# Patient Record
Sex: MALE | Race: WHITE | NOT HISPANIC OR LATINO | Employment: OTHER | URBAN - METROPOLITAN AREA
[De-identification: names, ages, dates, MRNs, and addresses within clinical notes are randomized per-mention and may not be internally consistent; named-entity substitution may affect disease eponyms.]

---

## 2017-01-25 ENCOUNTER — ALLSCRIPTS OFFICE VISIT (OUTPATIENT)
Dept: OTHER | Facility: OTHER | Age: 62
End: 2017-01-25

## 2017-01-25 DIAGNOSIS — G47.30 SLEEP APNEA: ICD-10-CM

## 2017-01-25 DIAGNOSIS — R79.9 ABNORMAL FINDING OF BLOOD CHEMISTRY: ICD-10-CM

## 2017-01-25 DIAGNOSIS — I10 ESSENTIAL (PRIMARY) HYPERTENSION: ICD-10-CM

## 2017-01-25 DIAGNOSIS — K22.70 BARRETT'S ESOPHAGUS WITHOUT DYSPLASIA: ICD-10-CM

## 2017-01-25 DIAGNOSIS — Z12.11 ENCOUNTER FOR SCREENING FOR MALIGNANT NEOPLASM OF COLON: ICD-10-CM

## 2017-02-03 ENCOUNTER — TRANSCRIBE ORDERS (OUTPATIENT)
Dept: ADMINISTRATIVE | Facility: HOSPITAL | Age: 62
End: 2017-02-03

## 2017-02-03 ENCOUNTER — APPOINTMENT (OUTPATIENT)
Dept: LAB | Facility: HOSPITAL | Age: 62
End: 2017-02-03
Attending: INTERNAL MEDICINE
Payer: MEDICARE

## 2017-02-03 DIAGNOSIS — Z12.11 SPECIAL SCREENING FOR MALIGNANT NEOPLASMS, COLON: Primary | ICD-10-CM

## 2017-02-03 DIAGNOSIS — Z12.11 ENCOUNTER FOR SCREENING FOR MALIGNANT NEOPLASM OF COLON: ICD-10-CM

## 2017-02-03 DIAGNOSIS — G47.30 SLEEP APNEA: ICD-10-CM

## 2017-02-03 DIAGNOSIS — K22.70 BARRETT'S ESOPHAGUS WITHOUT DYSPLASIA: ICD-10-CM

## 2017-02-03 DIAGNOSIS — I10 ESSENTIAL (PRIMARY) HYPERTENSION: ICD-10-CM

## 2017-02-03 DIAGNOSIS — R79.9 ABNORMAL FINDING OF BLOOD CHEMISTRY: ICD-10-CM

## 2017-02-03 DIAGNOSIS — R80.9 PROTEINURIA: ICD-10-CM

## 2017-02-03 LAB
ALBUMIN SERPL BCP-MCNC: 3.5 G/DL (ref 3.5–5)
ALP SERPL-CCNC: 129 U/L (ref 46–116)
ALT SERPL W P-5'-P-CCNC: 23 U/L (ref 12–78)
ANION GAP SERPL CALCULATED.3IONS-SCNC: 5 MMOL/L (ref 4–13)
AST SERPL W P-5'-P-CCNC: 15 U/L (ref 5–45)
BACTERIA UR QL AUTO: ABNORMAL /HPF
BASOPHILS # BLD AUTO: 0.03 THOUSANDS/ΜL (ref 0–0.1)
BASOPHILS NFR BLD AUTO: 1 % (ref 0–1)
BILIRUB SERPL-MCNC: 0.6 MG/DL (ref 0.2–1)
BILIRUB UR QL STRIP: NEGATIVE
BUN SERPL-MCNC: 13 MG/DL (ref 5–25)
CALCIUM SERPL-MCNC: 8.5 MG/DL (ref 8.3–10.1)
CHLORIDE SERPL-SCNC: 107 MMOL/L (ref 100–108)
CHOLEST SERPL-MCNC: 164 MG/DL (ref 50–200)
CLARITY UR: CLEAR
CO2 SERPL-SCNC: 31 MMOL/L (ref 21–32)
COLOR UR: YELLOW
CREAT SERPL-MCNC: 0.81 MG/DL (ref 0.6–1.3)
CREAT UR-MCNC: 263 MG/DL
CREAT UR-MCNC: 271 MG/DL
EOSINOPHIL # BLD AUTO: 0.21 THOUSAND/ΜL (ref 0–0.61)
EOSINOPHIL NFR BLD AUTO: 4 % (ref 0–6)
ERYTHROCYTE [DISTWIDTH] IN BLOOD BY AUTOMATED COUNT: 12.2 % (ref 11.6–15.1)
EST. AVERAGE GLUCOSE BLD GHB EST-MCNC: 105 MG/DL
GFR SERPL CREATININE-BSD FRML MDRD: >60 ML/MIN/1.73SQ M
GLUCOSE SERPL-MCNC: 110 MG/DL (ref 65–140)
GLUCOSE UR STRIP-MCNC: NEGATIVE MG/DL
HBA1C MFR BLD: 5.3 % (ref 4.2–6.3)
HCT VFR BLD AUTO: 42.2 % (ref 36.5–49.3)
HDLC SERPL-MCNC: 34 MG/DL (ref 40–60)
HGB BLD-MCNC: 14.2 G/DL (ref 12–17)
HGB UR QL STRIP.AUTO: ABNORMAL
KETONES UR STRIP-MCNC: NEGATIVE MG/DL
LDLC SERPL CALC-MCNC: 96 MG/DL (ref 0–100)
LEUKOCYTE ESTERASE UR QL STRIP: NEGATIVE
LYMPHOCYTES # BLD AUTO: 1.5 THOUSANDS/ΜL (ref 0.6–4.47)
LYMPHOCYTES NFR BLD AUTO: 27 % (ref 14–44)
MCH RBC QN AUTO: 28.1 PG (ref 26.8–34.3)
MCHC RBC AUTO-ENTMCNC: 33.6 G/DL (ref 31.4–37.4)
MCV RBC AUTO: 83 FL (ref 82–98)
MICROALBUMIN UR-MCNC: 789 MG/L (ref 0–20)
MICROALBUMIN/CREAT 24H UR: 291 MG/G CREATININE (ref 0–30)
MONOCYTES # BLD AUTO: 0.42 THOUSAND/ΜL (ref 0.17–1.22)
MONOCYTES NFR BLD AUTO: 8 % (ref 4–12)
NEUTROPHILS # BLD AUTO: 3.42 THOUSANDS/ΜL (ref 1.85–7.62)
NEUTS SEG NFR BLD AUTO: 61 % (ref 43–75)
NITRITE UR QL STRIP: NEGATIVE
NON-SQ EPI CELLS URNS QL MICRO: ABNORMAL /HPF
NRBC BLD AUTO-RTO: 0 /100 WBCS
PH UR STRIP.AUTO: 5.5 [PH] (ref 4.5–8)
PLATELET # BLD AUTO: 217 THOUSANDS/UL (ref 149–390)
PMV BLD AUTO: 10.8 FL (ref 8.9–12.7)
POTASSIUM SERPL-SCNC: 3.6 MMOL/L (ref 3.5–5.3)
PROT SERPL-MCNC: 6.6 G/DL (ref 6.4–8.2)
PROT UR STRIP-MCNC: ABNORMAL MG/DL
PROT UR-MCNC: 115 MG/DL
PROT/CREAT UR: 0.44 MG/G{CREAT} (ref 0–0.1)
RBC # BLD AUTO: 5.06 MILLION/UL (ref 3.88–5.62)
RBC #/AREA URNS AUTO: ABNORMAL /HPF
SODIUM SERPL-SCNC: 143 MMOL/L (ref 136–145)
SP GR UR STRIP.AUTO: >=1.03 (ref 1–1.03)
T4 FREE SERPL-MCNC: 0.78 NG/DL (ref 0.76–1.46)
TRIGL SERPL-MCNC: 170 MG/DL
TSH SERPL DL<=0.05 MIU/L-ACNC: 0.95 UIU/ML (ref 0.36–3.74)
UROBILINOGEN UR QL STRIP.AUTO: 0.2 E.U./DL
WBC # BLD AUTO: 5.59 THOUSAND/UL (ref 4.31–10.16)
WBC #/AREA URNS AUTO: ABNORMAL /HPF

## 2017-02-03 PROCEDURE — 80061 LIPID PANEL: CPT

## 2017-02-03 PROCEDURE — 84156 ASSAY OF PROTEIN URINE: CPT

## 2017-02-03 PROCEDURE — 85025 COMPLETE CBC W/AUTO DIFF WBC: CPT

## 2017-02-03 PROCEDURE — 82570 ASSAY OF URINE CREATININE: CPT | Performed by: FAMILY MEDICINE

## 2017-02-03 PROCEDURE — 84439 ASSAY OF FREE THYROXINE: CPT

## 2017-02-03 PROCEDURE — 82043 UR ALBUMIN QUANTITATIVE: CPT | Performed by: FAMILY MEDICINE

## 2017-02-03 PROCEDURE — 36415 COLL VENOUS BLD VENIPUNCTURE: CPT

## 2017-02-03 PROCEDURE — 83036 HEMOGLOBIN GLYCOSYLATED A1C: CPT

## 2017-02-03 PROCEDURE — 87086 URINE CULTURE/COLONY COUNT: CPT

## 2017-02-03 PROCEDURE — 81001 URINALYSIS AUTO W/SCOPE: CPT

## 2017-02-03 PROCEDURE — 84443 ASSAY THYROID STIM HORMONE: CPT

## 2017-02-03 PROCEDURE — 80053 COMPREHEN METABOLIC PANEL: CPT

## 2017-02-04 LAB — BACTERIA UR CULT: NORMAL

## 2017-02-05 ENCOUNTER — GENERIC CONVERSION - ENCOUNTER (OUTPATIENT)
Dept: OTHER | Facility: OTHER | Age: 62
End: 2017-02-05

## 2017-02-06 ENCOUNTER — TRANSCRIBE ORDERS (OUTPATIENT)
Dept: ADMINISTRATIVE | Facility: HOSPITAL | Age: 62
End: 2017-02-06

## 2017-02-06 ENCOUNTER — APPOINTMENT (OUTPATIENT)
Dept: LAB | Facility: HOSPITAL | Age: 62
End: 2017-02-06
Payer: MEDICARE

## 2017-02-06 DIAGNOSIS — Z12.11 ENCOUNTER FOR SCREENING FOR MALIGNANT NEOPLASM OF COLON: ICD-10-CM

## 2017-02-06 LAB — HEMOCCULT STL QL IA: POSITIVE

## 2017-02-06 PROCEDURE — G0328 FECAL BLOOD SCRN IMMUNOASSAY: HCPCS

## 2017-02-08 ENCOUNTER — GENERIC CONVERSION - ENCOUNTER (OUTPATIENT)
Dept: OTHER | Facility: OTHER | Age: 62
End: 2017-02-08

## 2017-02-16 ENCOUNTER — ALLSCRIPTS OFFICE VISIT (OUTPATIENT)
Dept: OTHER | Facility: OTHER | Age: 62
End: 2017-02-16

## 2017-02-27 ENCOUNTER — APPOINTMENT (OUTPATIENT)
Dept: LAB | Facility: HOSPITAL | Age: 62
End: 2017-02-27
Attending: INTERNAL MEDICINE
Payer: MEDICARE

## 2017-02-27 ENCOUNTER — TRANSCRIBE ORDERS (OUTPATIENT)
Dept: ADMINISTRATIVE | Facility: HOSPITAL | Age: 62
End: 2017-02-27

## 2017-02-27 DIAGNOSIS — R80.9 PROTEINURIA: Primary | ICD-10-CM

## 2017-02-27 DIAGNOSIS — R80.9 PROTEINURIA: ICD-10-CM

## 2017-02-27 PROCEDURE — 84166 PROTEIN E-PHORESIS/URINE/CSF: CPT | Performed by: INTERNAL MEDICINE

## 2017-02-27 PROCEDURE — 36415 COLL VENOUS BLD VENIPUNCTURE: CPT

## 2017-02-27 PROCEDURE — 84165 PROTEIN E-PHORESIS SERUM: CPT

## 2017-02-28 ENCOUNTER — ALLSCRIPTS OFFICE VISIT (OUTPATIENT)
Dept: OTHER | Facility: OTHER | Age: 62
End: 2017-02-28

## 2017-03-01 LAB
ALBUMIN SERPL ELPH-MCNC: 4.21 G/DL (ref 3.5–5)
ALBUMIN SERPL ELPH-MCNC: 64.8 % (ref 52–65)
ALBUMIN UR ELPH-MCNC: 80.2 %
ALPHA1 GLOB MFR UR ELPH: 2 %
ALPHA1 GLOB SERPL ELPH-MCNC: 0.33 G/DL (ref 0.1–0.4)
ALPHA1 GLOB SERPL ELPH-MCNC: 5.1 % (ref 2.5–5)
ALPHA2 GLOB MFR UR ELPH: 4.8 %
ALPHA2 GLOB SERPL ELPH-MCNC: 0.64 G/DL (ref 0.4–1.2)
ALPHA2 GLOB SERPL ELPH-MCNC: 9.9 % (ref 7–13)
B-GLOBULIN MFR UR ELPH: 6.7 %
BETA GLOB ABNORMAL SERPL ELPH-MCNC: 0.46 G/DL (ref 0.4–0.8)
BETA1 GLOB SERPL ELPH-MCNC: 7.1 % (ref 5–13)
BETA2 GLOB SERPL ELPH-MCNC: 4.1 % (ref 2–8)
BETA2+GAMMA GLOB SERPL ELPH-MCNC: 0.27 G/DL (ref 0.2–0.5)
GAMMA GLOB ABNORMAL SERPL ELPH-MCNC: 0.59 G/DL (ref 0.5–1.6)
GAMMA GLOB MFR UR ELPH: 6.3 %
GAMMA GLOB SERPL ELPH-MCNC: 9 % (ref 12–22)
IGG/ALB SER: 1.84 {RATIO} (ref 1.1–1.8)
PROT PATTERN SERPL ELPH-IMP: ABNORMAL
PROT PATTERN UR ELPH-IMP: ABNORMAL
PROT SERPL-MCNC: 6.5 G/DL (ref 6.4–8.2)
PROT UR-MCNC: 79 MG/DL

## 2017-03-09 ENCOUNTER — GENERIC CONVERSION - ENCOUNTER (OUTPATIENT)
Dept: OTHER | Facility: OTHER | Age: 62
End: 2017-03-09

## 2017-03-15 ENCOUNTER — ALLSCRIPTS OFFICE VISIT (OUTPATIENT)
Dept: OTHER | Facility: OTHER | Age: 62
End: 2017-03-15

## 2017-04-27 ENCOUNTER — ALLSCRIPTS OFFICE VISIT (OUTPATIENT)
Dept: OTHER | Facility: OTHER | Age: 62
End: 2017-04-27

## 2017-05-05 ENCOUNTER — GENERIC CONVERSION - ENCOUNTER (OUTPATIENT)
Dept: OTHER | Facility: OTHER | Age: 62
End: 2017-05-05

## 2017-05-11 ENCOUNTER — GENERIC CONVERSION - ENCOUNTER (OUTPATIENT)
Dept: OTHER | Facility: OTHER | Age: 62
End: 2017-05-11

## 2017-05-31 ENCOUNTER — GENERIC CONVERSION - ENCOUNTER (OUTPATIENT)
Dept: OTHER | Facility: OTHER | Age: 62
End: 2017-05-31

## 2017-06-28 ENCOUNTER — ALLSCRIPTS OFFICE VISIT (OUTPATIENT)
Dept: OTHER | Facility: OTHER | Age: 62
End: 2017-06-28

## 2017-06-28 DIAGNOSIS — E11.9 TYPE 2 DIABETES MELLITUS WITHOUT COMPLICATIONS (HCC): ICD-10-CM

## 2017-07-13 ENCOUNTER — GENERIC CONVERSION - ENCOUNTER (OUTPATIENT)
Dept: OTHER | Facility: OTHER | Age: 62
End: 2017-07-13

## 2017-08-11 ENCOUNTER — APPOINTMENT (OUTPATIENT)
Dept: LAB | Facility: HOSPITAL | Age: 62
End: 2017-08-11
Attending: INTERNAL MEDICINE
Payer: MEDICARE

## 2017-08-11 ENCOUNTER — TRANSCRIBE ORDERS (OUTPATIENT)
Dept: ADMINISTRATIVE | Facility: HOSPITAL | Age: 62
End: 2017-08-11

## 2017-08-11 DIAGNOSIS — I10 ESSENTIAL (PRIMARY) HYPERTENSION: ICD-10-CM

## 2017-08-11 DIAGNOSIS — E11.9 TYPE 2 DIABETES MELLITUS WITHOUT COMPLICATIONS (HCC): ICD-10-CM

## 2017-08-11 LAB
ALBUMIN SERPL BCP-MCNC: 3.9 G/DL (ref 3.5–5)
ALP SERPL-CCNC: 138 U/L (ref 46–116)
ALT SERPL W P-5'-P-CCNC: 31 U/L (ref 12–78)
ANION GAP SERPL CALCULATED.3IONS-SCNC: 8 MMOL/L (ref 4–13)
AST SERPL W P-5'-P-CCNC: 15 U/L (ref 5–45)
BACTERIA UR QL AUTO: ABNORMAL /HPF
BASOPHILS # BLD AUTO: 0.05 THOUSANDS/ΜL (ref 0–0.1)
BASOPHILS NFR BLD AUTO: 1 % (ref 0–1)
BILIRUB SERPL-MCNC: 0.7 MG/DL (ref 0.2–1)
BILIRUB UR QL STRIP: NEGATIVE
BUN SERPL-MCNC: 11 MG/DL (ref 5–25)
CALCIUM SERPL-MCNC: 9 MG/DL (ref 8.3–10.1)
CHLORIDE SERPL-SCNC: 106 MMOL/L (ref 100–108)
CHOLEST SERPL-MCNC: 162 MG/DL (ref 50–200)
CLARITY UR: CLEAR
CO2 SERPL-SCNC: 29 MMOL/L (ref 21–32)
COLOR UR: YELLOW
CREAT SERPL-MCNC: 0.88 MG/DL (ref 0.6–1.3)
CREAT UR-MCNC: 179 MG/DL
EOSINOPHIL # BLD AUTO: 0.18 THOUSAND/ΜL (ref 0–0.61)
EOSINOPHIL NFR BLD AUTO: 3 % (ref 0–6)
ERYTHROCYTE [DISTWIDTH] IN BLOOD BY AUTOMATED COUNT: 12.6 % (ref 11.6–15.1)
EST. AVERAGE GLUCOSE BLD GHB EST-MCNC: 114 MG/DL
GFR SERPL CREATININE-BSD FRML MDRD: 93 ML/MIN/1.73SQ M
GLUCOSE P FAST SERPL-MCNC: 109 MG/DL (ref 65–99)
GLUCOSE UR STRIP-MCNC: ABNORMAL MG/DL
HBA1C MFR BLD: 5.6 % (ref 4.2–6.3)
HCT VFR BLD AUTO: 43.7 % (ref 36.5–49.3)
HDLC SERPL-MCNC: 38 MG/DL (ref 40–60)
HGB BLD-MCNC: 15.1 G/DL (ref 12–17)
HGB UR QL STRIP.AUTO: ABNORMAL
HYALINE CASTS #/AREA URNS LPF: ABNORMAL /LPF
KETONES UR STRIP-MCNC: ABNORMAL MG/DL
LDLC SERPL CALC-MCNC: 78 MG/DL (ref 0–100)
LEUKOCYTE ESTERASE UR QL STRIP: ABNORMAL
LYMPHOCYTES # BLD AUTO: 1.51 THOUSANDS/ΜL (ref 0.6–4.47)
LYMPHOCYTES NFR BLD AUTO: 26 % (ref 14–44)
MCH RBC QN AUTO: 28.1 PG (ref 26.8–34.3)
MCHC RBC AUTO-ENTMCNC: 34.6 G/DL (ref 31.4–37.4)
MCV RBC AUTO: 81 FL (ref 82–98)
MONOCYTES # BLD AUTO: 0.52 THOUSAND/ΜL (ref 0.17–1.22)
MONOCYTES NFR BLD AUTO: 9 % (ref 4–12)
MUCOUS THREADS UR QL AUTO: ABNORMAL
NEUTROPHILS # BLD AUTO: 3.55 THOUSANDS/ΜL (ref 1.85–7.62)
NEUTS SEG NFR BLD AUTO: 61 % (ref 43–75)
NITRITE UR QL STRIP: NEGATIVE
NON-SQ EPI CELLS URNS QL MICRO: ABNORMAL /HPF
NRBC BLD AUTO-RTO: 0 /100 WBCS
PH UR STRIP.AUTO: 5.5 [PH] (ref 4.5–8)
PLATELET # BLD AUTO: 191 THOUSANDS/UL (ref 149–390)
PMV BLD AUTO: 10.7 FL (ref 8.9–12.7)
POTASSIUM SERPL-SCNC: 3.4 MMOL/L (ref 3.5–5.3)
PROT SERPL-MCNC: 6.9 G/DL (ref 6.4–8.2)
PROT UR STRIP-MCNC: ABNORMAL MG/DL
PROT UR-MCNC: 49 MG/DL
PROT/CREAT UR: 0.27 MG/G{CREAT} (ref 0–0.1)
RBC # BLD AUTO: 5.38 MILLION/UL (ref 3.88–5.62)
RBC #/AREA URNS AUTO: ABNORMAL /HPF
SODIUM SERPL-SCNC: 143 MMOL/L (ref 136–145)
SP GR UR STRIP.AUTO: 1.02 (ref 1–1.03)
TRIGL SERPL-MCNC: 229 MG/DL
UROBILINOGEN UR QL STRIP.AUTO: 0.2 E.U./DL
WBC # BLD AUTO: 5.82 THOUSAND/UL (ref 4.31–10.16)
WBC #/AREA URNS AUTO: ABNORMAL /HPF

## 2017-08-11 PROCEDURE — 82570 ASSAY OF URINE CREATININE: CPT

## 2017-08-11 PROCEDURE — 84156 ASSAY OF PROTEIN URINE: CPT

## 2017-08-11 PROCEDURE — 80053 COMPREHEN METABOLIC PANEL: CPT

## 2017-08-11 PROCEDURE — 81001 URINALYSIS AUTO W/SCOPE: CPT

## 2017-08-11 PROCEDURE — 83036 HEMOGLOBIN GLYCOSYLATED A1C: CPT

## 2017-08-11 PROCEDURE — 85025 COMPLETE CBC W/AUTO DIFF WBC: CPT

## 2017-08-11 PROCEDURE — 36415 COLL VENOUS BLD VENIPUNCTURE: CPT

## 2017-08-11 PROCEDURE — 80061 LIPID PANEL: CPT

## 2017-08-14 ENCOUNTER — APPOINTMENT (OUTPATIENT)
Dept: LAB | Facility: HOSPITAL | Age: 62
End: 2017-08-14
Payer: MEDICARE

## 2017-08-14 DIAGNOSIS — E11.9 TYPE 2 DIABETES MELLITUS WITHOUT COMPLICATIONS (HCC): ICD-10-CM

## 2017-08-14 LAB — HEMOCCULT STL QL IA: NEGATIVE

## 2017-08-14 PROCEDURE — G0328 FECAL BLOOD SCRN IMMUNOASSAY: HCPCS

## 2017-08-15 ENCOUNTER — GENERIC CONVERSION - ENCOUNTER (OUTPATIENT)
Dept: OTHER | Facility: OTHER | Age: 62
End: 2017-08-15

## 2017-08-28 ENCOUNTER — ALLSCRIPTS OFFICE VISIT (OUTPATIENT)
Dept: OTHER | Facility: OTHER | Age: 62
End: 2017-08-28

## 2017-08-28 DIAGNOSIS — R80.9 PROTEINURIA: ICD-10-CM

## 2017-09-28 ENCOUNTER — ALLSCRIPTS OFFICE VISIT (OUTPATIENT)
Dept: OTHER | Facility: OTHER | Age: 62
End: 2017-09-28

## 2017-10-04 ENCOUNTER — GENERIC CONVERSION - ENCOUNTER (OUTPATIENT)
Dept: OTHER | Facility: OTHER | Age: 62
End: 2017-10-04

## 2018-01-09 NOTE — RESULT NOTES
Verified Results  (1) COMPREHENSIVE METABOLIC PANEL [id number] 15:56AL Electa Zoey     Test Name Result Flag Reference   GLUCOSE,RANDM 110 mg/dL     If the patient is fasting, the ADA then defines impaired fasting glucose as > 100 mg/dL and diabetes as > or equal to 123 mg/dL  SODIUM 143 mmol/L  136-145   POTASSIUM 3 6 mmol/L  3 5-5 3   CHLORIDE 107 mmol/L  100-108   CARBON DIOXIDE 31 mmol/L  21-32   ANION GAP (CALC) 5 mmol/L  4-13   BLOOD UREA NITROGEN 13 mg/dL  5-25   CREATININE 0 81 mg/dL  0 60-1 30   Standardized to IDMS reference method   CALCIUM 8 5 mg/dL  8 3-10 1   BILI, TOTAL 0 60 mg/dL  0 20-1 00   ALK PHOSPHATAS 129 U/L H    ALT (SGPT) 23 U/L  12-78   AST(SGOT) 15 U/L  5-45   ALBUMIN 3 5 g/dL  3 5-5 0   TOTAL PROTEIN 6 6 g/dL  6 4-8 2   eGFR Non-African American      >60 0 ml/min/1 73sq m   - Patient Instructions: This is a fasting blood test  Water,black tea or black  coffee only after 9:00pm the night before test Drink 2 glasses of water the morning of test - Patient Instructions: This bloodwork is non-fasting  Please drink two glasses of   water morning of bloodwork  National Kidney Disease Education Program recommendations are as follows:  GFR calculation is accurate only with a steady state creatinine  Chronic Kidney disease less than 60 ml/min/1 73 sq  meters  Kidney failure less than 15 ml/min/1 73 sq  meters  (1) LIPID PANEL, FASTING [id number] 09:00AM [facility]     Test Name Result Flag Reference   CHOLESTEROL 164 mg/dL     HDL,DIRECT 34 mg/dL L 40-60   Specimen collection should occur prior to Metamizole administration due to the potential for falsely depressed results  LDL CHOLESTEROL CALCULATED 96 mg/dL  0-100   - Patient Instructions: This is a fasting blood test  Water,black tea or black  coffee only after 9:00pm the night before test   Drink 2 glasses of water the morning of test     - Patient Instructions:  This is a fasting blood test  Water,black tea or black  coffee only after 9:00pm the night before test Drink 2 glasses of water the morning of test - Patient Instructions: This bloodwork is non-fasting  Please drink two glasses of   water morning of bloodwork  Triglyceride:         Normal              <150 mg/dl       Borderline High    150-199 mg/dl       High               200-499 mg/dl       Very High          >499 mg/dl  Cholesterol:         Desirable        <200 mg/dl      Borderline High  200-239 mg/dl      High             >239 mg/dl  HDL Cholesterol:        High    >59 mg/dL      Low     <41 mg/dL  LDL CALCULATED:    This screening LDL is a calculated result  It does not have the accuracy of the Direct Measured LDL in the monitoring of patients with hyperlipidemia and/or statin therapy  Direct Measure LDL (GTM684) must be ordered separately in these patients  TRIGLYCERIDES 170 mg/dL H <=150   Specimen collection should occur prior to N-Acetylcysteine or Metamizole administration due to the potential for falsely depressed results  (1) T4, FREE [id number] 09:00AM [facility]     Test Name Result Flag Reference   T4,FREE 0 78 ng/dL  0 76-1 46     (1) TSH [id number] 09:00AM [facility]     Test Name Result Flag Reference   TSH 0 946 uIU/mL  0 358-3 740   - Patient Instructions: This bloodwork is non-fasting  Please drink two glasses of water morning of bloodwork  - Patient Instructions: This is a fasting blood test  Water,black tea or black  coffee only after 9:00pm the night before test Drink 2 glasses of water the morning of test - Patient Instructions: This bloodwork is non-fasting  Please drink two glasses of   water morning of bloodwork  Patients undergoing fluorescein dye angiography may retain small amounts of fluorescein in the body for 48-72 hours post procedure  Samples containing fluorescein can produce falsely depressed TSH values  If the patient had this procedure,a specimen should be resubmitted post fluorescein clearance       (1) URINALYSIS w URINE C/S REFLEX (will reflex a microscopy if leukocytes, occult blood, or nitrites are not within normal limits) [id number] 09:00AM [facility]     Test Name Result Flag Reference   COLOR Yellow     CLARITY Clear     PH UA 5 5  4 5-8 0   LEUKOCYTE ESTERASE UA Negative  Negative   NITRITE UA Negative  Negative   PROTEIN  (2+) mg/dl A Negative   GLUCOSE UA Negative mg/dl  Negative   KETONES UA Negative mg/dl  Negative   UROBILINOGEN UA 0 2 E U /dl  0 2, 1 0 E U /dl   BILIRUBIN UA Negative  Negative   BLOOD UA Trace-Intact A Negative   SPECIFIC GRAVITY UA >=1 030  1 003-1 030   BACTERIA Occasional /hpf  None Seen, Occasional   EPITHELIAL CELLS Occasional /hpf  None Seen, Occasional   RBC UA None Seen /hpf  None Seen   WBC UA 1-2 /hpf A None Seen     (1) HEMOGLOBIN A1C [id number] 09:00AM [facility]     Test Name Result Flag Reference   HEMOGLOBIN A1C 5 3 %  4 2-6 3   EST  AVG   GLUCOSE 105 mg/dl       (1) MICROALBUMIN CREATININE RATIO, RANDOM URINE [id number] 09:00AM [facility]     Test Name Result Flag Reference   MICROALBUMIN/ CREAT R 291 mg/g creatinine H 0-30   MICROALBUMIN,URINE 789 0 mg/L H 0 0-20 0   CREATININE URINE 271 0 mg/dL

## 2018-01-10 NOTE — PROGRESS NOTES
Assessment    1  Cough (786 2) (R05)   2  Acute frontal sinusitis (461 1) (J01 10)   3  PUD (peptic ulcer disease) (533 90) (K27 9)    Plan  Acute frontal sinusitis    · Augmentin 875-125 MG Oral Tablet (Amoxicillin-Pot Clavulanate); TAKE 1 TABLET  EVERY 12 HOURS UNTIL GONE   · Call [phone] if: The sinus pain is not better in 1 week ; Status:Complete;   Done:  23NTW5828   · Apply warm moist compresses to the affected area 3 times a day for 5 minutes ;  Status:Complete;   Done: 11ARV3154   · Drink at least 6 glasses of water or juice a day ; Status:Complete;   Done: 00AIH8494   · Taking a hot steamy shower may help your condition ; Status:Complete;   Done:  52HEH9609  Cough    · Cheratussin -10 MG/5ML Oral Syrup; TAKE 10 ML EVERY 4 TO 6 HOURS  AS NEEDED    Discussion/Summary    Hydrate  lots of tea with honey  no driving with the cough syrup  take OTC plain mucinex for the next 10 days  Raise the head of your bed 4-6 inchs  take your carafate 4 times a day  Attend that f/u EGD in 12 weeks  Chief Complaint  Pt has complaints of coughing , congestion and headache      History of Present Illness  Cough: Tk French presents with complaints of cough  Associated symptoms include wheezing, sore throat, postnasal drainage and hoarseness, but no dyspnea, no chills, no fever and no runny nose  Sinusitis (Brief): The sinusitis involves the maxillary sinuses and the frontal sinuses  The sinusitis is classified as acute  The patient is currently experiencing symptoms  facial pain facial pressure headache purulent rhinorrhea   Associated symptoms:  ear fullness, ear pressure, sore throat and cough, but no fever, no diplopia, no periorbital redness, no periorbital swelling and no neck stiffness  Peptic Ulcer Disease (Follow-Up): The patient is being seen for follow-up of peptic ulcer disease and small ulcer at site of Trudi En Y anastimosis site on EGD  The patient reports doing well   He has had no significant interval events  The patient is currently asymptomatic  Medications:  the patient is adherent to his medication regimen  Disease management:  the patient is doing well with his goals  Due for: Using carafate  Review of Systems    Constitutional: no fever or chills, feels well, no tiredness, no recent weight loss or weight gain  ENT: earache, sore throat and nasal discharge  Cardiovascular: no complaints of slow or fast heart rate, no chest pain, no palpitations, no leg claudication or lower extremity edema  Respiratory: cough and wheezing  Gastrointestinal: no complaints of abdominal pain, no constipation, no nausea or vomiting, no diarrhea or bloody stools  Genitourinary: no complaints of dysuria or incontinence, no hesitancy, no nocturia, no genital lesion, no inadequacy of penile erection  Musculoskeletal: no complaints of arthralgia, no myalgia, no joint swelling or stiffness, no limb pain or swelling  Integumentary: no complaints of skin rash or lesion, no itching or dry skin, no skin wounds  Neurological: no complaints of headache, no confusion, no numbness or tingling, no dizziness or fainting  Preventive Quality 65 and Older: Falls Risk: The patient fell 0 times in the past 12 months  The patient is currently asymptomatic Symptoms Include:  Associated symptoms:  No associated symptoms are reported  The patient currently has no urinary incontinence symptoms  Over the past 2 weeks, how often have you been bothered by the following problems? 1 ) Little interest or pleasure in doing things? Not at all    2 ) Feeling down, depressed or hopeless? Not at all    3 ) Trouble falling asleep or sleeping too much? Not at all    4 ) Feeling tired or having little energy? Not at all    5 ) Poor appetite or overeating? Not at all    6 ) Feeling bad about yourself, or that you are a failure, or have let yourself or your family down?  Not at all    7 ) Trouble concentrating on things, such as reading a newspaper or watching television? Not at all    8 ) Moving or speaking so slowly that other people could have noticed, or the opposite, moving or speaking faster than usual? Not at all  How difficult have these problems made it for you to do your work, take care of things at home, or get along with people? Not at all  Score      ROS reviewed  Active Problems    1  Abnormal blood chemistry (790 6) (R79 9)   2  Acute URI (465 9) (J06 9)   3  Arthritis (716 90) (M19 90)   4  Ugalde esophagus (530 85) (K22 70)   5  Benign essential hypertension (401 1) (I10)   6  Blood in stool (578 1) (K92 1)   7  Flu vaccine need (V04 81) (Z23)   8  Hiatal hernia (553 3) (K44 9)   9  Hypervitaminosis A (278 2) (E67 0)   10  Need for pneumococcal vaccine (V03 82) (Z23)   11  Obesity surgery status (V45 86) (Z98 84)   12  Obstructive sleep apnea (327 23) (G47 33)   13  Other vitamin B12 deficiency anemias (281 1) (D51 8)   14  Positive fecal immunochemical test (792 1) (R19 5)   15  Postgastrectomy malabsorption (579 3) (K91 2,Z90 3)   16  Protein in urine (791 0) (R80 9)   17  Screening for colon cancer (V76 51) (Z12 11)   18  Sleep apnea (780 57) (G47 30)   19  Vitamin B12 deficiency (266 2) (E53 8)    Past Medical History    1  History of diabetes mellitus (V12 29) (Z86 39)   2  History of edema (V13 89) (Z87 898)   3  History of hyperlipidemia (V12 29) (Z86 39)   4  History of hypertension (V12 59) (Z86 79)   5  History of Morbid or severe obesity due to excess calories (278 01) (E66 01)  Active Problems And Past Medical History Reviewed: The active problems and past medical history were reviewed and updated today  Family History  Mother    1  Family history of Chronic Obstructive Pulmonary Disease   2  Family history of Congenital Heart Disease   3  Denied: Family history of mental disorder   4  Denied: Family history of Illicit drug use  Father    5   Family history of Diabetes Mellitus (V18 0)   6  Denied: Family history of mental disorder   7  Denied: Family history of Illicit drug use   8  Family history of Lung Cancer (V16 1)  Sister    5  Family history of Diabetes Mellitus (V18 0)  Family History Reviewed: The family history was reviewed and updated today  Social History    · Denied: History of Alcohol Use (History)   · Denied: History of Drug Use   · Former smoker (I18 21) (X66 457)  The social history was reviewed and updated today  The social history was reviewed and is unchanged  Surgical History    1  History of Back Surgery   2  History of Cholecystectomy   3  History of Complete Colonoscopy   4  History of Foot Surgery   5  History of Gastric Stapling For Morbid Obesity Laparoscop W/ Trudi-en-Y   6  History of Shoulder Surgery   7  History of Uvuloplasty  Surgical History Reviewed: The surgical history was reviewed and updated today  Current Meds   1  AmLODIPine Besylate 5 MG Oral Tablet; TAKE 1 TABLET DAILY FOR BLOOD   PRESSURE; Therapy: 41HIY3975 to (Evaluate:06Tlv7079)  Requested for: 54VRR7442; Last   Rx:25Jan2017 Ordered   2  Calcium Citrate TABS; Therapy: (Recorded:19Oct2012) to Recorded   3  Losartan Potassium 100 MG Oral Tablet; TAKE 1 TABLET ONCE DAILY  Requested for:   69AYF1256; Last Rx:25Jan2017 Ordered   4  Multi-Vitamin TABS; Therapy: (Recorded:19Oct2012) to Recorded   5  Sucralfate 1 GM Oral Tablet; take 1 tablet and dissolve in 10ml and swallow QID; Therapy: 20PZB1644 to (Evaluate:07Jun2017)  Requested for: 20GVP4789; Last   Rx:09Mar2017 Ordered   6  Vitamin B-12 SUBL; Therapy: (Recorded:19Oct2012) to Recorded    The medication list was reviewed and updated today  Allergies    1   Ciprofloxacin HCl TABS    Vitals   Recorded: 56EFE1736 11:09AM   Temperature 98 9 F   Heart Rate 74   Systolic 354   Diastolic 84   Height 5 ft 10 in   Weight 231 lb 4 00 oz   BMI Calculated 33 18   BSA Calculated 2 22   O2 Saturation 97     Physical Exam    Constitutional   General appearance: No acute distress, well appearing and well nourished  Eyes   Conjunctiva and lids: No swelling, erythema, or discharge  Pupils and irises: Equal, round and reactive to light  Ears, Nose, Mouth, and Throat   External inspection of ears and nose: Normal     Otoscopic examination: Tympanic membrance translucent with normal light reflex  Canals patent without erythema  Nasal mucosa, septum, and turbinates: Normal without edema or erythema  Oropharynx: Normal with no erythema, edema, exudate or lesions  Pulmonary   Respiratory effort: No increased work of breathing or signs of respiratory distress  Auscultation of lungs: Clear to auscultation, equal breath sounds bilaterally, no wheezes, no rales, no rhonci  Cardiovascular   Auscultation of heart: Normal rate and rhythm, normal S1 and S2, without murmurs  Examination of extremities for edema and/or varicosities: Normal     Carotid pulses: Normal     Abdomen   Abdomen: Non-tender, no masses  Lymphatic   Palpation of lymph nodes in neck: No lymphadenopathy  Musculoskeletal   Gait and station: Normal     Digits and nails: Normal without clubbing or cyanosis  Inspection/palpation of joints, bones, and muscles: Normal     Skin   Skin and subcutaneous tissue: Normal without rashes or lesions  Neurologic   Cranial nerves: Cranial nerves 2-12 intact  Reflexes: 2+ and symmetric  Sensation: No sensory loss      Psychiatric   Orientation to person, place and time: Normal     Mood and affect: Normal          Results/Data  COLONOSCOPY [id number] 02:52PM Giovanni Hastings     Test Name Result Flag Reference   Colonoscopy 03/09/2017       Summary / No summary entered :      No summary entered  Documents attached :      sColonoscopies - Giovanni Hastings; Enc: [id number] - Appointment - Giovanni Hastings -      (Gastroenterology Adult) (Result Document)  EGD [id number] 02:51PM Giovanni Hastings     Test Name Result Flag Reference   EGD 03/09/2017       Summary / No summary entered :      No summary entered  Documents attached :      Gurmeet Lora; Enc: [id number] - Appointment - Sushma Urias -      (Gastroenterology Adult) (Result Document)  (1) PROTEIN ELECTRO, URINE [id number] 08:58AM Florentin Gloria     Test Name Result Flag Reference   ALPHA 1 URINE 2 0 %     ALPHA 2 URINE 4 8 %     BETA URINE 6 7 %     GAMMA GLOBULIN URINE 6 3 %     UPEP INTERPRETATION      The urine total protein is increased  The urine protein electrophoresis shows non-selective proteinuria  No monoclonal bands noted  Reviewed by: Angelo Garnett MD ([id number]) **Electronic Signature**   ALBUMIN URINE ELP 80 2 %     URINE PROTEIN 79 0 mg/dL H 2 0-17 5     (1) PROTEIN ELECTRO, SERUM [id number] 08:58AM Nick Bailey     Test Name Result Flag Reference   A/G RATIO 1 84 H 1 10-1 80   Albumin 64 8 %  52 0-65 0   Albumin Conc  4 21 g/dl  3 50-5 00   Alpha 1 Conc  0 33 g/dL  0 10-0 40   ALPHA 1 5 1 % H 2 5-5 0   Alpha 2 Conc  0 64 g/dL  0 40-1 20   ALPHA 2 9 9 %  7 0-13 0   Beta 1 Conc  0 46 g/dL  0 40-0 80   BETA-1 7 1 %  5 0-13 0   Beta 2 Conc 0 27 g/dL  0 20-0 50   BETA-2 4 1 %  2 0-8 0   Gamma Conc 0 59 g/dL  0 50-1 60   GAMMA GLOBULIN 9 0 % L 12 0-22 0   Interpretation      The serum total protein, albumin and electrophoresis are within normal limits  No monoclonal bands noted  Reviewed by: Angelo Bruno MD ([id number]) **Electronic Signature**   TOTAL PROTEIN  6 5 g/dL  6 4-8 2     (1) HEMOGLOBIN A1C [id number] 09:00AM [facility]     Test Name Result Flag Reference   HEMOGLOBIN A1C 5 3 %  4 2-6 3   EST  AVG   GLUCOSE 105 mg/dl       (1) MICROALBUMIN CREATININE RATIO, RANDOM URINE [id number] 09:00AM [facility]     Test Name Result Flag Reference   MICROALBUMIN/ CREAT R 291 mg/g creatinine H 0-30   MICROALBUMIN,URINE 789 0 mg/L H 0 0-20 0   CREATININE URINE 271 0 mg/dL       Future Appointments    Date/Time Provider Specialty Site 04/27/2017 09:00 AM MASSIMO Sena, [address] [facility]   08/21/2017 08:45 AM CHELO De Paz   Nephrology [address]   05/11/2017 08:00 AM Arianne Jeff MD Gastroenterology Adult [address] [place]   04/03/2017 09:30 AM [facility] Zigyg Francois, Nurse Schedule  [address] [facility]     Signatures   Electronically signed by : Madhav Jauregui, [facility] [address]; Mar 20 2017  9:55PM EST                       (Author)

## 2018-01-12 ENCOUNTER — TRANSCRIBE ORDERS (OUTPATIENT)
Dept: ADMINISTRATIVE | Facility: HOSPITAL | Age: 63
End: 2018-01-12

## 2018-01-12 ENCOUNTER — APPOINTMENT (OUTPATIENT)
Dept: LAB | Facility: HOSPITAL | Age: 63
End: 2018-01-12
Payer: MEDICARE

## 2018-01-12 ENCOUNTER — ALLSCRIPTS OFFICE VISIT (OUTPATIENT)
Dept: OTHER | Facility: OTHER | Age: 63
End: 2018-01-12

## 2018-01-12 ENCOUNTER — GENERIC CONVERSION - ENCOUNTER (OUTPATIENT)
Dept: OTHER | Facility: OTHER | Age: 63
End: 2018-01-12

## 2018-01-12 DIAGNOSIS — R52 PAIN: ICD-10-CM

## 2018-01-12 DIAGNOSIS — R50.9 FEVER: ICD-10-CM

## 2018-01-12 LAB
FLUAV AG SPEC QL IA: NEGATIVE
FLUBV AG SPEC QL IA: NEGATIVE

## 2018-01-12 PROCEDURE — 87798 DETECT AGENT NOS DNA AMP: CPT

## 2018-01-12 PROCEDURE — 87400 INFLUENZA A/B EACH AG IA: CPT

## 2018-01-12 NOTE — RESULT NOTES
Verified Results  (1) OCCULT BLOOD, FECAL IMMUNOCHEMICAL TEST [id number] 04:26PM Addy Bautista   TW Order Number: [id number]     Test Name Result Flag Reference   OCCULT BLD, FECAL IMMUNOLOGICAL Positive A Negative   Performed by Fecal Immunochemical Test      (1) URINALYSIS w URINE C/S REFLEX (will reflex a microscopy if leukocytes, occult blood, or nitrites are not within normal limits) [id number] 09:00AM Addy Bautista     Test Name Result Flag Reference   CLINICAL REPORT (Report)     Test:        Urine culture  Specimen Source:  Urine, Clean Catch  Specimen Type:   Urine  Specimen Date:   2/3/2017 9:00 AM  Result Date:    2/4/2017 7:35 AM  Result Status:   Final result  Resulting Lab:    [address]            Tel: [phone]      CULTURE                                       ------------------                                   No Growth <1000 cfu/mL       Plan  Blood in stool    · 1 - Ole ZHOU, Brittaney Escobar (Gastroenterology) Physician Referral  Consult Only: the  expectation is that the referring provider will communicate back to the patient on  treatment options  Evaluation and Treatment: the expectation is that the referred to  provider will communicate back to the patient on treatment options    Status: Active   Requested for: 85HQT1935  Care Summary provided  : Yes  Vitamin B12 deficiency    · Cyanocobalamin 1000 MCG/ML Injection Solution    Discussion/Summary   call pt and let him know his stool test was +   for blood   He needs to be seen by Dr Elva Clement    please put in the consult with Dr Jenifer Bangura for heme + stool

## 2018-01-12 NOTE — RESULT NOTES
Verified Results  (1) OCCULT BLOOD, FECAL IMMUNOCHEMICAL TEST [id number] 09:12PM Urbano Forde     Test Name Result Flag Reference   OCCULT BLD, FECAL IMMUNOLOGICAL Negative  Negative   Performed by Fecal Immunochemical Test

## 2018-01-13 VITALS
SYSTOLIC BLOOD PRESSURE: 120 MMHG | TEMPERATURE: 98 F | HEIGHT: 70 IN | DIASTOLIC BLOOD PRESSURE: 80 MMHG | BODY MASS INDEX: 32.82 KG/M2 | RESPIRATION RATE: 14 BRPM | OXYGEN SATURATION: 97 % | WEIGHT: 229.25 LBS | HEART RATE: 92 BPM

## 2018-01-13 VITALS
SYSTOLIC BLOOD PRESSURE: 140 MMHG | WEIGHT: 231.25 LBS | OXYGEN SATURATION: 97 % | HEART RATE: 74 BPM | DIASTOLIC BLOOD PRESSURE: 84 MMHG | TEMPERATURE: 98.9 F | BODY MASS INDEX: 33.11 KG/M2 | HEIGHT: 70 IN

## 2018-01-13 VITALS
BODY MASS INDEX: 33.28 KG/M2 | OXYGEN SATURATION: 97 % | WEIGHT: 232.5 LBS | DIASTOLIC BLOOD PRESSURE: 82 MMHG | SYSTOLIC BLOOD PRESSURE: 158 MMHG | HEART RATE: 71 BPM | HEIGHT: 70 IN

## 2018-01-13 VITALS
RESPIRATION RATE: 16 BRPM | DIASTOLIC BLOOD PRESSURE: 78 MMHG | HEART RATE: 67 BPM | SYSTOLIC BLOOD PRESSURE: 148 MMHG | OXYGEN SATURATION: 97 % | WEIGHT: 231.25 LBS | BODY MASS INDEX: 33.11 KG/M2 | HEIGHT: 70 IN

## 2018-01-13 VITALS
SYSTOLIC BLOOD PRESSURE: 120 MMHG | BODY MASS INDEX: 32.69 KG/M2 | RESPIRATION RATE: 16 BRPM | WEIGHT: 228.38 LBS | TEMPERATURE: 97.8 F | DIASTOLIC BLOOD PRESSURE: 60 MMHG | HEART RATE: 68 BPM | HEIGHT: 70 IN

## 2018-01-13 VITALS
HEART RATE: 61 BPM | WEIGHT: 231.25 LBS | HEIGHT: 70 IN | BODY MASS INDEX: 33.11 KG/M2 | SYSTOLIC BLOOD PRESSURE: 124 MMHG | DIASTOLIC BLOOD PRESSURE: 88 MMHG | OXYGEN SATURATION: 98 %

## 2018-01-13 VITALS
HEIGHT: 70 IN | BODY MASS INDEX: 32.41 KG/M2 | WEIGHT: 226.38 LBS | HEART RATE: 70 BPM | DIASTOLIC BLOOD PRESSURE: 78 MMHG | SYSTOLIC BLOOD PRESSURE: 136 MMHG

## 2018-01-13 LAB
FLUAV AG SPEC QL: ABNORMAL
FLUBV AG SPEC QL: DETECTED
RSV B RNA SPEC QL NAA+PROBE: ABNORMAL

## 2018-01-13 NOTE — PROGRESS NOTES
Assessment   1  Fever and chills (780 60) (R50 9)   2  Body aches (780 96) (R52)    Plan   Body aches, Fever and chills    · (1) RAPID INFLUENZA SCREEN RFLX PCR CHILD < 2 MOS; Status:Active; Requested    AAL:[id number];    · Follow-up PRN Evaluation and Treatment  Follow-up  Status: Complete  Done:    [id number]  Vitamin B12 deficiency    · Cyanocobalamin 1000 MCG/ML Injection Solution    Discussion/Summary      I suspect you either have a viral infection or the flow  Swab for flu now and will call with results  Plenty of liquids rest at home Tylenol or Advil for the body aches and a fever  If you develops any chest pain again, you need to go to any ER  Rest at home  Will give B12 shot today instead of Monday  The patient was counseled regarding instructions for management,-- risk factor reductions,-- prognosis,-- patient and family education,-- impressions,-- risks and benefits of treatment options,-- importance of compliance with treatment  Possible side effects of new medications were reviewed with the patient/guardian today  The treatment plan was reviewed with the patient/guardian  The patient/guardian understands and agrees with the treatment plan      Chief Complaint   patient is here for a s/t and chills off and on      History of Present Illness   HPI: Sick since yesterday am  Developed chills and left anterior chest pains  Fever  No cough  Has generalized body aches with bad chills  Raspy throat  Follows with Dr Anyi Sherman and had recent ekg  Has taken sudafed and ibuprofen for the fever and the pains  Also taking airborne vitamin c       Review of Systems        Constitutional: fever,-- feeling poorly,-- chills-- and-- feeling tired  ENT: no earache-- and-- no nosebleeds--       The patient presents with complaints of no sore throat (but raspy)  Cardiovascular: chest pain-- and-- Felt like a dull pain below the left anterior chest  Felt sore,but no longer        Respiratory: no shortness of breath,-- no cough-- and-- no wheezing  Gastrointestinal: no abdominal pain,-- no nausea,-- no vomiting,-- no constipation-- and-- no diarrhea  Genitourinary: no dysuria-- and-- no incontinence  Musculoskeletal: myalgias-- and-- Felt body aches were worse yesterday  , but-- no arthralgias  Integumentary: no rashes-- and-- no skin lesions  Neurological: no headache-- and-- no dizziness  ROS reviewed  Active Problems   1  Abnormal blood chemistry (790 6) (R79 9)   2  Arthritis (716 90) (M19 90)   3  Ugalde esophagus (530 85) (K22 70)   4  Benign essential hypertension (401 1) (I10)   5  Blood in stool (578 1) (K92 1)   6  Cough (786 2) (R05)   7  Diabetes mellitus, type 2 (250 00) (E11 9)   8  Flu vaccine need (V04 81) (Z23)   9  Hiatal hernia (553 3) (K44 9)   10  Hypervitaminosis A (278 2) (E67 0)   11  Need for influenza vaccination (V04 81) (Z23)   12  Need for pneumococcal vaccine (V03 82) (Z23)   13  Obesity surgery status (V45 86) (Z98 84)   14  Obstructive sleep apnea (327 23) (G47 33)   15  Other vitamin B12 deficiency anemias (281 1) (D51 8)   16  Persistent proteinuria (791 0) (R80 1)   17  Positive fecal immunochemical test (792 1) (R19 5)   18  Postgastrectomy malabsorption (579 3) (K91 2,Z90 3)   19  Protein in urine (791 0) (R80 9)   20  Proteinuria (791 0) (R80 9)   21  PUD (peptic ulcer disease) (533 90) (K27 9)   22  Screening for colon cancer (V76 51) (Z12 11)   23  Screening for depression (V79 0) (Z13 89)   24  Sleep apnea (780 57) (G47 30)   25  Type 2 diabetes mellitus with diabetic neuropathy, without long-term current use of      insulin (250 60,357 2) (E11 40)   26  Vitamin B12 deficiency (266 2) (E53 8)   27  Weight gain, abnormal (783 1) (R63 5)    Past Medical History   1  History of diabetes mellitus (V12 29) (Z86 39)   2  History of edema (V13 89) (Z87 898)   3  History of hyperlipidemia (V12 29) (Z86 39)   4   History of hypertension (V12 59) (Z86 79)   5  History of Morbid or severe obesity due to excess calories (278 01) (E66 01)  Active Problems And Past Medical History Reviewed: The active problems and past medical history were reviewed and updated today  Family History   Mother    1  Family history of Chronic Obstructive Pulmonary Disease   2  Family history of Congenital Heart Disease   3  Denied: Family history of mental disorder   4  Denied: Family history of Illicit drug use  Father    5  Family history of Diabetes Mellitus (V18 0)   6  Denied: Family history of mental disorder   7  Denied: Family history of Illicit drug use   8  Family history of Lung Cancer (V16 1)  Sister    5  Family history of Diabetes Mellitus (V18 0)  Family History Reviewed: The family history was reviewed and updated today  Social History    · Denied: History of Alcohol Use (History)   · Denied: History of Drug Use   · Former smoker (F29 31) (N64 829)  The social history was reviewed and updated today  Surgical History   1  History of Back Surgery   2  History of Cholecystectomy   3  History of Complete Colonoscopy   4  History of Foot Surgery   5  History of Gastric Stapling For Morbid Obesity Laparoscop W/ Trudi-en-Y   6  History of Shoulder Surgery   7  History of Uvuloplasty  Surgical History Reviewed: The surgical history was reviewed and updated today  Current Meds    1  AmLODIPine Besylate 5 MG Oral Tablet; TAKE 1 TABLET DAILY FOR BLOOD     PRESSURE; Therapy: 29QVK6196 to (Evaluate:22Cxs0619)  Requested for: 58JHW5676; Last     Rx:19Oct2017 Ordered   2  Calcium Citrate TABS; Therapy: (Recorded:19Oct2012) to Recorded   3  Jardiance 25 MG Oral Tablet; Take one daily; Therapy: 36UKW4583 to (Last [facility])  Requested for: 08YGX7828 Ordered   4  Losartan Potassium 100 MG Oral Tablet; TAKE 1 TABLET ONCE DAILY  Requested for:     28NQO9174; Last Rx:44Uaj9089 Ordered   5  Multi-Vitamin TABS;      Therapy: (Recorded:19Oct2012) to Recorded   6  Sucralfate 1 GM Oral Tablet; take 1 tablet and dissolve in 10ml and swallow QID; Therapy: 33LFU2773 to (Ratna Gomez)  Requested for: 43Bbz6739; Last     Rx:56Llm6810 Ordered     The medication list was reviewed and updated today  Allergies   1  Augmentin TABS   2  Ciprofloxacin HCl TABS    Vitals    Recorded: 12Jan2018 11:42AM   Temperature 101 F   Heart Rate 92   Respiration 16   Systolic 673   Diastolic 80   Height 5 ft 10 in   Weight 228 lb    BMI Calculated 32 71   BSA Calculated 2 21   O2 Saturation 97     Physical Exam        Constitutional      General appearance: No acute distress, well appearing and well nourished  Eyes      Conjunctiva and lids: No swelling, erythema, or discharge  Pupils and irises: Equal, round and reactive to light  Ears, Nose, Mouth, and Throat      External inspection of ears and nose: Normal        Otoscopic examination: Tympanic membrance translucent with normal light reflex  Canals patent without erythema  Nasal mucosa, septum, and turbinates: Normal without edema or erythema  Oropharynx: Normal with no erythema, edema, exudate or lesions  Pulmonary      Respiratory effort: No increased work of breathing or signs of respiratory distress  Auscultation of lungs: Clear to auscultation, equal breath sounds bilaterally, no wheezes, no rales, no rhonci  Cardiovascular      Auscultation of heart: Normal rate and rhythm, normal S1 and S2, without murmurs  Examination of extremities for edema and/or varicosities: Normal        Musculoskeletal      Gait and station: Normal        Inspection/palpation of joints, bones, and muscles: Normal        Psychiatric      Orientation to person, place and time: Normal        Mood and affect: Normal           Attending Note   Collaborating Physician Note: Collaborating Note: I supervised the Advanced Practitioner-- and-- I agree with the Advanced Practitioner note  Future Appointments      Date/Time Provider Specialty Site   01/18/2018 09:15 AM Katarzyna Mariano, [facility], [address]   03/01/2018 09:15 AM CHELO Montoya  Nephrology  [address]   01/15/2018 09:30 AM Francesco Fortune, Nurse Schedule  [facility]    [address] [facility]     Signatures    Electronically signed by :  [address] [place], [place] [address]TIFFANY; Jan 12 2018 12:01PM EST                       (Author)     Electronically signed by : Asa Castano DO; Jan 12 2018 12:15PM EST                       (Co-author)

## 2018-01-14 VITALS
RESPIRATION RATE: 16 BRPM | BODY MASS INDEX: 32.53 KG/M2 | WEIGHT: 227.25 LBS | SYSTOLIC BLOOD PRESSURE: 140 MMHG | TEMPERATURE: 97.3 F | DIASTOLIC BLOOD PRESSURE: 80 MMHG | HEART RATE: 68 BPM | HEIGHT: 70 IN

## 2018-01-15 ENCOUNTER — GENERIC CONVERSION - ENCOUNTER (OUTPATIENT)
Dept: OTHER | Facility: OTHER | Age: 63
End: 2018-01-15

## 2018-01-22 VITALS
DIASTOLIC BLOOD PRESSURE: 80 MMHG | SYSTOLIC BLOOD PRESSURE: 130 MMHG | OXYGEN SATURATION: 97 % | HEIGHT: 70 IN | TEMPERATURE: 101 F | WEIGHT: 228 LBS | BODY MASS INDEX: 32.64 KG/M2 | RESPIRATION RATE: 16 BRPM | HEART RATE: 92 BPM

## 2018-01-23 NOTE — RESULT NOTES
Verified Results  (1) RAPID INFLUENZA SCREEN RFLX PCR CHILD < 2 MOS 12Jan2018 12:22PM [place] Order Number: [id number]     Test Name Result Flag Reference   RAPID INFLUENZA A AGN Negative  Negative, Indeterminate   RAPID INFLUENZA B AGN Negative  Negative, Indeterminate   INFLUENZA A/MATRIX None Detected  None Detected   INFLUENZA B Detected A None Detected   RESP SYNCYTIAL VIRUS None Detected  None Detected

## 2018-01-24 DIAGNOSIS — I10 HYPERTENSION, UNSPECIFIED TYPE: Primary | ICD-10-CM

## 2018-01-24 RX ORDER — AMLODIPINE BESYLATE 5 MG/1
1 TABLET ORAL DAILY
COMMUNITY
Start: 2017-01-25 | End: 2018-01-24 | Stop reason: SDUPTHER

## 2018-01-24 RX ORDER — AMLODIPINE BESYLATE 5 MG/1
5 TABLET ORAL DAILY
Qty: 90 TABLET | Refills: 1 | Status: SHIPPED | OUTPATIENT
Start: 2018-01-24 | End: 2018-04-09 | Stop reason: SDUPTHER

## 2018-01-24 RX ORDER — LOSARTAN POTASSIUM 100 MG/1
1 TABLET ORAL DAILY
COMMUNITY
End: 2018-11-01 | Stop reason: SDUPTHER

## 2018-02-06 ENCOUNTER — OFFICE VISIT (OUTPATIENT)
Dept: FAMILY MEDICINE CLINIC | Facility: CLINIC | Age: 63
End: 2018-02-06
Payer: MEDICARE

## 2018-02-06 VITALS
SYSTOLIC BLOOD PRESSURE: 130 MMHG | OXYGEN SATURATION: 97 % | HEIGHT: 70 IN | HEART RATE: 64 BPM | TEMPERATURE: 97.7 F | DIASTOLIC BLOOD PRESSURE: 74 MMHG | BODY MASS INDEX: 33.07 KG/M2 | WEIGHT: 231 LBS

## 2018-02-06 DIAGNOSIS — K90.89 OTHER SPECIFIED INTESTINAL MALABSORPTION: ICD-10-CM

## 2018-02-06 DIAGNOSIS — J11.1 INFLUENZA: Primary | ICD-10-CM

## 2018-02-06 PROBLEM — K90.9 MALABSORPTION: Status: ACTIVE | Noted: 2018-02-06

## 2018-02-06 PROCEDURE — 99214 OFFICE O/P EST MOD 30 MIN: CPT | Performed by: FAMILY MEDICINE

## 2018-02-06 RX ORDER — CYANOCOBALAMIN 1000 UG/ML
1000 INJECTION INTRAMUSCULAR; SUBCUTANEOUS
Status: DISCONTINUED | OUTPATIENT
Start: 2018-02-06 | End: 2020-09-30

## 2018-02-06 RX ADMIN — CYANOCOBALAMIN 1000 MCG: 1000 INJECTION INTRAMUSCULAR; SUBCUTANEOUS at 10:15

## 2018-02-06 NOTE — PROGRESS NOTES
Assessment/Plan:    Influenza   Plain Mucinex twice a day for the next week   hydrate   as a lot of moisture to the home with the vaporizer    You are on the tail end of this influenza  And you are doing well  Call if you have any return of symptoms    Malabsorption   Vitamin B12 injection today 1000 mcg   given in office             Subjective:      Patient ID: Luly Us is a 63 y o  male  Here for checkup post flu   he was positive influenza and was given Tamiflu   he is feeling better however he is still very congested in the head,  Scratchy throat   congested cough   denies any remaining fever   no shortness of breath   no sweating            Review of Systems   Constitutional: Negative for activity change, fatigue, fever and unexpected weight change  HENT: Positive for congestion and postnasal drip  Negative for ear pain, hearing loss, sinus pain, sore throat, tinnitus, trouble swallowing and voice change  Eyes: Negative for pain, discharge and visual disturbance  Respiratory: Positive for cough  Negative for chest tightness, shortness of breath and wheezing  Cardiovascular: Negative for chest pain, palpitations and leg swelling  Gastrointestinal: Negative for abdominal pain, blood in stool, diarrhea and vomiting  Endocrine: Negative for cold intolerance, heat intolerance and polyuria  Genitourinary: Negative for difficulty urinating, dysuria, flank pain, genital sores and urgency  Musculoskeletal: Negative for gait problem, joint swelling and neck stiffness  Skin: Negative for color change, rash and wound  Allergic/Immunologic: Negative for immunocompromised state  Neurological: Negative for syncope, speech difficulty and light-headedness  Psychiatric/Behavioral: Negative for behavioral problems, dysphoric mood and suicidal ideas  Objective:     Physical Exam   Constitutional: He is oriented to person, place, and time  He appears well-developed and well-nourished  HENT:   Head: Normocephalic and atraumatic  Eyes: Pupils are equal, round, and reactive to light  Neck: Normal range of motion  Neck supple  Cardiovascular: Normal rate and normal heart sounds  No murmur heard  Pulmonary/Chest: Effort normal and breath sounds normal  No respiratory distress  He exhibits no tenderness  Abdominal: Soft  Bowel sounds are normal    Musculoskeletal: Normal range of motion  He exhibits no tenderness  Lymphadenopathy:     He has no cervical adenopathy  Neurological: He is alert and oriented to person, place, and time  Coordination normal    Skin: Skin is warm and dry  Psychiatric: He has a normal mood and affect  Thought content normal    Nursing note and vitals reviewed  Social History     Social History    Marital status: Single     Spouse name: N/A    Number of children: N/A    Years of education: N/A     Occupational History    Not on file       Social History Main Topics    Smoking status: Former Smoker     Types: Cigarettes     Quit date: 1986    Smokeless tobacco: Never Used    Alcohol use No    Drug use: No    Sexual activity: Not on file     Other Topics Concern    Not on file     Social History Narrative    No narrative on file     Past Medical History:   Diagnosis Date    [facility]     LAST ASSESSED: 7/23/14    Edema     LAST ASSESSED:6/12/12    Hyperlipidemia     Hypertension     Morbid obesity due to excess calories (HCC)     LAST ASSESSED: 6/12/12       Current Outpatient Prescriptions:     amLODIPine (NORVASC) 5 mg tablet, Take 1 tablet by mouth daily, Disp: 90 tablet, Rfl: 1    calcium citrate-Vitamin D (CALCIUM CITRATE + D3) 200 mg-250 units, Take 1 tablet by mouth daily, Disp: , Rfl:     Empagliflozin (JARDIANCE) 25 MG TABS, Take 1 tablet by mouth daily, Disp: , Rfl:     losartan (COZAAR) 100 MG tablet, Take 1 tablet by mouth daily, Disp: , Rfl:     Multiple Vitamins-Minerals (CENTRUM ADULTS PO), Take 1 tablet by mouth daily, Disp: , Rfl:     Current Facility-Administered Medications:     cyanocobalamin injection 1,000 mcg, 1,000 mcg, Intramuscular, Q30 Days, TIFFANY Henry  Family History   Problem Relation Age of Onset    COPD Mother     Heart disease Mother     Mental illness Mother     Drug abuse Mother     Diabetes Father     Mental illness Father     Drug abuse Father     Lung cancer Father     Diabetes Sister

## 2018-02-06 NOTE — ASSESSMENT & PLAN NOTE
Plain Mucinex twice a day for the next week   hydrate   as a lot of moisture to the home with the vaporizer    You are on the tail end of this influenza  And you are doing well    Call if you have any return of symptoms

## 2018-02-06 NOTE — PATIENT INSTRUCTIONS
Get the generic store brand plain Mucinex and take twice a day for the next week   lots of hot tea with honey, soup, chicken noodle soup    add moisture to the air with an expensive vaporizer   you are on the tail end of the influenza illness and are doing very well   call if you have a return of symptoms or a sudden return of shortness of breath or fever

## 2018-02-24 ENCOUNTER — TRANSCRIBE ORDERS (OUTPATIENT)
Dept: ADMINISTRATIVE | Facility: HOSPITAL | Age: 63
End: 2018-02-24

## 2018-02-24 ENCOUNTER — APPOINTMENT (OUTPATIENT)
Dept: LAB | Facility: HOSPITAL | Age: 63
End: 2018-02-24
Attending: INTERNAL MEDICINE
Payer: MEDICARE

## 2018-02-24 DIAGNOSIS — R80.9 PROTEINURIA: ICD-10-CM

## 2018-02-24 LAB
ANION GAP SERPL CALCULATED.3IONS-SCNC: 8 MMOL/L (ref 4–13)
BACTERIA UR QL AUTO: ABNORMAL /HPF
BILIRUB UR QL STRIP: NEGATIVE
BUN SERPL-MCNC: 11 MG/DL (ref 5–25)
CALCIUM SERPL-MCNC: 9 MG/DL (ref 8.3–10.1)
CHLORIDE SERPL-SCNC: 105 MMOL/L (ref 100–108)
CHOLEST SERPL-MCNC: 171 MG/DL (ref 50–200)
CLARITY UR: CLEAR
CO2 SERPL-SCNC: 30 MMOL/L (ref 21–32)
COLOR UR: YELLOW
CREAT SERPL-MCNC: 0.98 MG/DL (ref 0.6–1.3)
ERYTHROCYTE [DISTWIDTH] IN BLOOD BY AUTOMATED COUNT: 13.2 % (ref 11.6–15.1)
GFR SERPL CREATININE-BSD FRML MDRD: 83 ML/MIN/1.73SQ M
GLUCOSE P FAST SERPL-MCNC: 116 MG/DL (ref 65–99)
GLUCOSE UR STRIP-MCNC: ABNORMAL MG/DL
HCT VFR BLD AUTO: 45.5 % (ref 36.5–49.3)
HDLC SERPL-MCNC: 37 MG/DL (ref 40–60)
HGB BLD-MCNC: 15.1 G/DL (ref 12–17)
HGB UR QL STRIP.AUTO: ABNORMAL
KETONES UR STRIP-MCNC: NEGATIVE MG/DL
LDLC SERPL CALC-MCNC: 75 MG/DL (ref 0–100)
LEUKOCYTE ESTERASE UR QL STRIP: ABNORMAL
MCH RBC QN AUTO: 27.2 PG (ref 26.8–34.3)
MCHC RBC AUTO-ENTMCNC: 33.2 G/DL (ref 31.4–37.4)
MCV RBC AUTO: 82 FL (ref 82–98)
MUCOUS THREADS UR QL AUTO: ABNORMAL
NITRITE UR QL STRIP: NEGATIVE
NON-SQ EPI CELLS URNS QL MICRO: ABNORMAL /HPF
PH UR STRIP.AUTO: 5.5 [PH] (ref 4.5–8)
PLATELET # BLD AUTO: 199 THOUSANDS/UL (ref 149–390)
PMV BLD AUTO: 10.4 FL (ref 8.9–12.7)
POTASSIUM SERPL-SCNC: 3.8 MMOL/L (ref 3.5–5.3)
PROT UR STRIP-MCNC: NEGATIVE MG/DL
RBC # BLD AUTO: 5.55 MILLION/UL (ref 3.88–5.62)
RBC #/AREA URNS AUTO: ABNORMAL /HPF
SODIUM SERPL-SCNC: 143 MMOL/L (ref 136–145)
SP GR UR STRIP.AUTO: >=1.03 (ref 1–1.03)
TRIGL SERPL-MCNC: 297 MG/DL
UROBILINOGEN UR QL STRIP.AUTO: 0.2 E.U./DL
WBC # BLD AUTO: 5.83 THOUSAND/UL (ref 4.31–10.16)
WBC #/AREA URNS AUTO: ABNORMAL /HPF

## 2018-02-24 PROCEDURE — 80048 BASIC METABOLIC PNL TOTAL CA: CPT

## 2018-02-24 PROCEDURE — 85027 COMPLETE CBC AUTOMATED: CPT

## 2018-02-24 PROCEDURE — 80061 LIPID PANEL: CPT

## 2018-02-24 PROCEDURE — 36415 COLL VENOUS BLD VENIPUNCTURE: CPT

## 2018-02-24 PROCEDURE — 81001 URINALYSIS AUTO W/SCOPE: CPT

## 2018-02-26 NOTE — RESULT NOTES
Verified Results  (1) RAPID INFLUENZA SCREEN RFLX PCR CHILD < 2 MOS 12Jan2018 12:22PM Shireen Hanh Order Number: [id number]     Test Name Result Flag Reference   RAPID INFLUENZA A AGN Negative  Negative, Indeterminate   RAPID INFLUENZA B AGN Negative  Negative, Indeterminate

## 2018-03-01 ENCOUNTER — OFFICE VISIT (OUTPATIENT)
Dept: NEPHROLOGY | Facility: CLINIC | Age: 63
End: 2018-03-01
Payer: MEDICARE

## 2018-03-01 VITALS — BODY MASS INDEX: 33.47 KG/M2 | WEIGHT: 233.8 LBS | TEMPERATURE: 97.6 F | HEIGHT: 70 IN

## 2018-03-01 DIAGNOSIS — R80.1 PERSISTENT PROTEINURIA: Primary | ICD-10-CM

## 2018-03-01 DIAGNOSIS — I10 BENIGN ESSENTIAL HYPERTENSION: ICD-10-CM

## 2018-03-01 PROBLEM — E11.9 DIABETES MELLITUS, TYPE 2 (HCC): Status: ACTIVE | Noted: 2017-04-27

## 2018-03-01 PROCEDURE — 99213 OFFICE O/P EST LOW 20 MIN: CPT | Performed by: INTERNAL MEDICINE

## 2018-03-01 NOTE — ASSESSMENT & PLAN NOTE
Urinalysis is negative for any protein by dipstick  Somehow I do not have urine for protein to creatinine ratio  I will do it within a week to assess protein loss   I will repeat that in 1 year again also and I will see him back in 1 year

## 2018-03-01 NOTE — PROGRESS NOTES
NEPHROLOGY OFFICE FOLLOW UP  Lorna Nash 63 y o  male MRN: [id number]    Encounter: [id number] 3/1/2018    REASON FOR VISIT: Lorna Nash is a 63 y o  male who is here on 3/1/2018 for Proteinuria    HPI:    Christian Weaver came in today for follow-up of diabetic nephropathy in form of proteinuria  He is feeling quite well denies any complaint  Nothing much new happen since his last visit with me        REVIEW OF SYSTEMS:    Review of Systems   Constitutional: Negative for activity change and fatigue  HENT: Negative for congestion and ear discharge  Eyes: Negative for photophobia and pain  Respiratory: Negative for apnea and choking  Cardiovascular: Negative for chest pain and palpitations  Gastrointestinal: Negative for abdominal distention and blood in stool  Endocrine: Negative for heat intolerance and polyphagia  Genitourinary: Negative for flank pain and urgency  Musculoskeletal: Negative for neck pain and neck stiffness  Skin: Negative for color change and wound  Allergic/Immunologic: Negative for food allergies and immunocompromised state  Neurological: Negative for seizures and facial asymmetry  Hematological: Negative for adenopathy  Does not bruise/bleed easily  Psychiatric/Behavioral: Negative for self-injury and suicidal ideas           PAST MEDICAL HISTORY:  Past Medical History:   Diagnosis Date    Chronic kidney disease     [facility] ([facility] )     LAST ASSESSED: 7/23/14    Edema     LAST ASSESSED:6/12/12    Hyperlipidemia     Hypertension     [facility]     LAST ASSESSED: 6/12/12       PAST SURGICAL HISTORY:  Past Surgical History:   Procedure Laterality Date    BACK SURGERY      CHOLECYSTECTOMY      COLONOSCOPY      FOOT SURGERY      GASTRIC RESTRICTION SURGERY      GASTRIC STAPLING FOR MORBID OBESITY LAPAROSCOPY W/ MARCY-EN-Y    SHOULDER SURGERY      UVULOPALATOPLASTY         SOCIAL HISTORY:  History   Alcohol Use No     History Drug Use    Types: Marijuana     Comment: everyday      History   Smoking Status    Former Smoker    Types: Cigarettes    Quit date: 1986   Smokeless Tobacco    Never Used       FAMILY HISTORY:  Family History   Problem Relation Age of Onset    COPD Mother     Heart disease Mother     Mental illness Mother     Drug abuse Mother     Diabetes Father     Mental illness Father     Drug abuse Father     Lung cancer Father     Diabetes Sister        MEDICATIONS:    Current Outpatient Prescriptions:     amLODIPine (NORVASC) 5 mg tablet, Take 1 tablet by mouth daily, Disp: 90 tablet, Rfl: 1    calcium citrate-Vitamin D (CALCIUM CITRATE + D3) 200 mg-250 units, Take 1 tablet by mouth daily, Disp: , Rfl:     Empagliflozin (JARDIANCE) 25 MG TABS, Take 1 tablet by mouth daily, Disp: , Rfl:     losartan (COZAAR) 100 MG tablet, Take 1 tablet by mouth daily, Disp: , Rfl:     Multiple Vitamins-Minerals (CENTRUM ADULTS PO), Take 1 tablet by mouth daily, Disp: , Rfl:     Current Facility-Administered Medications:     cyanocobalamin injection 1,000 mcg, 1,000 mcg, Intramuscular, Q30 Days, TIFFANY Keys, 1,000 mcg at 02/06/18 1015    PHYSICAL EXAM:  Vitals:    03/01/18 0907   Temp: 97 6 °F (36 4 °C)   TempSrc: Oral   Weight: 106 kg (233 lb 12 8 oz)   Height: 5' 10" (1 778 m)     Body mass index is 33 55 kg/m²  Physical Exam   Constitutional: He is oriented to person, place, and time  He appears well-developed  No distress  HENT:   Head: Normocephalic  Mouth/Throat: Oropharynx is clear and moist    Eyes: Conjunctivae are normal  No scleral icterus  Neck: Neck supple  No JVD present  Cardiovascular: Normal rate and normal heart sounds  Pulmonary/Chest: Effort normal  He has no wheezes  Abdominal: Soft  There is no tenderness  Musculoskeletal: Normal range of motion  He exhibits no edema  Neurological: He is alert and oriented to person, place, and time  Skin: Skin is warm  No rash noted  Psychiatric: He has a normal mood and affect  His behavior is normal        LAB RESULTS:  Results for orders placed or performed in visit on 61/50/52   Basic metabolic panel   Result Value Ref Range    Sodium 143 136 - 145 mmol/L    Potassium 3 8 3 5 - 5 3 mmol/L    Chloride 105 100 - 108 mmol/L    CO2 30 21 - 32 mmol/L    Anion Gap 8 4 - 13 mmol/L    BUN 11 5 - 25 mg/dL    Creatinine 0 98 0 60 - 1 30 mg/dL    Glucose, Fasting 116 (H) 65 - 99 mg/dL    Calcium 9 0 8 3 - 10 1 mg/dL    eGFR 83 ml/min/1 73sq m   CBC   Result Value Ref Range    WBC 5 83 4 31 - 10 16 Thousand/uL    RBC 5 55 3 88 - 5 62 Million/uL    Hemoglobin 15 1 12 0 - 17 0 g/dL    Hematocrit 45 5 36 5 - 49 3 %    MCV 82 82 - 98 fL    MCH 27 2 26 8 - 34 3 pg    MCHC 33 2 31 4 - 37 4 g/dL    RDW 13 2 11 6 - 15 1 %    Platelets 367 220 - 780 Thousands/uL    MPV 10 4 8 9 - 12 7 fL   Lipid panel   Result Value Ref Range    Cholesterol 171 50 - 200 mg/dL    Triglycerides 297 (H) <=150 mg/dL    HDL, Direct 37 (L) 40 - 60 mg/dL    LDL Calculated 75 0 - 100 mg/dL   Urinalysis with reflex to microscopic   Result Value Ref Range    Color, UA Yellow     Clarity, UA Clear     Specific Gravity, UA >=1 030 1 003 - 1 030    pH, UA 5 5 4 5 - 8 0    Leukocytes, UA (A) Negative     Elevated glucose may cause decreased leukocyte values   See urine microscopic for [facility] result/    Nitrite, UA Negative Negative    Protein, UA Negative Negative mg/dl    Glucose, UA >=1000 (1%) (A) Negative mg/dl    Ketones, UA Negative Negative mg/dl    Urobilinogen, UA 0 2 0 2, 1 0 E U /dl E U /dl    Bilirubin, UA Negative Negative    Blood, UA Trace-lysed (A) Negative   Urine Microscopic   Result Value Ref Range    RBC, UA 0-1 (A) None Seen, 0-5 /hpf    WBC, UA 0-1 (A) None Seen, 0-5, 5-55, 5-65 /hpf    Epithelial Cells None Seen None Seen, Occasional /hpf    Bacteria, UA None Seen None Seen, Occasional /hpf    MUCOUS THREADS Occasional Occasional, Moderate, Innumerable       ASSESSMENT and PLAN:      Diabetes mellitus, type 2 ([facility] )  Seems to well control based on last hemoglobin A1c  Advised to continue what is doing    Benign essential hypertension  Very well controlled with present medication which I will continue as it include ARB blocker    Persistent proteinuria  Urinalysis is negative for any protein by dipstick  Somehow I do not have urine for protein to creatinine ratio  I will do it within a week to assess protein loss  I will repeat that in 1 year again also and I will see him back in 1 year      So he will do urine test again in week or 2  Unless there is worsening proteinuria I will see him back in 1 year with another repeat blood and urine test   He is advised to continue ARB blocker        Portions of the record may have been created with voice recognition software  Occasional wrong word or "sound a like" substitutions may have occurred due to the inherent limitations of voice recognition software  Read the chart carefully and recognize, using context, where substitutions have occurred  If you have any questions, please contact the dictating provider

## 2018-03-01 NOTE — PATIENT INSTRUCTIONS
Chronic Kidney Disease   AMBULATORY CARE:   Chronic kidney disease (CKD)  is the gradual and permanent loss of kidney function  Normally, the kidneys remove fluid, chemicals, and waste from your blood  These wastes are turned into urine by your kidneys  CKD may worsen over time and lead to kidney failure  Common symptoms include the following:   · Changes in how often you need to urinate    · Swelling in your arms, legs, or feet    · Shortness of breath    · Fatigue or weakness    · Bad or bitter taste in your mouth    · Nausea, vomiting, or loss of appetite  Seek care immediately if:   · You are confused and very drowsy  · You have a seizure  · You have shortness of breath  Contact your healthcare provider if:   · You suddenly gain or lose more weight than your healthcare provider has told you is okay  · You have itchy skin or a rash  · You urinate more or less than you normally do  · You have blood in your urine  · You have nausea and repeated vomiting  · You have fatigue or muscle weakness  · You have hiccups that will not stop  · You have questions or concerns about your condition or care  Treatment for CKD:  Medicines may be given to decrease blood pressure and get rid of extra fluid  You may also receive medicine to manage health conditions that may occur with CKD  Dialysis is a treatment to remove chemicals and waste from your blood when your kidneys can no longer do this  Surgery may be needed to create an arteriovenous fistula (AVF) in your arm or insert a catheter into your abdomen so that you can receive dialysis  A kidney transplant may be done if your CKD becomes severe  Manage CKD:   · Maintain a healthy weight  Ask your healthcare provider how much you should weigh  Ask him to help you create a weight loss plan if you are overweight  · Exercise 30 to 60 minutes a day, 4 to 7 times a week, or as directed  Ask about the best exercise plan for you   Regular exercise can help you manage CKD, high blood pressure, and diabetes  · Follow your healthcare provider's advice about what to eat and drink  He may tell you to eat food low in sodium (salt), potassium, phosphorus, or protein  You may need to see a dietitian if you need help planning meals  Ask how much liquid to drink each day and which liquids are best for you  · Limit alcohol  Ask how much alcohol is safe for you to drink  A drink of alcohol is 12 ounces of beer, 5 ounces of wine, or 1½ ounces of liquor  · Do not smoke  Nicotine and other chemicals in cigarettes and cigars can cause lung and kidney damage  Ask your healthcare provider for information if you currently smoke and need help to quit  E-cigarettes or smokeless tobacco still contain nicotine  Talk to your healthcare provider before you use these products  · Ask your healthcare provider if you need vaccines  Infections such as pneumonia, influenza, and hepatitis can be more harmful or more likely to occur in a person who has CKD  Vaccines reduce your risk of infection with these viruses  Follow up with your healthcare provider as directed:  Write down your questions so you remember to ask them during your visits  © 2017 [employer] Information is for End User's use only and may not be sold, redistributed or otherwise used for commercial purposes  All illustrations and images included in CareNotes® are the copyrighted property of [employer]  or Juvenal Andrews  The above information is an  only  It is not intended as medical advice for individual conditions or treatments  Talk to your doctor, nurse or pharmacist before following any medical regimen to see if it is safe and effective for you

## 2018-03-01 NOTE — LETTER
March 1, 2018     Niarenny Mcdermott, DO  [address] [facility] [address]    Patient: Lorna Nash   YOB: 1955   Date of Visit: 3/1/2018       Dear Dr Mike Ray: Thank you for referring Pedro Guidry to me for evaluation  Below are my notes for this consultation  If you have questions, please do not hesitate to call me  I look forward to following your patient along with you  Sincerely,        Isrrael Scherer MD        CC: No Recipients  Isrrael Scherer MD  3/1/2018  9:45 AM  Sign at close encounter  [address] 63 y o  male MRN: [id number]    Encounter: [id number] 3/1/2018    REASON FOR VISIT: Lorna Nash is a 63 y o  male who is here on 3/1/2018 for Proteinuria    HPI:    Christian Weaver came in today for follow-up of diabetic nephropathy in form of proteinuria  He is feeling quite well denies any complaint  Nothing much new happen since his last visit with me        REVIEW OF SYSTEMS:    Review of Systems   Constitutional: Negative for activity change and fatigue  HENT: Negative for congestion and ear discharge  Eyes: Negative for photophobia and pain  Respiratory: Negative for apnea and choking  Cardiovascular: Negative for chest pain and palpitations  Gastrointestinal: Negative for abdominal distention and blood in stool  Endocrine: Negative for heat intolerance and polyphagia  Genitourinary: Negative for flank pain and urgency  Musculoskeletal: Negative for neck pain and neck stiffness  Skin: Negative for color change and wound  Allergic/Immunologic: Negative for food allergies and immunocompromised state  Neurological: Negative for seizures and facial asymmetry  Hematological: Negative for adenopathy  Does not bruise/bleed easily  Psychiatric/Behavioral: Negative for self-injury and suicidal ideas           PAST MEDICAL HISTORY:  Past Medical History:   Diagnosis Date    Chronic kidney disease     Diabetes ([facility] [address] ) LAST ASSESSED: 7/23/14    Edema     LAST ASSESSED:6/12/12    Hyperlipidemia     Hypertension     Morbid obesity due to excess calories (HCC)     LAST ASSESSED: 6/12/12       PAST SURGICAL HISTORY:  Past Surgical History:   Procedure Laterality Date    BACK SURGERY      CHOLECYSTECTOMY      COLONOSCOPY      FOOT SURGERY      GASTRIC RESTRICTION SURGERY      GASTRIC STAPLING FOR MORBID OBESITY LAPAROSCOPY W/ MARCY-EN-Y    SHOULDER SURGERY      UVULOPALATOPLASTY         SOCIAL HISTORY:  History   Alcohol Use No     History   Drug Use    Types: Marijuana     Comment: everyday      History   Smoking Status    Former Smoker    Types: Cigarettes    Quit date: 1986   Smokeless Tobacco    Never Used       FAMILY HISTORY:  Family History   Problem Relation Age of Onset    COPD Mother     Heart disease Mother     Mental illness Mother     Drug abuse Mother     Diabetes Father     Mental illness Father     Drug abuse Father     Lung cancer Father     Diabetes Sister        MEDICATIONS:    Current Outpatient Prescriptions:     amLODIPine (NORVASC) 5 mg tablet, Take 1 tablet by mouth daily, Disp: 90 tablet, Rfl: 1    calcium citrate-Vitamin D (CALCIUM CITRATE + D3) 200 mg-250 units, Take 1 tablet by mouth daily, Disp: , Rfl:     Empagliflozin (JARDIANCE) 25 MG TABS, Take 1 tablet by mouth daily, Disp: , Rfl:     losartan (COZAAR) 100 MG tablet, Take 1 tablet by mouth daily, Disp: , Rfl:     Multiple Vitamins-Minerals (CENTRUM ADULTS PO), Take 1 tablet by mouth daily, Disp: , Rfl:     Current Facility-Administered Medications:     cyanocobalamin injection 1,000 mcg, 1,000 mcg, Intramuscular, Q30 Days, TIFFANY Keys, 1,000 mcg at 02/06/18 1015    PHYSICAL EXAM:  Vitals:    03/01/18 0907   Temp: 97 6 °F (36 4 °C)   TempSrc: Oral   Weight: 106 kg (233 lb 12 8 oz)   Height: 5' 10" (1 778 m)     Body mass index is 33 55 kg/m²      Physical Exam   Constitutional: He is oriented to person, place, and time  He appears well-developed  No distress  HENT:   Head: Normocephalic  Mouth/Throat: Oropharynx is clear and moist    Eyes: Conjunctivae are normal  No scleral icterus  Neck: Neck supple  No JVD present  Cardiovascular: Normal rate and normal heart sounds  Pulmonary/Chest: Effort normal  He has no wheezes  Abdominal: Soft  There is no tenderness  Musculoskeletal: Normal range of motion  He exhibits no edema  Neurological: He is alert and oriented to person, place, and time  Skin: Skin is warm  No rash noted  Psychiatric: He has a normal mood and affect  His behavior is normal        LAB RESULTS:  Results for orders placed or performed in visit on 47/40/65   Basic metabolic panel   Result Value Ref Range    Sodium 143 136 - 145 mmol/L    Potassium 3 8 3 5 - 5 3 mmol/L    Chloride 105 100 - 108 mmol/L    CO2 30 21 - 32 mmol/L    Anion Gap 8 4 - 13 mmol/L    BUN 11 5 - 25 mg/dL    Creatinine 0 98 0 60 - 1 30 mg/dL    Glucose, Fasting 116 (H) 65 - 99 mg/dL    Calcium 9 0 8 3 - 10 1 mg/dL    eGFR 83 ml/min/1 73sq m   CBC   Result Value Ref Range    WBC 5 83 4 31 - 10 16 Thousand/uL    RBC 5 55 3 88 - 5 62 Million/uL    Hemoglobin 15 1 12 0 - 17 0 g/dL    Hematocrit 45 5 36 5 - 49 3 %    MCV 82 82 - 98 fL    MCH 27 2 26 8 - 34 3 pg    MCHC 33 2 31 4 - 37 4 g/dL    RDW 13 2 11 6 - 15 1 %    Platelets 863 878 - 130 Thousands/uL    MPV 10 4 8 9 - 12 7 fL   Lipid panel   Result Value Ref Range    Cholesterol 171 50 - 200 mg/dL    Triglycerides 297 (H) <=150 mg/dL    HDL, Direct 37 (L) 40 - 60 mg/dL    LDL Calculated 75 0 - 100 mg/dL   Urinalysis with reflex to microscopic   Result Value Ref Range    Color, UA Yellow     Clarity, UA Clear     Specific Gravity, UA >=1 030 1 003 - 1 030    pH, UA 5 5 4 5 - 8 0    Leukocytes, UA (A) Negative     Elevated glucose may cause decreased leukocyte values   See urine microscopic for [facility] result/    Nitrite, UA Negative Negative    Protein, UA Negative Negative mg/dl    Glucose, UA >=1000 (1%) (A) Negative mg/dl    Ketones, UA Negative Negative mg/dl    Urobilinogen, UA 0 2 0 2, 1 0 E U /dl E U /dl    Bilirubin, UA Negative Negative    Blood, UA Trace-lysed (A) Negative   Urine Microscopic   Result Value Ref Range    RBC, UA 0-1 (A) None Seen, 0-5 /hpf    WBC, UA 0-1 (A) None Seen, 0-5, 5-55, 5-65 /hpf    Epithelial Cells None Seen None Seen, Occasional /hpf    Bacteria, UA None Seen None Seen, Occasional /hpf    MUCOUS THREADS Occasional Occasional, Moderate, Innumerable       ASSESSMENT and PLAN:      Diabetes mellitus, type 2 (HCC)  Seems to well control based on last hemoglobin A1c  Advised to continue what is doing    Benign essential hypertension  Very well controlled with present medication which I will continue as it include ARB blocker    Persistent proteinuria  Urinalysis is negative for any protein by dipstick  Somehow I do not have urine for protein to creatinine ratio  I will do it within a week to assess protein loss  I will repeat that in 1 year again also and I will see him back in 1 year      So he will do urine test again in week or 2  Unless there is worsening proteinuria I will see him back in 1 year with another repeat blood and urine test   He is advised to continue ARB blocker        Portions of the record may have been created with voice recognition software  Occasional wrong word or "sound a like" substitutions may have occurred due to the inherent limitations of voice recognition software  Read the chart carefully and recognize, using context, where substitutions have occurred  If you have any questions, please contact the dictating provider

## 2018-03-12 ENCOUNTER — CLINICAL SUPPORT (OUTPATIENT)
Dept: FAMILY MEDICINE CLINIC | Facility: CLINIC | Age: 63
End: 2018-03-12
Payer: MEDICARE

## 2018-03-12 VITALS — TEMPERATURE: 97.8 F

## 2018-03-12 DIAGNOSIS — E53.8 B12 DEFICIENCY: Primary | ICD-10-CM

## 2018-03-12 RX ADMIN — CYANOCOBALAMIN 1000 MCG: 1000 INJECTION INTRAMUSCULAR; SUBCUTANEOUS at 08:57

## 2018-03-12 RX ADMIN — CYANOCOBALAMIN 1000 MCG: 1000 INJECTION INTRAMUSCULAR; SUBCUTANEOUS at 08:54

## 2018-03-19 ENCOUNTER — APPOINTMENT (OUTPATIENT)
Dept: LAB | Facility: HOSPITAL | Age: 63
End: 2018-03-19
Attending: INTERNAL MEDICINE
Payer: MEDICARE

## 2018-03-19 DIAGNOSIS — R80.1 PERSISTENT PROTEINURIA: ICD-10-CM

## 2018-03-19 LAB
ANION GAP SERPL CALCULATED.3IONS-SCNC: 7 MMOL/L (ref 4–13)
BACTERIA UR QL AUTO: ABNORMAL /HPF
BASOPHILS # BLD AUTO: 0.05 THOUSANDS/ΜL (ref 0–0.1)
BASOPHILS NFR BLD AUTO: 1 % (ref 0–1)
BILIRUB UR QL STRIP: NEGATIVE
BUN SERPL-MCNC: 11 MG/DL (ref 5–25)
CALCIUM SERPL-MCNC: 8.8 MG/DL (ref 8.3–10.1)
CHLORIDE SERPL-SCNC: 108 MMOL/L (ref 100–108)
CLARITY UR: CLEAR
CO2 SERPL-SCNC: 30 MMOL/L (ref 21–32)
COLOR UR: YELLOW
CREAT SERPL-MCNC: 0.91 MG/DL (ref 0.6–1.3)
CREAT UR-MCNC: 153 MG/DL
EOSINOPHIL # BLD AUTO: 0.24 THOUSAND/ΜL (ref 0–0.61)
EOSINOPHIL NFR BLD AUTO: 4 % (ref 0–6)
ERYTHROCYTE [DISTWIDTH] IN BLOOD BY AUTOMATED COUNT: 13.2 % (ref 11.6–15.1)
GFR SERPL CREATININE-BSD FRML MDRD: 91 ML/MIN/1.73SQ M
GLUCOSE P FAST SERPL-MCNC: 110 MG/DL (ref 65–99)
GLUCOSE UR STRIP-MCNC: ABNORMAL MG/DL
HCT VFR BLD AUTO: 44.7 % (ref 36.5–49.3)
HGB BLD-MCNC: 14.6 G/DL (ref 12–17)
HGB UR QL STRIP.AUTO: NEGATIVE
KETONES UR STRIP-MCNC: NEGATIVE MG/DL
LEUKOCYTE ESTERASE UR QL STRIP: ABNORMAL
LYMPHOCYTES # BLD AUTO: 1.47 THOUSANDS/ΜL (ref 0.6–4.47)
LYMPHOCYTES NFR BLD AUTO: 27 % (ref 14–44)
MCH RBC QN AUTO: 27 PG (ref 26.8–34.3)
MCHC RBC AUTO-ENTMCNC: 32.7 G/DL (ref 31.4–37.4)
MCV RBC AUTO: 83 FL (ref 82–98)
MONOCYTES # BLD AUTO: 0.47 THOUSAND/ΜL (ref 0.17–1.22)
MONOCYTES NFR BLD AUTO: 9 % (ref 4–12)
MUCOUS THREADS UR QL AUTO: ABNORMAL
NEUTROPHILS # BLD AUTO: 3.28 THOUSANDS/ΜL (ref 1.85–7.62)
NEUTS SEG NFR BLD AUTO: 60 % (ref 43–75)
NITRITE UR QL STRIP: NEGATIVE
NON-SQ EPI CELLS URNS QL MICRO: ABNORMAL /HPF
NRBC BLD AUTO-RTO: 0 /100 WBCS
OTHER STN SPEC: ABNORMAL
PH UR STRIP.AUTO: 5.5 [PH] (ref 4.5–8)
PLATELET # BLD AUTO: 217 THOUSANDS/UL (ref 149–390)
PMV BLD AUTO: 10.8 FL (ref 8.9–12.7)
POTASSIUM SERPL-SCNC: 4.3 MMOL/L (ref 3.5–5.3)
PROT UR STRIP-MCNC: ABNORMAL MG/DL
PROT UR-MCNC: 22 MG/DL
PROT/CREAT UR: 0.14 MG/G{CREAT} (ref 0–0.1)
RBC # BLD AUTO: 5.41 MILLION/UL (ref 3.88–5.62)
RBC #/AREA URNS AUTO: ABNORMAL /HPF
SODIUM SERPL-SCNC: 145 MMOL/L (ref 136–145)
SP GR UR STRIP.AUTO: 1.02 (ref 1–1.03)
UROBILINOGEN UR QL STRIP.AUTO: 0.2 E.U./DL
WBC # BLD AUTO: 5.52 THOUSAND/UL (ref 4.31–10.16)
WBC #/AREA URNS AUTO: ABNORMAL /HPF

## 2018-03-19 PROCEDURE — 80048 BASIC METABOLIC PNL TOTAL CA: CPT

## 2018-03-19 PROCEDURE — 84156 ASSAY OF PROTEIN URINE: CPT

## 2018-03-19 PROCEDURE — 36415 COLL VENOUS BLD VENIPUNCTURE: CPT

## 2018-03-19 PROCEDURE — 82570 ASSAY OF URINE CREATININE: CPT

## 2018-03-19 PROCEDURE — 81001 URINALYSIS AUTO W/SCOPE: CPT

## 2018-03-19 PROCEDURE — 85025 COMPLETE CBC W/AUTO DIFF WBC: CPT

## 2018-04-09 DIAGNOSIS — I10 HYPERTENSION, UNSPECIFIED TYPE: Primary | ICD-10-CM

## 2018-04-09 RX ORDER — AMLODIPINE BESYLATE 5 MG/1
5 TABLET ORAL DAILY
Qty: 90 TABLET | Refills: 0 | Status: SHIPPED | OUTPATIENT
Start: 2018-04-09 | End: 2018-07-13 | Stop reason: SDUPTHER

## 2018-04-12 ENCOUNTER — CLINICAL SUPPORT (OUTPATIENT)
Dept: FAMILY MEDICINE CLINIC | Facility: CLINIC | Age: 63
End: 2018-04-12
Payer: MEDICARE

## 2018-04-12 DIAGNOSIS — E53.8 B12 DEFICIENCY: Primary | ICD-10-CM

## 2018-04-12 PROCEDURE — 96372 THER/PROPH/DIAG INJ SC/IM: CPT

## 2018-04-12 RX ADMIN — CYANOCOBALAMIN 1000 MCG: 1000 INJECTION INTRAMUSCULAR; SUBCUTANEOUS at 09:56

## 2018-05-11 ENCOUNTER — CLINICAL SUPPORT (OUTPATIENT)
Dept: FAMILY MEDICINE CLINIC | Facility: CLINIC | Age: 63
End: 2018-05-11
Payer: MEDICARE

## 2018-05-11 DIAGNOSIS — E53.8 B12 DEFICIENCY: Primary | ICD-10-CM

## 2018-05-11 RX ADMIN — CYANOCOBALAMIN 1000 MCG: 1000 INJECTION INTRAMUSCULAR; SUBCUTANEOUS at 08:50

## 2018-06-11 ENCOUNTER — CLINICAL SUPPORT (OUTPATIENT)
Dept: FAMILY MEDICINE CLINIC | Facility: CLINIC | Age: 63
End: 2018-06-11
Payer: MEDICARE

## 2018-06-11 DIAGNOSIS — E53.8 VITAMIN B12 DEFICIENCY: Primary | ICD-10-CM

## 2018-06-11 RX ADMIN — CYANOCOBALAMIN 1000 MCG: 1000 INJECTION INTRAMUSCULAR; SUBCUTANEOUS at 10:23

## 2018-06-21 ENCOUNTER — TELEPHONE (OUTPATIENT)
Dept: BARIATRICS | Facility: CLINIC | Age: 63
End: 2018-06-21

## 2018-06-21 NOTE — TELEPHONE ENCOUNTER
LM asking for a return phone call for patient to schedule his annual bariatric visit in the [facility] office

## 2018-07-12 DIAGNOSIS — K22.70 BARRETT'S ESOPHAGUS WITHOUT DYSPLASIA: Primary | ICD-10-CM

## 2018-07-12 RX ORDER — PANTOPRAZOLE SODIUM 40 MG/1
40 TABLET, DELAYED RELEASE ORAL DAILY
Qty: 30 TABLET | Refills: 11 | Status: SHIPPED | OUTPATIENT
Start: 2018-07-12 | End: 2018-12-11 | Stop reason: SDUPTHER

## 2018-07-13 ENCOUNTER — OFFICE VISIT (OUTPATIENT)
Dept: FAMILY MEDICINE CLINIC | Facility: CLINIC | Age: 63
End: 2018-07-13
Payer: MEDICARE

## 2018-07-13 DIAGNOSIS — E53.8 B12 DEFICIENCY: Primary | ICD-10-CM

## 2018-07-13 DIAGNOSIS — I10 HYPERTENSION, UNSPECIFIED TYPE: ICD-10-CM

## 2018-07-13 RX ORDER — AMLODIPINE BESYLATE 5 MG/1
5 TABLET ORAL DAILY
Qty: 90 TABLET | Refills: 0 | Status: SHIPPED | OUTPATIENT
Start: 2018-07-13 | End: 2018-11-01 | Stop reason: SDUPTHER

## 2018-07-13 RX ADMIN — CYANOCOBALAMIN 1000 MCG: 1000 INJECTION INTRAMUSCULAR; SUBCUTANEOUS at 13:31

## 2018-08-02 ENCOUNTER — OFFICE VISIT (OUTPATIENT)
Dept: FAMILY MEDICINE CLINIC | Facility: CLINIC | Age: 63
End: 2018-08-02
Payer: MEDICARE

## 2018-08-02 VITALS
HEIGHT: 70 IN | TEMPERATURE: 97.2 F | WEIGHT: 237 LBS | HEART RATE: 59 BPM | SYSTOLIC BLOOD PRESSURE: 132 MMHG | DIASTOLIC BLOOD PRESSURE: 80 MMHG | OXYGEN SATURATION: 98 % | BODY MASS INDEX: 33.93 KG/M2

## 2018-08-02 DIAGNOSIS — Z12.5 SCREENING FOR PROSTATE CANCER: ICD-10-CM

## 2018-08-02 DIAGNOSIS — E78.01 FAMILIAL HYPERCHOLESTEROLEMIA: ICD-10-CM

## 2018-08-02 DIAGNOSIS — E11.22 TYPE 2 DIABETES MELLITUS WITH STAGE 2 CHRONIC KIDNEY DISEASE, WITHOUT LONG-TERM CURRENT USE OF INSULIN (HCC): ICD-10-CM

## 2018-08-02 DIAGNOSIS — I10 ESSENTIAL HYPERTENSION: Primary | ICD-10-CM

## 2018-08-02 DIAGNOSIS — D64.9 ANEMIA, UNSPECIFIED TYPE: ICD-10-CM

## 2018-08-02 DIAGNOSIS — F34.1 DYSTHYMIC: ICD-10-CM

## 2018-08-02 DIAGNOSIS — R53.83 FATIGUE, UNSPECIFIED TYPE: ICD-10-CM

## 2018-08-02 DIAGNOSIS — N18.2 TYPE 2 DIABETES MELLITUS WITH STAGE 2 CHRONIC KIDNEY DISEASE, WITHOUT LONG-TERM CURRENT USE OF INSULIN (HCC): ICD-10-CM

## 2018-08-02 PROBLEM — J11.1 INFLUENZA: Status: RESOLVED | Noted: 2018-02-06 | Resolved: 2018-08-02

## 2018-08-02 PROCEDURE — 99214 OFFICE O/P EST MOD 30 MIN: CPT | Performed by: FAMILY MEDICINE

## 2018-08-02 RX ORDER — BUPROPION HYDROCHLORIDE 150 MG/1
TABLET ORAL
Qty: 90 TABLET | Refills: 0 | Status: SHIPPED | OUTPATIENT
Start: 2018-08-02 | End: 2018-09-06 | Stop reason: ALTCHOICE

## 2018-08-02 RX ORDER — BUPROPION HYDROCHLORIDE 150 MG/1
TABLET ORAL
Qty: 90 TABLET | Refills: 0 | Status: SHIPPED | OUTPATIENT
Start: 2018-08-02 | End: 2018-08-02 | Stop reason: ALTCHOICE

## 2018-08-02 RX ADMIN — CYANOCOBALAMIN 1000 MCG: 1000 INJECTION INTRAMUSCULAR; SUBCUTANEOUS at 15:15

## 2018-08-02 NOTE — PROGRESS NOTES
Standard Visit   Assessment/Plan:     Visit Diagnosis:  Diagnoses and all orders for this visit:    Essential hypertension    Type 2 diabetes mellitus with stage 2 chronic kidney disease, without long-term current use of insulin (HCC)    Fatigue, unspecified type  -     CBC and differential; Future  -     Comprehensive metabolic panel; Future  -     TSH, 3rd generation with Free T4 reflex; Future    Anemia, unspecified type  -     Ferritin; Future  -     TIBC; Future    Familial hypercholesterolemia  -     Lipid panel; Future    Screening for prostate cancer  -     PSA, total and free; Future    Dysthymic  -     Discontinue: buPROPion (WELLBUTRIN XL) 150 mg 24 hr tablet; Take 1 pill in the morning for 1 week after 1 week take 2 pills every morning  -     buPROPion (WELLBUTRIN XL) 150 mg 24 hr tablet; Take 1 pill daily for 1 week and after that take 2 pills every morning    Routine health maintenance he is up-to-date on we are going to give him his B12 shot today  Weight goes up and down but his weight is actually very good past is bariatric surgery   He is doing excellent  He is quite compliant with his vitamins  He takes  His Jardiance   which helps  To keep his weight and his blood sugar at a normal level  We are going to start Wellbutrin XL to also help keep the weight down a little bit and also give him a little energy  He is going to take 1 pill daily in the morning for a week then 2 pills daily after that  I am going to see him mid to late August to see how he is doing on that and also for weight check   Fasting lab work has been started        Subjective:    Patient ID: Shane Lyons is a 66 y o  male       Patient is here with the following concerns:    Here for checkup   He recently had an EGD where it showed Ugalde's esophagus  Was placed on pantoprazole  We are reinforcing the fact that he has to take this pill for life   He feels comfortable with this  Blood pressure is controlled with Cozaar 100 mg daily and amlodipine 5 mg daily  Diabetes is controlled with Jardiance  25 mg once a day and his Bariatric Trudi En Y surgery several years ago  He lost over 100 lbs   He sees Dr Moo Castanon for cardiology - Multiple risk factors    he is followed by Dr Nito Yip  for persistent hematuria   [employer]  also sees Dr Euna Lombard  for protein in the urine Current GFR is 87   Colonoscopy and EGD is up-to-date  He gets his B12 shots by  us  on a monthly basis and is very compliant with all his vitamins  Has taken small summer vacations the past few months, but anything longer " ends up a conroy with his wife :)  "  Feeling well  The following portions of the patient's history were reviewed and updated as appropriate: allergies, current medications, past family history, past medical history, past social history, past surgical history and problem list     Review of Systems   Constitutional: Negative for activity change, fatigue, fever and unexpected weight change  HENT: Negative for congestion, ear pain, hearing loss, sinus pain, sore throat, tinnitus, trouble swallowing and voice change  Eyes: Negative for pain, discharge and visual disturbance  Respiratory: Negative for cough, chest tightness, shortness of breath and wheezing  Cardiovascular: Negative for chest pain, palpitations and leg swelling  Gastrointestinal: Negative for abdominal pain, blood in stool, diarrhea and vomiting  Endocrine: Negative for cold intolerance, heat intolerance and polyuria  Genitourinary: Negative for difficulty urinating, dysuria, flank pain, genital sores and urgency  Musculoskeletal: Negative for gait problem, joint swelling and neck stiffness  Skin: Negative for color change, rash and wound  Allergic/Immunologic: Negative for immunocompromised state  Neurological: Negative for syncope, speech difficulty and light-headedness     Psychiatric/Behavioral: Negative for behavioral problems, dysphoric mood and suicidal ideas          /80 (BP Location: Left arm, Patient Position: Sitting, Cuff Size: Adult)   Pulse 59   Temp (!) 97 2 °F (36 2 °C) (Tympanic)   Ht 5' 10" (1 778 m)   Wt 108 kg (237 lb)   SpO2 98%   BMI 34 01 kg/m²   Social History     Social History    Marital status: Single     Spouse name: N/A    Number of children: N/A    Years of education: N/A     Occupational History    Not on file       Social History Main Topics    Smoking status: Former Smoker     Types: Cigarettes     Quit date: 1986    Smokeless tobacco: Never Used    Alcohol use No    Drug use: Yes     Types: Marijuana      Comment: everyday     Sexual activity: Not on file     Other Topics Concern    Not on file     Social History Narrative    No narrative on file     Past Medical History:   Diagnosis Date    Chronic kidney disease     Diabetes ([address] )     LAST ASSESSED: 7/23/14    Edema     LAST ASSESSED:6/12/12    Hyperlipidemia     Hypertension     Morbid obesity due to excess calories ([facility] [address] )     LAST ASSESSED: 6/12/12     Family History   Problem Relation Age of Onset    COPD Mother     Heart disease Mother     Mental illness Mother     Drug abuse Mother     Diabetes Father     Mental illness Father     Drug abuse Father     Lung cancer Father     Diabetes Sister      Past Surgical History:   Procedure Laterality Date    BACK SURGERY      CHOLECYSTECTOMY      COLONOSCOPY      FOOT SURGERY      GASTRIC RESTRICTION SURGERY      GASTRIC STAPLING FOR MORBID OBESITY LAPAROSCOPY W/ MARCY-EN-Y    SHOULDER SURGERY      UVULOPALATOPLASTY         Current Outpatient Prescriptions:     amLODIPine (NORVASC) 5 mg tablet, Take 1 tablet (5 mg total) by mouth daily, Disp: 90 tablet, Rfl: 0    calcium citrate-Vitamin D (CALCIUM CITRATE + D3) 200 mg-250 units, Take 1 tablet by mouth daily, Disp: , Rfl:     Empagliflozin (JARDIANCE) 25 MG TABS, Take 1 tablet (25 mg total) by mouth daily, Disp: 90 tablet, Rfl: 0    losartan (COZAAR) 100 MG tablet, Take 1 tablet by mouth daily, Disp: , Rfl:     Multiple Vitamins-Minerals (CENTRUM ADULTS PO), Take 1 tablet by mouth daily, Disp: , Rfl:     pantoprazole (PROTONIX) 40 mg tablet, Take 1 tablet (40 mg total) by mouth daily, Disp: 30 tablet, Rfl: 11    buPROPion (WELLBUTRIN XL) 150 mg 24 hr tablet, Take 1 pill daily for 1 week and after that take 2 pills every morning, Disp: 90 tablet, Rfl: 0    Current Facility-Administered Medications:     cyanocobalamin injection 1,000 mcg, 1,000 mcg, Intramuscular, Q30 Days, TIFFANY Keys, 1,000 mcg at 08/02/18 1515      Objective:     Physical Exam   Constitutional: He is oriented to person, place, and time  He appears well-developed and well-nourished  HENT:   Head: Normocephalic and atraumatic  Eyes: Pupils are equal, round, and reactive to light  Neck: Normal range of motion  Neck supple  Cardiovascular: Normal rate and normal heart sounds  No murmur heard  Pulmonary/Chest: Effort normal and breath sounds normal  No respiratory distress  He exhibits no tenderness  Abdominal: Soft  Bowel sounds are normal    Musculoskeletal: Normal range of motion  He exhibits no tenderness  Lymphadenopathy:     He has no cervical adenopathy  Neurological: He is alert and oriented to person, place, and time  Coordination normal    Skin: Skin is warm and dry  Psychiatric: He has a normal mood and affect  Thought content normal    Nursing note and vitals reviewed  Social History     Social History    Marital status: Single     Spouse name: N/A    Number of children: N/A    Years of education: N/A     Occupational History    Not on file       Social History Main Topics    Smoking status: Former Smoker     Types: Cigarettes     Quit date: 1986    Smokeless tobacco: Never Used    Alcohol use No    Drug use: Yes     Types: Marijuana      Comment: everyday     Sexual activity: Not on file     Other Topics Concern    Not on file Social History Narrative    No narrative on file     Past Medical History:   Diagnosis Date    Chronic kidney disease     Diabetes ([facility] [address] )     LAST ASSESSED: 7/23/14    Edema     LAST ASSESSED:6/12/12    Hyperlipidemia     Hypertension     Morbid obesity due to excess calories (HCC)     LAST ASSESSED: 6/12/12       Current Outpatient Prescriptions:     amLODIPine (NORVASC) 5 mg tablet, Take 1 tablet (5 mg total) by mouth daily, Disp: 90 tablet, Rfl: 0    calcium citrate-Vitamin D (CALCIUM CITRATE + D3) 200 mg-250 units, Take 1 tablet by mouth daily, Disp: , Rfl:     Empagliflozin (JARDIANCE) 25 MG TABS, Take 1 tablet (25 mg total) by mouth daily, Disp: 90 tablet, Rfl: 0    losartan (COZAAR) 100 MG tablet, Take 1 tablet by mouth daily, Disp: , Rfl:     Multiple Vitamins-Minerals (CENTRUM ADULTS PO), Take 1 tablet by mouth daily, Disp: , Rfl:     pantoprazole (PROTONIX) 40 mg tablet, Take 1 tablet (40 mg total) by mouth daily, Disp: 30 tablet, Rfl: 11    buPROPion (WELLBUTRIN XL) 150 mg 24 hr tablet, Take 1 pill daily for 1 week and after that take 2 pills every morning, Disp: 90 tablet, Rfl: 0    Current Facility-Administered Medications:     cyanocobalamin injection 1,000 mcg, 1,000 mcg, Intramuscular, Q30 Days, TIFFANY Keys, 1,000 mcg at 08/02/18 1515  Family History   Problem Relation Age of Onset    COPD Mother     Heart disease Mother     Mental illness Mother     Drug abuse Mother     Diabetes Father     Mental illness Father     Drug abuse Father     Lung cancer Father     Diabetes Sister        Patient Instructions   Routine health maintenance he is up-to-date on we are going to give him his B12 shot today  Weight goes up and down but his weight is actually very good past is bariatric surgery   He is doing excellent  He is quite compliant with his vitamins  He takes  His Jardiance   which helps  To keep his weight and his blood sugar at a normal level  We are going to start Wellbutrin XL to also help keep the weight down a little bit and also give him a little energy  He is going to take 1 pill daily in the morning for a week then 2 pills daily after that  I am going to see him mid to late August to see how he is doing on that and also for weight check   Fasting lab work has been started          Daniela, Viridiana Holt

## 2018-08-02 NOTE — PATIENT INSTRUCTIONS
Routine health maintenance he is up-to-date on we are going to give him his B12 shot today  Weight goes up and down but his weight is actually very good past is bariatric surgery   He is doing excellent  He is quite compliant with his vitamins  He takes  His Jardiance   which helps  To keep his weight and his blood sugar at a normal level  We are going to start Wellbutrin XL to also help keep the weight down a little bit and also give him a little energy  He is going to take 1 pill daily in the morning for a week then 2 pills daily after that  I am going to see him mid to late August to see how he is doing on that and also for weight check   Fasting lab work has been started

## 2018-09-06 ENCOUNTER — TELEPHONE (OUTPATIENT)
Dept: FAMILY MEDICINE CLINIC | Facility: CLINIC | Age: 63
End: 2018-09-06

## 2018-09-06 ENCOUNTER — APPOINTMENT (OUTPATIENT)
Dept: LAB | Facility: HOSPITAL | Age: 63
End: 2018-09-06
Payer: MEDICARE

## 2018-09-06 ENCOUNTER — OFFICE VISIT (OUTPATIENT)
Dept: FAMILY MEDICINE CLINIC | Facility: CLINIC | Age: 63
End: 2018-09-06
Payer: MEDICARE

## 2018-09-06 VITALS
DIASTOLIC BLOOD PRESSURE: 70 MMHG | HEART RATE: 67 BPM | TEMPERATURE: 98.4 F | HEIGHT: 70 IN | BODY MASS INDEX: 33.64 KG/M2 | WEIGHT: 235 LBS | OXYGEN SATURATION: 98 % | SYSTOLIC BLOOD PRESSURE: 124 MMHG | RESPIRATION RATE: 18 BRPM

## 2018-09-06 DIAGNOSIS — Z23 NEED FOR INFLUENZA VACCINATION: ICD-10-CM

## 2018-09-06 DIAGNOSIS — D64.9 ANEMIA, UNSPECIFIED TYPE: ICD-10-CM

## 2018-09-06 DIAGNOSIS — F32.A DEPRESSION, UNSPECIFIED DEPRESSION TYPE: Primary | ICD-10-CM

## 2018-09-06 DIAGNOSIS — K90.89 OTHER SPECIFIED INTESTINAL MALABSORPTION: ICD-10-CM

## 2018-09-06 DIAGNOSIS — Z98.84 H/O BARIATRIC SURGERY: ICD-10-CM

## 2018-09-06 DIAGNOSIS — R53.83 FATIGUE, UNSPECIFIED TYPE: ICD-10-CM

## 2018-09-06 DIAGNOSIS — R80.1 PERSISTENT PROTEINURIA: ICD-10-CM

## 2018-09-06 DIAGNOSIS — Z12.5 SCREENING FOR PROSTATE CANCER: ICD-10-CM

## 2018-09-06 DIAGNOSIS — E78.01 FAMILIAL HYPERCHOLESTEROLEMIA: ICD-10-CM

## 2018-09-06 LAB
ALBUMIN SERPL BCP-MCNC: 3.8 G/DL (ref 3.5–5)
ALP SERPL-CCNC: 118 U/L (ref 46–116)
ALT SERPL W P-5'-P-CCNC: 31 U/L (ref 12–78)
ANION GAP SERPL CALCULATED.3IONS-SCNC: 5 MMOL/L (ref 4–13)
AST SERPL W P-5'-P-CCNC: 18 U/L (ref 5–45)
BASOPHILS # BLD AUTO: 0.04 THOUSANDS/ΜL (ref 0–0.1)
BASOPHILS NFR BLD AUTO: 1 % (ref 0–1)
BILIRUB SERPL-MCNC: 0.7 MG/DL (ref 0.2–1)
BUN SERPL-MCNC: 13 MG/DL (ref 5–25)
CALCIUM SERPL-MCNC: 8.6 MG/DL (ref 8.3–10.1)
CHLORIDE SERPL-SCNC: 107 MMOL/L (ref 100–108)
CHOLEST SERPL-MCNC: 158 MG/DL (ref 50–200)
CO2 SERPL-SCNC: 29 MMOL/L (ref 21–32)
CREAT SERPL-MCNC: 1.01 MG/DL (ref 0.6–1.3)
EOSINOPHIL # BLD AUTO: 0.24 THOUSAND/ΜL (ref 0–0.61)
EOSINOPHIL NFR BLD AUTO: 5 % (ref 0–6)
ERYTHROCYTE [DISTWIDTH] IN BLOOD BY AUTOMATED COUNT: 13.2 % (ref 11.6–15.1)
FERRITIN SERPL-MCNC: 8 NG/ML (ref 8–388)
GFR SERPL CREATININE-BSD FRML MDRD: 80 ML/MIN/1.73SQ M
GLUCOSE P FAST SERPL-MCNC: 116 MG/DL (ref 65–99)
HCT VFR BLD AUTO: 45.6 % (ref 36.5–49.3)
HDLC SERPL-MCNC: 33 MG/DL (ref 40–60)
HGB BLD-MCNC: 14.6 G/DL (ref 12–17)
IMM GRANULOCYTES # BLD AUTO: 0.02 THOUSAND/UL (ref 0–0.2)
IMM GRANULOCYTES NFR BLD AUTO: 0 % (ref 0–2)
LDLC SERPL CALC-MCNC: 66 MG/DL (ref 0–100)
LYMPHOCYTES # BLD AUTO: 1.29 THOUSANDS/ΜL (ref 0.6–4.47)
LYMPHOCYTES NFR BLD AUTO: 25 % (ref 14–44)
MCH RBC QN AUTO: 26.4 PG (ref 26.8–34.3)
MCHC RBC AUTO-ENTMCNC: 32 G/DL (ref 31.4–37.4)
MCV RBC AUTO: 83 FL (ref 82–98)
MONOCYTES # BLD AUTO: 0.54 THOUSAND/ΜL (ref 0.17–1.22)
MONOCYTES NFR BLD AUTO: 10 % (ref 4–12)
NEUTROPHILS # BLD AUTO: 3.13 THOUSANDS/ΜL (ref 1.85–7.62)
NEUTS SEG NFR BLD AUTO: 59 % (ref 43–75)
NONHDLC SERPL-MCNC: 125 MG/DL
NRBC BLD AUTO-RTO: 0 /100 WBCS
PLATELET # BLD AUTO: 209 THOUSANDS/UL (ref 149–390)
PMV BLD AUTO: 11.2 FL (ref 8.9–12.7)
POTASSIUM SERPL-SCNC: 4 MMOL/L (ref 3.5–5.3)
PROT SERPL-MCNC: 6.8 G/DL (ref 6.4–8.2)
RBC # BLD AUTO: 5.52 MILLION/UL (ref 3.88–5.62)
SODIUM SERPL-SCNC: 141 MMOL/L (ref 136–145)
TIBC SERPL-MCNC: 393 UG/DL (ref 250–450)
TRIGL SERPL-MCNC: 297 MG/DL
TSH SERPL DL<=0.05 MIU/L-ACNC: 1 UIU/ML (ref 0.36–3.74)
WBC # BLD AUTO: 5.26 THOUSAND/UL (ref 4.31–10.16)

## 2018-09-06 PROCEDURE — 90682 RIV4 VACC RECOMBINANT DNA IM: CPT

## 2018-09-06 PROCEDURE — 36415 COLL VENOUS BLD VENIPUNCTURE: CPT

## 2018-09-06 PROCEDURE — 82728 ASSAY OF FERRITIN: CPT

## 2018-09-06 PROCEDURE — 80053 COMPREHEN METABOLIC PANEL: CPT

## 2018-09-06 PROCEDURE — 84443 ASSAY THYROID STIM HORMONE: CPT

## 2018-09-06 PROCEDURE — 83550 IRON BINDING TEST: CPT

## 2018-09-06 PROCEDURE — 80061 LIPID PANEL: CPT

## 2018-09-06 PROCEDURE — G0103 PSA SCREENING: HCPCS

## 2018-09-06 PROCEDURE — 99214 OFFICE O/P EST MOD 30 MIN: CPT | Performed by: FAMILY MEDICINE

## 2018-09-06 PROCEDURE — 85025 COMPLETE CBC W/AUTO DIFF WBC: CPT

## 2018-09-06 RX ORDER — BUPROPION HYDROCHLORIDE 300 MG/1
300 TABLET ORAL DAILY
Qty: 90 TABLET | Refills: 1 | Status: SHIPPED | OUTPATIENT
Start: 2018-09-06 | End: 2019-03-20 | Stop reason: SDUPTHER

## 2018-09-06 RX ADMIN — CYANOCOBALAMIN 1000 MCG: 1000 INJECTION INTRAMUSCULAR; SUBCUTANEOUS at 10:45

## 2018-09-06 NOTE — PROGRESS NOTES
Standard Visit   Assessment/Plan:     Visit Diagnosis:  Diagnoses and all orders for this visit:    Depression, unspecified depression type  -     buPROPion (WELLBUTRIN XL) 300 mg 24 hr tablet; Take 1 tablet (300 mg total) by mouth daily for 90 days    Stay on the Wellbutrin  mg once a day  Stay on the Jardiances 25 mg once a day  You must remember to hydrate While on this med  There is a slight increased risk of kidney stones while on this pill  So you must hydrate    eye appointment is coming up so when you go to the eye doctor bring that taper back to us so we can put in  your chart   flu shot today          Subjective:    Patient ID: Sheron Carrillo is a 66 y o  male  Patient is here with the following concerns:    Here for checkup   Time did have blood work done he has done this morning   We had a CBC that just came back which was normal   That looks okay   He will be getting a flu shot while he is here   Weight looks good he is actually down a few lb   For blood pressure control he takes Norvasc 5 mg and losartan 100 mg daily    blood pressure is 124/70 which is perfect for today  He is on Wellbutrin at this point Wellbutrin  mg once a day  He started out with 150 mg and now he is up to 2 pills daily so we will switch that prescription  The losartan he was always on so we never had to make that switch with valsartan  He is on GERD it is which is helping with blood pressure and blood sugar control   Weight is on the downward slide which is fantastic  He is status post bariatric surgery             The following portions of the patient's history were reviewed and updated as appropriate: allergies, current medications, past family history, past medical history, past social history, past surgical history and problem list     Review of Systems   Constitutional: Negative for activity change, fatigue, fever and unexpected weight change     HENT: Negative for congestion, ear pain, hearing loss, sinus pain, sore throat, tinnitus, trouble swallowing and voice change  Eyes: Negative for pain, discharge and visual disturbance  Respiratory: Negative for cough, chest tightness, shortness of breath and wheezing  Cardiovascular: Negative for chest pain, palpitations and leg swelling  Gastrointestinal: Negative for abdominal pain, blood in stool, diarrhea and vomiting  Endocrine: Negative for cold intolerance, heat intolerance and polyuria  Genitourinary: Negative for difficulty urinating, dysuria, flank pain, genital sores and urgency  Musculoskeletal: Negative for gait problem, joint swelling and neck stiffness  Skin: Negative for color change, rash and wound  Allergic/Immunologic: Negative for immunocompromised state  Neurological: Negative for syncope, speech difficulty and light-headedness  Psychiatric/Behavioral: Negative for behavioral problems, dysphoric mood and suicidal ideas  /70   Pulse 67   Temp 98 4 °F (36 9 °C)   Resp 18   Ht 5' 10" (1 778 m)   Wt 107 kg (235 lb)   SpO2 98%   BMI 33 72 kg/m²   Social History     Social History    Marital status: Single     Spouse name: N/A    Number of children: N/A    Years of education: N/A     Occupational History    Not on file       Social History Main Topics    Smoking status: Former Smoker     Types: Cigarettes     Quit date: 1986    Smokeless tobacco: Never Used    Alcohol use No    Drug use: Yes     Types: Marijuana      Comment: everyday     Sexual activity: Not on file     Other Topics Concern    Not on file     Social History Narrative    No narrative on file     Past Medical History:   Diagnosis Date    Chronic kidney disease     Diabetes ([facility] [address] )     LAST ASSESSED: 7/23/14    Edema     LAST ASSESSED:6/12/12    Hyperlipidemia     Hypertension     Morbid obesity due to excess calories (HCC)     LAST ASSESSED: 6/12/12     Family History   Problem Relation Age of Onset    COPD Mother     Heart disease Mother     Mental illness Mother     Drug abuse Mother     Diabetes Father     Mental illness Father     Drug abuse Father     Lung cancer Father     Diabetes Sister      Past Surgical History:   Procedure Laterality Date    BACK SURGERY      CHOLECYSTECTOMY      COLONOSCOPY      FOOT SURGERY      GASTRIC RESTRICTION SURGERY      GASTRIC STAPLING FOR MORBID OBESITY LAPAROSCOPY W/ MARCY-EN-Y    SHOULDER SURGERY      UVULOPALATOPLASTY         Current Outpatient Prescriptions:     amLODIPine (NORVASC) 5 mg tablet, Take 1 tablet (5 mg total) by mouth daily, Disp: 90 tablet, Rfl: 0    calcium citrate-Vitamin D (CALCIUM CITRATE + D3) 200 mg-250 units, Take 1 tablet by mouth daily, Disp: , Rfl:     Empagliflozin (JARDIANCE) 25 MG TABS, Take 1 tablet (25 mg total) by mouth daily, Disp: 90 tablet, Rfl: 0    losartan (COZAAR) 100 MG tablet, Take 1 tablet by mouth daily, Disp: , Rfl:     Multiple Vitamins-Minerals (CENTRUM ADULTS PO), Take 1 tablet by mouth daily, Disp: , Rfl:     pantoprazole (PROTONIX) 40 mg tablet, Take 1 tablet (40 mg total) by mouth daily, Disp: 30 tablet, Rfl: 11    buPROPion (WELLBUTRIN XL) 300 mg 24 hr tablet, Take 1 tablet (300 mg total) by mouth daily for 90 days, Disp: 90 tablet, Rfl: 1    Current Facility-Administered Medications:     cyanocobalamin injection 1,000 mcg, 1,000 mcg, Intramuscular, Q30 Days, TIFFANY Keys, 1,000 mcg at 08/02/18 1515      Objective:     Physical Exam   Constitutional: He is oriented to person, place, and time  He appears well-developed and well-nourished  HENT:   Head: Normocephalic and atraumatic  Eyes: Pupils are equal, round, and reactive to light  Neck: Normal range of motion  Neck supple  Cardiovascular: Normal rate and normal heart sounds  No murmur heard  Pulmonary/Chest: Effort normal and breath sounds normal  No respiratory distress  He exhibits no tenderness  Abdominal: Soft   Bowel sounds are normal  Musculoskeletal: Normal range of motion  He exhibits no tenderness  Lymphadenopathy:     He has no cervical adenopathy  Neurological: He is alert and oriented to person, place, and time  Coordination normal    Skin: Skin is warm and dry  Psychiatric: He has a normal mood and affect  Thought content normal    Nursing note and vitals reviewed  Social History     Social History    Marital status: Single     Spouse name: N/A    Number of children: N/A    Years of education: N/A     Occupational History    Not on file       Social History Main Topics    Smoking status: Former Smoker     Types: Cigarettes     Quit date: 1986    Smokeless tobacco: Never Used    Alcohol use No    Drug use: Yes     Types: Marijuana      Comment: everyday     Sexual activity: Not on file     Other Topics Concern    Not on file     Social History Narrative    No narrative on file     Past Medical History:   Diagnosis Date    Chronic kidney disease     Diabetes ([facility] [address] )     LAST ASSESSED: 7/23/14    Edema     LAST ASSESSED:6/12/12    Hyperlipidemia     Hypertension     Morbid obesity due to excess calories (HCC)     LAST ASSESSED: 6/12/12       Current Outpatient Prescriptions:     amLODIPine (NORVASC) 5 mg tablet, Take 1 tablet (5 mg total) by mouth daily, Disp: 90 tablet, Rfl: 0    calcium citrate-Vitamin D (CALCIUM CITRATE + D3) 200 mg-250 units, Take 1 tablet by mouth daily, Disp: , Rfl:     Empagliflozin (JARDIANCE) 25 MG TABS, Take 1 tablet (25 mg total) by mouth daily, Disp: 90 tablet, Rfl: 0    losartan (COZAAR) 100 MG tablet, Take 1 tablet by mouth daily, Disp: , Rfl:     Multiple Vitamins-Minerals (CENTRUM ADULTS PO), Take 1 tablet by mouth daily, Disp: , Rfl:     pantoprazole (PROTONIX) 40 mg tablet, Take 1 tablet (40 mg total) by mouth daily, Disp: 30 tablet, Rfl: 11    buPROPion (WELLBUTRIN XL) 300 mg 24 hr tablet, Take 1 tablet (300 mg total) by mouth daily for 90 days, Disp: 90 tablet, Rfl: 1    Current Facility-Administered Medications:     cyanocobalamin injection 1,000 mcg, 1,000 mcg, Intramuscular, Q30 Days, TIFFANY Keys, 1,000 mcg at 08/02/18 5465  Family History   Problem Relation Age of Onset   Karissa Denton COPD Mother     Heart disease Mother     Mental illness Mother     Drug abuse Mother     Diabetes Father     Mental illness Father     Drug abuse Father     Lung cancer Father     Diabetes Sister        Patient Instructions    Stay on the Wellbutrin  mg once a day  Stay on the Jardiances 25 mg once a day  You must remember to hydrate While on this med  There is a slight increased risk of kidney stones while on this pill  So you must hydrate    eye appointment is coming up so when you go to the eye doctor bring that taper back to us so we can put in  your chart   flu shot today   B12 shot today              TIFFANY Villalta

## 2018-09-06 NOTE — PATIENT INSTRUCTIONS
Stay on the Wellbutrin  mg once a day  Stay on the Jardiances 25 mg once a day  You must remember to hydrate While on this med  There is a slight increased risk of kidney stones while on this pill  So you must hydrate    eye appointment is coming up so when you go to the eye doctor bring that taper back to us so we can put in  your chart   flu shot today   B12 shot today

## 2018-09-07 LAB
PSA FREE MFR SERPL: 22.7 %
PSA FREE SERPL-MCNC: 0.34 NG/ML
PSA SERPL-MCNC: 1.5 NG/ML (ref 0–4)

## 2018-09-08 DIAGNOSIS — D53.0 ANEMIA DUE TO PROTEIN DEFICIENCY: Primary | ICD-10-CM

## 2018-09-12 ENCOUNTER — TELEPHONE (OUTPATIENT)
Dept: FAMILY MEDICINE CLINIC | Facility: CLINIC | Age: 63
End: 2018-09-12

## 2018-09-12 NOTE — TELEPHONE ENCOUNTER
----- Message from Tomás Levi, [address] sent at 9/8/2018  8:42 AM EDT -----  Iron level is very low  I want him to start the vitron c and take it twice a day  Also please set him  With the occulta blood stool test   I will put the order in

## 2018-09-12 NOTE — TELEPHONE ENCOUNTER
Spoke to patient in regards to his labs, patient is aware of the repeat occult blood test as well    Pt had some questions in regards to the Iron     He wanted to know how long he will need to take the Vitron C for and when you will be rechecking his iron, if so when? Pt also wanted to know if this is something that is newly occurring, and if so what could have caused this?     I tried to find a past Ferritin test but I did not see one    Please advise

## 2018-09-25 ENCOUNTER — APPOINTMENT (OUTPATIENT)
Dept: LAB | Facility: HOSPITAL | Age: 63
End: 2018-09-25
Payer: MEDICARE

## 2018-09-25 DIAGNOSIS — D53.0 ANEMIA DUE TO PROTEIN DEFICIENCY: ICD-10-CM

## 2018-09-25 LAB — HEMOCCULT STL QL IA: NEGATIVE

## 2018-09-25 PROCEDURE — G0328 FECAL BLOOD SCRN IMMUNOASSAY: HCPCS

## 2018-10-17 ENCOUNTER — OFFICE VISIT (OUTPATIENT)
Dept: FAMILY MEDICINE CLINIC | Facility: CLINIC | Age: 63
End: 2018-10-17
Payer: MEDICARE

## 2018-10-17 DIAGNOSIS — E53.8 B12 DEFICIENCY: Primary | ICD-10-CM

## 2018-10-17 PROCEDURE — 96372 THER/PROPH/DIAG INJ SC/IM: CPT

## 2018-10-17 RX ADMIN — CYANOCOBALAMIN 1000 MCG: 1000 INJECTION INTRAMUSCULAR; SUBCUTANEOUS at 08:49

## 2018-10-25 DIAGNOSIS — I10 HYPERTENSION, UNSPECIFIED TYPE: ICD-10-CM

## 2018-11-01 RX ORDER — LOSARTAN POTASSIUM 100 MG/1
100 TABLET ORAL DAILY
Qty: 90 TABLET | Refills: 1 | Status: SHIPPED | OUTPATIENT
Start: 2018-11-01 | End: 2019-05-07 | Stop reason: SDUPTHER

## 2018-11-01 RX ORDER — AMLODIPINE BESYLATE 5 MG/1
5 TABLET ORAL DAILY
Qty: 90 TABLET | Refills: 0 | Status: SHIPPED | OUTPATIENT
Start: 2018-11-01 | End: 2019-01-07 | Stop reason: SDUPTHER

## 2018-11-05 ENCOUNTER — OFFICE VISIT (OUTPATIENT)
Dept: FAMILY MEDICINE CLINIC | Facility: CLINIC | Age: 63
End: 2018-11-05
Payer: MEDICARE

## 2018-11-05 VITALS
BODY MASS INDEX: 33.36 KG/M2 | SYSTOLIC BLOOD PRESSURE: 145 MMHG | HEIGHT: 70 IN | TEMPERATURE: 99.8 F | WEIGHT: 233 LBS | HEART RATE: 69 BPM | OXYGEN SATURATION: 98 % | DIASTOLIC BLOOD PRESSURE: 82 MMHG

## 2018-11-05 DIAGNOSIS — J06.9 VIRAL UPPER RESPIRATORY TRACT INFECTION: Primary | ICD-10-CM

## 2018-11-05 DIAGNOSIS — H61.23 BILATERAL IMPACTED CERUMEN: ICD-10-CM

## 2018-11-05 PROCEDURE — 99213 OFFICE O/P EST LOW 20 MIN: CPT | Performed by: FAMILY MEDICINE

## 2018-11-05 NOTE — PROGRESS NOTES
Assessment/Plan:         Diagnoses and all orders for this visit:    Viral upper respiratory tract infection    Bilateral impacted cerumen          URI  Discussed plan care recommended over-the-counter antihistamine, gargling salt water, increase fluids call for any changes worsening  Cerumen impaction bilateral  Discussed plan care recommend over-the-counter Debrox    Subjective:      Patient ID: Luis Mascorro is a 66 y o  male  Has been with a raspy voice , hourse x3 days  Neg fevers at home ,   Neg ill contact at home , also with a right ear ache which started today , neg discharge   Hearing feels closed shut, just a little sore   Negative smoker, denies cough wheezing shortness of breath difficulty breathing          The following portions of the patient's history were reviewed and updated as appropriate:   He has a past medical history of Chronic kidney disease; Diabetes ([facility] [address] ); Edema; Hypertension; and Morbid obesity due to excess calories ([facility] [address] )  ,   does not have any pertinent problems on file  ,   has a past surgical history that includes Back surgery; Cholecystectomy; Colonoscopy; Foot surgery; Shoulder surgery; Uvulopalatoplasty; and Gastric restriction surgery  ,  family history includes COPD in his mother; Diabetes in his father and sister; Drug abuse in his father and mother; Heart disease in his mother; Lung cancer in his father; Mental illness in his father and mother  ,   reports that he quit smoking about 32 years ago  His smoking use included Cigarettes  He has never used smokeless tobacco  He reports that he uses drugs, including Marijuana  He reports that he does not drink alcohol ,  is allergic to augmentin [amoxicillin-pot clavulanate] and ciprofloxacin       Ceruminosis is noted  Wax is removed by  manual debridement  Instructions for home care to prevent wax buildup are given    Review of Systems      Objective:  Vitals:    11/05/18 1415   BP: 145/82   Pulse: 69   Temp: 99 8 °F (37 7 °C) SpO2: 98%      Physical Exam   Constitutional: He is oriented to person, place, and time  He appears well-developed and well-nourished  HENT:   Head: Normocephalic and atraumatic  Mouth/Throat: Oropharynx is clear and moist    Throat slightly irritated, positive postnasal drip  Cerumen in bilateral ear canals, extracted   Eyes: Conjunctivae are normal  No scleral icterus  Neck: Normal range of motion  Neck supple  No thyromegaly present  Cardiovascular: Normal rate, regular rhythm and normal heart sounds  Pulmonary/Chest: Effort normal and breath sounds normal    Negative forced expiratory wheeze   Musculoskeletal: Normal range of motion  Lymphadenopathy:     He has no cervical adenopathy  Neurological: He is alert and oriented to person, place, and time  Skin: Skin is warm and dry  Psychiatric: He has a normal mood and affect   His behavior is normal  Judgment and thought content normal

## 2018-12-11 DIAGNOSIS — K22.70 BARRETT'S ESOPHAGUS WITHOUT DYSPLASIA: ICD-10-CM

## 2018-12-11 RX ORDER — PANTOPRAZOLE SODIUM 40 MG/1
40 TABLET, DELAYED RELEASE ORAL DAILY
Qty: 30 TABLET | Refills: 2 | Status: SHIPPED | OUTPATIENT
Start: 2018-12-11 | End: 2019-12-10 | Stop reason: SDUPTHER

## 2018-12-11 NOTE — TELEPHONE ENCOUNTER
Rx sent pantoprazole 40 mg with 2 refills        [facility]   [facility]     RX: Pantoprazole 40 mg 90 qty/refills   Uses: [facility] [employer]    (Routing comment)

## 2018-12-26 ENCOUNTER — TELEPHONE (OUTPATIENT)
Dept: NEPHROLOGY | Facility: CLINIC | Age: 63
End: 2018-12-26

## 2018-12-26 NOTE — TELEPHONE ENCOUNTER
On 03/04/2019 the patient has a follow up appointment that needs to be rescheduled do to a providers schedule change  I call and left a message for the patient and did cancel out the appointment

## 2019-01-07 DIAGNOSIS — I10 HYPERTENSION, UNSPECIFIED TYPE: ICD-10-CM

## 2019-01-07 RX ORDER — AMLODIPINE BESYLATE 5 MG/1
TABLET ORAL
Qty: 90 TABLET | Refills: 0 | Status: SHIPPED | OUTPATIENT
Start: 2019-01-07 | End: 2019-04-10 | Stop reason: SDUPTHER

## 2019-01-07 RX ORDER — EMPAGLIFLOZIN 25 MG/1
TABLET, FILM COATED ORAL
Qty: 90 TABLET | Refills: 0 | Status: SHIPPED | OUTPATIENT
Start: 2019-01-07 | End: 2019-04-10 | Stop reason: SDUPTHER

## 2019-02-06 ENCOUNTER — OFFICE VISIT (OUTPATIENT)
Dept: FAMILY MEDICINE CLINIC | Facility: CLINIC | Age: 64
End: 2019-02-06
Payer: MEDICARE

## 2019-02-06 VITALS
HEART RATE: 71 BPM | HEIGHT: 70 IN | SYSTOLIC BLOOD PRESSURE: 122 MMHG | BODY MASS INDEX: 34.36 KG/M2 | DIASTOLIC BLOOD PRESSURE: 62 MMHG | OXYGEN SATURATION: 96 % | WEIGHT: 240 LBS

## 2019-02-06 DIAGNOSIS — Z12.5 SCREENING FOR PROSTATE CANCER: ICD-10-CM

## 2019-02-06 DIAGNOSIS — Z12.11 SCREENING FOR COLON CANCER: ICD-10-CM

## 2019-02-06 DIAGNOSIS — Z11.59 NEED FOR HEPATITIS C SCREENING TEST: ICD-10-CM

## 2019-02-06 DIAGNOSIS — I10 ESSENTIAL HYPERTENSION: ICD-10-CM

## 2019-02-06 DIAGNOSIS — Z12.5 SCREENING FOR MALIGNANT NEOPLASM OF PROSTATE: ICD-10-CM

## 2019-02-06 DIAGNOSIS — R53.82 CHRONIC FATIGUE: ICD-10-CM

## 2019-02-06 DIAGNOSIS — K90.9 INTESTINAL MALABSORPTION, UNSPECIFIED TYPE: ICD-10-CM

## 2019-02-06 DIAGNOSIS — E56.9 HYPOVITAMINOSIS: ICD-10-CM

## 2019-02-06 DIAGNOSIS — E11.29 TYPE 2 DIABETES MELLITUS WITH MICROALBUMINURIA, WITHOUT LONG-TERM CURRENT USE OF INSULIN (HCC): ICD-10-CM

## 2019-02-06 DIAGNOSIS — R80.1 PERSISTENT PROTEINURIA: ICD-10-CM

## 2019-02-06 DIAGNOSIS — Z00.00 MEDICARE ANNUAL WELLNESS VISIT, SUBSEQUENT: Primary | ICD-10-CM

## 2019-02-06 DIAGNOSIS — Z13.1 SCREENING FOR DIABETES MELLITUS: ICD-10-CM

## 2019-02-06 DIAGNOSIS — R80.9 TYPE 2 DIABETES MELLITUS WITH MICROALBUMINURIA, WITHOUT LONG-TERM CURRENT USE OF INSULIN (HCC): ICD-10-CM

## 2019-02-06 DIAGNOSIS — Z13.6 SCREENING FOR CARDIOVASCULAR CONDITION: ICD-10-CM

## 2019-02-06 PROBLEM — E66.01 MORBID OBESITY DUE TO EXCESS CALORIES (HCC): Status: RESOLVED | Noted: 2019-02-06 | Resolved: 2019-02-06

## 2019-02-06 LAB — SL AMB POCT HEMOGLOBIN AIC: 5.7 (ref ?–6.5)

## 2019-02-06 PROCEDURE — G0439 PPPS, SUBSEQ VISIT: HCPCS | Performed by: FAMILY MEDICINE

## 2019-02-06 PROCEDURE — 96372 THER/PROPH/DIAG INJ SC/IM: CPT | Performed by: FAMILY MEDICINE

## 2019-02-06 PROCEDURE — 83036 HEMOGLOBIN GLYCOSYLATED A1C: CPT | Performed by: FAMILY MEDICINE

## 2019-02-06 RX ORDER — CLINDAMYCIN PHOSPHATE 10 MG/G
GEL TOPICAL
Refills: 0 | COMMUNITY
Start: 2019-02-04 | End: 2019-05-08 | Stop reason: ALTCHOICE

## 2019-02-06 RX ORDER — MOMETASONE FUROATE 1 MG/G
OINTMENT TOPICAL
Refills: 0 | COMMUNITY
Start: 2019-02-04 | End: 2019-05-08 | Stop reason: ALTCHOICE

## 2019-02-06 RX ADMIN — CYANOCOBALAMIN 1000 MCG: 1000 INJECTION INTRAMUSCULAR; SUBCUTANEOUS at 09:38

## 2019-02-06 NOTE — PROGRESS NOTES
Assessment and Plan:    Problem List Items Addressed This Visit        Digestive    Malabsorption    Relevant Orders    Vitamin D 25 hydroxy    Vitamin B12       Endocrine    Diabetes mellitus, type 2 (HCC)    Relevant Orders    Lipid panel    Comprehensive metabolic panel    CBC and differential    Lipid panel    POCT hemoglobin A1c (Completed)       Cardiovascular and Mediastinum    Hypertension    Relevant Orders    Microalbumin / creatinine urine ratio    TSH, 3rd generation with Free T4 reflex    Comprehensive metabolic panel       Other    Persistent proteinuria      Other Visit Diagnoses     Medicare annual wellness visit, subsequent    -  Primary    Chronic fatigue        Relevant Orders    CBC and differential    Screening for colon cancer        Relevant Orders    Occult Blood, Fecal Immunochemical    Hypovitaminosis        Relevant Orders    Vitamin D 25 hydroxy    Vitamin B12    Screening for malignant neoplasm of prostate        Relevant Orders    PSA, Total Screen    Screening for cardiovascular condition        Relevant Orders    Lipid panel    Screening for diabetes mellitus        Need for hepatitis C screening test        Screening for prostate cancer        Relevant Orders    Hepatitis C antibody        Health Maintenance Due   Topic Date Due    Hepatitis C Screening  1955    Medicare Annual Wellness Visit (AWV)  1955    Pneumococcal PPSV23 Medium Risk Adult (1 of 1 - PPSV23) 07/04/1976    DM Eye Exam  10/27/2017    URINE MICROALBUMIN  02/03/2018    HEMOGLOBIN A1C  02/11/2018    Diabetic Foot Exam  06/28/2018         HPI:  Sharad Escoto is a 66 y o  male here for his Subsequent Wellness Visit      Patient Active Problem List   Diagnosis    Malabsorption    Diabetes mellitus, type 2 ([address] )    Persistent proteinuria    Hypertension     Past Medical History:   Diagnosis Date    Chronic kidney disease     Diabetes ([address] )     LAST ASSESSED: 7/23/14    Edema     LAST ASSESSED:6/12/12    Hypertension     Morbid obesity due to excess calories (HCC)     LAST ASSESSED: 6/12/12     Past Surgical History:   Procedure Laterality Date    BACK SURGERY      CHOLECYSTECTOMY      COLONOSCOPY      FOOT SURGERY      GASTRIC RESTRICTION SURGERY      GASTRIC STAPLING FOR MORBID OBESITY LAPAROSCOPY W/ MARCY-EN-Y    SHOULDER SURGERY      UVULOPALATOPLASTY       Family History   Problem Relation Age of Onset    COPD Mother     Heart disease Mother     Mental illness Mother    Barbara Tangirnaq Drug abuse Mother     Diabetes Father     Mental illness Father     Drug abuse Father     Lung cancer Father     Diabetes Sister      History   Smoking Status    Former Smoker    Types: Cigarettes    Quit date: 1986   Smokeless Tobacco    Never Used     History   Alcohol Use No      History   Drug Use    Types: Marijuana     Comment: everyday        Current Outpatient Prescriptions   Medication Sig Dispense Refill    amLODIPine (NORVASC) 5 mg tablet TAKE 1 TABLET DAILY 90 tablet 0    buPROPion (WELLBUTRIN XL) 300 mg 24 hr tablet Take 1 tablet (300 mg total) by mouth daily for 90 days 90 tablet 1    calcium citrate-Vitamin D (CALCIUM CITRATE + D3) 200 mg-250 units Take 1 tablet by mouth daily      clindamycin (CLINDAGEL) 1 % gel APPLY TO SCALP PIMPLES ONCE OR TWICE DAILY AS NEEDED  0    Iron-Vitamin C (VITRON-C)  MG TABS Take one pill twice daily 60 tablet 1    JARDIANCE 25 MG TABS TAKE 1 TABLET DAILY 90 tablet 0    losartan (COZAAR) 100 MG tablet Take 1 tablet (100 mg total) by mouth daily 90 tablet 1    mometasone (ELOCON) 0 1 % ointment APPLY TO DRY SKIN ON LEGS NIGHTLY IF NEEDED  0    Multiple Vitamins-Minerals (CENTRUM ADULTS PO) Take 1 tablet by mouth daily      pantoprazole (PROTONIX) 40 mg tablet Take 1 tablet (40 mg total) by mouth daily 30 tablet 2     Current Facility-Administered Medications   Medication Dose Route Frequency Provider Last Rate Last Dose    cyanocobalamin injection 1,000 mcg  1,000 mcg Intramuscular Q30 Days TIFFANY Thrasher   1,000 mcg at 02/06/19 3121     Allergies   Allergen Reactions    Augmentin [Amoxicillin-Pot Clavulanate] Diarrhea    Ciprofloxacin Diarrhea     Immunization History   Administered Date(s) Administered    Influenza 10/07/2004, 10/27/2005, 12/01/2006, 10/30/2007, 11/10/2008, 10/02/2009, 11/15/2010, 09/09/2011, 10/08/2012, 10/14/2013, 09/17/2014, 09/25/2015, 09/06/2018    Influenza Quadrivalent Preservative Free 3 years and older IM 09/21/2016, 09/07/2017    Influenza, recombinant, quadrivalent,injectable, preservative free 09/06/2018    Pneumococcal Conjugate 13-Valent 09/09/2016    Tdap 11/06/2013       Patient Care Team:  Holley Beal as PCP - General (Family Medicine)  Edith Gorman MD as Consulting Physician (Gastroenterology)  Gerald Escobar as Consulting Physician (Optometry)  Ashley Haynes MD as Consulting Physician (Nephrology)  Aruna Edge PA-C    Medicare Screening Tests and Risk Assessments:  Maria Fernanda Vicente is here for his Subsequent Wellness visit  Last Medicare Wellness visit information reviewed, patient interviewed, no change since last AWV  Health Risk Assessment:  Patient rates overall health as fair  Patient feels that their physical health rating is Same  Eyesight was rated as Same  Hearing was rated as Same  Patient feels that their emotional and mental health rating is Same  Pain experienced by patient in the last 7 days has been Some  Patient's pain rating has been 6/10  Patient states that he has experienced no weight loss or gain in last 6 months  Emotional/Mental Health:  Patient has been feeling nervous/anxious  PHQ-9 Depression Screening:    Frequency of the following problems over the past two weeks:      1  Little interest or pleasure in doing things: 0 - not at all      2  Feeling down, depressed, or hopeless: 0 - not at all  PHQ-2 Score: 0          Broken Bones/Falls:     Fall Risk Assessment:    In the past year, patient has experienced: No history of falling in past year          Bladder/Bowel:  Patient has not leaked urine accidently in the last six months  Patient reports no loss of bowel control  Immunizations:  Patient has had a flu vaccination within the last year  Patient has received a pneumonia shot  Patient has not received a shingles shot  Patient has received tetanus/diphtheria shot  Date of tetanus/diphtheria shot: 11/6/2013    Home Safety:  Patient does not have trouble with stairs inside or outside of their home  Patient currently reports that there are no safety hazards present in home, working smoke alarms, no working carbon monoxide detectors  Preventative Screenings:   prostate cancer screen performed, colon cancer screen completed, cholesterol screen completed, glaucoma eye exam completed,     Nutrition:  Current diet: Regular with servings of the following:    Medications:  Patient is currently taking over-the-counter supplements  Patient is able to manage medications  Lifestyle Choices:  Patient reports no tobacco use  Patient has smoked or used tobacco in the past   Patient has stopped his tobacco use  Patient reports no alcohol use  Patient drives a vehicle  Patient wears seat belt  Activities of Daily Living:  Can get out of bed by his or her self, able to dress self, able to make own meals, able to do own shopping, able to bathe self, can do own laundry/housekeeping, can manage own money, pay bills and track expenses    Previous Hospitalizations:  Hospitalization or ED visit in past 12 months  Number of hospitalizations within the last year: 1-2        Advanced Directives:  Patient has not decided on power of   Patient has not completed advanced directive          Preventative Screening/Counseling:      Cardiovascular:      General: Risks and Benefits Discussed      Counseling: Healthy Diet, Healthy Weight, Improve Cholesterol, Improve Blood Pressure, Improve Exercise Tolerance and Tobacco Cessation     Due for Labs/Analytes/Optional EKG: Lipid Panel, Cholesterol, Lipoprotein and Triglycerides      Comments: Fasting labs to be done today        Diabetes:      General: Risks and Benefits Discussed and Screening Current      Counseling: Healthy Diet, Healthy Weight and Improve Physical Activity      Comments: A1c to be done today        Colorectal Cancer:      General: Risks and Benefits Discussed and Screening Current      Counseling: high fiber diet      Comments: FIT test to be ordered today  Done by Dr Eren Mclean   EGD done also        Prostate Cancer:      General: Risks and Benefits Discussed and Screening Current      Due for labs: PSA      Comments: Sees Dr Nely Cardenas on a yearly basis  No h/o prostate cancer        Osteoporosis:      General: Risks and Benefits Discussed      Counseling: Calcium and Vitamin D Intake and Regular Weightbearing Exercise      Due for studies: Bone Density Ultrasound      Comments: Takes a PPI every day  + Ugalde's Esophagus        AAA:      General: Risks and Benefits Discussed          Glaucoma:      General: Risks and Benefits Discussed and Screening Current      Comments: Eye exam current   [facility]   [id number]    One month ago        HIV:      General: Risks and Benefits Discussed and Screening Not Indicated          Hepatitis C:      General: Risks and Benefits Discussed      Counseling: has received general HCV counseling      Additional Comments: Will check with these labs    Advanced Directives:   Patient has no living will for healthcare, Information on ACP and/or AD provided  5 wishes given  End of life assessment reviewed with patient  Immunizations:      Influenza: Risks & Benefits Discussed and Influenza UTD This Year      Pneumococcal: Risks & Benefits Discussed        Patient's shoes and socks removed  Right Foot/Ankle   Right Foot Inspection  Skin Exam: skin normal and skin intact no dry skin, no warmth, no callus, no erythema, no maceration, no abnormal color, no pre-ulcer, no ulcer and no callus                          Toe Exam: ROM and strength within normal limits  Sensory     Proprioception: intact     Vascular  Capillary refills: < 3 seconds  The right DP pulse is 2+  The right PT pulse is 2+  Left Foot/Ankle  Left Foot Inspection  Skin Exam: skin normal and skin intactno dry skin, no warmth, no erythema, no maceration, normal color, no pre-ulcer, no ulcer and no callus                         Toe Exam: ROM and strength within normal limits                   Sensory     Proprioception: intact    Vascular  Capillary refills: < 3 seconds  The left DP pulse is 2+  The left PT pulse is 2+  Assign Risk Category:  No deformity present;  No loss of protective sensation;        Risk: 0

## 2019-02-08 ENCOUNTER — APPOINTMENT (OUTPATIENT)
Dept: LAB | Facility: HOSPITAL | Age: 64
End: 2019-02-08
Payer: MEDICARE

## 2019-02-08 DIAGNOSIS — R80.9 TYPE 2 DIABETES MELLITUS WITH MICROALBUMINURIA, WITHOUT LONG-TERM CURRENT USE OF INSULIN (HCC): ICD-10-CM

## 2019-02-08 DIAGNOSIS — R53.82 CHRONIC FATIGUE: ICD-10-CM

## 2019-02-08 DIAGNOSIS — E11.29 TYPE 2 DIABETES MELLITUS WITH MICROALBUMINURIA, WITHOUT LONG-TERM CURRENT USE OF INSULIN (HCC): ICD-10-CM

## 2019-02-08 DIAGNOSIS — E56.9 HYPOVITAMINOSIS: ICD-10-CM

## 2019-02-08 DIAGNOSIS — I10 ESSENTIAL HYPERTENSION: ICD-10-CM

## 2019-02-08 DIAGNOSIS — K90.9 INTESTINAL MALABSORPTION, UNSPECIFIED TYPE: ICD-10-CM

## 2019-02-08 LAB
25(OH)D3 SERPL-MCNC: 40 NG/ML (ref 30–100)
ALBUMIN SERPL BCP-MCNC: 3.7 G/DL (ref 3.5–5)
ALP SERPL-CCNC: 125 U/L (ref 46–116)
ALT SERPL W P-5'-P-CCNC: 26 U/L (ref 12–78)
ANION GAP SERPL CALCULATED.3IONS-SCNC: 6 MMOL/L (ref 4–13)
AST SERPL W P-5'-P-CCNC: 16 U/L (ref 5–45)
BASOPHILS # BLD AUTO: 0.04 THOUSANDS/ΜL (ref 0–0.1)
BASOPHILS NFR BLD AUTO: 1 % (ref 0–1)
BILIRUB SERPL-MCNC: 0.6 MG/DL (ref 0.2–1)
BUN SERPL-MCNC: 14 MG/DL (ref 5–25)
CALCIUM SERPL-MCNC: 8.7 MG/DL (ref 8.3–10.1)
CHLORIDE SERPL-SCNC: 108 MMOL/L (ref 100–108)
CHOLEST SERPL-MCNC: 172 MG/DL (ref 50–200)
CO2 SERPL-SCNC: 29 MMOL/L (ref 21–32)
CREAT SERPL-MCNC: 1 MG/DL (ref 0.6–1.3)
CREAT UR-MCNC: 119 MG/DL
EOSINOPHIL # BLD AUTO: 0.17 THOUSAND/ΜL (ref 0–0.61)
EOSINOPHIL NFR BLD AUTO: 3 % (ref 0–6)
ERYTHROCYTE [DISTWIDTH] IN BLOOD BY AUTOMATED COUNT: 13.6 % (ref 11.6–15.1)
GFR SERPL CREATININE-BSD FRML MDRD: 81 ML/MIN/1.73SQ M
GLUCOSE P FAST SERPL-MCNC: 112 MG/DL (ref 65–99)
HCT VFR BLD AUTO: 46 % (ref 36.5–49.3)
HCV AB SER QL: NORMAL
HDLC SERPL-MCNC: 35 MG/DL (ref 40–60)
HGB BLD-MCNC: 14.5 G/DL (ref 12–17)
IMM GRANULOCYTES # BLD AUTO: 0.01 THOUSAND/UL (ref 0–0.2)
IMM GRANULOCYTES NFR BLD AUTO: 0 % (ref 0–2)
LDLC SERPL CALC-MCNC: 80 MG/DL (ref 0–100)
LYMPHOCYTES # BLD AUTO: 1.4 THOUSANDS/ΜL (ref 0.6–4.47)
LYMPHOCYTES NFR BLD AUTO: 28 % (ref 14–44)
MCH RBC QN AUTO: 26.1 PG (ref 26.8–34.3)
MCHC RBC AUTO-ENTMCNC: 31.5 G/DL (ref 31.4–37.4)
MCV RBC AUTO: 83 FL (ref 82–98)
MICROALBUMIN UR-MCNC: 44 MG/L (ref 0–20)
MICROALBUMIN/CREAT 24H UR: 37 MG/G CREATININE (ref 0–30)
MONOCYTES # BLD AUTO: 0.45 THOUSAND/ΜL (ref 0.17–1.22)
MONOCYTES NFR BLD AUTO: 9 % (ref 4–12)
NEUTROPHILS # BLD AUTO: 2.86 THOUSANDS/ΜL (ref 1.85–7.62)
NEUTS SEG NFR BLD AUTO: 59 % (ref 43–75)
NONHDLC SERPL-MCNC: 137 MG/DL
NRBC BLD AUTO-RTO: 0 /100 WBCS
PLATELET # BLD AUTO: 213 THOUSANDS/UL (ref 149–390)
PMV BLD AUTO: 11.4 FL (ref 8.9–12.7)
POTASSIUM SERPL-SCNC: 4.2 MMOL/L (ref 3.5–5.3)
PROT SERPL-MCNC: 6.6 G/DL (ref 6.4–8.2)
PSA SERPL-MCNC: 1.6 NG/ML (ref 0–4)
RBC # BLD AUTO: 5.56 MILLION/UL (ref 3.88–5.62)
SODIUM SERPL-SCNC: 143 MMOL/L (ref 136–145)
TRIGL SERPL-MCNC: 287 MG/DL
TSH SERPL DL<=0.05 MIU/L-ACNC: 0.58 UIU/ML (ref 0.36–3.74)
VIT B12 SERPL-MCNC: 1131 PG/ML (ref 100–900)
WBC # BLD AUTO: 4.93 THOUSAND/UL (ref 4.31–10.16)

## 2019-02-08 PROCEDURE — 80053 COMPREHEN METABOLIC PANEL: CPT

## 2019-02-08 PROCEDURE — 80061 LIPID PANEL: CPT | Performed by: FAMILY MEDICINE

## 2019-02-08 PROCEDURE — G0103 PSA SCREENING: HCPCS | Performed by: FAMILY MEDICINE

## 2019-02-08 PROCEDURE — 82607 VITAMIN B-12: CPT

## 2019-02-08 PROCEDURE — 82043 UR ALBUMIN QUANTITATIVE: CPT | Performed by: FAMILY MEDICINE

## 2019-02-08 PROCEDURE — 82306 VITAMIN D 25 HYDROXY: CPT

## 2019-02-08 PROCEDURE — 85025 COMPLETE CBC W/AUTO DIFF WBC: CPT

## 2019-02-08 PROCEDURE — 82570 ASSAY OF URINE CREATININE: CPT | Performed by: FAMILY MEDICINE

## 2019-02-08 PROCEDURE — 36415 COLL VENOUS BLD VENIPUNCTURE: CPT | Performed by: FAMILY MEDICINE

## 2019-02-08 PROCEDURE — 86803 HEPATITIS C AB TEST: CPT | Performed by: FAMILY MEDICINE

## 2019-02-08 PROCEDURE — 84443 ASSAY THYROID STIM HORMONE: CPT

## 2019-03-05 ENCOUNTER — APPOINTMENT (OUTPATIENT)
Dept: LAB | Facility: HOSPITAL | Age: 64
End: 2019-03-05
Payer: MEDICARE

## 2019-03-05 ENCOUNTER — TRANSCRIBE ORDERS (OUTPATIENT)
Dept: ADMINISTRATIVE | Facility: HOSPITAL | Age: 64
End: 2019-03-05

## 2019-03-05 DIAGNOSIS — R80.1 PERSISTENT PROTEINURIA: Primary | ICD-10-CM

## 2019-03-05 DIAGNOSIS — Z12.11 SCREENING FOR COLON CANCER: ICD-10-CM

## 2019-03-05 DIAGNOSIS — R80.1 PERSISTENT PROTEINURIA: ICD-10-CM

## 2019-03-05 LAB
ANION GAP SERPL CALCULATED.3IONS-SCNC: 11 MMOL/L (ref 4–13)
BACTERIA UR QL AUTO: ABNORMAL /HPF
BASOPHILS # BLD AUTO: 0.05 THOUSANDS/ΜL (ref 0–0.1)
BASOPHILS NFR BLD AUTO: 1 % (ref 0–1)
BILIRUB UR QL STRIP: NEGATIVE
BUN SERPL-MCNC: 14 MG/DL (ref 5–25)
CALCIUM SERPL-MCNC: 8.8 MG/DL (ref 8.3–10.1)
CHLORIDE SERPL-SCNC: 109 MMOL/L (ref 100–108)
CLARITY UR: CLEAR
CO2 SERPL-SCNC: 27 MMOL/L (ref 21–32)
COLOR UR: YELLOW
CREAT SERPL-MCNC: 0.94 MG/DL (ref 0.6–1.3)
CREAT UR-MCNC: 177 MG/DL
EOSINOPHIL # BLD AUTO: 0.17 THOUSAND/ΜL (ref 0–0.61)
EOSINOPHIL NFR BLD AUTO: 4 % (ref 0–6)
ERYTHROCYTE [DISTWIDTH] IN BLOOD BY AUTOMATED COUNT: 14.1 % (ref 11.6–15.1)
GFR SERPL CREATININE-BSD FRML MDRD: 87 ML/MIN/1.73SQ M
GLUCOSE P FAST SERPL-MCNC: 107 MG/DL (ref 65–99)
GLUCOSE UR STRIP-MCNC: ABNORMAL MG/DL
HCT VFR BLD AUTO: 44.3 % (ref 36.5–49.3)
HEMOCCULT STL QL IA: NEGATIVE
HGB BLD-MCNC: 14.1 G/DL (ref 12–17)
HGB UR QL STRIP.AUTO: NEGATIVE
HYALINE CASTS #/AREA URNS LPF: ABNORMAL /LPF
IMM GRANULOCYTES # BLD AUTO: 0.02 THOUSAND/UL (ref 0–0.2)
IMM GRANULOCYTES NFR BLD AUTO: 0 % (ref 0–2)
KETONES UR STRIP-MCNC: ABNORMAL MG/DL
LEUKOCYTE ESTERASE UR QL STRIP: ABNORMAL
LYMPHOCYTES # BLD AUTO: 1.38 THOUSANDS/ΜL (ref 0.6–4.47)
LYMPHOCYTES NFR BLD AUTO: 30 % (ref 14–44)
MCH RBC QN AUTO: 26.3 PG (ref 26.8–34.3)
MCHC RBC AUTO-ENTMCNC: 31.8 G/DL (ref 31.4–37.4)
MCV RBC AUTO: 83 FL (ref 82–98)
MONOCYTES # BLD AUTO: 0.51 THOUSAND/ΜL (ref 0.17–1.22)
MONOCYTES NFR BLD AUTO: 11 % (ref 4–12)
MUCOUS THREADS UR QL AUTO: ABNORMAL
NEUTROPHILS # BLD AUTO: 2.42 THOUSANDS/ΜL (ref 1.85–7.62)
NEUTS SEG NFR BLD AUTO: 54 % (ref 43–75)
NITRITE UR QL STRIP: NEGATIVE
NON-SQ EPI CELLS URNS QL MICRO: ABNORMAL /HPF
NRBC BLD AUTO-RTO: 0 /100 WBCS
PH UR STRIP.AUTO: 5.5 [PH]
PLATELET # BLD AUTO: 207 THOUSANDS/UL (ref 149–390)
PMV BLD AUTO: 11 FL (ref 8.9–12.7)
POTASSIUM SERPL-SCNC: 3.8 MMOL/L (ref 3.5–5.3)
PROT UR STRIP-MCNC: NEGATIVE MG/DL
PROT UR-MCNC: 27 MG/DL
PROT/CREAT UR: 0.15 MG/G{CREAT} (ref 0–0.1)
RBC # BLD AUTO: 5.37 MILLION/UL (ref 3.88–5.62)
RBC #/AREA URNS AUTO: ABNORMAL /HPF
SODIUM SERPL-SCNC: 147 MMOL/L (ref 136–145)
SP GR UR STRIP.AUTO: >=1.03 (ref 1–1.03)
UROBILINOGEN UR QL STRIP.AUTO: 0.2 E.U./DL
WBC # BLD AUTO: 4.55 THOUSAND/UL (ref 4.31–10.16)
WBC #/AREA URNS AUTO: ABNORMAL /HPF

## 2019-03-05 PROCEDURE — G0328 FECAL BLOOD SCRN IMMUNOASSAY: HCPCS

## 2019-03-05 PROCEDURE — 84156 ASSAY OF PROTEIN URINE: CPT | Performed by: INTERNAL MEDICINE

## 2019-03-05 PROCEDURE — 80048 BASIC METABOLIC PNL TOTAL CA: CPT

## 2019-03-05 PROCEDURE — 81001 URINALYSIS AUTO W/SCOPE: CPT | Performed by: INTERNAL MEDICINE

## 2019-03-05 PROCEDURE — 36415 COLL VENOUS BLD VENIPUNCTURE: CPT

## 2019-03-05 PROCEDURE — 82570 ASSAY OF URINE CREATININE: CPT | Performed by: INTERNAL MEDICINE

## 2019-03-05 PROCEDURE — 85025 COMPLETE CBC W/AUTO DIFF WBC: CPT

## 2019-03-05 NOTE — RESULT ENCOUNTER NOTE
Let pt know labs reviewed  Essentially unchanged  Fasting blood sugar continues to be just slightly above where we want it  Need to reduce intake of all white starchy carbs  Increase activity as much as possible  otc fish oil to reduce trigs will help  Vitamin levels look fine        Continue same advice discussed in office

## 2019-03-06 ENCOUNTER — CLINICAL SUPPORT (OUTPATIENT)
Dept: FAMILY MEDICINE CLINIC | Facility: CLINIC | Age: 64
End: 2019-03-06
Payer: MEDICARE

## 2019-03-06 DIAGNOSIS — E53.8 B12 DEFICIENCY: Primary | ICD-10-CM

## 2019-03-06 RX ADMIN — CYANOCOBALAMIN 1000 MCG: 1000 INJECTION INTRAMUSCULAR; SUBCUTANEOUS at 09:15

## 2019-03-13 ENCOUNTER — OFFICE VISIT (OUTPATIENT)
Dept: NEPHROLOGY | Facility: CLINIC | Age: 64
End: 2019-03-13
Payer: MEDICARE

## 2019-03-13 VITALS
HEART RATE: 80 BPM | RESPIRATION RATE: 16 BRPM | DIASTOLIC BLOOD PRESSURE: 70 MMHG | TEMPERATURE: 98 F | WEIGHT: 239.6 LBS | SYSTOLIC BLOOD PRESSURE: 120 MMHG | HEIGHT: 70 IN | BODY MASS INDEX: 34.3 KG/M2

## 2019-03-13 DIAGNOSIS — I10 ESSENTIAL HYPERTENSION: ICD-10-CM

## 2019-03-13 DIAGNOSIS — R80.9 TYPE 2 DIABETES MELLITUS WITH MICROALBUMINURIA, WITHOUT LONG-TERM CURRENT USE OF INSULIN (HCC): ICD-10-CM

## 2019-03-13 DIAGNOSIS — E87.0 HYPERNATREMIA: ICD-10-CM

## 2019-03-13 DIAGNOSIS — E11.29 TYPE 2 DIABETES MELLITUS WITH MICROALBUMINURIA, WITHOUT LONG-TERM CURRENT USE OF INSULIN (HCC): ICD-10-CM

## 2019-03-13 DIAGNOSIS — R80.1 PERSISTENT PROTEINURIA: Primary | ICD-10-CM

## 2019-03-13 PROCEDURE — 99213 OFFICE O/P EST LOW 20 MIN: CPT | Performed by: INTERNAL MEDICINE

## 2019-03-13 NOTE — ASSESSMENT & PLAN NOTE
Lab Results   Component Value Date    HGBA1C 5 7 02/06/2019       No results for input(s): POCGLU in the last 72 hours  Blood Sugar Average: Last 72 hrs:     Diabetes seems to be reasonably well control    Advised to continue present management

## 2019-03-13 NOTE — PROGRESS NOTES
NEPHROLOGY OFFICE FOLLOW UP  Linda Carias 66 y o  male MRN: [id number]    Encounter: [id number] 3/13/2019    REASON FOR VISIT: Linda Carias is a 66 y o  male who is here on 3/13/2019 for Follow-up    HPI:    Haile Nguyen came in today for follow-up of proteinuria  He is feeling quite well  Denies any complaint  Nothing new happen since his last visit a year ago  REVIEW OF SYSTEMS:    Review of Systems   Constitutional: Negative for activity change and fatigue  HENT: Negative for congestion and ear discharge  Eyes: Negative for photophobia and pain  Respiratory: Negative for apnea, cough, choking, chest tightness and shortness of breath  Cardiovascular: Negative for chest pain, palpitations and leg swelling  Gastrointestinal: Negative for abdominal distention, abdominal pain and blood in stool  Endocrine: Negative for heat intolerance and polyphagia  Genitourinary: Negative for decreased urine volume, difficulty urinating, flank pain and urgency  Musculoskeletal: Negative for arthralgias, back pain, neck pain and neck stiffness  Skin: Negative for color change and wound  Allergic/Immunologic: Negative for food allergies and immunocompromised state  Neurological: Negative for seizures and facial asymmetry  Hematological: Negative for adenopathy  Does not bruise/bleed easily  Psychiatric/Behavioral: Negative for self-injury and suicidal ideas           PAST MEDICAL HISTORY:  Past Medical History:   Diagnosis Date    Chronic kidney disease     [facility]     LAST ASSESSED: 7/23/14    Edema     LAST ASSESSED:6/12/12    Hypertension     Morbid obesity due to excess calories ([facility] )     LAST ASSESSED: 6/12/12       PAST SURGICAL HISTORY:  Past Surgical History:   Procedure Laterality Date    BACK SURGERY      CHOLECYSTECTOMY      COLONOSCOPY      FOOT SURGERY      GASTRIC RESTRICTION SURGERY      GASTRIC STAPLING FOR MORBID OBESITY LAPAROSCOPY W/ [id number]    SHOULDER SURGERY      UVULOPALATOPLASTY         SOCIAL HISTORY:  Social History     Substance and Sexual Activity   Alcohol Use No     Social History     Substance and Sexual Activity   Drug Use Yes    Types: Marijuana    Comment: everyday      Social History     Tobacco Use   Smoking Status Former Smoker    Types: Cigarettes    Last attempt to quit: Shahrzad Salter Years since quittin 2   Smokeless Tobacco Never Used       FAMILY HISTORY:  Family History   Problem Relation Age of Onset   [facility] COPD Mother     Heart disease Mother     Mental illness Mother     Drug abuse Mother     Diabetes Father     Mental illness Father     Drug abuse Father     Lung cancer Father     Diabetes Sister        MEDICATIONS:    Current Outpatient Medications:     amLODIPine (NORVASC) 5 mg tablet, TAKE 1 TABLET DAILY, Disp: 90 tablet, Rfl: 0    buPROPion (WELLBUTRIN XL) 300 mg 24 hr tablet, Take 1 tablet (300 mg total) by mouth daily for 90 days, Disp: 90 tablet, Rfl: 1    calcium citrate-Vitamin D (CALCIUM CITRATE + D3) 200 mg-250 units, Take 1 tablet by mouth daily, Disp: , Rfl:     clindamycin (CLINDAGEL) 1 % gel, APPLY TO SCALP PIMPLES ONCE OR TWICE DAILY AS NEEDED, Disp: , Rfl: 0    Iron-Vitamin C (VITRON-C)  MG TABS, Take one pill twice daily, Disp: 60 tablet, Rfl: 1    JARDIANCE 25 MG TABS, TAKE 1 TABLET DAILY, Disp: 90 tablet, Rfl: 0    losartan (COZAAR) 100 MG tablet, Take 1 tablet (100 mg total) by mouth daily, Disp: 90 tablet, Rfl: 1    mometasone (ELOCON) 0 1 % ointment, APPLY TO DRY SKIN ON LEGS NIGHTLY IF NEEDED, Disp: , Rfl: 0    Multiple Vitamins-Minerals (CENTRUM ADULTS PO), Take 1 tablet by mouth daily, Disp: , Rfl:     pantoprazole (PROTONIX) 40 mg tablet, Take 1 tablet (40 mg total) by mouth daily, Disp: 30 tablet, Rfl: 2    Current Facility-Administered Medications:     cyanocobalamin injection 1,000 mcg, 1,000 mcg, Intramuscular, Q30 Days, TIFFANY Keys, 1,000 mcg at 19 0915    PHYSICAL EXAM:  Vitals:    03/13/19 0851   BP: 120/70   BP Location: Right arm   Patient Position: Sitting   Pulse: 80   Resp: 16   Temp: 98 °F (36 7 °C)   Weight: 109 kg (239 lb 9 6 oz)   Height: 5' 9 5" (1 765 m)     Body mass index is 34 88 kg/m²  Physical Exam   Constitutional: He is oriented to person, place, and time  He appears well-developed  No distress  HENT:   Head: Normocephalic  Mouth/Throat: Oropharynx is clear and moist    Eyes: Conjunctivae are normal  No scleral icterus  Neck: Neck supple  No JVD present  Cardiovascular: Normal rate and normal heart sounds  Pulmonary/Chest: Effort normal  He has no wheezes  Abdominal: Soft  There is no tenderness  Musculoskeletal: Normal range of motion  He exhibits no edema  Neurological: He is alert and oriented to person, place, and time  Skin: Skin is warm  No rash noted  Psychiatric: He has a normal mood and affect   His behavior is normal        LAB RESULTS:  Results for orders placed or performed in visit on 03/05/19   Occult Blood, Fecal Immunochemical   Result Value Ref Range    OCCULT BLD, FECAL IMMUNOLOGICAL Negative Negative   Basic metabolic panel   Result Value Ref Range    Sodium 147 (H) 136 - 145 mmol/L    Potassium 3 8 3 5 - 5 3 mmol/L    Chloride 109 (H) 100 - 108 mmol/L    CO2 27 21 - 32 mmol/L    ANION GAP 11 4 - 13 mmol/L    BUN 14 5 - 25 mg/dL    Creatinine 0 94 0 60 - 1 30 mg/dL    Glucose, Fasting 107 (H) 65 - 99 mg/dL    Calcium 8 8 8 3 - 10 1 mg/dL    eGFR 87 ml/min/1 73sq m   CBC and differential   Result Value Ref Range    WBC 4 55 4 31 - 10 16 Thousand/uL    RBC 5 37 3 88 - 5 62 Million/uL    Hemoglobin 14 1 12 0 - 17 0 g/dL    Hematocrit 44 3 36 5 - 49 3 %    MCV 83 82 - 98 fL    MCH 26 3 (L) 26 8 - 34 3 pg    MCHC 31 8 31 4 - 37 4 g/dL    RDW 14 1 11 6 - 15 1 %    MPV 11 0 8 9 - 12 7 fL    Platelets 266 511 - 513 Thousands/uL    nRBC 0 /100 WBCs    Neutrophils Relative 54 43 - 75 %    Immat GRANS % 0 0 - 2 %    Lymphocytes Relative 30 14 - 44 %    Monocytes Relative 11 4 - 12 %    Eosinophils Relative 4 0 - 6 %    Basophils Relative 1 0 - 1 %    Neutrophils Absolute 2 42 1 85 - 7 62 Thousands/µL    Immature Grans Absolute 0 02 0 00 - 0 20 Thousand/uL    Lymphocytes Absolute 1 38 0 60 - 4 47 Thousands/µL    Monocytes Absolute 0 51 0 17 - 1 22 Thousand/µL    Eosinophils Absolute 0 17 0 00 - 0 61 Thousand/µL    Basophils Absolute 0 05 0 00 - 0 10 Thousands/µL       ASSESSMENT and PLAN:      Persistent proteinuria  Seems to be reasonably well control at this point  He is on ARB blocker which I will continue    Diabetes mellitus, type 2 ([facility] )  Lab Results   Component Value Date    HGBA1C 5 7 02/06/2019       No results for input(s): POCGLU in the last 72 hours  Blood Sugar Average: Last 72 hrs:     Diabetes seems to be reasonably well control  Advised to continue present management    Hypertension  Quite well controlled with ARB blocker    Hypernatremia  I am not sure why it is high  May be related to Jardiance as it can cause glycosuria with possible dehydration if patient does not drink enough water  Discussed with the patient at length  Will repeat blood work next week to make sure it get back to normal      I will see him back in 1 year  Will get blood and urine test before that visit  He will get blood test for hypernatremia next week  Hydration discussed with him        Portions of the record may have been created with voice recognition software  Occasional wrong word or "sound a like" substitutions may have occurred due to the inherent limitations of voice recognition software  Read the chart carefully and recognize, using context, where substitutions have occurred  If you have any questions, please contact the dictating provider

## 2019-03-13 NOTE — ASSESSMENT & PLAN NOTE
I am not sure why it is high  May be related to Jardiance as it can cause glycosuria with possible dehydration if patient does not drink enough water  Discussed with the patient at length    Will repeat blood work next week to make sure it get back to normal

## 2019-03-13 NOTE — PATIENT INSTRUCTIONS
Chronic Kidney Disease   AMBULATORY CARE:   Chronic kidney disease (CKD)  is the gradual and permanent loss of kidney function  Normally, the kidneys remove fluid, chemicals, and waste from your blood  These wastes are turned into urine by your kidneys  CKD may worsen over time and lead to kidney failure  Common symptoms include the following:   · Changes in how often you need to urinate    · Swelling in your arms, legs, or feet    · Shortness of breath    · Fatigue or weakness    · Bad or bitter taste in your mouth    · Nausea, vomiting, or loss of appetite  Seek care immediately if:   · You are confused and very drowsy  · You have a seizure  · You have shortness of breath  Contact your healthcare provider if:   · You suddenly gain or lose more weight than your healthcare provider has told you is okay  · You have itchy skin or a rash  · You urinate more or less than you normally do  · You have blood in your urine  · You have nausea and repeated vomiting  · You have fatigue or muscle weakness  · You have hiccups that will not stop  · You have questions or concerns about your condition or care  Treatment for CKD:  Medicines may be given to decrease blood pressure and get rid of extra fluid  You may also receive medicine to manage health conditions that may occur with CKD  Dialysis is a treatment to remove chemicals and waste from your blood when your kidneys can no longer do this  Surgery may be needed to create an arteriovenous fistula (AVF) in your arm or insert a catheter into your abdomen so that you can receive dialysis  A kidney transplant may be done if your CKD becomes severe  Manage CKD:   · Maintain a healthy weight  Ask your healthcare provider how much you should weigh  Ask him to help you create a weight loss plan if you are overweight  · Exercise 30 to 60 minutes a day, 4 to 7 times a week, or as directed  Ask about the best exercise plan for you   Regular exercise can help you manage CKD, high blood pressure, and diabetes  · Follow your healthcare provider's advice about what to eat and drink  He may tell you to eat food low in sodium (salt), potassium, phosphorus, or protein  You may need to see a dietitian if you need help planning meals  Ask how much liquid to drink each day and which liquids are best for you  · Limit alcohol  Ask how much alcohol is safe for you to drink  A drink of alcohol is 12 ounces of beer, 5 ounces of wine, or 1½ ounces of liquor  · Do not smoke  Nicotine and other chemicals in cigarettes and cigars can cause lung and kidney damage  Ask your healthcare provider for information if you currently smoke and need help to quit  E-cigarettes or smokeless tobacco still contain nicotine  Talk to your healthcare provider before you use these products  · Ask your healthcare provider if you need vaccines  Infections such as pneumonia, influenza, and hepatitis can be more harmful or more likely to occur in a person who has CKD  Vaccines reduce your risk of infection with these viruses  Follow up with your healthcare provider as directed:  Write down your questions so you remember to ask them during your visits  © 2017 [employer] Information is for End User's use only and may not be sold, redistributed or otherwise used for commercial purposes  All illustrations and images included in CareNotes® are the copyrighted property of [employer]  or Juvenal Andrews  The above information is an  only  It is not intended as medical advice for individual conditions or treatments  Talk to your doctor, nurse or pharmacist before following any medical regimen to see if it is safe and effective for you

## 2019-03-19 ENCOUNTER — APPOINTMENT (OUTPATIENT)
Dept: LAB | Facility: HOSPITAL | Age: 64
End: 2019-03-19
Attending: INTERNAL MEDICINE
Payer: MEDICARE

## 2019-03-19 DIAGNOSIS — E87.0 HYPERNATREMIA: ICD-10-CM

## 2019-03-19 LAB
ANION GAP SERPL CALCULATED.3IONS-SCNC: 8 MMOL/L (ref 4–13)
BUN SERPL-MCNC: 15 MG/DL (ref 5–25)
CALCIUM SERPL-MCNC: 8.7 MG/DL (ref 8.3–10.1)
CHLORIDE SERPL-SCNC: 105 MMOL/L (ref 100–108)
CO2 SERPL-SCNC: 29 MMOL/L (ref 21–32)
CREAT SERPL-MCNC: 0.89 MG/DL (ref 0.6–1.3)
GFR SERPL CREATININE-BSD FRML MDRD: 92 ML/MIN/1.73SQ M
GLUCOSE P FAST SERPL-MCNC: 105 MG/DL (ref 65–99)
POTASSIUM SERPL-SCNC: 4.2 MMOL/L (ref 3.5–5.3)
SODIUM SERPL-SCNC: 142 MMOL/L (ref 136–145)

## 2019-03-19 PROCEDURE — 80048 BASIC METABOLIC PNL TOTAL CA: CPT

## 2019-03-19 PROCEDURE — 36415 COLL VENOUS BLD VENIPUNCTURE: CPT

## 2019-03-20 DIAGNOSIS — F32.A DEPRESSION, UNSPECIFIED DEPRESSION TYPE: ICD-10-CM

## 2019-03-20 RX ORDER — BUPROPION HYDROCHLORIDE 300 MG/1
300 TABLET ORAL DAILY
Qty: 90 TABLET | Refills: 1 | Status: SHIPPED | OUTPATIENT
Start: 2019-03-20 | End: 2019-06-11 | Stop reason: SDUPTHER

## 2019-04-10 ENCOUNTER — CLINICAL SUPPORT (OUTPATIENT)
Dept: FAMILY MEDICINE CLINIC | Facility: CLINIC | Age: 64
End: 2019-04-10
Payer: MEDICARE

## 2019-04-10 DIAGNOSIS — E53.8 VITAMIN B 12 DEFICIENCY: Primary | ICD-10-CM

## 2019-04-10 DIAGNOSIS — I10 HYPERTENSION, UNSPECIFIED TYPE: ICD-10-CM

## 2019-04-10 RX ORDER — AMLODIPINE BESYLATE 5 MG/1
5 TABLET ORAL DAILY
Qty: 90 TABLET | Refills: 0 | Status: SHIPPED | OUTPATIENT
Start: 2019-04-10 | End: 2019-07-23 | Stop reason: SDUPTHER

## 2019-04-10 RX ADMIN — CYANOCOBALAMIN 1000 MCG: 1000 INJECTION INTRAMUSCULAR; SUBCUTANEOUS at 13:38

## 2019-05-07 DIAGNOSIS — I10 HYPERTENSION, UNSPECIFIED TYPE: ICD-10-CM

## 2019-05-08 ENCOUNTER — OFFICE VISIT (OUTPATIENT)
Dept: FAMILY MEDICINE CLINIC | Facility: CLINIC | Age: 64
End: 2019-05-08
Payer: MEDICARE

## 2019-05-08 VITALS
WEIGHT: 239.6 LBS | BODY MASS INDEX: 34.3 KG/M2 | SYSTOLIC BLOOD PRESSURE: 120 MMHG | DIASTOLIC BLOOD PRESSURE: 84 MMHG | OXYGEN SATURATION: 98 % | HEART RATE: 62 BPM | HEIGHT: 70 IN

## 2019-05-08 DIAGNOSIS — E56.9 HYPOVITAMINOSIS: ICD-10-CM

## 2019-05-08 DIAGNOSIS — E11.22 TYPE 2 DIABETES MELLITUS WITH STAGE 2 CHRONIC KIDNEY DISEASE, WITHOUT LONG-TERM CURRENT USE OF INSULIN (HCC): ICD-10-CM

## 2019-05-08 DIAGNOSIS — N18.2 TYPE 2 DIABETES MELLITUS WITH STAGE 2 CHRONIC KIDNEY DISEASE, WITHOUT LONG-TERM CURRENT USE OF INSULIN (HCC): ICD-10-CM

## 2019-05-08 DIAGNOSIS — E53.8 VITAMIN B 12 DEFICIENCY: ICD-10-CM

## 2019-05-08 DIAGNOSIS — I10 ESSENTIAL HYPERTENSION: Primary | ICD-10-CM

## 2019-05-08 DIAGNOSIS — E67.3 HYPERVITAMINOSIS D: ICD-10-CM

## 2019-05-08 DIAGNOSIS — R80.1 PERSISTENT PROTEINURIA: ICD-10-CM

## 2019-05-08 DIAGNOSIS — D53.0 ANEMIA DUE TO PROTEIN DEFICIENCY: ICD-10-CM

## 2019-05-08 DIAGNOSIS — E78.2 MIXED HYPERLIPIDEMIA: ICD-10-CM

## 2019-05-08 PROBLEM — E87.0 HYPERNATREMIA: Status: RESOLVED | Noted: 2019-03-13 | Resolved: 2019-05-08

## 2019-05-08 PROCEDURE — 99214 OFFICE O/P EST MOD 30 MIN: CPT | Performed by: FAMILY MEDICINE

## 2019-05-08 RX ORDER — LOSARTAN POTASSIUM 100 MG/1
100 TABLET ORAL DAILY
Qty: 90 TABLET | Refills: 1 | Status: SHIPPED | OUTPATIENT
Start: 2019-05-08 | End: 2019-07-23 | Stop reason: SDUPTHER

## 2019-05-14 ENCOUNTER — APPOINTMENT (OUTPATIENT)
Dept: LAB | Facility: HOSPITAL | Age: 64
End: 2019-05-14
Payer: MEDICARE

## 2019-05-14 ENCOUNTER — TRANSCRIBE ORDERS (OUTPATIENT)
Dept: ADMINISTRATIVE | Facility: HOSPITAL | Age: 64
End: 2019-05-14

## 2019-05-14 DIAGNOSIS — I11.9 MALIGNANT HYPERTENSIVE HEART DISEASE WITHOUT HEART FAILURE: ICD-10-CM

## 2019-05-14 DIAGNOSIS — I11.9 MALIGNANT HYPERTENSIVE HEART DISEASE WITHOUT HEART FAILURE: Primary | ICD-10-CM

## 2019-05-14 LAB
CREAT SERPL-MCNC: 0.95 MG/DL (ref 0.6–1.3)
GFR SERPL CREATININE-BSD FRML MDRD: 86 ML/MIN/1.73SQ M
POTASSIUM SERPL-SCNC: 3.9 MMOL/L (ref 3.5–5.3)

## 2019-05-14 PROCEDURE — 84132 ASSAY OF SERUM POTASSIUM: CPT

## 2019-05-14 PROCEDURE — 82565 ASSAY OF CREATININE: CPT

## 2019-05-14 PROCEDURE — 36415 COLL VENOUS BLD VENIPUNCTURE: CPT

## 2019-05-29 ENCOUNTER — TELEPHONE (OUTPATIENT)
Dept: FAMILY MEDICINE CLINIC | Facility: CLINIC | Age: 64
End: 2019-05-29

## 2019-06-11 ENCOUNTER — TELEPHONE (OUTPATIENT)
Dept: FAMILY MEDICINE CLINIC | Facility: CLINIC | Age: 64
End: 2019-06-11

## 2019-06-11 ENCOUNTER — CLINICAL SUPPORT (OUTPATIENT)
Dept: FAMILY MEDICINE CLINIC | Facility: CLINIC | Age: 64
End: 2019-06-11
Payer: MEDICARE

## 2019-06-11 DIAGNOSIS — F32.A DEPRESSION, UNSPECIFIED DEPRESSION TYPE: ICD-10-CM

## 2019-06-11 DIAGNOSIS — E53.8 VITAMIN B 12 DEFICIENCY: Primary | ICD-10-CM

## 2019-06-11 RX ORDER — BUPROPION HYDROCHLORIDE 300 MG/1
300 TABLET ORAL DAILY
Qty: 90 TABLET | Refills: 1 | Status: SHIPPED | OUTPATIENT
Start: 2019-06-11 | End: 2019-09-09 | Stop reason: SDUPTHER

## 2019-06-11 RX ADMIN — CYANOCOBALAMIN 1000 MCG: 1000 INJECTION INTRAMUSCULAR; SUBCUTANEOUS at 09:00

## 2019-07-10 ENCOUNTER — CLINICAL SUPPORT (OUTPATIENT)
Dept: FAMILY MEDICINE CLINIC | Facility: CLINIC | Age: 64
End: 2019-07-10
Payer: MEDICARE

## 2019-07-10 DIAGNOSIS — E53.8 VITAMIN B 12 DEFICIENCY: Primary | ICD-10-CM

## 2019-07-10 PROCEDURE — 99211 OFF/OP EST MAY X REQ PHY/QHP: CPT

## 2019-07-10 RX ADMIN — CYANOCOBALAMIN 1000 MCG: 1000 INJECTION INTRAMUSCULAR; SUBCUTANEOUS at 09:13

## 2019-07-23 DIAGNOSIS — I10 HYPERTENSION, UNSPECIFIED TYPE: ICD-10-CM

## 2019-07-24 RX ORDER — LOSARTAN POTASSIUM 100 MG/1
100 TABLET ORAL DAILY
Qty: 90 TABLET | Refills: 0 | Status: SHIPPED | OUTPATIENT
Start: 2019-07-24 | End: 2019-10-28 | Stop reason: SDUPTHER

## 2019-07-24 RX ORDER — AMLODIPINE BESYLATE 5 MG/1
5 TABLET ORAL DAILY
Qty: 90 TABLET | Refills: 0 | Status: SHIPPED | OUTPATIENT
Start: 2019-07-24 | End: 2019-10-28 | Stop reason: SDUPTHER

## 2019-08-05 ENCOUNTER — APPOINTMENT (OUTPATIENT)
Dept: LAB | Facility: HOSPITAL | Age: 64
End: 2019-08-05
Payer: MEDICARE

## 2019-08-05 DIAGNOSIS — N18.2 TYPE 2 DIABETES MELLITUS WITH STAGE 2 CHRONIC KIDNEY DISEASE, WITHOUT LONG-TERM CURRENT USE OF INSULIN (HCC): ICD-10-CM

## 2019-08-05 DIAGNOSIS — E53.8 VITAMIN B 12 DEFICIENCY: ICD-10-CM

## 2019-08-05 DIAGNOSIS — E67.3 HYPERVITAMINOSIS D: ICD-10-CM

## 2019-08-05 DIAGNOSIS — E78.2 MIXED HYPERLIPIDEMIA: ICD-10-CM

## 2019-08-05 DIAGNOSIS — I10 ESSENTIAL HYPERTENSION: ICD-10-CM

## 2019-08-05 DIAGNOSIS — E56.9 HYPOVITAMINOSIS: ICD-10-CM

## 2019-08-05 DIAGNOSIS — D53.0 ANEMIA DUE TO PROTEIN DEFICIENCY: ICD-10-CM

## 2019-08-05 DIAGNOSIS — E11.22 TYPE 2 DIABETES MELLITUS WITH STAGE 2 CHRONIC KIDNEY DISEASE, WITHOUT LONG-TERM CURRENT USE OF INSULIN (HCC): ICD-10-CM

## 2019-08-05 LAB
25(OH)D3 SERPL-MCNC: 31.2 NG/ML (ref 30–100)
ALBUMIN SERPL BCP-MCNC: 3.7 G/DL (ref 3.5–5)
ALP SERPL-CCNC: 130 U/L (ref 46–116)
ALT SERPL W P-5'-P-CCNC: 30 U/L (ref 12–78)
ANION GAP SERPL CALCULATED.3IONS-SCNC: 7 MMOL/L (ref 4–13)
AST SERPL W P-5'-P-CCNC: 22 U/L (ref 5–45)
BASOPHILS # BLD AUTO: 0.05 THOUSANDS/ΜL (ref 0–0.1)
BASOPHILS NFR BLD AUTO: 1 % (ref 0–1)
BILIRUB SERPL-MCNC: 0.6 MG/DL (ref 0.2–1)
BUN SERPL-MCNC: 14 MG/DL (ref 5–25)
CALCIUM SERPL-MCNC: 9 MG/DL (ref 8.3–10.1)
CHLORIDE SERPL-SCNC: 107 MMOL/L (ref 100–108)
CHOLEST SERPL-MCNC: 179 MG/DL (ref 50–200)
CO2 SERPL-SCNC: 29 MMOL/L (ref 21–32)
CREAT SERPL-MCNC: 0.97 MG/DL (ref 0.6–1.3)
CREAT UR-MCNC: 143 MG/DL
EOSINOPHIL # BLD AUTO: 0.23 THOUSAND/ΜL (ref 0–0.61)
EOSINOPHIL NFR BLD AUTO: 4 % (ref 0–6)
ERYTHROCYTE [DISTWIDTH] IN BLOOD BY AUTOMATED COUNT: 13.3 % (ref 11.6–15.1)
EST. AVERAGE GLUCOSE BLD GHB EST-MCNC: 117 MG/DL
FERRITIN SERPL-MCNC: 11 NG/ML (ref 8–388)
GFR SERPL CREATININE-BSD FRML MDRD: 83 ML/MIN/1.73SQ M
GLUCOSE P FAST SERPL-MCNC: 116 MG/DL (ref 65–99)
HBA1C MFR BLD: 5.7 % (ref 4.2–6.3)
HCT VFR BLD AUTO: 47.1 % (ref 36.5–49.3)
HDLC SERPL-MCNC: 36 MG/DL (ref 40–60)
HGB BLD-MCNC: 15.4 G/DL (ref 12–17)
IMM GRANULOCYTES # BLD AUTO: 0.01 THOUSAND/UL (ref 0–0.2)
IMM GRANULOCYTES NFR BLD AUTO: 0 % (ref 0–2)
LDLC SERPL CALC-MCNC: 78 MG/DL (ref 0–100)
LYMPHOCYTES # BLD AUTO: 1.54 THOUSANDS/ΜL (ref 0.6–4.47)
LYMPHOCYTES NFR BLD AUTO: 28 % (ref 14–44)
MCH RBC QN AUTO: 27.6 PG (ref 26.8–34.3)
MCHC RBC AUTO-ENTMCNC: 32.7 G/DL (ref 31.4–37.4)
MCV RBC AUTO: 84 FL (ref 82–98)
MICROALBUMIN UR-MCNC: 69.2 MG/L (ref 0–20)
MICROALBUMIN/CREAT 24H UR: 48 MG/G CREATININE (ref 0–30)
MONOCYTES # BLD AUTO: 0.59 THOUSAND/ΜL (ref 0.17–1.22)
MONOCYTES NFR BLD AUTO: 11 % (ref 4–12)
NEUTROPHILS # BLD AUTO: 3.08 THOUSANDS/ΜL (ref 1.85–7.62)
NEUTS SEG NFR BLD AUTO: 56 % (ref 43–75)
NONHDLC SERPL-MCNC: 143 MG/DL
NRBC BLD AUTO-RTO: 0 /100 WBCS
PLATELET # BLD AUTO: 198 THOUSANDS/UL (ref 149–390)
PMV BLD AUTO: 11.4 FL (ref 8.9–12.7)
POTASSIUM SERPL-SCNC: 4.2 MMOL/L (ref 3.5–5.3)
PROT SERPL-MCNC: 6.8 G/DL (ref 6.4–8.2)
RBC # BLD AUTO: 5.58 MILLION/UL (ref 3.88–5.62)
SODIUM SERPL-SCNC: 143 MMOL/L (ref 136–145)
TIBC SERPL-MCNC: 366 UG/DL (ref 250–450)
TRIGL SERPL-MCNC: 324 MG/DL
TSH SERPL DL<=0.05 MIU/L-ACNC: 0.99 UIU/ML (ref 0.36–3.74)
VIT B12 SERPL-MCNC: 684 PG/ML (ref 100–900)
WBC # BLD AUTO: 5.5 THOUSAND/UL (ref 4.31–10.16)

## 2019-08-05 PROCEDURE — 85025 COMPLETE CBC W/AUTO DIFF WBC: CPT

## 2019-08-05 PROCEDURE — 82306 VITAMIN D 25 HYDROXY: CPT

## 2019-08-05 PROCEDURE — 82607 VITAMIN B-12: CPT

## 2019-08-05 PROCEDURE — 82570 ASSAY OF URINE CREATININE: CPT | Performed by: FAMILY MEDICINE

## 2019-08-05 PROCEDURE — 83550 IRON BINDING TEST: CPT

## 2019-08-05 PROCEDURE — 80053 COMPREHEN METABOLIC PANEL: CPT

## 2019-08-05 PROCEDURE — 82728 ASSAY OF FERRITIN: CPT

## 2019-08-05 PROCEDURE — 83036 HEMOGLOBIN GLYCOSYLATED A1C: CPT

## 2019-08-05 PROCEDURE — 82043 UR ALBUMIN QUANTITATIVE: CPT | Performed by: FAMILY MEDICINE

## 2019-08-05 PROCEDURE — 80061 LIPID PANEL: CPT

## 2019-08-05 PROCEDURE — 84443 ASSAY THYROID STIM HORMONE: CPT

## 2019-08-05 PROCEDURE — 36415 COLL VENOUS BLD VENIPUNCTURE: CPT

## 2019-08-07 ENCOUNTER — OFFICE VISIT (OUTPATIENT)
Dept: FAMILY MEDICINE CLINIC | Facility: CLINIC | Age: 64
End: 2019-08-07
Payer: MEDICARE

## 2019-08-07 VITALS
BODY MASS INDEX: 34.36 KG/M2 | OXYGEN SATURATION: 96 % | TEMPERATURE: 98 F | DIASTOLIC BLOOD PRESSURE: 72 MMHG | WEIGHT: 240 LBS | SYSTOLIC BLOOD PRESSURE: 114 MMHG | HEART RATE: 70 BPM | HEIGHT: 70 IN

## 2019-08-07 DIAGNOSIS — E53.8 LOW VITAMIN B12 LEVEL: ICD-10-CM

## 2019-08-07 DIAGNOSIS — E78.1 HYPERTRIGLYCERIDEMIA: Primary | ICD-10-CM

## 2019-08-07 DIAGNOSIS — Z98.84 H/O BARIATRIC SURGERY: ICD-10-CM

## 2019-08-07 DIAGNOSIS — K90.9 INTESTINAL MALABSORPTION, UNSPECIFIED TYPE: ICD-10-CM

## 2019-08-07 PROBLEM — R80.1 PERSISTENT PROTEINURIA: Status: RESOLVED | Noted: 2017-09-28 | Resolved: 2019-08-07

## 2019-08-07 PROCEDURE — 99214 OFFICE O/P EST MOD 30 MIN: CPT | Performed by: FAMILY MEDICINE

## 2019-08-07 RX ORDER — ICOSAPENT ETHYL 1000 MG/1
CAPSULE ORAL
Qty: 360 CAPSULE | Refills: 1 | Status: SHIPPED | OUTPATIENT
Start: 2019-08-07 | End: 2020-09-30 | Stop reason: SDUPTHER

## 2019-08-07 RX ORDER — OMEGA-3-ACID ETHYL ESTERS 1 G/1
CAPSULE, LIQUID FILLED ORAL
Qty: 180 CAPSULE | Refills: 1 | Status: SHIPPED | OUTPATIENT
Start: 2019-08-07 | End: 2019-11-19 | Stop reason: SDUPTHER

## 2019-08-07 RX ORDER — CYANOCOBALAMIN 1000 UG/ML
1000 INJECTION INTRAMUSCULAR; SUBCUTANEOUS
Status: DISCONTINUED | OUTPATIENT
Start: 2019-08-07 | End: 2019-08-07

## 2019-08-07 RX ADMIN — CYANOCOBALAMIN 1000 MCG: 1000 INJECTION INTRAMUSCULAR; SUBCUTANEOUS at 10:38

## 2019-08-07 NOTE — PATIENT INSTRUCTIONS
Keep going with the weight loss keep taking the Wellbutrin and the Jardiance  Your A1c is perfect 5 7  We are going to give you a vitamin B12 shot today  Keep moving  Double your visit try and see  But let me know if it makes her constipated  I want you to take vitamin D3 5000 international units daily  Your triglycerides are the only thing that is really out of whack  I am going to try to get you Vascepa fish oil because it lowers your triglycerides and raise is her HDL  If this covered you have a printed script for Reji Ramirez  If that is not covered then ask pharmacist to direct you to fish oil and the dose for all of this is 2 g twice a day  The rest of her labs are perfect  The bad cholesterol LDL is perfect it is right where we want to be

## 2019-08-07 NOTE — PROGRESS NOTES
Standard Visit   Assessment/Plan:     Visit Diagnosis:  Diagnoses and all orders for this visit:    Hypertriglyceridemia  -     Icosapent Ethyl (VASCEPA) 1 g CAPS; Take 2 pills in the morning and 2 pills at night  -     omega-3-acid ethyl esters (LOVAZA) 1 g capsule; One pill in the morning and 1 pill at night    Low vitamin B12 level  -     cyanocobalamin injection 1,000 mcg    Intestinal malabsorption, unspecified type    H/O bariatric surgery    Keep going with the weight loss keep taking the Wellbutrin and the Jardiance  Your A1c is perfect 5 7  We are going to give you a vitamin B12 shot today  Keep moving  Double your visit try and see  But let me know if it makes her constipated  I want you to take vitamin D3 5000 international units daily  Your triglycerides are the only thing that is really out of whack  I am going to try to get you Vascepa fish oil because it lowers your triglycerides and raise is her HDL  If this covered you have a printed script for Mike Delatorre  If that is not covered then ask pharmacist to direct you to fish oil and the dose for all of this is 2 g twice a day  The rest of her labs are perfect  The bad cholesterol LDL is perfect it is right where we want to be        Subjective:    Patient ID: Amy Sauceda is a 60 y o  male       Patient is here with the following concerns:    Here to review lab work  Time had bariatric surgery back in 2012  Total weight loss has been about 100 lb  Diabetes has reversed itself  Today's A1c was 5 7  He is on blood pressure meds  His blood pressure is at goal  He is on losartan 100 mg once a day  He is also on Jardiance  25 mg once a day but this is also helping for weight loss  Because over time some bariatric patients do start to gain a little weight  He has gained a little weight but he has lost weight on these 2 meds Jardiance and Wellbutrin XL  His vitamin numbers look actually pretty good  They are all within normal range just on the lower end  We are going to give him a vitamin B12 shot today  Labs reviewed with him  He is out on permanent disability because he shattered his L5 the head removed the whole area            The following portions of the patient's history were reviewed and updated as appropriate: allergies, current medications, past family history, past medical history, past social history, past surgical history and problem list     Review of Systems   Constitutional: Positive for activity change and appetite change  Negative for fever and unexpected weight change  HENT: Negative for nosebleeds and trouble swallowing  Eyes: Negative  Respiratory: Negative  Cardiovascular: Negative for palpitations  Gastrointestinal: Negative for abdominal pain, blood in stool and vomiting  Endocrine: Negative for cold intolerance  Diabetes "reversed" with bariatric surgery   Genitourinary: Negative  Musculoskeletal: Positive for back pain  Negative for neck stiffness  Skin: Negative for pallor  Allergic/Immunologic: Negative for immunocompromised state  Neurological: Negative for seizures and facial asymmetry  Hematological: Negative for adenopathy  Psychiatric/Behavioral: Negative for agitation and suicidal ideas           /72   Pulse 70   Temp 98 °F (36 7 °C)   Ht 5' 9 5" (1 765 m)   Wt 109 kg (240 lb)   SpO2 96%   BMI 34 93 kg/m²   Social History     Socioeconomic History    Marital status: Single     Spouse name: Not on file    Number of children: Not on file    Years of education: Not on file    Highest education level: Not on file   Occupational History    Not on file   Social Needs    Financial resource strain: Not on file    Food insecurity:     Worry: Not on file     Inability: Not on file    Transportation needs:     Medical: Not on file     Non-medical: Not on file   Tobacco Use    Smoking status: Former Smoker     Types: Cigarettes     Last attempt to quit: 1986     Years since quitting: 33 6    Smokeless tobacco: Never Used   Substance and Sexual Activity    Alcohol use: No    Drug use: Yes     Types: Marijuana     Comment: everyday     Sexual activity: Not on file   Lifestyle    Physical activity:     Days per week: Not on file     Minutes per session: Not on file    Stress: Not on file   Relationships    Social connections:     Talks on phone: Not on file     Gets together: Not on file     Attends Confucianism service: Not on file     Active member of club or organization: Not on file     Attends meetings of clubs or organizations: Not on file     Relationship status: Not on file    Intimate partner violence:     Fear of current or ex partner: Not on file     Emotionally abused: Not on file     Physically abused: Not on file     Forced sexual activity: Not on file   Other Topics Concern    Not on file   Social History Narrative    Not on file     Past Medical History:   Diagnosis Date    Chronic kidney disease     Diabetes ([facility] )     LAST ASSESSED: 7/23/14    Edema     LAST ASSESSED:6/12/12    Hypertension     [facility]     LAST ASSESSED: 6/12/12     Family History   Problem Relation Age of Onset    COPD Mother     Heart disease Mother     Mental illness Mother     Drug abuse Mother     Diabetes Father     Mental illness Father     Drug abuse Father     Lung cancer Father     Diabetes Sister      Past Surgical History:   Procedure Laterality Date    BACK SURGERY      CHOLECYSTECTOMY      COLONOSCOPY      FOOT SURGERY      GASTRIC RESTRICTION SURGERY      GASTRIC STAPLING FOR MORBID OBESITY LAPAROSCOPY W/ [id number]-[id number]    SHOULDER SURGERY      UVULOPALATOPLASTY         Current Outpatient Medications:     amLODIPine (NORVASC) 5 mg tablet, Take 1 tablet (5 mg total) by mouth daily, Disp: 90 tablet, Rfl: 0    buPROPion (WELLBUTRIN XL) 300 mg 24 hr tablet, Take 1 tablet (300 mg total) by mouth daily for 90 days, Disp: 90 tablet, Rfl: 1   calcium citrate-Vitamin D (CALCIUM CITRATE + D3) 200 mg-250 units, Take 1 tablet by mouth daily, Disp: , Rfl:     Empagliflozin (JARDIANCE) 25 MG TABS, Take 1 tablet (25 mg total) by mouth daily, Disp: 90 tablet, Rfl: 0    Iron-Vitamin C (VITRON-C)  MG TABS, Take one pill twice daily, Disp: 60 tablet, Rfl: 1    losartan (COZAAR) 100 MG tablet, Take 1 tablet (100 mg total) by mouth daily, Disp: 90 tablet, Rfl: 0    Multiple Vitamins-Minerals (CENTRUM ADULTS PO), Take 1 tablet by mouth daily, Disp: , Rfl:     pantoprazole (PROTONIX) 40 mg tablet, Take 1 tablet (40 mg total) by mouth daily, Disp: 30 tablet, Rfl: 2    Icosapent Ethyl (VASCEPA) 1 g CAPS, Take 2 pills in the morning and 2 pills at night, Disp: 360 capsule, Rfl: 1    omega-3-acid ethyl esters (LOVAZA) 1 g capsule, One pill in the morning and 1 pill at night, Disp: 180 capsule, Rfl: 1    Current Facility-Administered Medications:     cyanocobalamin injection 1,000 mcg, 1,000 mcg, Intramuscular, Q30 Days, TIFFANY Keys, 1,000 mcg at 08/07/19 1038    cyanocobalamin injection 1,000 mcg, 1,000 mcg, Subcutaneous, Q30 Days, TIFFANY Keys      Objective:     Physical Exam   Constitutional: He is oriented to person, place, and time  He appears well-developed and well-nourished  HENT:   Head: Normocephalic and atraumatic  Eyes: Pupils are equal, round, and reactive to light  Neck: Normal range of motion  Neck supple  Cardiovascular: Normal rate  Pulses are no weak pulses  Pulses:       Dorsalis pedis pulses are 2+ on the right side, and 2+ on the left side  Pulmonary/Chest: Effort normal and breath sounds normal    Abdominal: Soft  Musculoskeletal: Normal range of motion  Feet:   Right Foot:   Skin Integrity: Negative for ulcer, skin breakdown, erythema, warmth, callus or dry skin  Left Foot:   Skin Integrity: Negative for ulcer, skin breakdown, erythema, warmth, callus or dry skin     Neurological: He is alert and oriented to person, place, and time  Skin: Skin is warm and dry  Psychiatric: He has a normal mood and affect  Nursing note and vitals reviewed        Social History     Socioeconomic History    Marital status: Single     Spouse name: Not on file    Number of children: Not on file    Years of education: Not on file    Highest education level: Not on file   Occupational History    Not on file   Social Needs    Financial resource strain: Not on file    Food insecurity:     Worry: Not on file     Inability: Not on file    Transportation needs:     Medical: Not on file     Non-medical: Not on file   Tobacco Use    Smoking status: Former Smoker     Types: Cigarettes     Last attempt to quit:      Years since quittin 6    Smokeless tobacco: Never Used   Substance and Sexual Activity    Alcohol use: No    Drug use: Yes     Types: Marijuana     Comment: everyday     Sexual activity: Not on file   Lifestyle    Physical activity:     Days per week: Not on file     Minutes per session: Not on file    Stress: Not on file   Relationships    Social connections:     Talks on phone: Not on file     Gets together: Not on file     Attends Restorationism service: Not on file     Active member of club or organization: Not on file     Attends meetings of clubs or organizations: Not on file     Relationship status: Not on file    Intimate partner violence:     Fear of current or ex partner: Not on file     Emotionally abused: Not on file     Physically abused: Not on file     Forced sexual activity: Not on file   Other Topics Concern    Not on file   Social History Narrative    Not on file     Past Medical History:   Diagnosis Date    Chronic kidney disease     Diabetes ([facility] )     LAST ASSESSED: 14    Edema     LAST ASSESSED:12    Hypertension     Morbid obesity due to excess calories ([facility] )     LAST ASSESSED: 12       Current Outpatient Medications:     amLODIPine (NORVASC) 5 mg tablet, Take 1 tablet (5 mg total) by mouth daily, Disp: 90 tablet, Rfl: 0    buPROPion (WELLBUTRIN XL) 300 mg 24 hr tablet, Take 1 tablet (300 mg total) by mouth daily for 90 days, Disp: 90 tablet, Rfl: 1    calcium citrate-Vitamin D (CALCIUM CITRATE + D3) 200 mg-250 units, Take 1 tablet by mouth daily, Disp: , Rfl:     Empagliflozin (JARDIANCE) 25 MG TABS, Take 1 tablet (25 mg total) by mouth daily, Disp: 90 tablet, Rfl: 0    Iron-Vitamin C (VITRON-C)  MG TABS, Take one pill twice daily, Disp: 60 tablet, Rfl: 1    losartan (COZAAR) 100 MG tablet, Take 1 tablet (100 mg total) by mouth daily, Disp: 90 tablet, Rfl: 0    Multiple Vitamins-Minerals (CENTRUM ADULTS PO), Take 1 tablet by mouth daily, Disp: , Rfl:     pantoprazole (PROTONIX) 40 mg tablet, Take 1 tablet (40 mg total) by mouth daily, Disp: 30 tablet, Rfl: 2    Icosapent Ethyl (VASCEPA) 1 g CAPS, Take 2 pills in the morning and 2 pills at night, Disp: 360 capsule, Rfl: 1    omega-3-acid ethyl esters (LOVAZA) 1 g capsule, One pill in the morning and 1 pill at night, Disp: 180 capsule, Rfl: 1    Current Facility-Administered Medications:     cyanocobalamin injection 1,000 mcg, 1,000 mcg, Intramuscular, Q30 Days, TIFFANY Keys, 1,000 mcg at 08/07/19 1038    cyanocobalamin injection 1,000 mcg, 1,000 mcg, Subcutaneous, Q30 Days, TIFFANY Baeza  Family History   Problem Relation Age of Onset    COPD Mother     Heart disease Mother     Mental illness Mother     Drug abuse Mother     Diabetes Father     Mental illness Father     Drug abuse Father     Lung cancer Father     Diabetes Sister      Patient's shoes and socks removed  Right Foot/Ankle   Right Foot Inspection  Skin Exam: skin normal skin not intact, no dry skin, no warmth, no callus, no erythema, no maceration, no abnormal color, no pre-ulcer, no ulcer and no callus                          Toe Exam: ROM and strength within normal limitsno tenderness  Sensory   Vibration: intact      Vascular  Capillary refills: < 3 seconds  The right DP pulse is 2+  Left Foot/Ankle  Left Foot Inspection  Skin Exam: skin normalskin not intact, no dry skin, no warmth, no erythema, no maceration, normal color, no pre-ulcer, no ulcer and no callus                         Toe Exam: no tenderness                   Sensory   Vibration: intact      Vascular  Capillary refills: < 3 seconds  The left DP pulse is 2+  Assign Risk Category:  No deformity present; No loss of protective sensation;  No weak pulses       Risk: 0      Patient Instructions   Keep going with the weight loss keep taking the Wellbutrin and the Jardiance  Your A1c is perfect 5 7  We are going to give you a vitamin B12 shot today  Keep moving  Double your visit try and see  But let me know if it makes her constipated  I want you to take vitamin D3 5000 international units daily  Your triglycerides are the only thing that is really out of whack  I am going to try to get you Vascepa fish oil because it lowers your triglycerides and raise is her HDL  If this covered you have a printed script for Reji Oswaldo  If that is not covered then ask pharmacist to direct you to fish oil and the dose for all of this is 2 g twice a day  The rest of her labs are perfect  The bad cholesterol LDL is perfect it is right where we want to be          TIFFANY Chappell

## 2019-08-19 ENCOUNTER — TELEPHONE (OUTPATIENT)
Dept: FAMILY MEDICINE CLINIC | Facility: CLINIC | Age: 64
End: 2019-08-19

## 2019-08-19 NOTE — TELEPHONE ENCOUNTER
Oleg needs a prior auth  He did not get the [facility]    Please call in to the [facility]

## 2019-08-20 ENCOUNTER — TELEPHONE (OUTPATIENT)
Dept: FAMILY MEDICINE CLINIC | Facility: CLINIC | Age: 64
End: 2019-08-20

## 2019-09-09 ENCOUNTER — CLINICAL SUPPORT (OUTPATIENT)
Dept: FAMILY MEDICINE CLINIC | Facility: CLINIC | Age: 64
End: 2019-09-09

## 2019-09-09 DIAGNOSIS — F32.A DEPRESSION, UNSPECIFIED DEPRESSION TYPE: ICD-10-CM

## 2019-09-09 DIAGNOSIS — E53.8 B12 DEFICIENCY: Primary | ICD-10-CM

## 2019-09-09 RX ORDER — BUPROPION HYDROCHLORIDE 300 MG/1
300 TABLET ORAL DAILY
Qty: 90 TABLET | Refills: 0 | Status: SHIPPED | OUTPATIENT
Start: 2019-09-09 | End: 2020-02-26

## 2019-10-17 ENCOUNTER — CLINICAL SUPPORT (OUTPATIENT)
Dept: FAMILY MEDICINE CLINIC | Facility: CLINIC | Age: 64
End: 2019-10-17
Payer: MEDICARE

## 2019-10-17 DIAGNOSIS — Z23 NEEDS FLU SHOT: Primary | ICD-10-CM

## 2019-10-17 PROCEDURE — G0008 ADMIN INFLUENZA VIRUS VAC: HCPCS

## 2019-10-17 PROCEDURE — 90682 RIV4 VACC RECOMBINANT DNA IM: CPT

## 2019-10-17 RX ADMIN — CYANOCOBALAMIN 1000 MCG: 1000 INJECTION INTRAMUSCULAR; SUBCUTANEOUS at 10:03

## 2019-10-28 DIAGNOSIS — I10 HYPERTENSION, UNSPECIFIED TYPE: ICD-10-CM

## 2019-10-28 RX ORDER — LOSARTAN POTASSIUM 100 MG/1
100 TABLET ORAL DAILY
Qty: 90 TABLET | Refills: 1 | Status: SHIPPED | OUTPATIENT
Start: 2019-10-28 | End: 2020-03-13

## 2019-10-28 RX ORDER — AMLODIPINE BESYLATE 5 MG/1
5 TABLET ORAL DAILY
Qty: 90 TABLET | Refills: 1 | Status: SHIPPED | OUTPATIENT
Start: 2019-10-28 | End: 2020-03-13

## 2019-11-14 ENCOUNTER — TELEPHONE (OUTPATIENT)
Dept: FAMILY MEDICINE CLINIC | Facility: CLINIC | Age: 64
End: 2019-11-14

## 2019-11-18 ENCOUNTER — CLINICAL SUPPORT (OUTPATIENT)
Dept: FAMILY MEDICINE CLINIC | Facility: CLINIC | Age: 64
End: 2019-11-18
Payer: MEDICARE

## 2019-11-18 DIAGNOSIS — E53.8 B12 DEFICIENCY: Primary | ICD-10-CM

## 2019-11-18 RX ADMIN — CYANOCOBALAMIN 1000 MCG: 1000 INJECTION INTRAMUSCULAR; SUBCUTANEOUS at 10:55

## 2019-11-19 DIAGNOSIS — E78.1 HYPERTRIGLYCERIDEMIA: ICD-10-CM

## 2019-11-19 DIAGNOSIS — G47.33 OSA (OBSTRUCTIVE SLEEP APNEA): Primary | ICD-10-CM

## 2019-11-19 RX ORDER — OMEGA-3-ACID ETHYL ESTERS 1 G/1
CAPSULE, LIQUID FILLED ORAL
Qty: 180 CAPSULE | Refills: 1 | Status: SHIPPED | OUTPATIENT
Start: 2019-11-19 | End: 2019-11-22 | Stop reason: SDUPTHER

## 2019-11-22 ENCOUNTER — TELEPHONE (OUTPATIENT)
Dept: FAMILY MEDICINE CLINIC | Facility: CLINIC | Age: 64
End: 2019-11-22

## 2019-11-22 DIAGNOSIS — E78.1 HYPERTRIGLYCERIDEMIA: ICD-10-CM

## 2019-11-22 RX ORDER — OMEGA-3-ACID ETHYL ESTERS 1 G/1
CAPSULE, LIQUID FILLED ORAL
Qty: 180 CAPSULE | Refills: 1 | Status: SHIPPED | OUTPATIENT
Start: 2019-11-22 | End: 2020-05-26

## 2019-11-22 RX ORDER — OMEGA-3-ACID ETHYL ESTERS 1 G/1
CAPSULE, LIQUID FILLED ORAL
Qty: 180 CAPSULE | Refills: 1 | Status: CANCELLED | OUTPATIENT
Start: 2019-11-22

## 2019-11-22 RX ORDER — CLINDAMYCIN PHOSPHATE 10 MG/G
GEL TOPICAL
Refills: 0 | COMMUNITY
Start: 2019-11-11

## 2019-11-22 NOTE — TELEPHONE ENCOUNTER
Pt of [place] out of [facility], [employer] instead of [facility] can you please resubmit for patient   Thank you

## 2019-11-26 NOTE — TELEPHONE ENCOUNTER
He called again today and said [facility] needs his doctors approval before filling the order  They did receive the order  He was not sure what he needed done to get it filled  I know its a [facility] patient, but you sent the order in for the [facility]  He also said Viviana Agosto has our old phone number on file, if that would change anything

## 2019-12-03 ENCOUNTER — TELEPHONE (OUTPATIENT)
Dept: FAMILY MEDICINE CLINIC | Facility: CLINIC | Age: 64
End: 2019-12-03

## 2019-12-10 DIAGNOSIS — K22.70 BARRETT'S ESOPHAGUS WITHOUT DYSPLASIA: ICD-10-CM

## 2019-12-11 RX ORDER — PANTOPRAZOLE SODIUM 40 MG/1
TABLET, DELAYED RELEASE ORAL
Qty: 90 TABLET | Refills: 4 | Status: SHIPPED | OUTPATIENT
Start: 2019-12-11 | End: 2020-09-30 | Stop reason: SDUPTHER

## 2019-12-18 ENCOUNTER — CLINICAL SUPPORT (OUTPATIENT)
Dept: FAMILY MEDICINE CLINIC | Facility: CLINIC | Age: 64
End: 2019-12-18
Payer: MEDICARE

## 2019-12-18 DIAGNOSIS — E53.8 B12 DEFICIENCY: Primary | ICD-10-CM

## 2019-12-18 PROCEDURE — 96372 THER/PROPH/DIAG INJ SC/IM: CPT | Performed by: FAMILY MEDICINE

## 2019-12-18 RX ADMIN — CYANOCOBALAMIN 1000 MCG: 1000 INJECTION INTRAMUSCULAR; SUBCUTANEOUS at 09:15

## 2020-01-07 ENCOUNTER — TELEPHONE (OUTPATIENT)
Dept: NEPHROLOGY | Facility: CLINIC | Age: 65
End: 2020-01-07

## 2020-01-07 NOTE — TELEPHONE ENCOUNTER
Called and left a message on machine for patient stating that his appointment for Monday 3/16/2020 12 month follow up with Dr Candelario Perez needs to be reschedule because of Dr Candelario Perez on hospital call/rounding  A reschedule letter was mailed out to the patient   Cleophas Handler,

## 2020-01-20 ENCOUNTER — CLINICAL SUPPORT (OUTPATIENT)
Dept: FAMILY MEDICINE CLINIC | Facility: CLINIC | Age: 65
End: 2020-01-20
Payer: MEDICARE

## 2020-01-20 DIAGNOSIS — E53.8 VITAMIN B 12 DEFICIENCY: Primary | ICD-10-CM

## 2020-01-20 PROCEDURE — 96372 THER/PROPH/DIAG INJ SC/IM: CPT | Performed by: FAMILY MEDICINE

## 2020-01-20 RX ADMIN — CYANOCOBALAMIN 1000 MCG: 1000 INJECTION INTRAMUSCULAR; SUBCUTANEOUS at 15:21

## 2020-02-26 DIAGNOSIS — F32.A DEPRESSION, UNSPECIFIED DEPRESSION TYPE: ICD-10-CM

## 2020-02-26 RX ORDER — BUPROPION HYDROCHLORIDE 300 MG/1
TABLET ORAL
Qty: 90 TABLET | Refills: 0 | Status: SHIPPED | OUTPATIENT
Start: 2020-02-26 | End: 2020-05-18

## 2020-02-26 NOTE — TELEPHONE ENCOUNTER
Requested medication(s) are due for refill today: Yes  Patient has already received a courtesy refill: No  Other reason request has been forwarded to provider: Requirements not met

## 2020-02-27 ENCOUNTER — CLINICAL SUPPORT (OUTPATIENT)
Dept: FAMILY MEDICINE CLINIC | Facility: CLINIC | Age: 65
End: 2020-02-27
Payer: MEDICARE

## 2020-02-27 DIAGNOSIS — E53.8 VITAMIN B 12 DEFICIENCY: Primary | ICD-10-CM

## 2020-02-27 PROCEDURE — 96372 THER/PROPH/DIAG INJ SC/IM: CPT | Performed by: FAMILY MEDICINE

## 2020-02-27 RX ADMIN — CYANOCOBALAMIN 1000 MCG: 1000 INJECTION INTRAMUSCULAR; SUBCUTANEOUS at 10:23

## 2020-03-09 ENCOUNTER — APPOINTMENT (OUTPATIENT)
Dept: LAB | Facility: HOSPITAL | Age: 65
End: 2020-03-09
Attending: INTERNAL MEDICINE
Payer: MEDICARE

## 2020-03-09 DIAGNOSIS — E03.9 ACQUIRED HYPOTHYROIDISM: ICD-10-CM

## 2020-03-09 DIAGNOSIS — N18.2 TYPE 2 DIABETES MELLITUS WITH STAGE 2 CHRONIC KIDNEY DISEASE, WITHOUT LONG-TERM CURRENT USE OF INSULIN (HCC): ICD-10-CM

## 2020-03-09 DIAGNOSIS — E11.22 TYPE 2 DIABETES MELLITUS WITH STAGE 2 CHRONIC KIDNEY DISEASE, WITHOUT LONG-TERM CURRENT USE OF INSULIN (HCC): ICD-10-CM

## 2020-03-09 DIAGNOSIS — R80.1 PERSISTENT PROTEINURIA: ICD-10-CM

## 2020-03-09 LAB
ANION GAP SERPL CALCULATED.3IONS-SCNC: 8 MMOL/L (ref 4–13)
BACTERIA UR QL AUTO: ABNORMAL /HPF
BASOPHILS # BLD AUTO: 0.06 THOUSANDS/ΜL (ref 0–0.1)
BASOPHILS NFR BLD AUTO: 1 % (ref 0–1)
BILIRUB UR QL STRIP: NEGATIVE
BUN SERPL-MCNC: 12 MG/DL (ref 5–25)
CALCIUM SERPL-MCNC: 8.5 MG/DL (ref 8.3–10.1)
CHLORIDE SERPL-SCNC: 107 MMOL/L (ref 100–108)
CLARITY UR: CLEAR
CO2 SERPL-SCNC: 30 MMOL/L (ref 21–32)
COLOR UR: YELLOW
CREAT SERPL-MCNC: 0.88 MG/DL (ref 0.6–1.3)
CREAT UR-MCNC: 141 MG/DL
EOSINOPHIL # BLD AUTO: 0.21 THOUSAND/ΜL (ref 0–0.61)
EOSINOPHIL NFR BLD AUTO: 3 % (ref 0–6)
ERYTHROCYTE [DISTWIDTH] IN BLOOD BY AUTOMATED COUNT: 13.2 % (ref 11.6–15.1)
GFR SERPL CREATININE-BSD FRML MDRD: 92 ML/MIN/1.73SQ M
GLUCOSE P FAST SERPL-MCNC: 108 MG/DL (ref 65–99)
GLUCOSE UR STRIP-MCNC: ABNORMAL MG/DL
HCT VFR BLD AUTO: 48.6 % (ref 36.5–49.3)
HGB BLD-MCNC: 15.8 G/DL (ref 12–17)
HGB UR QL STRIP.AUTO: NEGATIVE
IMM GRANULOCYTES # BLD AUTO: 0.02 THOUSAND/UL (ref 0–0.2)
IMM GRANULOCYTES NFR BLD AUTO: 0 % (ref 0–2)
KETONES UR STRIP-MCNC: ABNORMAL MG/DL
LEUKOCYTE ESTERASE UR QL STRIP: ABNORMAL
LYMPHOCYTES # BLD AUTO: 1.51 THOUSANDS/ΜL (ref 0.6–4.47)
LYMPHOCYTES NFR BLD AUTO: 23 % (ref 14–44)
MCH RBC QN AUTO: 29 PG (ref 26.8–34.3)
MCHC RBC AUTO-ENTMCNC: 32.5 G/DL (ref 31.4–37.4)
MCV RBC AUTO: 89 FL (ref 82–98)
MONOCYTES # BLD AUTO: 0.69 THOUSAND/ΜL (ref 0.17–1.22)
MONOCYTES NFR BLD AUTO: 10 % (ref 4–12)
NEUTROPHILS # BLD AUTO: 4.2 THOUSANDS/ΜL (ref 1.85–7.62)
NEUTS SEG NFR BLD AUTO: 63 % (ref 43–75)
NITRITE UR QL STRIP: NEGATIVE
NON-SQ EPI CELLS URNS QL MICRO: ABNORMAL /HPF
NRBC BLD AUTO-RTO: 0 /100 WBCS
OTHER STN SPEC: ABNORMAL
PH UR STRIP.AUTO: 5.5 [PH]
PLATELET # BLD AUTO: 205 THOUSANDS/UL (ref 149–390)
PMV BLD AUTO: 10.9 FL (ref 8.9–12.7)
POTASSIUM SERPL-SCNC: 3.8 MMOL/L (ref 3.5–5.3)
PROT UR STRIP-MCNC: ABNORMAL MG/DL
PROT UR-MCNC: 41 MG/DL
PROT/CREAT UR: 0.29 MG/G{CREAT} (ref 0–0.1)
RBC # BLD AUTO: 5.44 MILLION/UL (ref 3.88–5.62)
RBC #/AREA URNS AUTO: ABNORMAL /HPF
SODIUM SERPL-SCNC: 145 MMOL/L (ref 136–145)
SP GR UR STRIP.AUTO: 1.02 (ref 1–1.03)
UROBILINOGEN UR QL STRIP.AUTO: 0.2 E.U./DL
WBC # BLD AUTO: 6.69 THOUSAND/UL (ref 4.31–10.16)
WBC #/AREA URNS AUTO: ABNORMAL /HPF

## 2020-03-09 PROCEDURE — 81001 URINALYSIS AUTO W/SCOPE: CPT

## 2020-03-09 PROCEDURE — 85025 COMPLETE CBC W/AUTO DIFF WBC: CPT

## 2020-03-09 PROCEDURE — 82570 ASSAY OF URINE CREATININE: CPT

## 2020-03-09 PROCEDURE — 80048 BASIC METABOLIC PNL TOTAL CA: CPT

## 2020-03-09 PROCEDURE — 84443 ASSAY THYROID STIM HORMONE: CPT

## 2020-03-09 PROCEDURE — 80061 LIPID PANEL: CPT

## 2020-03-09 PROCEDURE — 84156 ASSAY OF PROTEIN URINE: CPT

## 2020-03-09 PROCEDURE — 36415 COLL VENOUS BLD VENIPUNCTURE: CPT

## 2020-03-10 ENCOUNTER — TELEPHONE (OUTPATIENT)
Dept: GASTROENTEROLOGY | Facility: CLINIC | Age: 65
End: 2020-03-10

## 2020-03-10 NOTE — TELEPHONE ENCOUNTER
Patient called to see when he is due for his next EGD  Doctor does it every 2 years, he thinks   Please call Karissa Waller at [phone] ty

## 2020-03-11 ENCOUNTER — OFFICE VISIT (OUTPATIENT)
Dept: FAMILY MEDICINE CLINIC | Facility: CLINIC | Age: 65
End: 2020-03-11
Payer: MEDICARE

## 2020-03-11 VITALS
OXYGEN SATURATION: 98 % | SYSTOLIC BLOOD PRESSURE: 138 MMHG | DIASTOLIC BLOOD PRESSURE: 82 MMHG | BODY MASS INDEX: 34.64 KG/M2 | WEIGHT: 238 LBS | HEART RATE: 62 BPM

## 2020-03-11 DIAGNOSIS — E03.9 ACQUIRED HYPOTHYROIDISM: ICD-10-CM

## 2020-03-11 DIAGNOSIS — Z00.00 MEDICARE ANNUAL WELLNESS VISIT, SUBSEQUENT: ICD-10-CM

## 2020-03-11 DIAGNOSIS — M81.0 AGE-RELATED OSTEOPOROSIS WITHOUT CURRENT PATHOLOGICAL FRACTURE: ICD-10-CM

## 2020-03-11 DIAGNOSIS — E11.22 TYPE 2 DIABETES MELLITUS WITH STAGE 2 CHRONIC KIDNEY DISEASE, WITHOUT LONG-TERM CURRENT USE OF INSULIN (HCC): Primary | ICD-10-CM

## 2020-03-11 DIAGNOSIS — Z12.5 SCREENING FOR PROSTATE CANCER: ICD-10-CM

## 2020-03-11 DIAGNOSIS — N18.2 TYPE 2 DIABETES MELLITUS WITH STAGE 2 CHRONIC KIDNEY DISEASE, WITHOUT LONG-TERM CURRENT USE OF INSULIN (HCC): Primary | ICD-10-CM

## 2020-03-11 LAB
CHOLEST SERPL-MCNC: 148 MG/DL (ref 50–200)
HDLC SERPL-MCNC: 32 MG/DL
LDLC SERPL CALC-MCNC: 72 MG/DL (ref 0–100)
NONHDLC SERPL-MCNC: 116 MG/DL
SL AMB POCT HEMOGLOBIN AIC: 5.5 (ref ?–6.5)
TRIGL SERPL-MCNC: 218 MG/DL
TSH SERPL DL<=0.05 MIU/L-ACNC: 1 UIU/ML (ref 0.36–3.74)

## 2020-03-11 PROCEDURE — G0438 PPPS, INITIAL VISIT: HCPCS | Performed by: FAMILY MEDICINE

## 2020-03-11 PROCEDURE — 3075F SYST BP GE 130 - 139MM HG: CPT | Performed by: FAMILY MEDICINE

## 2020-03-11 PROCEDURE — 3066F NEPHROPATHY DOC TX: CPT | Performed by: FAMILY MEDICINE

## 2020-03-11 PROCEDURE — 3044F HG A1C LEVEL LT 7.0%: CPT | Performed by: FAMILY MEDICINE

## 2020-03-11 PROCEDURE — 83036 HEMOGLOBIN GLYCOSYLATED A1C: CPT | Performed by: FAMILY MEDICINE

## 2020-03-11 PROCEDURE — 3079F DIAST BP 80-89 MM HG: CPT | Performed by: FAMILY MEDICINE

## 2020-03-11 PROCEDURE — 1036F TOBACCO NON-USER: CPT | Performed by: FAMILY MEDICINE

## 2020-03-11 NOTE — PROGRESS NOTES
Assessment and Plan:     Problem List Items Addressed This Visit        Endocrine    Diabetes mellitus, type 2 ([facility] ) - Primary    Relevant Orders    POCT hemoglobin A1c (Completed)    Lipid panel (Completed)      Other Visit Diagnoses     Medicare annual wellness visit, subsequent        Age-related osteoporosis without current pathological fracture        Relevant Orders    DXA bone density spine hip and pelvis (Completed)    Screening for prostate cancer        Relevant Orders    PSA, Total Screen (Completed)    Acquired hypothyroidism        Relevant Orders    TSH, 3rd generation with Free T4 reflex (Completed)        BMI Counseling: Body mass index is 34 64 kg/m²  The BMI is above normal  Nutrition recommendations include decreasing fast food intake, consuming healthier snacks, increasing intake of lean protein and reducing intake of saturated and trans fat  Exercise recommendations include vigorous physical activity 75 minutes/week  No pharmacotherapy was ordered  Preventive health issues were discussed with patient, and age appropriate screening tests were ordered as noted in patient's After Visit Summary  Personalized health advice and appropriate referrals for health education or preventive services given if needed, as noted in patient's After Visit Summary       History of Present Illness:     Patient presents for Medicare Annual Wellness visit    Patient Care Team:  Estela Rust as PCP - General (Family Medicine)  Lane Jeff MD as Consulting Physician (Gastroenterology)  Iliana Strange as Consulting Physician (Optometry)  Iron Clifton MD as Consulting Physician (Nephrology)  Alix Green PA-C     Problem List:     Patient Active Problem List   Diagnosis    Malabsorption    Diabetes mellitus, type 2 ([facility] )    Hypertension    Class 1 obesity due to excess calories without serious comorbidity in adult      Past Medical and Surgical History:     Past Medical History:   Diagnosis Date    Chronic kidney disease     Diabetes ([facility] )     LAST ASSESSED: 14    Edema     LAST ASSESSED:12    Hypertension     Morbid obesity due to excess calories (HCC)     LAST ASSESSED: 12    RBBB      Past Surgical History:   Procedure Laterality Date    BACK SURGERY      CHOLECYSTECTOMY      COLONOSCOPY      FOOT SURGERY      GASTRIC RESTRICTION SURGERY      GASTRIC STAPLING FOR MORBID OBESITY LAPAROSCOPY W/ [id number]    SHOULDER SURGERY      UVULOPALATOPLASTY        Family History:     Family History   Problem Relation Age of Onset   [place]ello [place] COPD Mother    Tello [place] Heart disease Mother     Mental illness Mother    Tello [place] Drug abuse Mother     Diabetes Father     Mental illness Father    Tello [place] Drug abuse Father     Lung cancer Father     Diabetes Sister       Social History:        Social History     Socioeconomic History    Marital status: Single     Spouse name: None    Number of children: None    Years of education: None    Highest education level: None   Occupational History    None   Social Needs    Financial resource strain: None    Food insecurity:     Worry: None     Inability: None    Transportation needs:     Medical: None     Non-medical: None   Tobacco Use    Smoking status: Former Smoker     Types: Cigarettes     Last attempt to quit:      Years since quittin 5    Smokeless tobacco: Never Used   Substance and Sexual Activity    Alcohol use: No    Drug use: Yes     Types: Marijuana     Comment: everyday     Sexual activity: None   Lifestyle    Physical activity:     Days per week: None     Minutes per session: None    Stress: None   Relationships    Social connections:     Talks on phone: None     Gets together: None     Attends Taoism service: None     Active member of club or organization: None     Attends meetings of clubs or organizations: None     Relationship status: None    Intimate partner violence:     Fear of current or ex partner: None     Emotionally abused: None     Physically abused: None     Forced sexual activity: None   Other Topics Concern    None   Social History Narrative    None      Medications and Allergies:     Current Outpatient Medications   Medication Sig Dispense Refill    calcium citrate-Vitamin D (CALCIUM CITRATE + D3) 200 mg-250 units Take 1 tablet by mouth daily      clindamycin (CLINDAGEL) 1 % gel   0    Icosapent Ethyl (VASCEPA) 1 g CAPS Take 2 pills in the morning and 2 pills at night 360 capsule 1    Iron-Vitamin C (VITRON-C)  MG TABS Take one pill twice daily 60 tablet 1    Multiple Vitamins-Minerals (CENTRUM ADULTS PO) Take 1 tablet by mouth daily      pantoprazole (PROTONIX) 40 mg tablet TAKE 1 TABLET DAILY 90 tablet 4    amLODIPine (NORVASC) 5 mg tablet TAKE 1 TABLET DAILY 90 tablet 3    buPROPion (WELLBUTRIN XL) 300 mg 24 hr tablet TAKE 1 TABLET DAILY 90 tablet 3    JARDIANCE 25 MG TABS TAKE 1 TABLET DAILY 90 tablet 3    losartan (COZAAR) 100 MG tablet Take 1 tablet (100 mg total) by mouth daily 90 tablet 3    omega-3-acid ethyl esters (LOVAZA) 1 g capsule TAKE 1 CAPSULE EVERY MORNING AND 1 AT NIGHT 180 capsule 3     Current Facility-Administered Medications   Medication Dose Route Frequency Provider Last Rate Last Dose    cyanocobalamin injection 1,000 mcg  1,000 mcg Intramuscular Q30 Days TIFFANY Keys   1,000 mcg at 02/27/20 1023     Allergies   Allergen Reactions    Augmentin [Amoxicillin-Pot Clavulanate] Diarrhea    Ciprofloxacin Diarrhea      Immunizations:     Immunization History   Administered Date(s) Administered    INFLUENZA 10/07/2004, 10/27/2005, 12/01/2006, 10/30/2007, 11/10/2008, 10/02/2009, 11/15/2010, 09/09/2011, 10/08/2012, 10/14/2013, 09/17/2014, 09/25/2015, 09/06/2018, 09/06/2019    Influenza Quadrivalent Preservative Free 3 years and older IM 09/21/2016, 09/07/2017    Influenza, recombinant, quadrivalent,injectable, preservative free 09/06/2018, 10/17/2019    Pneumococcal Conjugate 13-Valent 09/09/2016    Pneumococcal Polysaccharide PPV23 03/13/2020    Tdap 11/06/2013      Health Maintenance:         Topic Date Due    CRC Screening: Colonoscopy  03/09/2027    Hepatitis C Screening  Completed         Topic Date Due    Influenza Vaccine  07/01/2020      Medicare Health Risk Assessment:     /82   Pulse 62   Wt 108 kg (238 lb)   SpO2 98%   BMI 34 64 kg/m²      Elizabeth Merlos is here for his Subsequent Wellness visit  Health Risk Assessment:   Patient rates overall health as good  Patient feels that their physical health rating is same  Eyesight was rated as same  Hearing was rated as same  Patient feels that their emotional and mental health rating is same  Pain experienced in the last 7 days has been none  Patient states that he has experienced no weight loss or gain in last 6 months  Depression Screening:   PHQ-2 Score: 0  PHQ-9 Score: 0      Fall Risk Screening: In the past year, patient has experienced: no history of falling in past year      Home Safety:  Patient does not have trouble with stairs inside or outside of their home  Patient has working smoke alarms and has working carbon monoxide detector  Home safety hazards include: none  Nutrition:   Current diet is Regular  Medications:   Patient is currently taking over-the-counter supplements  OTC medications include: see medication list  Patient is able to manage medications  Activities of Daily Living (ADLs)/Instrumental Activities of Daily Living (IADLs):   Walk and transfer into and out of bed and chair?: Yes  Dress and groom yourself?: Yes    Bathe or shower yourself?: Yes    Feed yourself?  Yes  Do your laundry/housekeeping?: Yes  Manage your money, pay your bills and track your expenses?: Yes  Make your own meals?: Yes    Do your own shopping?: Yes    Previous Hospitalizations:   Any hospitalizations or ED visits within the last 12 months?: No      Advance Care Planning:   Living will: Yes    Durable POA for healthcare: Yes    Advanced directive: Yes    Five wishes given: Yes    End of Life Decisions reviewed with patient: Yes    Provider agrees with end of life decisions: Yes      Cognitive Screening:   Provider or family/friend/caregiver concerned regarding cognition?: No    PREVENTIVE SCREENINGS      Cardiovascular Screening:    General: History Lipid Disorder and Risks and Benefits Discussed    Due for: Lipid Panel      Diabetes Screening:     General: History Diabetes and Risks and Benefits Discussed      Colorectal Cancer Screening:     General: Screening Current      Prostate Cancer Screening:    General: Risks and Benefits Discussed      Osteoporosis Screening:    General: Risks and Benefits Discussed    Due for: Bone Density Ultrasound      Abdominal Aortic Aneurysm (AAA) Screening:    Risk factors include: tobacco use        General: Screening Not Indicated      Lung Cancer Screening:     General: Screening Not Indicated      Hepatitis C Screening:    General: Screening Current and Screening Not Indicated    Other Counseling Topics:   Alcohol use counseling, car/seat belt/driving safety, skin self-exam and calcium and vitamin D intake and regular weightbearing exercise  Patient's shoes and socks removed  Right Foot/Ankle   Right Foot Inspection  Skin Exam: skin normal and skin intact no dry skin, no warmth, no callus, no erythema, no maceration, no abnormal color, no pre-ulcer, no ulcer and no callus                          Toe Exam: ROM and strength within normal limits  Sensory   Vibration: intact      Vascular  Capillary refills: < 3 seconds      Left Foot/Ankle  Left Foot Inspection  Skin Exam: skin normal and skin intactno dry skin, no warmth, no erythema, no maceration, normal color, no pre-ulcer, no ulcer and no callus                         Toe Exam: ROM and strength within normal limits                   Sensory   Vibration: intact      Vascular  Capillary refills: < 3 seconds    Assign Risk Category:  No deformity present; No loss of protective sensation; No weak pulses       Risk: 0      TIFFANY Keys  BMI Counseling: Body mass index is 34 64 kg/m²  The BMI is above normal  Nutrition recommendations include 3-5 servings of fruits/vegetables daily

## 2020-03-11 NOTE — PATIENT INSTRUCTIONS
Medicare Preventive Visit Patient Instructions  Thank you for completing your Welcome to Medicare Visit or Medicare Annual Wellness Visit today  Your next wellness visit will be due in one year (3/11/2021)  The screening/preventive services that you may require over the next 5-10 years are detailed below  Some tests may not apply to you based off risk factors and/or age  Screening tests ordered at today's visit but not completed yet may show as past due  Also, please note that scanned in results may not display below  Preventive Screenings:  Service Recommendations Previous Testing/Comments   Colorectal Cancer Screening  · Colonoscopy    · Fecal Occult Blood Test (FOBT)/Fecal Immunochemical Test (FIT)  · Fecal DNA/Cologuard Test  · Flexible Sigmoidoscopy Age: 54-65 years old   Colonoscopy: every 10 years (May be performed more frequently if at higher risk)  OR  FOBT/FIT: every 1 year  OR  Cologuard: every 3 years  OR  Sigmoidoscopy: every 5 years  Screening may be recommended earlier than age 48 if at higher risk for colorectal cancer  Also, an individualized decision between you and your healthcare provider will decide whether screening between the ages of 74-80 would be appropriate   Colonoscopy: 03/09/2017  FOBT/FIT: 03/05/2019  Cologuard: Not on file  Sigmoidoscopy: Not on file    Screening Current     Prostate Cancer Screening Individualized decision between patient and health care provider in men between ages of 53-78   Medicare will cover every 12 months beginning on the day after your 50th birthday PSA: 1 6 ng/mL          Hepatitis C Screening Once for adults born between 1945 and 1965  More frequently in patients at high risk for Hepatitis C Hep C Antibody: 02/08/2019    Screening Current   Diabetes Screening 1-2 times per year if you're at risk for diabetes or have pre-diabetes Fasting glucose: 108 mg/dL   A1C: 5 7 %    Screening Not Indicated  History Diabetes   Cholesterol Screening Once every 5 years if you don't have a lipid disorder  May order more often based on risk factors  Lipid panel: 08/05/2019    Screening Not Indicated  History Lipid Disorder      Other Preventive Screenings Covered by Medicare:  1  Abdominal Aortic Aneurysm (AAA) Screening: covered once if your at risk  You're considered to be at risk if you have a family history of AAA or a male between the age of 73-68 who smoking at least 100 cigarettes in your lifetime  2  Lung Cancer Screening: covers low dose CT scan once per year if you meet all of the following conditions: (1) Age 50-69; (2) No signs or symptoms of lung cancer; (3) Current smoker or have quit smoking within the last 15 years; (4) You have a tobacco smoking history of at least 30 pack years (packs per day x number of years you smoked); (5) You get a written order from a healthcare provider  3  Glaucoma Screening: covered annually if you're considered high risk: (1) You have diabetes OR (2) Family history of glaucoma OR (3)  aged 48 and older OR (3)  American aged 72 and older  3  Osteoporosis Screening: covered every 2 years if you meet one of the following conditions: (1) Have a vertebral abnormality; (2) On glucocorticoid therapy for more than 3 months; (3) Have primary hyperparathyroidism; (4) On osteoporosis medications and need to assess response to drug therapy  5  HIV Screening: covered annually if you're between the age of 12-76  Also covered annually if you are younger than 13 and older than 72 with risk factors for HIV infection  For pregnant patients, it is covered up to 3 times per pregnancy      Immunizations:  Immunization Recommendations   Influenza Vaccine Annual influenza vaccination during flu season is recommended for all persons aged >= 6 months who do not have contraindications   Pneumococcal Vaccine (Prevnar and Pneumovax)  * Prevnar = PCV13  * Pneumovax = PPSV23 Adults 25-60 years old: 1-3 doses may be recommended based on certain risk factors  Adults 72 years old: Prevnar (PCV13) vaccine recommended followed by Pneumovax (PPSV23) vaccine  If already received PPSV23 since turning 65, then PCV13 recommended at least one year after PPSV23 dose  Hepatitis B Vaccine 3 dose series if at intermediate or high risk (ex: diabetes, end stage renal disease, liver disease)   Tetanus (Td) Vaccine - COST NOT COVERED BY MEDICARE PART B Following completion of primary series, a booster dose should be given every 10 years to maintain immunity against tetanus  Td may also be given as tetanus wound prophylaxis  Tdap Vaccine - COST NOT COVERED BY MEDICARE PART B Recommended at least once for all adults  For pregnant patients, recommended with each pregnancy  Shingles Vaccine (Shingrix) - COST NOT COVERED BY MEDICARE PART B  2 shot series recommended in those aged 48 and above     Health Maintenance Due:      Topic Date Due    CRC Screening: Colonoscopy  03/09/2027    Hepatitis C Screening  Completed     Immunizations Due:  There are no preventive care reminders to display for this patient  Advance Directives   What are advance directives? Advance directives are legal documents that state your wishes and plans for medical care  These plans are made ahead of time in case you lose your ability to make decisions for yourself  Advance directives can apply to any medical decision, such as the treatments you want, and if you want to donate organs  What are the types of advance directives? There are many types of advance directives, and each state has rules about how to use them  You may choose a combination of any of the following:  · Living will: This is a written record of the treatment you want  You can also choose which treatments you do not want, which to limit, and which to stop at a certain time  This includes surgery, medicine, IV fluid, and tube feedings  · Durable power of  for healthcare [facility]):   This is a written record that states who you want to make healthcare choices for you when you are unable to make them for yourself  This person, called a proxy, is usually a family member or a friend  You may choose more than 1 proxy  · Do not resuscitate (DNR) order:  A DNR order is used in case your heart stops beating or you stop breathing  It is a request not to have certain forms of treatment, such as CPR  A DNR order may be included in other types of advance directives  · Medical directive: This covers the care that you want if you are in a coma, near death, or unable to make decisions for yourself  You can list the treatments you want for each condition  Treatment may include pain medicine, surgery, blood transfusions, dialysis, IV or tube feedings, and a ventilator (breathing machine)  · Values history: This document has questions about your views, beliefs, and how you feel and think about life  This information can help others choose the care that you would choose  Why are advance directives important? An advance directive helps you control your care  Although spoken wishes may be used, it is better to have your wishes written down  Spoken wishes can be misunderstood, or not followed  Treatments may be given even if you do not want them  An advance directive may make it easier for your family to make difficult choices about your care  Weight Management   Why it is important to manage your weight:  Being overweight increases your risk of health conditions such as heart disease, high blood pressure, type 2 diabetes, and certain types of cancer  It can also increase your risk for osteoarthritis, sleep apnea, and other respiratory problems  Aim for a slow, steady weight loss  Even a small amount of weight loss can lower your risk of health problems  How to lose weight safely:  A safe and healthy way to lose weight is to eat fewer calories and get regular exercise   You can lose up about 1 pound a week by decreasing the number of calories you eat by 500 calories each day  Healthy meal plan for weight management:  A healthy meal plan includes a variety of foods, contains fewer calories, and helps you stay healthy  A healthy meal plan includes the following:  · Eat whole-grain foods more often  A healthy meal plan should contain fiber  Fiber is the part of grains, fruits, and vegetables that is not broken down by your body  Whole-grain foods are healthy and provide extra fiber in your diet  Some examples of whole-grain foods are whole-wheat breads and pastas, oatmeal, brown rice, and bulgur  · Eat a variety of vegetables every day  Include dark, leafy greens such as spinach, kale, lennie greens, and mustard greens  Eat yellow and orange vegetables such as carrots, sweet potatoes, and winter squash  · Eat a variety of fruits every day  Choose fresh or canned fruit (canned in its own juice or light syrup) instead of juice  Fruit juice has very little or no fiber  · Eat low-fat dairy foods  Drink fat-free (skim) milk or 1% milk  Eat fat-free yogurt and low-fat cottage cheese  Try low-fat cheeses such as mozzarella and other reduced-fat cheeses  · Choose meat and other protein foods that are low in fat  Choose beans or other legumes such as split peas or lentils  Choose fish, skinless poultry (chicken or turkey), or lean cuts of red meat (beef or pork)  Before you cook meat or poultry, cut off any visible fat  · Use less fat and oil  Try baking foods instead of frying them  Add less fat, such as margarine, sour cream, regular salad dressing and mayonnaise to foods  Eat fewer high-fat foods  Some examples of high-fat foods include french fries, doughnuts, ice cream, and cakes  · Eat fewer sweets  Limit foods and drinks that are high in sugar  This includes candy, cookies, regular soda, and sweetened drinks  Exercise:  Exercise at least 30 minutes per day on most days of the week   Some examples of exercise include walking, biking, dancing, and swimming  You can also fit in more physical activity by taking the stairs instead of the elevator or parking farther away from stores  Ask your healthcare provider about the best exercise plan for you  © Copyright [employer] 2018 Information is for End User's use only and may not be sold, redistributed or otherwise used for commercial purposes  All illustrations and images included in CareNotes® are the copyrighted property of [employer]  or [facility] Preventive Visit Patient Instructions  Thank you for completing your Welcome to Medicare Visit or Medicare Annual Wellness Visit today  Your next wellness visit will be due in one year (3/11/2021)  The screening/preventive services that you may require over the next 5-10 years are detailed below  Some tests may not apply to you based off risk factors and/or age  Screening tests ordered at today's visit but not completed yet may show as past due  Also, please note that scanned in results may not display below  Preventive Screenings:  Service Recommendations Previous Testing/Comments   Colorectal Cancer Screening  · Colonoscopy    · Fecal Occult Blood Test (FOBT)/Fecal Immunochemical Test (FIT)  · Fecal DNA/Cologuard Test  · Flexible Sigmoidoscopy Age: 54-65 years old   Colonoscopy: every 10 years (May be performed more frequently if at higher risk)  OR  FOBT/FIT: every 1 year  OR  Cologuard: every 3 years  OR  Sigmoidoscopy: every 5 years  Screening may be recommended earlier than age 48 if at higher risk for colorectal cancer  Also, an individualized decision between you and your healthcare provider will decide whether screening between the ages of 74-80 would be appropriate   Colonoscopy: 03/09/2017  FOBT/FIT: 03/05/2019  Cologuard: Not on file  Sigmoidoscopy: Not on file    Screening Current     Prostate Cancer Screening Individualized decision between patient and health care provider in men between ages of 53-78 Medicare will cover every 12 months beginning on the day after your 50th birthday PSA: 1 6 ng/mL          Hepatitis C Screening Once for adults born between 1945 and 1965  More frequently in patients at high risk for Hepatitis C Hep C Antibody: 02/08/2019    Screening Current   Diabetes Screening 1-2 times per year if you're at risk for diabetes or have pre-diabetes Fasting glucose: 108 mg/dL   A1C: 5 5    Screening Not Indicated  History Diabetes   Cholesterol Screening Once every 5 years if you don't have a lipid disorder  May order more often based on risk factors  Lipid panel: 08/05/2019    Screening Not Indicated  History Lipid Disorder      Other Preventive Screenings Covered by Medicare:  6  Abdominal Aortic Aneurysm (AAA) Screening: covered once if your at risk  You're considered to be at risk if you have a family history of AAA or a male between the age of 73-68 who smoking at least 100 cigarettes in your lifetime  7  Lung Cancer Screening: covers low dose CT scan once per year if you meet all of the following conditions: (1) Age 50-69; (2) No signs or symptoms of lung cancer; (3) Current smoker or have quit smoking within the last 15 years; (4) You have a tobacco smoking history of at least 30 pack years (packs per day x number of years you smoked); (5) You get a written order from a healthcare provider  8  Glaucoma Screening: covered annually if you're considered high risk: (1) You have diabetes OR (2) Family history of glaucoma OR (3)  aged 48 and older OR (3)  American aged 72 and older  5  Osteoporosis Screening: covered every 2 years if you meet one of the following conditions: (1) Have a vertebral abnormality; (2) On glucocorticoid therapy for more than 3 months; (3) Have primary hyperparathyroidism; (4) On osteoporosis medications and need to assess response to drug therapy  10  HIV Screening: covered annually if you're between the age of 12-76   Also covered annually if you are younger than 13 and older than 72 with risk factors for HIV infection  For pregnant patients, it is covered up to 3 times per pregnancy  Immunizations:  Immunization Recommendations   Influenza Vaccine Annual influenza vaccination during flu season is recommended for all persons aged >= 6 months who do not have contraindications   Pneumococcal Vaccine (Prevnar and Pneumovax)  * Prevnar = PCV13  * Pneumovax = PPSV23 Adults 25-60 years old: 1-3 doses may be recommended based on certain risk factors  Adults 72 years old: Prevnar (PCV13) vaccine recommended followed by Pneumovax (PPSV23) vaccine  If already received PPSV23 since turning 65, then PCV13 recommended at least one year after PPSV23 dose  Hepatitis B Vaccine 3 dose series if at intermediate or high risk (ex: diabetes, end stage renal disease, liver disease)   Tetanus (Td) Vaccine - COST NOT COVERED BY MEDICARE PART B Following completion of primary series, a booster dose should be given every 10 years to maintain immunity against tetanus  Td may also be given as tetanus wound prophylaxis  Tdap Vaccine - COST NOT COVERED BY MEDICARE PART B Recommended at least once for all adults  For pregnant patients, recommended with each pregnancy  Shingles Vaccine (Shingrix) - COST NOT COVERED BY MEDICARE PART B  2 shot series recommended in those aged 48 and above     Health Maintenance Due:      Topic Date Due    CRC Screening: Colonoscopy  03/09/2027    Hepatitis C Screening  Completed     Immunizations Due:  There are no preventive care reminders to display for this patient  Advance Directives   What are advance directives? Advance directives are legal documents that state your wishes and plans for medical care  These plans are made ahead of time in case you lose your ability to make decisions for yourself  Advance directives can apply to any medical decision, such as the treatments you want, and if you want to donate organs     What are the types of advance directives? There are many types of advance directives, and each state has rules about how to use them  You may choose a combination of any of the following:  · Living will: This is a written record of the treatment you want  You can also choose which treatments you do not want, which to limit, and which to stop at a certain time  This includes surgery, medicine, IV fluid, and tube feedings  · Durable power of  for healthcare [facility] [place]): This is a written record that states who you want to make healthcare choices for you when you are unable to make them for yourself  This person, called a proxy, is usually a family member or a friend  You may choose more than 1 proxy  · Do not resuscitate (DNR) order:  A DNR order is used in case your heart stops beating or you stop breathing  It is a request not to have certain forms of treatment, such as CPR  A DNR order may be included in other types of advance directives  · Medical directive: This covers the care that you want if you are in a coma, near death, or unable to make decisions for yourself  You can list the treatments you want for each condition  Treatment may include pain medicine, surgery, blood transfusions, dialysis, IV or tube feedings, and a ventilator (breathing machine)  · Values history: This document has questions about your views, beliefs, and how you feel and think about life  This information can help others choose the care that you would choose  Why are advance directives important? An advance directive helps you control your care  Although spoken wishes may be used, it is better to have your wishes written down  Spoken wishes can be misunderstood, or not followed  Treatments may be given even if you do not want them  An advance directive may make it easier for your family to make difficult choices about your care     Weight Management   Why it is important to manage your weight:  Being overweight increases your risk of health conditions such as heart disease, high blood pressure, type 2 diabetes, and certain types of cancer  It can also increase your risk for osteoarthritis, sleep apnea, and other respiratory problems  Aim for a slow, steady weight loss  Even a small amount of weight loss can lower your risk of health problems  How to lose weight safely:  A safe and healthy way to lose weight is to eat fewer calories and get regular exercise  You can lose up about 1 pound a week by decreasing the number of calories you eat by 500 calories each day  Healthy meal plan for weight management:  A healthy meal plan includes a variety of foods, contains fewer calories, and helps you stay healthy  A healthy meal plan includes the following:  · Eat whole-grain foods more often  A healthy meal plan should contain fiber  Fiber is the part of grains, fruits, and vegetables that is not broken down by your body  Whole-grain foods are healthy and provide extra fiber in your diet  Some examples of whole-grain foods are whole-wheat breads and pastas, oatmeal, brown rice, and bulgur  · Eat a variety of vegetables every day  Include dark, leafy greens such as spinach, kale, lennie greens, and mustard greens  Eat yellow and orange vegetables such as carrots, sweet potatoes, and winter squash  · Eat a variety of fruits every day  Choose fresh or canned fruit (canned in its own juice or light syrup) instead of juice  Fruit juice has very little or no fiber  · Eat low-fat dairy foods  Drink fat-free (skim) milk or 1% milk  Eat fat-free yogurt and low-fat cottage cheese  Try low-fat cheeses such as mozzarella and other reduced-fat cheeses  · Choose meat and other protein foods that are low in fat  Choose beans or other legumes such as split peas or lentils  Choose fish, skinless poultry (chicken or turkey), or lean cuts of red meat (beef or pork)  Before you cook meat or poultry, cut off any visible fat  · Use less fat and oil    Try baking foods instead of frying them  Add less fat, such as margarine, sour cream, regular salad dressing and mayonnaise to foods  Eat fewer high-fat foods  Some examples of high-fat foods include french fries, doughnuts, ice cream, and cakes  · Eat fewer sweets  Limit foods and drinks that are high in sugar  This includes candy, cookies, regular soda, and sweetened drinks  Exercise:  Exercise at least 30 minutes per day on most days of the week  Some examples of exercise include walking, biking, dancing, and swimming  You can also fit in more physical activity by taking the stairs instead of the elevator or parking farther away from stores  Ask your healthcare provider about the best exercise plan for you  © Copyright [employer] 2018 Information is for End User's use only and may not be sold, redistributed or otherwise used for commercial purposes  All illustrations and images included in CareNotes® are the copyrighted property of [employer]  or [address] [place] [address]  Low Fat Diet   AMBULATORY CARE:   A low-fat diet  is an eating plan that is low in total fat, unhealthy fat, and cholesterol  You may need to follow a low-fat diet if you have trouble digesting or absorbing fat  You may also need to follow this diet if you have high cholesterol  You can also lower your cholesterol by increasing the amount of fiber in your diet  Soluble fiber is a type of fiber that helps to decrease cholesterol levels  Different types of fat in food:   · Limit unhealthy fats  A diet that is high in cholesterol, saturated fat, and trans fat may cause unhealthy cholesterol levels  Unhealthy cholesterol levels increase your risk of heart disease  ¨ Cholesterol:  Limit intake of cholesterol to less than 200 mg per day  Cholesterol is found in meat, eggs, and dairy  ¨ Saturated fat:  Limit saturated fat to less than 7% of your total daily calories  Ask your dietitian how many calories you need each day   Saturated fat is found in butter, cheese, ice cream, whole milk, and palm oil  Saturated fat is also found in meat, such as beef, pork, chicken skin, and processed meats  Processed meats include sausage, hot dogs, and bologna  ¨ Trans fat:  Avoid trans fat as much as possible  Trans fat is used in fried and baked foods  Foods that say trans fat free on the label may still have up to 0 5 grams of trans fat per serving  · Include healthy fats  Replace foods that are high in saturated and trans fat with foods high in healthy fats  This may help to decrease high cholesterol levels  ¨ Monounsaturated fats: These are found in avocados, nuts, and vegetable oils, such as olive, canola, and sunflower oil  ¨ Polyunsaturated fats: These can be found in vegetable oils, such as soybean or corn oil  Omega-3 fats can help to decrease the risk of heart disease  Omega-3 fats are found in fish, such as salmon, herring, trout, and tuna  Omega-3 fats can also be found in plant foods, such as walnuts, flaxseed, soybeans, and canola oil    Foods to limit or avoid:   · Grains:      ¨ Snacks that are made with partially hydrogenated oils, such as chips, regular crackers, and butter-flavored popcorn    ¨ High-fat baked goods, such as biscuits, croissants, doughnuts, pies, cookies, and pastries    · Dairy:      ¨ Whole milk, 2% milk, and yogurt and ice cream made with whole milk    ¨ Half and half creamer, heavy cream, and whipping cream    ¨ Cheese, cream cheese, and sour cream    · Meats and proteins:      ¨ High-fat cuts of meat (T-bone steak, regular hamburger, and ribs)    ¨ Fried meat, poultry (turkey and chicken), and fish    ¨ Poultry (chicken and turkey) with skin    ¨ Cold cuts (salami or bologna), hot dogs, chan, and sausage    ¨ Whole eggs and egg yolks    · Vegetables and fruits with added fat:      ¨ Fried vegetables or vegetables in butter or high-fat sauces, such as cream or cheese sauces    ¨ Fried fruit or fruit served with butter or cream    · Fats:      ¨ Butter, stick margarine, and shortening    ¨ Coconut, palm oil, and palm kernel oil  Foods to include:   · Grains:      ¨ Whole-grain breads, cereals, pasta, and brown rice    ¨ Low-fat crackers and pretzels    · Vegetables and fruits:      ¨ Fresh, frozen, or canned vegetables (no salt or low-sodium)    ¨ Fresh, frozen, dried, or canned fruit (canned in light syrup or fruit juice)    ¨ Avocado    · Low-fat dairy products:      ¨ Nonfat (skim) or 1% milk    ¨ Nonfat or low-fat cheese, yogurt, and cottage cheese    · Meats and proteins:      ¨ Chicken or turkey with no skin    ¨ Baked or broiled fish    ¨ Lean beef and pork (loin, round, extra lean hamburger)    ¨ Beans and peas, unsalted nuts, soy products    ¨ Egg whites and substitutes    ¨ Seeds and nuts    · Fats:      ¨ Unsaturated oil, such as canola, olive, peanut, soybean, or sunflower oil    ¨ Soft or liquid margarine and vegetable oil spread    ¨ Low-fat salad dressing  Other ways to decrease fat:   · Read food labels before you buy foods  Choose foods that have less than 30% of calories from fat  Choose low-fat or fat-free dairy products  Remember that fat free does not mean calorie free  These foods still contain calories, and too many calories can lead to weight gain  · Trim fat from meat and avoid fried food  Trim all visible fat from meat before you cook it  Remove the skin from poultry  Do not andersen meat, fish, or poultry  Bake, roast, boil, or broil these foods instead  Avoid fried foods  Eat a baked potato instead of Western Shikha fries  Steam vegetables instead of sautéing them in butter  · Add less fat to foods  Use imitation chan bits on salads and baked potatoes instead of regular chan bits  Use fat-free or low-fat salad dressings instead of regular dressings  Use low-fat or nonfat butter-flavored topping instead of regular butter or margarine on popcorn and other foods    Ways to decrease fat in recipes:  Replace high-fat ingredients with low-fat or nonfat ones  This may cause baked goods to be drier than usual  You may need to use nonfat cooking spray on pans to prevent food from sticking  You also may need to change the amount of other ingredients, such as water, in the recipe  Try the following:  · Use low-fat or light margarine instead of regular margarine or shortening  · Use lean ground turkey breast or chicken, or lean ground beef (less than 5% fat) instead of hamburger  · Add 1 teaspoon of canola oil to 8 ounces of skim milk instead of using cream or half and half  · Use grated zucchini, carrots, or apples in breads instead of coconut  · Use blenderized, low-fat cottage cheese, plain tofu, or low-fat ricotta cheese instead of cream cheese  · Use 1 egg white and 1 teaspoon of canola oil, or use ¼ cup (2 ounces) of fat-free egg substitute instead of a whole egg  · Replace half of the oil that is called for in a recipe with applesauce when you bake  Use 3 tablespoons of cocoa powder and 1 tablespoon of canola oil instead of a square of baking chocolate  How to increase fiber:  Eat enough high-fiber foods to get 20 to 30 grams of fiber every day  Slowly increase your fiber intake to avoid stomach cramps, gas, and other problems  · Eat 3 ounces of whole-grain foods each day  An ounce is about 1 slice of bread  Eat whole-grain breads, such as whole-wheat bread  Whole wheat, whole-wheat flour, or other whole grains should be listed as the first ingredient on the food label  Replace white flour with whole-grain flour or use half of each in recipes  Whole-grain flour is heavier than white flour, so you may have to add more yeast or baking powder  · Eat a high-fiber cereal for breakfast   Oatmeal is a good source of soluble fiber  Look for cereals that have bran or fiber in the name  Choose whole-grain products, such as brown rice, barley, and whole-wheat pasta       · Eat more beans, peas, and lentils  For example, add beans to soups or salads  Eat at least 5 cups of fruits and vegetables each day  Eat fruits and vegetables with the peel because the peel is high in fiber  © 2017 [employer] Information is for End User's use only and may not be sold, redistributed or otherwise used for commercial purposes  All illustrations and images included in CareNotes® are the copyrighted property of [employer]  or Juvenal Andrews  The above information is an  only  It is not intended as medical advice for individual conditions or treatments  Talk to your doctor, nurse or pharmacist before following any medical regimen to see if it is safe and effective for you  Heart Healthy Diet   AMBULATORY CARE:   A heart healthy diet  is an eating plan low in total fat, unhealthy fats, and sodium (salt)  A heart healthy diet helps decrease your risk for heart disease and stroke  Limit the amount of fat you eat to 25% to 35% of your total daily calories  Limit sodium to less than 2,300 mg each day  Healthy fats:  Healthy fats can help improve cholesterol levels  The risk for heart disease is decreased when cholesterol levels are normal  Choose healthy fats, such as the following:  · Unsaturated fat  is found in foods such as soybean, canola, olive, corn, and safflower oils  It is also found in soft tub margarine that is made with liquid vegetable oil  · Omega-3 fat  is found in certain fish, such as salmon, tuna, and trout, and in walnuts and flaxseed  Unhealthy fats:  Unhealthy fats can cause unhealthy cholesterol levels in your blood and increase your risk of heart disease  Limit unhealthy fats, such as the following:  · Cholesterol  is found in animal foods, such as eggs and lobster, and in dairy products made from whole milk  Limit cholesterol to less than 300 milligrams (mg) each day  You may need to limit cholesterol to 200 mg each day if you have heart disease  · Saturated fat  is found in meats, such as chan and hamburger  It is also found in chicken or turkey skin, whole milk, and butter  Limit saturated fat to less than 7% of your total daily calories  Limit saturated fat to less than 6% if you have heart disease or are at increased risk for it  · Trans fat  is found in packaged foods, such as potato chips and cookies  It is also in hard margarine, some fried foods, and shortening  Avoid trans fats as much as possible    Heart healthy foods and drinks to include:  Ask your dietitian or healthcare provider how many servings to have from each of the following food groups:  · Grains:      ¨ Whole-wheat breads, cereals, and pastas, and brown rice    ¨ Low-fat, low-sodium crackers and chips    · Vegetables:      ¨ Broccoli, green beans, green peas, and spinach    ¨ Collards, kale, and lima beans    ¨ Carrots, sweet potatoes, tomatoes, and peppers    ¨ Canned vegetables with no salt added    · Fruits:      ¨ Bananas, peaches, pears, and pineapple    ¨ Grapes, raisins, and dates    ¨ Oranges, tangerines, grapefruit, orange juice, and grapefruit juice    ¨ Apricots, mangoes, melons, and papaya    ¨ Raspberries and strawberries    ¨ Canned fruit with no added sugar    · Low-fat dairy products:      ¨ Nonfat (skim) milk, 1% milk, and low-fat almond, cashew, or soy milks fortified with calcium    ¨ Low-fat cheese, regular or frozen yogurt, and cottage cheese    · Meats and proteins , such as lean cuts of beef and pork (loin, leg, round), skinless chicken and turkey, legumes, soy products, egg whites, and nuts  Foods and drinks to limit or avoid:  Ask your dietitian or healthcare provider about these and other foods that are high in unhealthy fat, sodium, and sugar:  · Snack or packaged foods , such as frozen dinners, cookies, macaroni and cheese, and cereals with more than 300 mg of sodium per serving    · Canned or dry mixes  for cakes, soups, sauces, or gravies    · Vegetables with added sodium , such as instant potatoes, vegetables with added sauces, or regular canned vegetables    · Other foods high in sodium , such as ketchup, barbecue sauce, salad dressing, pickles, olives, soy sauce, and miso    · High-fat dairy foods  such as whole or 2% milk, cream cheese, or sour cream, and cheeses     · High-fat protein foods  such as high-fat cuts of beef (T-bone steaks, ribs), chicken or turkey with skin, and organ meats, such as liver    · Cured or smoked meats , such as hot dogs, chan, and sausage    · Unhealthy fats and oils , such as butter, stick margarine, shortening, and cooking oils such as coconut or palm oil    · Food and drinks high in sugar , such as soft drinks (soda), sports drinks, sweetened tea, candy, cake, cookies, pies, and doughnuts  Other diet guidelines to follow:   · Eat more foods containing omega-3 fats  Eat fish high in omega-3 fats at least 2 times a week  · Limit alcohol  Too much alcohol can damage your heart and raise your blood pressure  Women should limit alcohol to 1 drink a day  Men should limit alcohol to 2 drinks a day  A drink of alcohol is 12 ounces of beer, 5 ounces of wine, or 1½ ounces of liquor  · Choose low-sodium foods  High-sodium foods can lead to high blood pressure  Add little or no salt to food you prepare  Use herbs and spices in place of salt  · Eat more fiber  to help lower cholesterol levels  Eat at least 5 servings of fruits and vegetables each day  Eat 3 ounces of whole-grain foods each day  Legumes (beans) are also a good source of fiber  Lifestyle guidelines:   · Do not smoke  Nicotine and other chemicals in cigarettes and cigars can cause lung and heart damage  Ask your healthcare provider for information if you currently smoke and need help to quit  E-cigarettes or smokeless tobacco still contain nicotine  Talk to your healthcare provider before you use these products       · Exercise regularly  to help you maintain a healthy weight and improve your blood pressure and cholesterol levels  Ask your healthcare provider about the best exercise plan for you  Do not start an exercise program without asking your healthcare provider  Follow up with your healthcare provider as directed:  Write down your questions so you remember to ask them during your visits  © 2017 [employer] Information is for End User's use only and may not be sold, redistributed or otherwise used for commercial purposes  All illustrations and images included in CareNotes® are the copyrighted property of [employer]  or Juvenal Andrews  The above information is an  only  It is not intended as medical advice for individual conditions or treatments  Talk to your doctor, nurse or pharmacist before following any medical regimen to see if it is safe and effective for you

## 2020-03-13 ENCOUNTER — APPOINTMENT (OUTPATIENT)
Dept: LAB | Facility: HOSPITAL | Age: 65
End: 2020-03-13
Payer: MEDICARE

## 2020-03-13 ENCOUNTER — CLINICAL SUPPORT (OUTPATIENT)
Dept: FAMILY MEDICINE CLINIC | Facility: CLINIC | Age: 65
End: 2020-03-13
Payer: MEDICARE

## 2020-03-13 DIAGNOSIS — Z12.5 SCREENING FOR PROSTATE CANCER: ICD-10-CM

## 2020-03-13 DIAGNOSIS — I10 HYPERTENSION, UNSPECIFIED TYPE: ICD-10-CM

## 2020-03-13 DIAGNOSIS — Z23 ENCOUNTER FOR IMMUNIZATION: Primary | ICD-10-CM

## 2020-03-13 LAB — PSA SERPL-MCNC: 1.3 NG/ML (ref 0–4)

## 2020-03-13 PROCEDURE — 36415 COLL VENOUS BLD VENIPUNCTURE: CPT

## 2020-03-13 PROCEDURE — G0009 ADMIN PNEUMOCOCCAL VACCINE: HCPCS | Performed by: FAMILY MEDICINE

## 2020-03-13 PROCEDURE — 90732 PPSV23 VACC 2 YRS+ SUBQ/IM: CPT | Performed by: FAMILY MEDICINE

## 2020-03-13 PROCEDURE — G0103 PSA SCREENING: HCPCS

## 2020-03-13 RX ORDER — LOSARTAN POTASSIUM 100 MG/1
TABLET ORAL
Qty: 90 TABLET | Refills: 3 | Status: SHIPPED | OUTPATIENT
Start: 2020-03-13 | End: 2020-06-29 | Stop reason: SDUPTHER

## 2020-03-13 RX ORDER — EMPAGLIFLOZIN 25 MG/1
TABLET, FILM COATED ORAL
Qty: 90 TABLET | Refills: 3 | Status: SHIPPED | OUTPATIENT
Start: 2020-03-13 | End: 2021-02-23

## 2020-03-13 RX ORDER — AMLODIPINE BESYLATE 5 MG/1
TABLET ORAL
Qty: 90 TABLET | Refills: 3 | Status: SHIPPED | OUTPATIENT
Start: 2020-03-13 | End: 2021-02-23

## 2020-03-19 ENCOUNTER — TELEPHONE (OUTPATIENT)
Dept: FAMILY MEDICINE CLINIC | Facility: CLINIC | Age: 65
End: 2020-03-19

## 2020-03-25 ENCOUNTER — TELEPHONE (OUTPATIENT)
Dept: NEPHROLOGY | Facility: CLINIC | Age: 65
End: 2020-03-25

## 2020-03-25 ENCOUNTER — TELEMEDICINE (OUTPATIENT)
Dept: NEPHROLOGY | Facility: CLINIC | Age: 65
End: 2020-03-25
Payer: MEDICARE

## 2020-03-25 DIAGNOSIS — E11.29 TYPE 2 DIABETES MELLITUS WITH MICROALBUMINURIA, WITHOUT LONG-TERM CURRENT USE OF INSULIN (HCC): ICD-10-CM

## 2020-03-25 DIAGNOSIS — R80.9 TYPE 2 DIABETES MELLITUS WITH MICROALBUMINURIA, WITHOUT LONG-TERM CURRENT USE OF INSULIN (HCC): ICD-10-CM

## 2020-03-25 DIAGNOSIS — R80.1 PERSISTENT PROTEINURIA: Primary | ICD-10-CM

## 2020-03-25 DIAGNOSIS — I10 ESSENTIAL HYPERTENSION: ICD-10-CM

## 2020-03-25 DIAGNOSIS — E66.09 CLASS 1 OBESITY DUE TO EXCESS CALORIES WITHOUT SERIOUS COMORBIDITY WITH BODY MASS INDEX (BMI) OF 34.0 TO 34.9 IN ADULT: ICD-10-CM

## 2020-03-25 PROBLEM — E66.811 CLASS 1 OBESITY DUE TO EXCESS CALORIES WITHOUT SERIOUS COMORBIDITY IN ADULT: Status: ACTIVE | Noted: 2019-02-06

## 2020-03-25 PROCEDURE — 99442 PR PHYS/QHP TELEPHONE EVALUATION 11-20 MIN: CPT | Performed by: INTERNAL MEDICINE

## 2020-03-25 NOTE — TELEPHONE ENCOUNTER
I called and spoke to the patient and schedule him for a 12 month nephrology follow up  I also explained to the patient that I will be mailing him his lab orders, appointment card, and summary report  The patient understood and was okay with it   Lisa Tompkins,

## 2020-03-25 NOTE — PROGRESS NOTES
Virtual Regular Visit    Problem List Items Addressed This Visit     None               Reason for visit is for proteinuria    Encounter provider Sherwin Terry MD    Provider located at [address] [facility] [address]  [address] [facility] [address] [place] [address]  [phone]      Recent Visits  Date Type Provider Dept   03/19/20 Telephone Peter Mindybro, [address] recent visits within past 7 days and meeting all other requirements     Future Appointments  No visits were found meeting these conditions  Showing future appointments within next 150 days and meeting all other requirements        After connecting through [employer], the patient was identified by name and date of birth  Joan Caldwell was informed that this is a telemedicine visit and that the visit is being conducted through telephone which may not be secure and therefore, might not be HIPAA-compliant  My office door was closed  No one else was in the room  He acknowledged consent and understanding of privacy and security of the video platform  The patient has agreed to participate and understands they can discontinue the visit at any time  Subjective  Joan Caldwell is a 60 y o  male with history of diabetes and high blood pressure who also proteinuria    He is doing quite well  Denies any complaint  Trying to lose weight but not very successful    No chest pain no palpitation    Nothing much happen since his last visit        Past Medical History:   Diagnosis Date    Chronic kidney disease     Diabetes ([facility] [address] )     LAST ASSESSED: 7/23/14    Edema     LAST ASSESSED:6/12/12    Hypertension     Morbid obesity due to excess calories ([facility] [address] )     LAST ASSESSED: 6/12/12    RBBB        Past Surgical History:   Procedure Laterality Date    BACK SURGERY      CHOLECYSTECTOMY      COLONOSCOPY      FOOT SURGERY      GASTRIC RESTRICTION SURGERY      GASTRIC STAPLING FOR MORBID OBESITY LAPAROSCOPY W/ MARCY-EN-Y    SHOULDER SURGERY      UVULOPALATOPLASTY         Current Outpatient Medications   Medication Sig Dispense Refill    amLODIPine (NORVASC) 5 mg tablet TAKE 1 TABLET DAILY 90 tablet 3    buPROPion (WELLBUTRIN XL) 300 mg 24 hr tablet TAKE 1 TABLET DAILY 90 tablet 0    calcium citrate-Vitamin D (CALCIUM CITRATE + D3) 200 mg-250 units Take 1 tablet by mouth daily      clindamycin (CLINDAGEL) 1 % gel   0    Icosapent Ethyl (VASCEPA) 1 g CAPS Take 2 pills in the morning and 2 pills at night 360 capsule 1    Iron-Vitamin C (VITRON-C)  MG TABS Take one pill twice daily 60 tablet 1    JARDIANCE 25 MG TABS TAKE 1 TABLET DAILY 90 tablet 3    losartan (COZAAR) 100 MG tablet TAKE 1 TABLET DAILY 90 tablet 3    Multiple Vitamins-Minerals (CENTRUM ADULTS PO) Take 1 tablet by mouth daily      omega-3-acid ethyl esters (LOVAZA) 1 g capsule One pill in the morning and 1 pill at night 180 capsule 1    pantoprazole (PROTONIX) 40 mg tablet TAKE 1 TABLET DAILY 90 tablet 4     Current Facility-Administered Medications   Medication Dose Route Frequency Provider Last Rate Last Dose    cyanocobalamin injection 1,000 mcg  1,000 mcg Intramuscular Q30 Days TIFFANY Keys   1,000 mcg at 02/27/20 1023        Allergies   Allergen Reactions    Augmentin [Amoxicillin-Pot Clavulanate] Diarrhea    Ciprofloxacin Diarrhea       Review of Systems   Constitutional: Negative for activity change and fatigue  HENT: Negative for congestion and ear discharge  Eyes: Negative for photophobia and pain  Respiratory: Negative for apnea and choking  Cardiovascular: Negative for chest pain and palpitations  Gastrointestinal: Negative for abdominal distention and blood in stool  Endocrine: Negative for heat intolerance and polyphagia  Genitourinary: Negative for flank pain and urgency  Musculoskeletal: Negative for neck pain and neck stiffness  Skin: Negative for color change and wound  Allergic/Immunologic: Negative for food allergies and immunocompromised state  Neurological: Negative for seizures and facial asymmetry  Hematological: Negative for adenopathy  Does not bruise/bleed easily  Psychiatric/Behavioral: Negative for self-injury and suicidal ideas  Assessment and plan:    1  Proteinuria:  Seems to be stable diabetes and overweight both contributing  It is stable and patient is on ARB blocker which I will continue    2  Diabetes:  Very well controlled with hemoglobin A1c being quite normal    3  Hypertension:  Based on past data seems to be reasonably well control    4  Obesity:  Advised to lose weight as it is contributing to the proteinuria    I will see him back in 1 year  Will get blood and urine test before that visit     I spent 20 minutes with the patient during this visit

## 2020-05-17 DIAGNOSIS — F32.A DEPRESSION, UNSPECIFIED DEPRESSION TYPE: ICD-10-CM

## 2020-05-18 RX ORDER — BUPROPION HYDROCHLORIDE 300 MG/1
TABLET ORAL
Qty: 90 TABLET | Refills: 3 | Status: SHIPPED | OUTPATIENT
Start: 2020-05-18 | End: 2021-05-13

## 2020-05-26 DIAGNOSIS — E78.1 HYPERTRIGLYCERIDEMIA: ICD-10-CM

## 2020-05-26 RX ORDER — OMEGA-3-ACID ETHYL ESTERS 1 G/1
CAPSULE, LIQUID FILLED ORAL
Qty: 180 CAPSULE | Refills: 3 | Status: SHIPPED | OUTPATIENT
Start: 2020-05-26 | End: 2020-09-30 | Stop reason: ALTCHOICE

## 2020-06-02 ENCOUNTER — APPOINTMENT (OUTPATIENT)
Dept: LAB | Facility: HOSPITAL | Age: 65
End: 2020-06-02
Payer: MEDICARE

## 2020-06-02 ENCOUNTER — TRANSCRIBE ORDERS (OUTPATIENT)
Dept: ADMINISTRATIVE | Facility: HOSPITAL | Age: 65
End: 2020-06-02

## 2020-06-02 DIAGNOSIS — I10 ESSENTIAL HYPERTENSION, MALIGNANT: Primary | ICD-10-CM

## 2020-06-02 DIAGNOSIS — I10 ESSENTIAL HYPERTENSION, MALIGNANT: ICD-10-CM

## 2020-06-02 LAB
CREAT SERPL-MCNC: 0.93 MG/DL (ref 0.6–1.3)
GFR SERPL CREATININE-BSD FRML MDRD: 88 ML/MIN/1.73SQ M
POTASSIUM SERPL-SCNC: 4.2 MMOL/L (ref 3.5–5.3)

## 2020-06-02 PROCEDURE — 84132 ASSAY OF SERUM POTASSIUM: CPT

## 2020-06-02 PROCEDURE — 82565 ASSAY OF CREATININE: CPT

## 2020-06-02 PROCEDURE — 36415 COLL VENOUS BLD VENIPUNCTURE: CPT

## 2020-06-23 ENCOUNTER — HOSPITAL ENCOUNTER (OUTPATIENT)
Dept: MAMMOGRAPHY | Facility: CLINIC | Age: 65
Discharge: HOME | End: 2020-06-23
Payer: MEDICARE

## 2020-06-23 DIAGNOSIS — M81.0 AGE-RELATED OSTEOPOROSIS WITHOUT CURRENT PATHOLOGICAL FRACTURE: ICD-10-CM

## 2020-06-23 PROCEDURE — 77080 DXA BONE DENSITY AXIAL: CPT

## 2020-06-24 ENCOUNTER — TELEPHONE (OUTPATIENT)
Dept: FAMILY MEDICINE CLINIC | Facility: CLINIC | Age: 65
End: 2020-06-24

## 2020-06-29 DIAGNOSIS — I10 HYPERTENSION, UNSPECIFIED TYPE: ICD-10-CM

## 2020-06-29 NOTE — TELEPHONE ENCOUNTER
He would like a 30 day supply of [place] to [facility]

## 2020-07-01 ENCOUNTER — TELEPHONE (OUTPATIENT)
Dept: FAMILY MEDICINE CLINIC | Facility: CLINIC | Age: 65
End: 2020-07-01

## 2020-07-01 RX ORDER — LOSARTAN POTASSIUM 100 MG/1
100 TABLET ORAL DAILY
Qty: 90 TABLET | Refills: 3 | Status: SHIPPED | OUTPATIENT
Start: 2020-07-01 | End: 2020-10-02 | Stop reason: SDUPTHER

## 2020-07-01 NOTE — TELEPHONE ENCOUNTER
When I called to let him know that his prescription was put in he asked me about shots for his Osteoporosis  He said we were working on something for him for the shots  I let him know we would give him a call and that I did not have any information on that

## 2020-07-21 ENCOUNTER — TELEPHONE (OUTPATIENT)
Dept: FAMILY MEDICINE CLINIC | Facility: CLINIC | Age: 65
End: 2020-07-21

## 2020-07-22 ENCOUNTER — CLINICAL SUPPORT (OUTPATIENT)
Dept: FAMILY MEDICINE CLINIC | Facility: CLINIC | Age: 65
End: 2020-07-22
Payer: MEDICARE

## 2020-07-22 DIAGNOSIS — M81.0 OSTEOPOROSIS WITHOUT CURRENT PATHOLOGICAL FRACTURE, UNSPECIFIED OSTEOPOROSIS TYPE: Primary | ICD-10-CM

## 2020-07-22 PROCEDURE — 96372 THER/PROPH/DIAG INJ SC/IM: CPT

## 2020-09-30 ENCOUNTER — OFFICE VISIT (OUTPATIENT)
Dept: FAMILY MEDICINE CLINIC | Facility: CLINIC | Age: 65
End: 2020-09-30
Payer: MEDICARE

## 2020-09-30 VITALS
TEMPERATURE: 98.2 F | HEART RATE: 89 BPM | BODY MASS INDEX: 33.64 KG/M2 | HEIGHT: 70 IN | SYSTOLIC BLOOD PRESSURE: 124 MMHG | WEIGHT: 235 LBS | DIASTOLIC BLOOD PRESSURE: 74 MMHG | OXYGEN SATURATION: 99 %

## 2020-09-30 DIAGNOSIS — E78.1 HYPERTRIGLYCERIDEMIA: ICD-10-CM

## 2020-09-30 DIAGNOSIS — R21 RASH: ICD-10-CM

## 2020-09-30 DIAGNOSIS — K22.70 BARRETT'S ESOPHAGUS WITHOUT DYSPLASIA: ICD-10-CM

## 2020-09-30 DIAGNOSIS — M62.830 BACK MUSCLE SPASM: ICD-10-CM

## 2020-09-30 DIAGNOSIS — E11.22 TYPE 2 DIABETES MELLITUS WITH STAGE 2 CHRONIC KIDNEY DISEASE, WITHOUT LONG-TERM CURRENT USE OF INSULIN (HCC): Primary | ICD-10-CM

## 2020-09-30 DIAGNOSIS — M62.830 BACK MUSCLE SPASM: Primary | ICD-10-CM

## 2020-09-30 DIAGNOSIS — N18.2 TYPE 2 DIABETES MELLITUS WITH STAGE 2 CHRONIC KIDNEY DISEASE, WITHOUT LONG-TERM CURRENT USE OF INSULIN (HCC): Primary | ICD-10-CM

## 2020-09-30 DIAGNOSIS — M25.50 ARTHRALGIA, UNSPECIFIED JOINT: ICD-10-CM

## 2020-09-30 DIAGNOSIS — E78.00 PURE HYPERCHOLESTEROLEMIA: ICD-10-CM

## 2020-09-30 DIAGNOSIS — R53.82 CHRONIC FATIGUE: ICD-10-CM

## 2020-09-30 DIAGNOSIS — Z12.5 SCREENING FOR PROSTATE CANCER: ICD-10-CM

## 2020-09-30 LAB — SL AMB POCT HEMOGLOBIN AIC: 5.5 (ref ?–6.5)

## 2020-09-30 PROCEDURE — 99214 OFFICE O/P EST MOD 30 MIN: CPT | Performed by: FAMILY MEDICINE

## 2020-09-30 PROCEDURE — 83036 HEMOGLOBIN GLYCOSYLATED A1C: CPT | Performed by: FAMILY MEDICINE

## 2020-09-30 RX ORDER — SODIUM FLUORIDE 0.9 MG/ML
MOUTHWASH DENTAL
COMMUNITY
Start: 2020-09-09 | End: 2022-06-14 | Stop reason: ALTCHOICE

## 2020-09-30 RX ORDER — METHOCARBAMOL 500 MG/1
TABLET, FILM COATED ORAL
Qty: 90 TABLET | Refills: 1 | Status: SHIPPED | OUTPATIENT
Start: 2020-09-30 | End: 2020-09-30 | Stop reason: SDUPTHER

## 2020-09-30 RX ORDER — ICOSAPENT ETHYL 1000 MG/1
CAPSULE ORAL
Qty: 360 CAPSULE | Refills: 1 | Status: SHIPPED | OUTPATIENT
Start: 2020-09-30 | End: 2021-01-06 | Stop reason: SDUPTHER

## 2020-09-30 RX ORDER — METHOCARBAMOL 500 MG/1
TABLET, FILM COATED ORAL
Qty: 90 TABLET | Refills: 1 | Status: SHIPPED | OUTPATIENT
Start: 2020-09-30 | End: 2021-04-05 | Stop reason: ALTCHOICE

## 2020-09-30 RX ORDER — PANTOPRAZOLE SODIUM 40 MG/1
40 TABLET, DELAYED RELEASE ORAL DAILY
Qty: 90 TABLET | Refills: 4 | Status: SHIPPED | OUTPATIENT
Start: 2020-09-30 | End: 2020-12-14 | Stop reason: SDUPTHER

## 2020-09-30 NOTE — PROGRESS NOTES
Standard Visit   Assessment/Plan:     Visit Diagnosis:  Diagnoses and all orders for this visit:    Type 2 diabetes mellitus with stage 2 chronic kidney disease, without long-term current use of insulin ([facility] [address] )  -     POCT hemoglobin A1c    Screening for prostate cancer  -     PSA, Total Screen; Future    Arthralgia, unspecified joint  -     Lyme Antibody Profile with reflex to WB; Future  -     RF Screen w/ Reflex to Titer; Future  -     ANAID Screen w/ Reflex to Titer/Pattern; Future    Back muscle spasm  -     Discontinue: methocarbamol (ROBAXIN) 500 mg tablet; May take 1 pill every 8 hours p r n  back stiffness  -     Discontinue: methocarbamol (ROBAXIN) 500 mg tablet; May take 1 pill every 8 hours p r n  back stiffness  -     methocarbamol (ROBAXIN) 500 mg tablet; May take 1 pill every 8 hours p r n  back stiffness    Other orders  -     PreviDent 0 2 % SOLN; RINSE WITH 10 milliliters for 1 MINUTE THEN SPIT USE AT BEDTIME, DO NOT SWALLOW          Subjective:    Patient ID: Jil Spatz is a 63 y o  male  Patient is here with the following concerns:    Complains of low back pain  Has had this pain off and on for the past month  Has had kidney stones in the past but this is not the same pain  Denies any incontinence of bowel or bladder  Has not fallen  Not currently in pain today   Denies any abdominal pain  Takes the [place] for high triglycerides  Takes amlodipine for hypertension  Takes Cozaar for high blood pressure   History of bariatric surgery   Has lost over 100 lb   Type 2 diabetic  Takes Jardiance for blood sugar control        The following portions of the patient's history were reviewed and updated as appropriate: allergies, current medications, past family history, past medical history, past social history, past surgical history and problem list     Review of Systems   Constitutional: Negative for activity change and fever  HENT: Negative for nosebleeds and trouble swallowing      Eyes: Negative  Respiratory: Negative  Cardiovascular: Negative for palpitations  Gastrointestinal: Negative for abdominal pain, blood in stool and vomiting  Endocrine: Negative for cold intolerance  Genitourinary: Negative  Musculoskeletal: Positive for back pain and gait problem  Negative for neck stiffness  Skin: Negative for pallor  Allergic/Immunologic: Negative for immunocompromised state  Neurological: Negative for seizures and facial asymmetry  Hematological: Negative for adenopathy  Psychiatric/Behavioral: Negative for agitation and suicidal ideas           /74   Pulse 89   Temp 98 2 °F (36 8 °C)   Ht 5' 9 5" (1 765 m)   Wt 107 kg (235 lb)   SpO2 99%   BMI 34 21 kg/m²   Social History     Socioeconomic History    Marital status: Single     Spouse name: Not on file    Number of children: Not on file    Years of education: Not on file    Highest education level: Not on file   Occupational History    Not on file   Social Needs    Financial resource strain: Not on file    Food insecurity     Worry: Not on file     Inability: Not on file    Transportation needs     Medical: Not on file     Non-medical: Not on file   Tobacco Use    Smoking status: Former Smoker     Types: Cigarettes     Last attempt to quit:      Years since quittin 7    Smokeless tobacco: Never Used   Substance and Sexual Activity    Alcohol use: No    Drug use: Yes     Types: Marijuana     Comment: everyday     Sexual activity: Not on file   Lifestyle    Physical activity     Days per week: Not on file     Minutes per session: Not on file    Stress: Not on file   Relationships    Social connections     Talks on phone: Not on file     Gets together: Not on file     Attends Cheondoism service: Not on file     Active member of club or organization: Not on file     Attends meetings of clubs or organizations: Not on file     Relationship status: Not on file    Intimate partner violence     Fear of current or ex partner: Not on file     Emotionally abused: Not on file     Physically abused: Not on file     Forced sexual activity: Not on file   Other Topics Concern    Not on file   Social History Narrative    Not on file     Past Medical History:   Diagnosis Date    Chronic kidney disease     Diabetes ([facility] [address] )     LAST ASSESSED: 7/23/14    Edema     LAST ASSESSED:6/12/12    Hypertension     Morbid obesity due to excess calories ([facility] [address] )     LAST ASSESSED: 6/12/12    RBBB      Family History   Problem Relation Age of Onset    COPD Mother     Heart disease Mother     Mental illness Mother     Drug abuse Mother     Diabetes Father     Mental illness Father     Drug abuse Father     Lung cancer Father     Diabetes Sister      Past Surgical History:   Procedure Laterality Date    BACK SURGERY      CHOLECYSTECTOMY      COLONOSCOPY      FOOT SURGERY      GASTRIC RESTRICTION SURGERY      GASTRIC STAPLING FOR MORBID OBESITY LAPAROSCOPY W/ MARCY-EN-Y    SHOULDER SURGERY      UVULOPALATOPLASTY         Current Outpatient Medications:     amLODIPine (NORVASC) 5 mg tablet, TAKE 1 TABLET DAILY, Disp: 90 tablet, Rfl: 3    buPROPion (WELLBUTRIN XL) 300 mg 24 hr tablet, TAKE 1 TABLET DAILY, Disp: 90 tablet, Rfl: 3    calcium citrate-Vitamin D (CALCIUM CITRATE + D3) 200 mg-250 units, Take 1 tablet by mouth daily, Disp: , Rfl:     clindamycin (CLINDAGEL) 1 % gel, , Disp: , Rfl: 0    Icosapent Ethyl (Vascepa) 1 g CAPS, Take 2 pills in the morning and 2 pills at night, Disp: 360 capsule, Rfl: 1    Iron-Vitamin C (VITRON-C)  MG TABS, Take one pill twice daily, Disp: 60 tablet, Rfl: 1    JARDIANCE 25 MG TABS, TAKE 1 TABLET DAILY, Disp: 90 tablet, Rfl: 3    losartan (COZAAR) 100 MG tablet, Take 1 tablet (100 mg total) by mouth daily, Disp: 90 tablet, Rfl: 3    methocarbamol (ROBAXIN) 500 mg tablet, May take 1 pill every 8 hours p r n  back stiffness, Disp: 90 tablet, Rfl: 1    Multiple Vitamins-Minerals (CENTRUM ADULTS PO), Take 1 tablet by mouth daily, Disp: , Rfl:     pantoprazole (PROTONIX) 40 mg tablet, Take 1 tablet (40 mg total) by mouth daily, Disp: 90 tablet, Rfl: 4    PreviDent 0 2 % SOLN, RINSE WITH 10 milliliters for 1 MINUTE THEN SPIT USE AT BEDTIME, DO NOT SWALLOW, Disp: , Rfl:   No current facility-administered medications for this visit  Lab Review   Appointment on 06/02/2020   Component Date Value    Potassium 06/02/2020 4 2     Creatinine 06/02/2020 0 93     eGFR 06/02/2020 88         Objective:     Physical Exam  Vitals signs and nursing note reviewed  Constitutional:       Appearance: He is well-developed  HENT:      Head: Normocephalic and atraumatic  Eyes:      Pupils: Pupils are equal, round, and reactive to light  Neck:      Musculoskeletal: Normal range of motion and neck supple  Cardiovascular:      Rate and Rhythm: Normal rate  Pulmonary:      Effort: Pulmonary effort is normal       Breath sounds: Normal breath sounds  Abdominal:      Palpations: Abdomen is soft  Musculoskeletal: Normal range of motion  Skin:     General: Skin is warm and dry  Neurological:      Mental Status: He is alert and oriented to person, place, and time  There are no Patient Instructions on file for this visit          TIFFANY Odom

## 2020-09-30 NOTE — PROGRESS NOTES
Standard Visit   Assessment/Plan:     Visit Diagnosis:  Diagnoses and all orders for this visit:    Back muscle spasm  -     methocarbamol (ROBAXIN) 500 mg tablet; May take 1 pill every 8 hours p r n  back stiffness    Ugalde's esophagus without dysplasia  -     pantoprazole (PROTONIX) 40 mg tablet; Take 1 tablet (40 mg total) by mouth daily    Pure hypercholesterolemia  -     Lipid panel; Future    Hypertriglyceridemia  -     Icosapent Ethyl (Vascepa) 1 g CAPS; Take 2 pills in the morning and 2 pills at night          Subjective:    Patient ID: Tai Weber is a 63 y o  male  Patient is here with the following concerns:     Complaining of low back pain specifically on the right   Pain comes and goes over the last month   Not currently in pain today  He has had chronic pain over the years  Care remember if he has had an episode where he hurt his back  He can not recall injuring it   He has had kidney stones in the past but this is not the same type pain   He feels achy   Esophagus   He takes see before high triglycerides   He has type 2 diabetes for which he takes Jardiance 25 mg once a day  He takes Wellbutrin 300 mg once a day  Had bariatric surgery and lost over 100 lb   He takes losartan 100 mg for hypertension   He denies any incontinence of stool or bowel  He has not fallen         The following portions of the patient's history were reviewed and updated as appropriate: allergies, current medications, past family history, past medical history, past social history, past surgical history and problem list     Review of Systems   Constitutional: Negative for activity change and fever  HENT: Negative for nosebleeds and trouble swallowing  Eyes: Negative  Respiratory: Negative  Cardiovascular: Negative for palpitations  Gastrointestinal: Negative for abdominal pain, blood in stool and vomiting  Endocrine: Negative for cold intolerance  Genitourinary: Negative      Musculoskeletal: Positive for arthralgias, back pain and gait problem  Negative for neck stiffness  Skin: Negative for pallor  Allergic/Immunologic: Negative for immunocompromised state  Neurological: Negative for seizures and facial asymmetry  Hematological: Negative for adenopathy  Psychiatric/Behavioral: Negative for agitation and suicidal ideas  There were no vitals taken for this visit    Social History     Socioeconomic History    Marital status: Single     Spouse name: Not on file    Number of children: Not on file    Years of education: Not on file    Highest education level: Not on file   Occupational History    Not on file   Social Needs    Financial resource strain: Not on file    Food insecurity     Worry: Not on file     Inability: Not on file    Transportation needs     Medical: Not on file     Non-medical: Not on file   Tobacco Use    Smoking status: Former Smoker     Types: Cigarettes     Last attempt to quit:      Years since quittin 7    Smokeless tobacco: Never Used   Substance and Sexual Activity    Alcohol use: No    Drug use: Yes     Types: Marijuana     Comment: everyday     Sexual activity: Not on file   Lifestyle    Physical activity     Days per week: Not on file     Minutes per session: Not on file    Stress: Not on file   Relationships    Social connections     Talks on phone: Not on file     Gets together: Not on file     Attends Confucianism service: Not on file     Active member of club or organization: Not on file     Attends meetings of clubs or organizations: Not on file     Relationship status: Not on file    Intimate partner violence     Fear of current or ex partner: Not on file     Emotionally abused: Not on file     Physically abused: Not on file     Forced sexual activity: Not on file   Other Topics Concern    Not on file   Social History Narrative    Not on file     Past Medical History:   Diagnosis Date    Chronic kidney disease     Diabetes ([facility] [address] ) LAST ASSESSED: 7/23/14    Edema     LAST ASSESSED:6/12/12    Hypertension     Morbid obesity due to excess calories ([facility] [address] )     LAST ASSESSED: 6/12/12    RBBB      Family History   Problem Relation Age of Onset    COPD Mother     Heart disease Mother     Mental illness Mother     Drug abuse Mother     Diabetes Father     Mental illness Father     Drug abuse Father     Lung cancer Father     Diabetes Sister      Past Surgical History:   Procedure Laterality Date    BACK SURGERY      CHOLECYSTECTOMY      COLONOSCOPY      FOOT SURGERY      GASTRIC RESTRICTION SURGERY      GASTRIC STAPLING FOR MORBID OBESITY LAPAROSCOPY W/ MARCY-EN-Y    SHOULDER SURGERY      UVULOPALATOPLASTY         Current Outpatient Medications:     amLODIPine (NORVASC) 5 mg tablet, TAKE 1 TABLET DAILY, Disp: 90 tablet, Rfl: 3    buPROPion (WELLBUTRIN XL) 300 mg 24 hr tablet, TAKE 1 TABLET DAILY, Disp: 90 tablet, Rfl: 3    calcium citrate-Vitamin D (CALCIUM CITRATE + D3) 200 mg-250 units, Take 1 tablet by mouth daily, Disp: , Rfl:     clindamycin (CLINDAGEL) 1 % gel, , Disp: , Rfl: 0    Icosapent Ethyl (Vascepa) 1 g CAPS, Take 2 pills in the morning and 2 pills at night, Disp: 360 capsule, Rfl: 1    Iron-Vitamin C (VITRON-C)  MG TABS, Take one pill twice daily, Disp: 60 tablet, Rfl: 1    JARDIANCE 25 MG TABS, TAKE 1 TABLET DAILY, Disp: 90 tablet, Rfl: 3    losartan (COZAAR) 100 MG tablet, Take 1 tablet (100 mg total) by mouth daily, Disp: 90 tablet, Rfl: 3    methocarbamol (ROBAXIN) 500 mg tablet, May take 1 pill every 8 hours p r n  back stiffness, Disp: 90 tablet, Rfl: 1    Multiple Vitamins-Minerals (CENTRUM ADULTS PO), Take 1 tablet by mouth daily, Disp: , Rfl:     pantoprazole (PROTONIX) 40 mg tablet, Take 1 tablet (40 mg total) by mouth daily, Disp: 90 tablet, Rfl: 4    PreviDent 0 2 % SOLN, RINSE WITH 10 milliliters for 1 MINUTE THEN SPIT USE AT BEDTIME, DO NOT SWALLOW, Disp: , Rfl:   No current facility-administered medications for this visit  Lab Review   Office Visit on 09/30/2020   Component Date Value    Hemoglobin A1C 09/30/2020 5 5    Appointment on 06/02/2020   Component Date Value    Potassium 06/02/2020 4 2     Creatinine 06/02/2020 0 93     eGFR 06/02/2020 88         Objective:     Physical Exam  Vitals signs and nursing note reviewed  Constitutional:       General: He is not in acute distress  Appearance: He is well-developed  HENT:      Head: Normocephalic and atraumatic  Right Ear: Tympanic membrane normal       Left Ear: Tympanic membrane normal    Eyes:      General: No scleral icterus  Right eye: No discharge  Left eye: No discharge  Pupils: Pupils are equal, round, and reactive to light  Neck:      Musculoskeletal: Normal range of motion and neck supple  No neck rigidity or muscular tenderness  Vascular: No carotid bruit  Cardiovascular:      Rate and Rhythm: Normal rate and regular rhythm  Pulses: Normal pulses  Pulmonary:      Effort: Pulmonary effort is normal       Breath sounds: Normal breath sounds  Abdominal:      General: There is no distension  Palpations: Abdomen is soft  Tenderness: There is no abdominal tenderness  There is no right CVA tenderness, left CVA tenderness, guarding or rebound  Musculoskeletal:         General: No deformity  Right lower leg: No edema  Left lower leg: No edema  Lymphadenopathy:      Cervical: No cervical adenopathy  Skin:     General: Skin is warm and dry  Capillary Refill: Capillary refill takes less than 2 seconds  Findings: No erythema, lesion or rash  Neurological:      Mental Status: He is alert and oriented to person, place, and time  Sensory: No sensory deficit  Deep Tendon Reflexes: Reflexes normal    Psychiatric:         Mood and Affect: Mood normal          Behavior: Behavior normal          Thought Content:  Thought content normal  Judgment: Judgment normal          Social History     Socioeconomic History    Marital status: Single     Spouse name: Not on file    Number of children: Not on file    Years of education: Not on file    Highest education level: Not on file   Occupational History    Not on file   Social Needs    Financial resource strain: Not on file    Food insecurity     Worry: Not on file     Inability: Not on file    Transportation needs     Medical: Not on file     Non-medical: Not on file   Tobacco Use    Smoking status: Former Smoker     Types: Cigarettes     Last attempt to quit:      Years since quittin 7    Smokeless tobacco: Never Used   Substance and Sexual Activity    Alcohol use: No    Drug use: Yes     Types: Marijuana     Comment: everyday     Sexual activity: Not on file   Lifestyle    Physical activity     Days per week: Not on file     Minutes per session: Not on file    Stress: Not on file   Relationships    Social connections     Talks on phone: Not on file     Gets together: Not on file     Attends Religion service: Not on file     Active member of club or organization: Not on file     Attends meetings of clubs or organizations: Not on file     Relationship status: Not on file    Intimate partner violence     Fear of current or ex partner: Not on file     Emotionally abused: Not on file     Physically abused: Not on file     Forced sexual activity: Not on file   Other Topics Concern    Not on file   Social History Narrative    Not on file     Past Medical History:   Diagnosis Date    Chronic kidney disease     Diabetes ([facility] )     LAST ASSESSED: 14    Edema     LAST ASSESSED:12    Hypertension     [facility]     LAST ASSESSED: 12    RBBB        Current Outpatient Medications:     amLODIPine (NORVASC) 5 mg tablet, TAKE 1 TABLET DAILY, Disp: 90 tablet, Rfl: 3    buPROPion (WELLBUTRIN XL) 300 mg 24 hr tablet, TAKE 1 TABLET DAILY, Disp: 90 tablet, Rfl: 3    calcium citrate-Vitamin D (CALCIUM CITRATE + D3) 200 mg-250 units, Take 1 tablet by mouth daily, Disp: , Rfl:     clindamycin (CLINDAGEL) 1 % gel, , Disp: , Rfl: 0    Icosapent Ethyl (Vascepa) 1 g CAPS, Take 2 pills in the morning and 2 pills at night, Disp: 360 capsule, Rfl: 1    Iron-Vitamin C (VITRON-C)  MG TABS, Take one pill twice daily, Disp: 60 tablet, Rfl: 1    JARDIANCE 25 MG TABS, TAKE 1 TABLET DAILY, Disp: 90 tablet, Rfl: 3    losartan (COZAAR) 100 MG tablet, Take 1 tablet (100 mg total) by mouth daily, Disp: 90 tablet, Rfl: 3    methocarbamol (ROBAXIN) 500 mg tablet, May take 1 pill every 8 hours p r n  back stiffness, Disp: 90 tablet, Rfl: 1    Multiple Vitamins-Minerals (CENTRUM ADULTS PO), Take 1 tablet by mouth daily, Disp: , Rfl:     pantoprazole (PROTONIX) 40 mg tablet, Take 1 tablet (40 mg total) by mouth daily, Disp: 90 tablet, Rfl: 4    PreviDent 0 2 % SOLN, RINSE WITH 10 milliliters for 1 MINUTE THEN SPIT USE AT BEDTIME, DO NOT SWALLOW, Disp: , Rfl:   No current facility-administered medications for this visit  Family History   Problem Relation Age of Onset   Herb Alekseymike COPD Mother     Heart disease Mother     Mental illness Mother     Drug abuse Mother     Diabetes Father     Mental illness Father     Drug abuse Father     Lung cancer Father     Diabetes Sister        There are no Patient Instructions on file for this visit          March Collet, CRNP

## 2020-10-01 ENCOUNTER — TELEPHONE (OUTPATIENT)
Dept: FAMILY MEDICINE CLINIC | Facility: CLINIC | Age: 65
End: 2020-10-01

## 2020-10-02 DIAGNOSIS — I10 HYPERTENSION, UNSPECIFIED TYPE: ICD-10-CM

## 2020-10-02 RX ORDER — LOSARTAN POTASSIUM 100 MG/1
100 TABLET ORAL DAILY
Qty: 90 TABLET | Refills: 3 | Status: SHIPPED | OUTPATIENT
Start: 2020-10-02 | End: 2022-04-04 | Stop reason: SDUPTHER

## 2020-10-14 ENCOUNTER — IMMUNIZATIONS (OUTPATIENT)
Dept: FAMILY MEDICINE CLINIC | Facility: CLINIC | Age: 65
End: 2020-10-14
Payer: MEDICARE

## 2020-10-14 DIAGNOSIS — Z23 NEED FOR IMMUNIZATION AGAINST INFLUENZA: Primary | ICD-10-CM

## 2020-10-14 PROCEDURE — 90682 RIV4 VACC RECOMBINANT DNA IM: CPT

## 2020-10-14 PROCEDURE — G0008 ADMIN INFLUENZA VIRUS VAC: HCPCS

## 2020-10-19 ENCOUNTER — TELEPHONE (OUTPATIENT)
Dept: FAMILY MEDICINE CLINIC | Facility: CLINIC | Age: 65
End: 2020-10-19

## 2020-11-03 LAB
LEFT EYE DIABETIC RETINOPATHY: NORMAL
RIGHT EYE DIABETIC RETINOPATHY: NORMAL

## 2020-11-12 ENCOUNTER — TRANSCRIBE ORDERS (OUTPATIENT)
Dept: ADMINISTRATIVE | Facility: HOSPITAL | Age: 65
End: 2020-11-12

## 2020-11-12 ENCOUNTER — APPOINTMENT (OUTPATIENT)
Dept: LAB | Facility: HOSPITAL | Age: 65
End: 2020-11-12
Payer: MEDICARE

## 2020-11-12 DIAGNOSIS — M25.50 ARTHRALGIA, UNSPECIFIED JOINT: ICD-10-CM

## 2020-11-12 DIAGNOSIS — E78.00 PURE HYPERCHOLESTEROLEMIA: ICD-10-CM

## 2020-11-12 DIAGNOSIS — E78.1 HYPERTRIGLYCERIDEMIA: ICD-10-CM

## 2020-11-12 DIAGNOSIS — Z12.5 SPECIAL SCREENING, PROSTATE CANCER: ICD-10-CM

## 2020-11-12 DIAGNOSIS — Z12.5 SPECIAL SCREENING, PROSTATE CANCER: Primary | ICD-10-CM

## 2020-11-12 DIAGNOSIS — R21 RASH: ICD-10-CM

## 2020-11-12 DIAGNOSIS — Z12.5 SCREENING FOR PROSTATE CANCER: ICD-10-CM

## 2020-11-12 DIAGNOSIS — R53.82 CHRONIC FATIGUE: ICD-10-CM

## 2020-11-12 LAB
ALBUMIN SERPL BCP-MCNC: 3.8 G/DL (ref 3.5–5)
ALP SERPL-CCNC: 76 U/L (ref 46–116)
ALT SERPL W P-5'-P-CCNC: 35 U/L (ref 12–78)
ANION GAP SERPL CALCULATED.3IONS-SCNC: 7 MMOL/L (ref 4–13)
AST SERPL W P-5'-P-CCNC: 16 U/L (ref 5–45)
BILIRUB SERPL-MCNC: 0.9 MG/DL (ref 0.2–1)
BUN SERPL-MCNC: 10 MG/DL (ref 5–25)
CALCIUM SERPL-MCNC: 8.2 MG/DL (ref 8.3–10.1)
CHLORIDE SERPL-SCNC: 109 MMOL/L (ref 100–108)
CHOLEST SERPL-MCNC: 151 MG/DL (ref 50–200)
CO2 SERPL-SCNC: 26 MMOL/L (ref 21–32)
CREAT SERPL-MCNC: 0.83 MG/DL (ref 0.6–1.3)
GFR SERPL CREATININE-BSD FRML MDRD: 94 ML/MIN/1.73SQ M
GLUCOSE P FAST SERPL-MCNC: 117 MG/DL (ref 65–99)
HDLC SERPL-MCNC: 35 MG/DL
LDLC SERPL CALC-MCNC: 58 MG/DL (ref 0–100)
NONHDLC SERPL-MCNC: 116 MG/DL
POTASSIUM SERPL-SCNC: 3.8 MMOL/L (ref 3.5–5.3)
PROT SERPL-MCNC: 6.7 G/DL (ref 6.4–8.2)
PSA SERPL-MCNC: 1.2 NG/ML (ref 0–4)
RHEUMATOID FACT SER QL LA: NEGATIVE
SODIUM SERPL-SCNC: 142 MMOL/L (ref 136–145)
TRIGL SERPL-MCNC: 292 MG/DL

## 2020-11-12 PROCEDURE — 80061 LIPID PANEL: CPT

## 2020-11-12 PROCEDURE — 86618 LYME DISEASE ANTIBODY: CPT

## 2020-11-12 PROCEDURE — 80053 COMPREHEN METABOLIC PANEL: CPT

## 2020-11-12 PROCEDURE — 86038 ANTINUCLEAR ANTIBODIES: CPT

## 2020-11-12 PROCEDURE — G0103 PSA SCREENING: HCPCS

## 2020-11-12 PROCEDURE — 86430 RHEUMATOID FACTOR TEST QUAL: CPT

## 2020-11-12 PROCEDURE — 36415 COLL VENOUS BLD VENIPUNCTURE: CPT

## 2020-11-13 ENCOUNTER — TELEPHONE (OUTPATIENT)
Dept: ADMINISTRATIVE | Facility: OTHER | Age: 65
End: 2020-11-13

## 2020-11-13 LAB
B BURGDOR IGG+IGM SER-ACNC: 2
RYE IGE QN: NEGATIVE

## 2020-12-14 ENCOUNTER — TELEPHONE (OUTPATIENT)
Dept: FAMILY MEDICINE CLINIC | Facility: CLINIC | Age: 65
End: 2020-12-14

## 2020-12-14 DIAGNOSIS — K22.70 BARRETT'S ESOPHAGUS WITHOUT DYSPLASIA: ICD-10-CM

## 2020-12-14 RX ORDER — PANTOPRAZOLE SODIUM 40 MG/1
40 TABLET, DELAYED RELEASE ORAL DAILY
Qty: 90 TABLET | Refills: 4 | Status: SHIPPED | OUTPATIENT
Start: 2020-12-14 | End: 2022-02-24

## 2021-01-06 ENCOUNTER — TELEPHONE (OUTPATIENT)
Dept: FAMILY MEDICINE CLINIC | Facility: CLINIC | Age: 66
End: 2021-01-06

## 2021-01-06 DIAGNOSIS — E78.1 HYPERTRIGLYCERIDEMIA: ICD-10-CM

## 2021-01-06 RX ORDER — ICOSAPENT ETHYL 1000 MG/1
CAPSULE ORAL
Qty: 360 CAPSULE | Refills: 1 | Status: SHIPPED | OUTPATIENT
Start: 2021-01-06 | End: 2021-07-12

## 2021-01-06 NOTE — TELEPHONE ENCOUNTER
Spoke with patient about Prolia shot  He let me know that he had a reaction after the last one  He said he feels crippled after it his hips feel bad  Patient did tell me he let provider know  He asked if he could get something off his chest  He said it feels different eher  He feels like th ball was dropped about a medication that was prescribed to him the vesecpa   He said insurance was not covering it and he called and was told that upper management would be handling it  I apologized to him about it because I was not familiar to what was going on  I did ask him if he wanted me to take care of the problem  He said no because it has been 5 months and he is just not taking it  Also he never got a call about his labs from November  He also asked me if he had an apt scheduled with her  I let him know he did not  I made an apt for him in January with Colby before she retires but I did not inform patient that as of right now  I asked if he would like me to tell the practice administrator but he said no it is ok  He doesn't mean to wine  He did tell me thank you for listening to him

## 2021-01-06 NOTE — TELEPHONE ENCOUNTER
Patient informed me that he has not been on this medication because it needed a prior Auth  He was to be on this since 9/2020  I did the prior Auth with [facility] and got it approved  Case ID [id number]   Resent the prescription to [facility] as well

## 2021-01-18 NOTE — TELEPHONE ENCOUNTER
Vascepa is no longer covered by his insurance company  OTC fish oil can be used 2 gms BID  [place] was made aware of this when Amna Sauce was initially ordered

## 2021-01-20 ENCOUNTER — OFFICE VISIT (OUTPATIENT)
Dept: FAMILY MEDICINE CLINIC | Facility: CLINIC | Age: 66
End: 2021-01-20
Payer: MEDICARE

## 2021-01-20 ENCOUNTER — TELEPHONE (OUTPATIENT)
Dept: OBGYN CLINIC | Facility: HOSPITAL | Age: 66
End: 2021-01-20

## 2021-01-20 VITALS
WEIGHT: 242 LBS | OXYGEN SATURATION: 98 % | HEART RATE: 79 BPM | TEMPERATURE: 97 F | SYSTOLIC BLOOD PRESSURE: 110 MMHG | DIASTOLIC BLOOD PRESSURE: 70 MMHG | HEIGHT: 70 IN | BODY MASS INDEX: 34.65 KG/M2

## 2021-01-20 DIAGNOSIS — M25.50 ARTHRALGIA, UNSPECIFIED JOINT: Primary | ICD-10-CM

## 2021-01-20 PROCEDURE — 99214 OFFICE O/P EST MOD 30 MIN: CPT | Performed by: FAMILY MEDICINE

## 2021-01-20 NOTE — TELEPHONE ENCOUNTER
Patient is calling to ask to be placed on Dr Laurey Litten cancellation list     Patient is scheduled 04-

## 2021-01-20 NOTE — PROGRESS NOTES
Standard Visit   Assessment/Plan:     Visit Diagnosis:  Diagnoses and all orders for this visit:    Arthralgia, unspecified joint  -     Ambulatory referral to Rheumatology; Future          Subjective:    Patient ID: Jud Espinosa is a 63 y o  male       Patient is here with the following concerns:    Here for a check up   S/p Bariatric surgery    Patient on a diabetic  Doing very well   We will    A1c is 5 5   LDL is 58   Blood pressure is at goal   His complaint however is continued joint pain  ANAID rheumatoid factor and Lyme titer are negative   But his joint pain his pain down his legs his hip pain especially when rising from a chair is quite intense and it continues   It is very difficult to rise from a sitting position especially when he has been stationary  We are going to get him into Rheumatology   PSA is normal    The following portions of the patient's history were reviewed and updated as appropriate: allergies, current medications, past family history, past medical history, past social history, past surgical history and problem list     Review of Systems      /70 (BP Location: Right arm, Patient Position: Sitting)   Pulse 79   Temp (!) 97 °F (36 1 °C) (Tympanic)   Ht 5' 9 5" (1 765 m)   Wt 110 kg (242 lb)   SpO2 98%   BMI 35 22 kg/m²   Social History     Socioeconomic History    Marital status: Single     Spouse name: Not on file    Number of children: Not on file    Years of education: Not on file    Highest education level: Not on file   Occupational History    Not on file   Social Needs    Financial resource strain: Not on file    Food insecurity     Worry: Not on file     Inability: Not on file    Transportation needs     Medical: Not on file     Non-medical: Not on file   Tobacco Use    Smoking status: Former Smoker     Types: Cigarettes     Quit date:      Years since quittin 0    Smokeless tobacco: Never Used   Substance and Sexual Activity    Alcohol use: No    Drug use: Yes     Types: Marijuana     Comment: everyday     Sexual activity: Not on file   Lifestyle    Physical activity     Days per week: Not on file     Minutes per session: Not on file    Stress: Not on file   Relationships    Social connections     Talks on phone: Not on file     Gets together: Not on file     Attends [employer] service: Not on file     Active member of club or organization: Not on file     Attends meetings of clubs or organizations: Not on file     Relationship status: Not on file    Intimate partner violence     Fear of current or ex partner: Not on file     Emotionally abused: Not on file     Physically abused: Not on file     Forced sexual activity: Not on file   Other Topics Concern    Not on file   Social History Narrative    Not on file     Past Medical History:   Diagnosis Date    Chronic kidney disease     Diabetes ([facility] [address]     LAST ASSESSED: 7/23/14    Edema     LAST ASSESSED:6/12/12    Hypertension     [facility]bid [facility] calories [facility]     LAST ASSESSED: 6/12/12    RBBB      Family History   Problem Relation Age of Onset    COPD Mother     Heart disease Mother     Mental illness Mother     Drug abuse Mother     Diabetes Father     Mental illness Father     Drug abuse Father     Lung cancer Father     Diabetes Sister      Past Surgical History:   Procedure Laterality Date    BACK SURGERY      CHOLECYSTECTOMY      COLONOSCOPY      FOOT SURGERY      GASTRIC RESTRICTION SURGERY      GASTRIC STAPLING FOR MORBID OBESITY LAPAROSCOPY W/ MARCY-EN-Y    SHOULDER SURGERY      UVULOPALATOPLASTY         Current Outpatient Medications:     amLODIPine (NORVASC) 5 mg tablet, TAKE 1 TABLET DAILY, Disp: 90 tablet, Rfl: 3    buPROPion (WELLBUTRIN XL) 300 mg 24 hr tablet, TAKE 1 TABLET DAILY, Disp: 90 tablet, Rfl: 3    calcium citrate-Vitamin D (CALCIUM CITRATE + D3) 200 mg-250 units, Take 1 tablet by mouth daily, Disp: , Rfl:     clindamycin (CLINDAGEL) 1 % gel, , Disp: , Rfl: 0    Icosapent Ethyl (Vascepa) 1 g CAPS, Take 2 pills in the morning and 2 pills at night, Disp: 360 capsule, Rfl: 1    Iron-Vitamin C (VITRON-C)  MG TABS, Take one pill twice daily, Disp: 60 tablet, Rfl: 1    JARDIANCE 25 MG TABS, TAKE 1 TABLET DAILY, Disp: 90 tablet, Rfl: 3    losartan (COZAAR) 100 MG tablet, Take 1 tablet (100 mg total) by mouth daily, Disp: 90 tablet, Rfl: 3    methocarbamol (ROBAXIN) 500 mg tablet, May take 1 pill every 8 hours p r n  back stiffness, Disp: 90 tablet, Rfl: 1    Multiple Vitamins-Minerals (CENTRUM ADULTS PO), Take 1 tablet by mouth daily, Disp: , Rfl:     pantoprazole (PROTONIX) 40 mg tablet, Take 1 tablet (40 mg total) by mouth daily, Disp: 90 tablet, Rfl: 4    PreviDent 0 2 % SOLN, RINSE WITH 10 milliliters for 1 MINUTE THEN SPIT USE AT BEDTIME, DO NOT SWALLOW, Disp: , Rfl:     Lab Review   Telephone on 11/13/2020   Component Date Value    Right Eye Diabetic Retin* 11/03/2020 None     Left Eye Diabetic Retino* 11/03/2020 None    Appointment on 11/12/2020   Component Date Value    Lyme total antibody 11/12/2020 2     Rheumatoid Factor 11/12/2020 Negative     ANAID 11/12/2020 Negative     PSA 11/12/2020 1 2     Cholesterol 11/12/2020 151     Triglycerides 11/12/2020 292*    HDL, Direct 11/12/2020 35*    LDL Calculated 11/12/2020 58     Non-HDL-Chol (CHOL-HDL) 11/12/2020 116     Sodium 11/12/2020 142     Potassium 11/12/2020 3 8     Chloride 11/12/2020 109*    CO2 11/12/2020 26     ANION GAP 11/12/2020 7     BUN 11/12/2020 10     Creatinine 11/12/2020 0 83     Glucose, Fasting 11/12/2020 117*    Calcium 11/12/2020 8 2*    AST 11/12/2020 16     ALT 11/12/2020 35     Alkaline Phosphatase 11/12/2020 76     Total Protein 11/12/2020 6 7     Albumin 11/12/2020 3 8     Total Bilirubin 11/12/2020 0 90     eGFR 11/12/2020 94    Office Visit on 09/30/2020   Component Date Value    Hemoglobin A1C 09/30/2020 5 5 Objective:     Physical Exam      There are no Patient Instructions on file for this visit          TIFFANY Vines

## 2021-01-26 NOTE — TELEPHONE ENCOUNTER
Patient calling back wanting to be put back on the list because he has to cancel because of the weather

## 2021-01-28 ENCOUNTER — TELEPHONE (OUTPATIENT)
Dept: OBGYN CLINIC | Facility: OTHER | Age: 66
End: 2021-01-28

## 2021-01-28 NOTE — TELEPHONE ENCOUNTER
Patient is calling to see if you can please place him on the Cancellation list for Dr Bethel Hinds    C/kale # [phone]

## 2021-02-23 DIAGNOSIS — I10 HYPERTENSION, UNSPECIFIED TYPE: ICD-10-CM

## 2021-02-23 RX ORDER — AMLODIPINE BESYLATE 5 MG/1
TABLET ORAL
Qty: 90 TABLET | Refills: 3 | Status: SHIPPED | OUTPATIENT
Start: 2021-02-23 | End: 2022-02-24

## 2021-02-23 RX ORDER — EMPAGLIFLOZIN 25 MG/1
TABLET, FILM COATED ORAL
Qty: 90 TABLET | Refills: 3 | Status: SHIPPED | OUTPATIENT
Start: 2021-02-23 | End: 2022-04-05

## 2021-03-18 ENCOUNTER — APPOINTMENT (OUTPATIENT)
Dept: LAB | Facility: HOSPITAL | Age: 66
End: 2021-03-18
Attending: INTERNAL MEDICINE
Payer: MEDICARE

## 2021-03-18 DIAGNOSIS — R80.1 PERSISTENT PROTEINURIA: ICD-10-CM

## 2021-03-18 LAB
ANION GAP SERPL CALCULATED.3IONS-SCNC: 10 MMOL/L (ref 4–13)
BACTERIA UR QL AUTO: ABNORMAL /HPF
BASOPHILS # BLD AUTO: 0.04 THOUSANDS/ΜL (ref 0–0.1)
BASOPHILS NFR BLD AUTO: 1 % (ref 0–1)
BILIRUB UR QL STRIP: NEGATIVE
BUN SERPL-MCNC: 13 MG/DL (ref 5–25)
CALCIUM SERPL-MCNC: 8.5 MG/DL (ref 8.3–10.1)
CHLORIDE SERPL-SCNC: 107 MMOL/L (ref 100–108)
CLARITY UR: CLEAR
CO2 SERPL-SCNC: 26 MMOL/L (ref 21–32)
COLOR UR: YELLOW
CREAT SERPL-MCNC: 0.81 MG/DL (ref 0.6–1.3)
CREAT UR-MCNC: 150 MG/DL
EOSINOPHIL # BLD AUTO: 0.24 THOUSAND/ΜL (ref 0–0.61)
EOSINOPHIL NFR BLD AUTO: 5 % (ref 0–6)
ERYTHROCYTE [DISTWIDTH] IN BLOOD BY AUTOMATED COUNT: 12.3 % (ref 11.6–15.1)
GFR SERPL CREATININE-BSD FRML MDRD: 95 ML/MIN/1.73SQ M
GLUCOSE P FAST SERPL-MCNC: 114 MG/DL (ref 65–99)
GLUCOSE UR STRIP-MCNC: ABNORMAL MG/DL
HCT VFR BLD AUTO: 47.1 % (ref 36.5–49.3)
HGB BLD-MCNC: 16.4 G/DL (ref 12–17)
HGB UR QL STRIP.AUTO: ABNORMAL
HYALINE CASTS #/AREA URNS LPF: ABNORMAL /LPF
IMM GRANULOCYTES # BLD AUTO: 0.01 THOUSAND/UL (ref 0–0.2)
IMM GRANULOCYTES NFR BLD AUTO: 0 % (ref 0–2)
KETONES UR STRIP-MCNC: NEGATIVE MG/DL
LEUKOCYTE ESTERASE UR QL STRIP: ABNORMAL
LYMPHOCYTES # BLD AUTO: 1.82 THOUSANDS/ΜL (ref 0.6–4.47)
LYMPHOCYTES NFR BLD AUTO: 34 % (ref 14–44)
MCH RBC QN AUTO: 30.7 PG (ref 26.8–34.3)
MCHC RBC AUTO-ENTMCNC: 34.8 G/DL (ref 31.4–37.4)
MCV RBC AUTO: 88 FL (ref 82–98)
MONOCYTES # BLD AUTO: 0.51 THOUSAND/ΜL (ref 0.17–1.22)
MONOCYTES NFR BLD AUTO: 10 % (ref 4–12)
MUCOUS THREADS UR QL AUTO: ABNORMAL
NEUTROPHILS # BLD AUTO: 2.77 THOUSANDS/ΜL (ref 1.85–7.62)
NEUTS SEG NFR BLD AUTO: 50 % (ref 43–75)
NITRITE UR QL STRIP: POSITIVE
NON-SQ EPI CELLS URNS QL MICRO: ABNORMAL /HPF
NRBC BLD AUTO-RTO: 0 /100 WBCS
PH UR STRIP.AUTO: 5.5 [PH]
PLATELET # BLD AUTO: 150 THOUSANDS/UL (ref 149–390)
PMV BLD AUTO: 11.5 FL (ref 8.9–12.7)
POTASSIUM SERPL-SCNC: 3.9 MMOL/L (ref 3.5–5.3)
PROT UR STRIP-MCNC: ABNORMAL MG/DL
PROT UR-MCNC: 47 MG/DL
PROT/CREAT UR: 0.31 MG/G{CREAT} (ref 0–0.1)
RBC # BLD AUTO: 5.34 MILLION/UL (ref 3.88–5.62)
RBC #/AREA URNS AUTO: ABNORMAL /HPF
SODIUM SERPL-SCNC: 143 MMOL/L (ref 136–145)
SP GR UR STRIP.AUTO: >=1.03 (ref 1–1.03)
UROBILINOGEN UR QL STRIP.AUTO: 0.2 E.U./DL
WBC # BLD AUTO: 5.39 THOUSAND/UL (ref 4.31–10.16)
WBC #/AREA URNS AUTO: ABNORMAL /HPF

## 2021-03-18 PROCEDURE — 82570 ASSAY OF URINE CREATININE: CPT

## 2021-03-18 PROCEDURE — 81001 URINALYSIS AUTO W/SCOPE: CPT

## 2021-03-18 PROCEDURE — 80048 BASIC METABOLIC PNL TOTAL CA: CPT

## 2021-03-18 PROCEDURE — 85025 COMPLETE CBC W/AUTO DIFF WBC: CPT

## 2021-03-18 PROCEDURE — 84156 ASSAY OF PROTEIN URINE: CPT

## 2021-03-18 PROCEDURE — 36415 COLL VENOUS BLD VENIPUNCTURE: CPT

## 2021-03-22 ENCOUNTER — OFFICE VISIT (OUTPATIENT)
Dept: FAMILY MEDICINE CLINIC | Facility: CLINIC | Age: 66
End: 2021-03-22
Payer: MEDICARE

## 2021-03-22 VITALS
OXYGEN SATURATION: 97 % | SYSTOLIC BLOOD PRESSURE: 130 MMHG | HEIGHT: 70 IN | BODY MASS INDEX: 34.65 KG/M2 | TEMPERATURE: 97 F | HEART RATE: 68 BPM | WEIGHT: 242 LBS | DIASTOLIC BLOOD PRESSURE: 80 MMHG

## 2021-03-22 DIAGNOSIS — E66.01 OBESITY, MORBID (HCC): ICD-10-CM

## 2021-03-22 DIAGNOSIS — E11.29 TYPE 2 DIABETES MELLITUS WITH MICROALBUMINURIA, WITHOUT LONG-TERM CURRENT USE OF INSULIN (HCC): Primary | ICD-10-CM

## 2021-03-22 DIAGNOSIS — M81.0 OSTEOPOROSIS, UNSPECIFIED OSTEOPOROSIS TYPE, UNSPECIFIED PATHOLOGICAL FRACTURE PRESENCE: ICD-10-CM

## 2021-03-22 DIAGNOSIS — R80.9 TYPE 2 DIABETES MELLITUS WITH MICROALBUMINURIA, WITHOUT LONG-TERM CURRENT USE OF INSULIN (HCC): Primary | ICD-10-CM

## 2021-03-22 DIAGNOSIS — M25.50 ARTHRALGIA, UNSPECIFIED JOINT: ICD-10-CM

## 2021-03-22 DIAGNOSIS — I10 ESSENTIAL HYPERTENSION: ICD-10-CM

## 2021-03-22 DIAGNOSIS — Z00.00 MEDICARE ANNUAL WELLNESS VISIT, SUBSEQUENT: ICD-10-CM

## 2021-03-22 LAB — SL AMB POCT HEMOGLOBIN AIC: 5.3 (ref ?–6.5)

## 2021-03-22 PROCEDURE — G0439 PPPS, SUBSEQ VISIT: HCPCS | Performed by: NURSE PRACTITIONER

## 2021-03-22 PROCEDURE — 83036 HEMOGLOBIN GLYCOSYLATED A1C: CPT | Performed by: NURSE PRACTITIONER

## 2021-03-22 PROCEDURE — 99214 OFFICE O/P EST MOD 30 MIN: CPT | Performed by: NURSE PRACTITIONER

## 2021-03-22 NOTE — ASSESSMENT & PLAN NOTE
Counseled the importance of low carb diet and daily physical activity as tolerated and the importance of reducing BMI

## 2021-03-22 NOTE — PROGRESS NOTES
Assessment and Plan:     Problem List Items Addressed This Visit        Endocrine    Diabetes mellitus, type 2 ([facility] )       Diabetes well controlled  To continue Jardiance 25 mg daily  Counseled the importance of low carb diet and low-impact activities  Relevant Orders    CBC and differential    Comprehensive metabolic panel    Hemoglobin A1C    Lipid Panel with Direct LDL reflex    TSH, 3rd generation with Free T4 reflex    POCT hemoglobin A1c       Cardiovascular and Mediastinum    Hypertension     Blood pressure stable  Counseled the importance of a low-sodium diet  To continue losartan 100 mg daily  Will repeat labs in 6 months  Follow-up in 6 months or sooner if needed  Relevant Orders    UA w Reflex to Microscopic w Reflex to Culture -Lab Collect       Musculoskeletal and Integument    Osteoporosis       Reviewed recent DEXA scan  To keep upcoming appointment with Rheumatology on 04/05/2021  Other    Obesity, morbid ([facility] )       Counseled the importance of low carb diet and daily physical activity as tolerated and the importance of reducing BMI  Other Visit Diagnoses     Medicare annual wellness visit, subsequent    -  Primary    Arthralgia, unspecified joint        Provided refill of tramadol -acetaminophen as requested  Advised to use sparingly  Again, keep upcoming appointment with rheumatologist     Relevant Medications    traMADol-acetaminophen (ULTRACET) 37 5-325 mg per tablet           Preventive health issues were discussed with patient, and age appropriate screening tests were ordered as noted in patient's After Visit Summary  Personalized health advice and appropriate referrals for health education or preventive services given if needed, as noted in patient's After Visit Summary       History of Present Illness:     Patient presents for Medicare Annual Wellness visit    Patient Care Team:  TIFFANY Lin as PCP - General (Family Medicine)  Will Padron MD as Consulting Physician (Gastroenterology)  Sabrina Mckeon as Consulting Physician (Optometry)  Mirna Cowden, MD as Consulting Physician (Nephrology)  Cece Martinez PA-C     Problem List:     Patient Active Problem List   Diagnosis    Malabsorption    Diabetes mellitus, type 2 ([facility] )    Hypertension    Obesity, morbid ([facility] )    Back muscle spasm    Osteoporosis      Past Medical and Surgical History:     Past Medical History:   Diagnosis Date    Chronic kidney disease     Diabetes ([facility] )     LAST ASSESSED: 14    Edema     LAST ASSESSED:12    Hypertension     Morbid obesity due to excess calories ([facility] )     LAST ASSESSED: 12    RBBB      Past Surgical History:   Procedure Laterality Date    BACK SURGERY      CHOLECYSTECTOMY      COLONOSCOPY      FOOT SURGERY      GASTRIC RESTRICTION SURGERY      GASTRIC STAPLING FOR MORBID OBESITY LAPAROSCOPY W/ MARCY-EN-Y    SHOULDER SURGERY      UVULOPALATOPLASTY        Family History:     Family History   Problem Relation Age of Onset    COPD Mother     Heart disease Mother     Mental illness Mother     Drug abuse Mother     Diabetes Father     Mental illness Father     Drug abuse Father     Lung cancer Father     Diabetes Sister       Social History:        Social History     Socioeconomic History    Marital status: Single     Spouse name: None    Number of children: None    Years of education: None    Highest education level: None   Occupational History    None   Social Needs    Financial resource strain: None    Food insecurity     Worry: None     Inability: None    Transportation needs     Medical: None     Non-medical: None   Tobacco Use    Smoking status: Former Smoker     Types: Cigarettes     Quit date:      Years since quittin 2    Smokeless tobacco: Never Used   Substance and Sexual Activity    Alcohol use: No    Drug use: Yes     Types: Marijuana     Comment: everyday     Sexual activity: None   Lifestyle    Physical activity     Days per week: None     Minutes per session: None    Stress: None   Relationships    Social connections     Talks on phone: None     Gets together: None     Attends [employer] service: None     Active member of club or organization: None     Attends meetings of clubs or organizations: None     Relationship status: None    Intimate partner violence     Fear of current or ex partner: None     Emotionally abused: None     Physically abused: None     Forced sexual activity: None   Other Topics Concern    None   Social History Narrative    None      Medications and Allergies:     Current Outpatient Medications   Medication Sig Dispense Refill    amLODIPine (NORVASC) 5 mg tablet TAKE 1 TABLET DAILY 90 tablet 3    buPROPion (WELLBUTRIN XL) 300 mg 24 hr tablet TAKE 1 TABLET DAILY 90 tablet 3    calcium citrate-Vitamin D (CALCIUM CITRATE + D3) 200 mg-250 units Take 1 tablet by mouth daily      clindamycin (CLINDAGEL) 1 % gel   0    Icosapent Ethyl (Vascepa) 1 g CAPS Take 2 pills in the morning and 2 pills at night 360 capsule 1    Iron-Vitamin C (VITRON-C)  MG TABS Take one pill twice daily 60 tablet 1    Jardiance 25 MG TABS TAKE 1 TABLET DAILY 90 tablet 3    losartan (COZAAR) 100 MG tablet Take 1 tablet (100 mg total) by mouth daily 90 tablet 3    methocarbamol (ROBAXIN) 500 mg tablet May take 1 pill every 8 hours p r n  back stiffness 90 tablet 1    Multiple Vitamins-Minerals (CENTRUM ADULTS PO) Take 1 tablet by mouth daily      pantoprazole (PROTONIX) 40 mg tablet Take 1 tablet (40 mg total) by mouth daily 90 tablet 4    PreviDent 0 2 % SOLN RINSE WITH 10 milliliters for 1 MINUTE THEN SPIT USE AT BEDTIME, DO NOT SWALLOW      traMADol-acetaminophen (ULTRACET) 37 5-325 mg per tablet May Take 1 pill twice a day p r n  arthritis pain 30 tablet 0     No current facility-administered medications for this visit        Allergies   Allergen Reactions    Augmentin [Amoxicillin-Pot Clavulanate] Diarrhea    Ciprofloxacin Diarrhea    Prolia [Denosumab]       Immunizations:     Immunization History   Administered Date(s) Administered    INFLUENZA 10/07/2004, 10/27/2005, 12/01/2006, 10/30/2007, 11/10/2008, 10/02/2009, 11/15/2010, 09/09/2011, 10/08/2012, 10/14/2013, 09/17/2014, 09/25/2015, 09/06/2018, 09/06/2019    Influenza Quadrivalent Preservative Free 3 years and older IM 09/21/2016, 09/07/2017    Influenza, recombinant, quadrivalent,injectable, preservative free 09/06/2018, 10/17/2019, 10/14/2020    Pneumococcal Conjugate 13-Valent 09/09/2016    Pneumococcal Polysaccharide PPV23 03/13/2020    Tdap 11/06/2013      Health Maintenance:         Topic Date Due    HIV Screening  Never done    Colorectal Cancer Screening  03/09/2027    Hepatitis C Screening  Completed         Topic Date Due    COVID-19 Vaccine (1) Never done      Medicare Health Risk Assessment:     /80 (BP Location: Left arm, Patient Position: Sitting)   Pulse 68   Temp (!) 97 °F (36 1 °C) (Tympanic)   Ht 5' 9 5" (1 765 m)   Wt 110 kg (242 lb)   SpO2 97%   BMI 35 22 kg/m²      Tim Lynne is here for his Subsequent Wellness visit  Last Medicare Wellness visit information reviewed, patient interviewed and updates made to the record today  Health Risk Assessment:   Patient rates overall health as good  Patient feels that their physical health rating is same  Patient is satisfied with their life  Eyesight was rated as same  Hearing was rated as same  Patient feels that their emotional and mental health rating is slightly better  Patients states they are sometimes angry  Patient states they are sometimes unusually tired/fatigued  Pain experienced in the last 7 days has been some  Patient's pain rating has been 6/10  Patient states that he has experienced no weight loss or gain in last 6 months  Fall Risk Screening:    In the past year, patient has experienced: history of falling in past year    Number of falls: 1  Injured during fall?: No    Feels unsteady when standing or walking?: Yes    Worried about falling?: No      Home Safety:  Patient does not have trouble with stairs inside or outside of their home  Patient has working smoke alarms and has no working carbon monoxide detector  Home safety hazards include: none  Nutrition:   Current diet is Regular  Medications:   Patient is currently taking over-the-counter supplements  OTC medications include: see medication list  Patient is able to manage medications  Activities of Daily Living (ADLs)/Instrumental Activities of Daily Living (IADLs):   Walk and transfer into and out of bed and chair?: Yes  Dress and groom yourself?: Yes    Bathe or shower yourself?: Yes    Feed yourself?  Yes  Do your laundry/housekeeping?: Yes  Manage your money, pay your bills and track your expenses?: Yes  Make your own meals?: Yes    Do your own shopping?: Yes    Previous Hospitalizations:   Any hospitalizations or ED visits within the last 12 months?: No      Advance Care Planning:   Living will: No    Durable POA for healthcare: No    Advanced directive: No    Advanced directive counseling given: Yes    Five wishes given: Yes      PREVENTIVE SCREENINGS      Cardiovascular Screening:    General: Screening Not Indicated and History Lipid Disorder      Diabetes Screening:     General: Screening Not Indicated and History Diabetes      Colorectal Cancer Screening:     General: Screening Current      Prostate Cancer Screening:    General: Screening Current      Osteoporosis Screening:    General: Screening Not Indicated and History Osteoporosis      Abdominal Aortic Aneurysm (AAA) Screening:    Risk factors include: tobacco use        Lung Cancer Screening:     General: Screening Not Indicated      Hepatitis C Screening:    General: Screening Current    Screening, Brief Intervention, and Referral to Treatment (SBIRT)    Screening  Typical number of drinks in a day: 0  Typical number of drinks in a week: 0  Interpretation: Low risk drinking behavior        TIFFANY Landis

## 2021-03-22 NOTE — PATIENT INSTRUCTIONS

## 2021-03-22 NOTE — ASSESSMENT & PLAN NOTE
Diabetes well controlled  To continue Jardiance 25 mg daily  Counseled the importance of low carb diet and low-impact activities

## 2021-03-22 NOTE — ASSESSMENT & PLAN NOTE
Blood pressure stable  Counseled the importance of a low-sodium diet  To continue losartan 100 mg daily  Will repeat labs in 6 months  Follow-up in 6 months or sooner if needed

## 2021-03-23 ENCOUNTER — TELEPHONE (OUTPATIENT)
Dept: FAMILY MEDICINE CLINIC | Facility: CLINIC | Age: 66
End: 2021-03-23

## 2021-03-24 ENCOUNTER — TELEPHONE (OUTPATIENT)
Dept: NEPHROLOGY | Facility: CLINIC | Age: 66
End: 2021-03-24

## 2021-03-25 ENCOUNTER — OFFICE VISIT (OUTPATIENT)
Dept: NEPHROLOGY | Facility: CLINIC | Age: 66
End: 2021-03-25
Payer: MEDICARE

## 2021-03-25 VITALS
SYSTOLIC BLOOD PRESSURE: 120 MMHG | WEIGHT: 237 LBS | BODY MASS INDEX: 33.93 KG/M2 | RESPIRATION RATE: 16 BRPM | TEMPERATURE: 98 F | DIASTOLIC BLOOD PRESSURE: 80 MMHG | HEIGHT: 70 IN | HEART RATE: 82 BPM

## 2021-03-25 DIAGNOSIS — R80.1 PERSISTENT PROTEINURIA: Primary | ICD-10-CM

## 2021-03-25 DIAGNOSIS — R80.9 TYPE 2 DIABETES MELLITUS WITH MICROALBUMINURIA, WITHOUT LONG-TERM CURRENT USE OF INSULIN (HCC): ICD-10-CM

## 2021-03-25 DIAGNOSIS — I10 ESSENTIAL HYPERTENSION: ICD-10-CM

## 2021-03-25 DIAGNOSIS — E66.01 OBESITY, MORBID (HCC): ICD-10-CM

## 2021-03-25 DIAGNOSIS — E11.29 TYPE 2 DIABETES MELLITUS WITH MICROALBUMINURIA, WITHOUT LONG-TERM CURRENT USE OF INSULIN (HCC): ICD-10-CM

## 2021-03-25 PROCEDURE — 99214 OFFICE O/P EST MOD 30 MIN: CPT | Performed by: INTERNAL MEDICINE

## 2021-03-25 RX ORDER — TRIAMCINOLONE ACETONIDE 1 MG/G
CREAM TOPICAL
COMMUNITY
Start: 2021-03-23 | End: 2022-06-14 | Stop reason: ALTCHOICE

## 2021-03-25 NOTE — ASSESSMENT & PLAN NOTE
In about 300 mg range which is quite stable  Patient is on losartan which I will continue as part of the management    Likely etiology is diabetes along with obesity

## 2021-03-25 NOTE — PROGRESS NOTES
NEPHROLOGY OFFICE FOLLOW UP  Christopher Restrepo 63 y o  male MRN: [id number]    Encounter: [id number] 3/25/2021    REASON FOR VISIT: Christopher Restrepo is a 63 y o  male who is here on 3/25/2021 for Follow-up and Proteinuria    HPI:    Daniel Hurst came in today for yearly nephrology follow-up because of proteinuria  Patient is doing quite well has some back spasm for which he takes tramadol but no other acute complaint     No chest pain no palpitation or shortness of breath     No nausea no vomiting no ankle swelling      REVIEW OF SYSTEMS:    Review of Systems   Constitutional: Negative for activity change and fatigue  HENT: Negative for congestion and ear discharge  Eyes: Negative for photophobia and pain  Respiratory: Negative for apnea and choking  Cardiovascular: Negative for chest pain and palpitations  Gastrointestinal: Negative for abdominal distention and blood in stool  Endocrine: Negative for heat intolerance and polyphagia  Genitourinary: Negative for flank pain and urgency  Musculoskeletal: Positive for back pain  Negative for neck pain and neck stiffness  Skin: Negative for color change and wound  Allergic/Immunologic: Negative for food allergies and immunocompromised state  Neurological: Negative for seizures and facial asymmetry  Hematological: Negative for adenopathy  Does not bruise/bleed easily  Psychiatric/Behavioral: Negative for self-injury and suicidal ideas           PAST MEDICAL HISTORY:  Past Medical History:   Diagnosis Date    Chronic kidney disease     [facility]     LAST ASSESSED: 7/23/14    Edema     LAST ASSESSED:6/12/12    Hypertension     Morbid obesity due to excess calories ([facility] )     LAST ASSESSED: 6/12/12    RBBB        PAST SURGICAL HISTORY:  Past Surgical History:   Procedure Laterality Date    BACK SURGERY      CHOLECYSTECTOMY      COLONOSCOPY      FOOT SURGERY      GASTRIC RESTRICTION SURGERY      GASTRIC STAPLING FOR MORBID OBESITY LAPAROSCOPY W/ MARCY-EN-Y    SHOULDER SURGERY      UVULOPALATOPLASTY         SOCIAL HISTORY:  Social History     Substance and Sexual Activity   Alcohol Use No     Social History     Substance and Sexual Activity   Drug Use Yes    Types: Marijuana    Comment: everyday      Social History     Tobacco Use   Smoking Status Former Smoker    Types: Cigarettes    Quit date: 12    Years since quittin 2   Smokeless Tobacco Never Used       FAMILY HISTORY:  Family History   Problem Relation Age of Onset    COPD Mother     Heart disease Mother     Mental illness Mother     Drug abuse Mother     Diabetes Father     Mental illness Father     Drug abuse Father     Lung cancer Father     Diabetes Sister        MEDICATIONS:    Current Outpatient Medications:     amLODIPine (NORVASC) 5 mg tablet, TAKE 1 TABLET DAILY, Disp: 90 tablet, Rfl: 3    buPROPion (WELLBUTRIN XL) 300 mg 24 hr tablet, TAKE 1 TABLET DAILY, Disp: 90 tablet, Rfl: 3    calcium citrate-Vitamin D (CALCIUM CITRATE + D3) 200 mg-250 units, Take 1 tablet by mouth daily, Disp: , Rfl:     clindamycin (CLINDAGEL) 1 % gel, , Disp: , Rfl: 0    Icosapent Ethyl (Vascepa) 1 g CAPS, Take 2 pills in the morning and 2 pills at night, Disp: 360 capsule, Rfl: 1    Iron-Vitamin C (VITRON-C)  MG TABS, Take one pill twice daily, Disp: 60 tablet, Rfl: 1    Jardiance 25 MG TABS, TAKE 1 TABLET DAILY, Disp: 90 tablet, Rfl: 3    losartan (COZAAR) 100 MG tablet, Take 1 tablet (100 mg total) by mouth daily, Disp: 90 tablet, Rfl: 3    Multiple Vitamins-Minerals (CENTRUM ADULTS PO), Take 1 tablet by mouth daily, Disp: , Rfl:     pantoprazole (PROTONIX) 40 mg tablet, Take 1 tablet (40 mg total) by mouth daily, Disp: 90 tablet, Rfl: 4    PreviDent 0 2 % SOLN, RINSE WITH 10 milliliters for 1 MINUTE THEN SPIT USE AT BEDTIME, DO NOT SWALLOW, Disp: , Rfl:     traMADol-acetaminophen (ULTRACET) 37 5-325 mg per tablet, May Take 1 pill twice a day p r n  arthritis pain, Disp: 30 tablet, Rfl: 0    methocarbamol (ROBAXIN) 500 mg tablet, May take 1 pill every 8 hours p r n  back stiffness (Patient not taking: Reported on 3/25/2021), Disp: 90 tablet, Rfl: 1    triamcinolone (KENALOG) 0 1 % cream, APPLY TO LEFT LEG SPOTS TWICE DAILY FOR UP TO 4 WEEKS, Disp: , Rfl:     PHYSICAL EXAM:  Vitals:    03/25/21 1007   BP: 120/80   BP Location: Right arm   Patient Position: Sitting   Pulse: 82   Resp: 16   Temp: 98 °F (36 7 °C)   TempSrc: Temporal   Weight: 108 kg (237 lb)   Height: 5' 9 5" (1 765 m)     Body mass index is 34 5 kg/m²  Physical Exam  Constitutional:       General: He is not in acute distress  Appearance: He is well-developed  He is obese  HENT:      Head: Normocephalic and atraumatic  Eyes:      General: No scleral icterus  Conjunctiva/sclera: Conjunctivae normal       Pupils: Pupils are equal, round, and reactive to light  Neck:      Musculoskeletal: Normal range of motion and neck supple  Vascular: No JVD  Cardiovascular:      Rate and Rhythm: Normal rate and regular rhythm  Heart sounds: Normal heart sounds  Pulmonary:      Effort: Pulmonary effort is normal       Breath sounds: Normal breath sounds  No wheezing  Abdominal:      General: Bowel sounds are normal       Palpations: Abdomen is soft  Tenderness: There is no abdominal tenderness  Musculoskeletal: Normal range of motion  Skin:     General: Skin is warm  Findings: No rash  Neurological:      Mental Status: He is alert and oriented to person, place, and time  Psychiatric:         Behavior: Behavior normal          LAB RESULTS:  Results for orders placed or performed in visit on 03/22/21   POCT hemoglobin A1c   Result Value Ref Range    Hemoglobin A1C 5 3 6 5       ASSESSMENT and PLAN:      Persistent proteinuria   In about 300 mg range which is quite stable  Patient is on losartan which I will continue as part of the management    Likely etiology is diabetes along with obesity    Diabetes mellitus, type 2 ([facility] )    Lab Results   Component Value Date    HGBA1C 5 3 03/22/2021    diabetes is very well control with medication and loss of weight    Hypertension   Blood pressure is also very well controlled with medication which include losartan    Obesity, morbid ([facility] )   Patient had gastric bypass surgery and lost weight though still need to lose more weight and he is aware of it       everything discussed with the patient including blood work and medication  He is trying to lose more weight  I will see him back in 1 year again and will get blood and urine test before that surgery        Portions of the record may have been created with voice recognition software  Occasional wrong word or "sound a like" substitutions may have occurred due to the inherent limitations of voice recognition software  Read the chart carefully and recognize, using context, where substitutions have occurred  If you have any questions, please contact the dictating provider

## 2021-03-25 NOTE — PATIENT INSTRUCTIONS

## 2021-03-25 NOTE — ASSESSMENT & PLAN NOTE
Patient had gastric bypass surgery and lost weight though still need to lose more weight and he is aware of it

## 2021-03-25 NOTE — ASSESSMENT & PLAN NOTE
Lab Results   Component Value Date    HGBA1C 5 3 03/22/2021    diabetes is very well control with medication and loss of weight

## 2021-03-29 ENCOUNTER — IMMUNIZATIONS (OUTPATIENT)
Dept: FAMILY MEDICINE CLINIC | Facility: HOSPITAL | Age: 66
End: 2021-03-29

## 2021-03-29 DIAGNOSIS — Z23 ENCOUNTER FOR IMMUNIZATION: Primary | ICD-10-CM

## 2021-03-29 PROCEDURE — 0001A SARS-COV-2 / COVID-19 MRNA VACCINE (PFIZER-BIONTECH) 30 MCG: CPT

## 2021-03-29 PROCEDURE — 91300 SARS-COV-2 / COVID-19 MRNA VACCINE (PFIZER-BIONTECH) 30 MCG: CPT

## 2021-04-01 NOTE — PROGRESS NOTES
Assessment and Plan:   Patient is a 61-year-old male who presents for rheumatology consult regarding bilateral hip and proximal leg stiffness and weakness over the last several months  Reports a significant history of spine surgery many years ago but reports he has had significant issues with his legs and feet ever since then  He weakness and generalized gait issues  Reports over the last year he has had progressive worsening of stiffness in his hips and thighs which he reports worsened after his Prolia injection in July and so has not had additional injections after that point  He describes stiffness after being at rest and improvement with activity, although is very sedentary in general and so never really gets resolution of the stiffness  His exam today does not reveal inflammatory arthritis, he does have hip flexor weakness bilaterally 4/5 and is unable to tell me if this has been chronic or this is a more recent issue  He feels it is a chronic progressive issue though  Differential for him would include polymyalgia rheumatica versus inflammatory myopathy and we will get further workup to help assess for these issues  It is unclear however how much of this is related to his spine issues as he describes significant injury in the past with fusion and reports longstanding issues with his gait and lower extremities ever since his spine injury in the 1970s  Hopefully the labs and x-rays will help clarify if there is any underlying rheumatologic process contributing, we will determine next steps after that workup returns  We also discussed that it is unlikely that Prolia had anything to do with this and this was likely a coincidence that he had the injection and then felt worse over the last several months  We can readdress osteoporosis treatment in the future after we clarify if there is an underlying inflammatory issue      Plan:  Diagnoses and all orders for this visit:    Polyarthralgia  -     Chronic Hepatitis Panel  -     C-reactive protein  -     Cyclic citrul peptide antibody, IgG  -     Sedimentation rate, automated  -     Sjogren's Antibodies    Arthralgia, unspecified joint  -     Ambulatory referral to Rheumatology    Proximal leg weakness  -     CK  -     Aldolase  -     XR spine lumbar minimum 4 views non injury; Future  -     XR sacroiliac joints 3+ views; Future  -     XR hip/pelv 2-3 vws left if performed; Future  -     XR hip/pelv 2-3 vws right if performed; Future    Chronic bilateral low back pain without sciatica  -     XR spine lumbar minimum 4 views non injury; Future  -     XR sacroiliac joints 3+ views; Future  -     XR hip/pelv 2-3 vws left if performed; Future  -     XR hip/pelv 2-3 vws right if performed; Future    Encounter for screening for other viral diseases   -     Chronic Hepatitis Panel        Follow-up plan: 8 weeks       HPI  Estrellita Saravia is a 63 y o   male with type 2 diabetes, hypertension, GERD, S/P gastric bypass 2012, osteoporosis on Prolia, who presents for rheumatology consult by request of Severiano Pierson for polyarthralgia       Patient reports he has long-standing lower back pain, states he had lumbar fracture in 1970's, has had chronic back pain since that time and lower extremity weakness  Had a lumbar fusion at that point at L5 per his report  However has had progressive worsening of lower back pain and general stiffness through the hips and thighs over the past several months  Feels it was ongoing but then had his 1st Prolia shot in feels things worsened even more  Unsure if it is related but was unwilling to get repeat Prolia shots after that  Patient reports he first had prolia shot 8 months ago, decline further shots since then, feels it exacerbated things in terms of his stiffness and weakness in the hips and thighs    Pain right in the center of his lower back, stiffness in his hips and thighs which is most prominent after he is at rest and in the morning  Morning stiffness present in the hips, thighs and lower back and does seem to improve with movement, but admits he is very sedentary on a daily basis and so does not move much throughout the day for the most part so it is hard to say this stiffness ever really resolves  Denies any similar symptoms in the upper body or upper extremities  Feels he has had weakness in his legs for a long time but is unable to tell me if this specifically has worsened  Had a fracture in the left foot and also has flat feet  Follows with a podiatrist   States he has had bilateral foot issues ever since his surgery many years ago  Saw [facility] many many years ago  Denies photosensitivity, rashes, psoriasis, sicca symptoms, oral or nasal ulcers, alopecia, Raynaud's, h/o pericarditis or pleurisy, h/o blood clots  Patient also has osteoporosis noted on DEXA scan in June last year and was started Prolia by his family doctor in July 2020  Review of Systems  Review of Systems   Constitutional: Negative for chills, fatigue, fever and unexpected weight change  HENT: Negative for mouth sores and trouble swallowing  Eyes: Negative for pain and visual disturbance  +dry eyes   Respiratory: Negative for cough and shortness of breath  Cardiovascular: Negative for chest pain and leg swelling  Gastrointestinal: Negative for abdominal pain, blood in stool, constipation, diarrhea and nausea  Musculoskeletal: Positive for arthralgias, back pain, gait problem and myalgias (thighs)  Negative for joint swelling  Skin: Negative for color change and rash  Neurological: Positive for weakness (proximal legs )  Negative for numbness  Hematological: Negative for adenopathy  Psychiatric/Behavioral: Negative for sleep disturbance         Allergies  Allergies   Allergen Reactions    Augmentin [Amoxicillin-Pot Clavulanate] Diarrhea    Ciprofloxacin Diarrhea    Prolia [Denosumab]        Home Medications    Current Outpatient Medications:     amLODIPine (NORVASC) 5 mg tablet, TAKE 1 TABLET DAILY, Disp: 90 tablet, Rfl: 3    buPROPion (WELLBUTRIN XL) 300 mg 24 hr tablet, TAKE 1 TABLET DAILY, Disp: 90 tablet, Rfl: 3    calcium citrate-Vitamin D (CALCIUM CITRATE + D3) 200 mg-250 units, Take 1 tablet by mouth daily, Disp: , Rfl:     clindamycin (CLINDAGEL) 1 % gel, , Disp: , Rfl: 0    Icosapent Ethyl (Vascepa) 1 g CAPS, Take 2 pills in the morning and 2 pills at night, Disp: 360 capsule, Rfl: 1    Iron-Vitamin C (VITRON-C)  MG TABS, Take one pill twice daily, Disp: 60 tablet, Rfl: 1    Jardiance 25 MG TABS, TAKE 1 TABLET DAILY, Disp: 90 tablet, Rfl: 3    losartan (COZAAR) 100 MG tablet, Take 1 tablet (100 mg total) by mouth daily, Disp: 90 tablet, Rfl: 3    Multiple Vitamins-Minerals (CENTRUM ADULTS PO), Take 1 tablet by mouth daily, Disp: , Rfl:     pantoprazole (PROTONIX) 40 mg tablet, Take 1 tablet (40 mg total) by mouth daily, Disp: 90 tablet, Rfl: 4    PreviDent 0 2 % SOLN, RINSE WITH 10 milliliters for 1 MINUTE THEN SPIT USE AT BEDTIME, DO NOT SWALLOW, Disp: , Rfl:     traMADol-acetaminophen (ULTRACET) 37 5-325 mg per tablet, May Take 1 pill twice a day p r n  arthritis pain, Disp: 30 tablet, Rfl: 0    triamcinolone (KENALOG) 0 1 % cream, APPLY TO LEFT LEG SPOTS TWICE DAILY FOR UP TO 4 WEEKS, Disp: , Rfl:     Past Medical History  Past Medical History:   Diagnosis Date    Chronic kidney disease     Diabetes ([facility] )     LAST ASSESSED: 7/23/14    Edema     LAST ASSESSED:6/12/12    Hypertension     Morbid obesity due to excess calories (HCC)     LAST ASSESSED: 6/12/12    RBBB        Past Surgical History   Past Surgical History:   Procedure Laterality Date    BACK SURGERY      CHOLECYSTECTOMY      COLONOSCOPY      FOOT SURGERY      GASTRIC RESTRICTION SURGERY      GASTRIC STAPLING FOR MORBID OBESITY LAPAROSCOPY W/ [id number]    SHOULDER SURGERY      UVULOPALATOPLASTY Family History  No known family history of autoimmune or inflammatory diseases  Family History   Problem Relation Age of Onset   Spencer Buckley COPD Mother     Heart disease Mother     Mental illness Mother     Drug abuse Mother     Diabetes Father     Mental illness Father     Drug abuse Father     Lung cancer Father     Diabetes Sister        Social History  Occupation: disabled because of lower back issues   Social History     Substance and Sexual Activity   Alcohol Use No     Social History     Substance and Sexual Activity   Drug Use Yes    Types: Marijuana    Comment: everyday      Social History     Tobacco Use   Smoking Status Former Smoker    Types: Cigarettes    Quit date: 12    Years since quittin 2   Smokeless Tobacco Never Used       Objective:    Vitals:    21 0836   Pulse: 80   Weight: 108 kg (237 lb)   Height: 5' 9 5" (1 765 m)       Physical Exam  Constitutional:       General: He is not in acute distress  Appearance: He is well-developed  HENT:      Head: Normocephalic and atraumatic  Eyes:      General: Lids are normal  No scleral icterus  Conjunctiva/sclera: Conjunctivae normal    Neck:      Musculoskeletal: Neck supple  Pulmonary:      Effort: Pulmonary effort is normal  No tachypnea, accessory muscle usage or respiratory distress  Musculoskeletal:      Comments: No joint swelling or synovitis appreciated on exam   No reproducible tenderness  Skin:     General: Skin is dry  Findings: No rash  Neurological:      Mental Status: He is alert  Comments: 4/5 strength hip flexors; 5/5 deltoids    Psychiatric:         Behavior: Behavior normal  Behavior is cooperative  Imaging:   DEXA 2020:  RESULTS:   LUMBAR SPINE:  L1-L4:  BMD 1 125 gm/cm2  T-score 0 3  Z-score 1 0     LUMBAR SPINE L2 and L4 (average) :    BMD 1 093 gm/sq-cm  T-score is -0 3   Z-score is 0 5            LEFT TOTAL HIP:  BMD 0 930 gm/cm2  T-score -0 7  Z-score -0 2     LEFT FEMORAL NECK:  BMD 0 704 gm/cm2  T-score -1 7  Z-score -0 7     RIGHT FOREARM :  BMD 0 645 gm/sq-cm,  T-score is  -3 1  Z-score is  -2 2          IMPRESSION:  1   Based on the WHO classification, the T-score of -3 1 in the right forearm is consistent with OSTEOPOROSIS  Labs:   Component      Latest Ref Rng & Units 3/18/2021   Color, UA       Yellow   Clarity, UA       Clear   SL AMB SPECIFIC GRAVITY-URINE      1 003 - 1 030 >=1 030   pH, UA      4 5, 5 0, 5 5, 6 0, 6 5, 7 0, 7 5, 8 0 5 5   Leukocytes, UA      Negative Elevated glucose may cause decreased leukocyte values   See urine microscopic for [facility] result/ (A)   Nitrite, UA      Negative Positive (A)   POCT URINE PROTEIN      Negative mg/dl 30 (1+) (A)   Glucose, UA      Negative mg/dl >=1000 (1%) (A)   Ketones, UA      Negative mg/dl Negative   SL AMB POCT UROBILINOGEN      0 2, 1 0 E U /dl E U /dl 0 2   Bilirubin, UA      Negative Negative   Blood, UA      Negative Small (A)   RBC, UA      None Seen, 0-1, 1-2, 2-4, 0-5 /hpf 1-2   WBC, UA      None Seen, 0-1, 1-2, 0-5, 2-4 /hpf 4-10 (A)   Epithelial Cells      None Seen, Occasional /hpf Occasional   Bacteria, UA      None Seen, Occasional /hpf Moderate (A)   Hyaline Casts, UA      (none) /lpf 0-1 (A)   MUCUS THREADS      None Seen Occasional (A)   EXT Creatinine Urine      mg/dL 150 0   Protein Urine Random      mg/dL 47   Prot/Creat Ratio, Ur      0 00 - 0 10 0 31 (H)     Component      Latest Ref Rng & Units 3/18/2021   WBC      4 31 - 10 16 Thousand/uL 5 39   Red Blood Cell Count      3 88 - 5 62 Million/uL 5 34   Hemoglobin      12 0 - 17 0 g/dL 16 4   HCT      36 5 - 49 3 % 47 1   MCV      82 - 98 fL 88   MCH      26 8 - 34 3 pg 30 7   MCHC      31 4 - 37 4 g/dL 34 8   RDW      11 6 - 15 1 % 12 3   MPV      8 9 - 12 7 fL 11 5   Platelet Count      445 - 390 Thousands/uL 150   nRBC      /100 WBCs 0   Neutrophils %      43 - 75 % 50   Immat GRANS %      0 - 2 % 0   Lymphocytes Relative      14 - 44 % 34 Monocytes Relative      4 - 12 % 10   Eosinophils      0 - 6 % 5   Basophils Relative      0 - 1 % 1   Absolute Neutrophils      1 85 - 7 62 Thousands/µL 2 77   Immature Grans Absolute      0 00 - 0 20 Thousand/uL 0 01   Lymphocytes Absolute      0 60 - 4 47 Thousands/µL 1 82   Absolute Monocytes      0 17 - 1 22 Thousand/µL 0 51   Absolute Eosinophils      0 00 - 0 61 Thousand/µL 0 24   Basophils Absolute      0 00 - 0 10 Thousands/µL 0 04   Sodium      136 - 145 mmol/L 143   Potassium      3 5 - 5 3 mmol/L 3 9   Chloride      100 - 108 mmol/L 107   CO2      21 - 32 mmol/L 26   Anion Gap      4 - 13 mmol/L 10   BUN      5 - 25 mg/dL 13   Creatinine      0 60 - 1 30 mg/dL 0 81   GLUCOSE FASTING      65 - 99 mg/dL 114 (H)   Calcium      8 3 - 10 1 mg/dL 8 5   eGFR      ml/min/1 73sq m 95     Component      Latest Ref Rng & Units 11/12/2020   Sodium      136 - 145 mmol/L 142   Potassium      3 5 - 5 3 mmol/L 3 8   Chloride      100 - 108 mmol/L 109 (H)   CO2      21 - 32 mmol/L 26   Anion Gap      4 - 13 mmol/L 7   BUN      5 - 25 mg/dL 10   Creatinine      0 60 - 1 30 mg/dL 0 83   GLUCOSE FASTING      65 - 99 mg/dL 117 (H)   Calcium      8 3 - 10 1 mg/dL 8 2 (L)   AST      5 - 45 U/L 16   ALT      12 - 78 U/L 35   Alkaline Phosphatase      46 - 116 U/L 76   Total Protein      6 4 - 8 2 g/dL 6 7   Albumin      3 5 - 5 0 g/dL 3 8   TOTAL BILIRUBIN      0 20 - 1 00 mg/dL 0 90   eGFR      ml/min/1 73sq m 94   Lyme total antibody      0 00 - 119 2   RHEUMATOID FACTOR      Negative Negative   ANTI-NUCLEAR ANTIBODY (ANAID)      Negative Negative      Component      Latest Ref Rng & Units 3/9/2020   WBC      4 31 - 10 16 Thousand/uL 6 69   Red Blood Cell Count      3 88 - 5 62 Million/uL 5 44   Hemoglobin      12 0 - 17 0 g/dL 15 8   HCT      36 5 - 49 3 % 48 6   MCV      82 - 98 fL 89   MCH      26 8 - 34 3 pg 29 0   MCHC      31 4 - 37 4 g/dL 32 5   RDW      11 6 - 15 1 % 13 2   MPV      8 9 - 12 7 fL 10 9   Platelet Count      149 - 390 Thousands/uL 205   nRBC      /100 WBCs 0   Neutrophils %      43 - 75 % 63   Immat GRANS %      0 - 2 % 0   Lymphocytes Relative      14 - 44 % 23   Monocytes Relative      4 - 12 % 10   Eosinophils      0 - 6 % 3   Basophils Relative      0 - 1 % 1   Absolute Neutrophils      1 85 - 7 62 Thousands/µL 4 20   Immature Grans Absolute      0 00 - 0 20 Thousand/uL 0 02   Lymphocytes Absolute      0 60 - 4 47 Thousands/µL 1 51   Absolute Monocytes      0 17 - 1 22 Thousand/µL 0 69   Absolute Eosinophils      0 00 - 0 61 Thousand/µL 0 21   Basophils Absolute      0 00 - 0 10 Thousands/µL 0 06   TSH 3RD GENERATON      0 358 - 3 740 uIU/mL 1 001

## 2021-04-05 ENCOUNTER — TELEPHONE (OUTPATIENT)
Dept: OBGYN CLINIC | Facility: HOSPITAL | Age: 66
End: 2021-04-05

## 2021-04-05 ENCOUNTER — OFFICE VISIT (OUTPATIENT)
Dept: RHEUMATOLOGY | Facility: CLINIC | Age: 66
End: 2021-04-05
Payer: MEDICARE

## 2021-04-05 VITALS — WEIGHT: 237 LBS | HEIGHT: 70 IN | BODY MASS INDEX: 33.93 KG/M2 | HEART RATE: 80 BPM

## 2021-04-05 DIAGNOSIS — R29.898 PROXIMAL LEG WEAKNESS: ICD-10-CM

## 2021-04-05 DIAGNOSIS — Z11.59 ENCOUNTER FOR SCREENING FOR OTHER VIRAL DISEASES: ICD-10-CM

## 2021-04-05 DIAGNOSIS — G89.29 CHRONIC BILATERAL LOW BACK PAIN WITHOUT SCIATICA: ICD-10-CM

## 2021-04-05 DIAGNOSIS — M25.50 ARTHRALGIA, UNSPECIFIED JOINT: ICD-10-CM

## 2021-04-05 DIAGNOSIS — M54.50 CHRONIC BILATERAL LOW BACK PAIN WITHOUT SCIATICA: ICD-10-CM

## 2021-04-05 DIAGNOSIS — M25.50 POLYARTHRALGIA: Primary | ICD-10-CM

## 2021-04-05 PROCEDURE — 99205 OFFICE O/P NEW HI 60 MIN: CPT | Performed by: INTERNAL MEDICINE

## 2021-04-05 NOTE — TELEPHONE ENCOUNTER
Patient sees Dr Esa Arevalo  Patient is calling stating he didn't get any paperwork to have bloodwork done  I asked if he was going to a [facility] and he stated yes  I advised they will be able to see it in his chart

## 2021-04-21 ENCOUNTER — IMMUNIZATIONS (OUTPATIENT)
Dept: FAMILY MEDICINE CLINIC | Facility: HOSPITAL | Age: 66
End: 2021-04-21

## 2021-04-21 DIAGNOSIS — Z23 ENCOUNTER FOR IMMUNIZATION: Primary | ICD-10-CM

## 2021-04-21 PROCEDURE — 0002A SARS-COV-2 / COVID-19 MRNA VACCINE (PFIZER-BIONTECH) 30 MCG: CPT

## 2021-04-21 PROCEDURE — 91300 SARS-COV-2 / COVID-19 MRNA VACCINE (PFIZER-BIONTECH) 30 MCG: CPT

## 2021-05-11 ENCOUNTER — TRANSCRIBE ORDERS (OUTPATIENT)
Dept: ADMINISTRATIVE | Facility: HOSPITAL | Age: 66
End: 2021-05-11

## 2021-05-11 ENCOUNTER — HOSPITAL ENCOUNTER (OUTPATIENT)
Dept: RADIOLOGY | Facility: HOSPITAL | Age: 66
Discharge: HOME | End: 2021-05-11
Payer: MEDICARE

## 2021-05-11 ENCOUNTER — APPOINTMENT (OUTPATIENT)
Dept: LAB | Facility: HOSPITAL | Age: 66
End: 2021-05-11
Payer: MEDICARE

## 2021-05-11 DIAGNOSIS — M54.50 CHRONIC BILATERAL LOW BACK PAIN WITHOUT SCIATICA: ICD-10-CM

## 2021-05-11 DIAGNOSIS — I10 ESSENTIAL HYPERTENSION, MALIGNANT: ICD-10-CM

## 2021-05-11 DIAGNOSIS — I10 ESSENTIAL HYPERTENSION, MALIGNANT: Primary | ICD-10-CM

## 2021-05-11 DIAGNOSIS — G89.29 CHRONIC BILATERAL LOW BACK PAIN WITHOUT SCIATICA: ICD-10-CM

## 2021-05-11 DIAGNOSIS — R29.898 PROXIMAL LEG WEAKNESS: ICD-10-CM

## 2021-05-11 LAB
ANION GAP SERPL CALCULATED.3IONS-SCNC: 10 MMOL/L (ref 4–13)
BUN SERPL-MCNC: 16 MG/DL (ref 5–25)
CALCIUM SERPL-MCNC: 8.5 MG/DL (ref 8.3–10.1)
CHLORIDE SERPL-SCNC: 108 MMOL/L (ref 100–108)
CK MB SERPL-MCNC: 1.3 % (ref 0–2.5)
CK MB SERPL-MCNC: 2.3 NG/ML (ref 0–5)
CK SERPL-CCNC: 180 U/L (ref 39–308)
CO2 SERPL-SCNC: 27 MMOL/L (ref 21–32)
CREAT SERPL-MCNC: 0.86 MG/DL (ref 0.6–1.3)
CRP SERPL QL: 4.7 MG/L
ERYTHROCYTE [SEDIMENTATION RATE] IN BLOOD: 1 MM/HOUR (ref 0–19)
GFR SERPL CREATININE-BSD FRML MDRD: 92 ML/MIN/1.73SQ M
GLUCOSE P FAST SERPL-MCNC: 117 MG/DL (ref 65–99)
HBV CORE AB SER QL: NORMAL
HBV CORE IGM SER QL: NORMAL
HBV SURFACE AG SER QL: NORMAL
HCV AB SER QL: NORMAL
POTASSIUM SERPL-SCNC: 3.8 MMOL/L (ref 3.5–5.3)
SODIUM SERPL-SCNC: 145 MMOL/L (ref 136–145)

## 2021-05-11 PROCEDURE — 72202 X-RAY EXAM SI JOINTS 3/> VWS: CPT

## 2021-05-11 PROCEDURE — 73523 X-RAY EXAM HIPS BI 5/> VIEWS: CPT

## 2021-05-11 PROCEDURE — 86705 HEP B CORE ANTIBODY IGM: CPT | Performed by: INTERNAL MEDICINE

## 2021-05-11 PROCEDURE — 85652 RBC SED RATE AUTOMATED: CPT | Performed by: INTERNAL MEDICINE

## 2021-05-11 PROCEDURE — 80048 BASIC METABOLIC PNL TOTAL CA: CPT

## 2021-05-11 PROCEDURE — 86200 CCP ANTIBODY: CPT | Performed by: INTERNAL MEDICINE

## 2021-05-11 PROCEDURE — 82553 CREATINE MB FRACTION: CPT | Performed by: INTERNAL MEDICINE

## 2021-05-11 PROCEDURE — 86140 C-REACTIVE PROTEIN: CPT | Performed by: INTERNAL MEDICINE

## 2021-05-11 PROCEDURE — 86803 HEPATITIS C AB TEST: CPT | Performed by: INTERNAL MEDICINE

## 2021-05-11 PROCEDURE — 86704 HEP B CORE ANTIBODY TOTAL: CPT | Performed by: INTERNAL MEDICINE

## 2021-05-11 PROCEDURE — 82085 ASSAY OF ALDOLASE: CPT | Performed by: INTERNAL MEDICINE

## 2021-05-11 PROCEDURE — 87340 HEPATITIS B SURFACE AG IA: CPT | Performed by: INTERNAL MEDICINE

## 2021-05-11 PROCEDURE — 72110 X-RAY EXAM L-2 SPINE 4/>VWS: CPT

## 2021-05-11 PROCEDURE — 86235 NUCLEAR ANTIGEN ANTIBODY: CPT | Performed by: INTERNAL MEDICINE

## 2021-05-11 PROCEDURE — 82550 ASSAY OF CK (CPK): CPT | Performed by: INTERNAL MEDICINE

## 2021-05-11 PROCEDURE — 36415 COLL VENOUS BLD VENIPUNCTURE: CPT | Performed by: INTERNAL MEDICINE

## 2021-05-12 ENCOUNTER — TELEPHONE (OUTPATIENT)
Dept: FAMILY MEDICINE CLINIC | Facility: CLINIC | Age: 66
End: 2021-05-12

## 2021-05-12 LAB
ALDOLASE SERPL-CCNC: 6 U/L (ref 3.3–10.3)
ENA SS-A AB SER-ACNC: <0.2 AI (ref 0–0.9)
ENA SS-B AB SER-ACNC: <0.2 AI (ref 0–0.9)

## 2021-05-12 NOTE — TELEPHONE ENCOUNTER
Ramiro Spencer this patient was on Prolia and he did not want to get anymore right now he is not on anything for his bones

## 2021-05-13 DIAGNOSIS — F32.A DEPRESSION, UNSPECIFIED DEPRESSION TYPE: ICD-10-CM

## 2021-05-13 RX ORDER — BUPROPION HYDROCHLORIDE 300 MG/1
TABLET ORAL
Qty: 90 TABLET | Refills: 1 | Status: SHIPPED | OUTPATIENT
Start: 2021-05-13 | End: 2021-11-15

## 2021-05-13 NOTE — TELEPHONE ENCOUNTER
He could always explore additional treatment options with Rheumatology  But I do recommend  him receiving some form of treatment

## 2021-05-14 LAB — CCP AB SER IA-ACNC: <0.4

## 2021-05-17 ENCOUNTER — TELEPHONE (OUTPATIENT)
Dept: GASTROENTEROLOGY | Facility: CLINIC | Age: 66
End: 2021-05-17

## 2021-05-17 ENCOUNTER — PREP FOR PROCEDURE (OUTPATIENT)
Dept: GASTROENTEROLOGY | Facility: CLINIC | Age: 66
End: 2021-05-17

## 2021-05-17 DIAGNOSIS — K22.70 BARRETT'S ESOPHAGUS WITHOUT DYSPLASIA: Primary | ICD-10-CM

## 2021-05-17 NOTE — TELEPHONE ENCOUNTER
Nalini Land patient - Patient called and was upset thinking we did not want to take care of his medical needs, thinking his EGD was scheduled for May, when notes from [facility] say July of 2021, as I informed the patient  Please RTRN his call ([phone]) to schedule him some time in July as the patient will be expecting  this  Call    Thx

## 2021-05-17 NOTE — TELEPHONE ENCOUNTER
Spoke with patient and he is due in July for his EGD  He scheduled for 7/1/21 and I mailed the prep instructions to him

## 2021-05-17 NOTE — TELEPHONE ENCOUNTER
Dr Fletcher Beaver - patient is returning our call  To see when he is schedule?  Please call Sue Bruno at [phone] ty

## 2021-05-27 NOTE — TELEPHONE ENCOUNTER
Spoke with patient and he said he went to see rheumatologist back in April and really didn't do anything for him  He had some labs done and scans  He was saying the everything came back good as far as results  He is saying he was told that their is really nothing that can be done for him with rheumatologist  He was told not to f/u since labs were good  He is not sure where to go now

## 2021-06-03 NOTE — TELEPHONE ENCOUNTER
Okay, can we please have him make follow-up within the next couple weeks to discuss possible treatment options for his osteoporosis?   Thank you

## 2021-06-23 ENCOUNTER — TELEPHONE (OUTPATIENT)
Dept: FAMILY MEDICINE CLINIC | Facility: CLINIC | Age: 66
End: 2021-06-23

## 2021-06-23 NOTE — TELEPHONE ENCOUNTER
Patient called advising matthew cpap machine sent a recall telling him to stop using this machine  Patient wants to discuss this matter with you during his appt tomorrow

## 2021-06-24 ENCOUNTER — OFFICE VISIT (OUTPATIENT)
Dept: FAMILY MEDICINE CLINIC | Facility: CLINIC | Age: 66
End: 2021-06-24
Payer: MEDICARE

## 2021-06-24 VITALS
RESPIRATION RATE: 16 BRPM | OXYGEN SATURATION: 97 % | HEIGHT: 70 IN | SYSTOLIC BLOOD PRESSURE: 130 MMHG | BODY MASS INDEX: 35.02 KG/M2 | WEIGHT: 244.6 LBS | DIASTOLIC BLOOD PRESSURE: 82 MMHG | HEART RATE: 99 BPM

## 2021-06-24 DIAGNOSIS — E66.01 OBESITY, MORBID (HCC): ICD-10-CM

## 2021-06-24 DIAGNOSIS — M81.0 OSTEOPOROSIS, UNSPECIFIED OSTEOPOROSIS TYPE, UNSPECIFIED PATHOLOGICAL FRACTURE PRESENCE: ICD-10-CM

## 2021-06-24 DIAGNOSIS — G47.33 OSA (OBSTRUCTIVE SLEEP APNEA): ICD-10-CM

## 2021-06-24 DIAGNOSIS — E11.29 TYPE 2 DIABETES MELLITUS WITH MICROALBUMINURIA, WITHOUT LONG-TERM CURRENT USE OF INSULIN (HCC): ICD-10-CM

## 2021-06-24 DIAGNOSIS — I10 ESSENTIAL HYPERTENSION: Primary | ICD-10-CM

## 2021-06-24 DIAGNOSIS — R80.9 TYPE 2 DIABETES MELLITUS WITH MICROALBUMINURIA, WITHOUT LONG-TERM CURRENT USE OF INSULIN (HCC): ICD-10-CM

## 2021-06-24 PROCEDURE — 99214 OFFICE O/P EST MOD 30 MIN: CPT | Performed by: NURSE PRACTITIONER

## 2021-06-24 NOTE — ASSESSMENT & PLAN NOTE
Lab Results   Component Value Date    HGBA1C 5 3 03/22/2021     Well controlled  To continue current diabetic regimen  Counseled the importance of low carb diet and daily physical activity as tolerated  Foot exam completed today  To keep upcoming appointment as scheduled in 3 months or sooner if needed

## 2021-06-24 NOTE — PROGRESS NOTES
Assessment/Plan:    Diabetes mellitus, type 2 ([facility] )    Lab Results   Component Value Date    HGBA1C 5 3 03/22/2021     Well controlled  To continue current diabetic regimen  Counseled the importance of low carb diet and daily physical activity as tolerated  Foot exam completed today  To keep upcoming appointment as scheduled in 3 months or sooner if needed  Hypertension    Blood pressure stable  To continue current antihypertensive regimen  Counseled the importance of reducing BMI and low-sodium diet  Advised to begin checking blood pressure while at home  Osteoporosis   To resume Prolia injections  To repeat BMP 2 weeks after injection  Obesity, morbid ([facility] )    Counseled the importance low-impact activities such as swimming or using stationary bike to help reduce BMI  Diagnoses and all orders for this visit:    Essential hypertension    Osteoporosis, unspecified osteoporosis type, unspecified pathological fracture presence  -     Basic metabolic panel; Future    DOE (obstructive sleep apnea)  -     Ambulatory referral to Sleep Medicine; Future    Type 2 diabetes mellitus with microalbuminuria, without long-term current use of insulin (HCC)    Obesity, morbid (HCC)          Subjective:      Patient ID: Linda Carias is a 63 y o  male  Haile Nguyen presents for a follow-up  He would like to discuss options for treatment of osteoporosis  He had 1 Prolia injection in the past but stopped as he felt it worsened his joint pain  In regards to his hypertension, he takes his antihypertensives as prescribed  He tries to monitor his sodium  Haile Nguyen does not exercise or monitors blood pressure while at home  Denies headache, chest pain, lower extremity edema, or dizziness        The following portions of the patient's history were reviewed and updated as appropriate:   He   Patient Active Problem List    Diagnosis Date Noted    Osteoporosis 03/22/2021    Back muscle spasm 09/30/2020    Hypertension 02/06/2019    Obesity, morbid ([facility] ) 02/06/2019    Malabsorption 02/06/2018    Persistent proteinuria 09/28/2017    Diabetes mellitus, type 2 ([facility] ) 04/27/2017     Current Outpatient Medications   Medication Sig Dispense Refill    amLODIPine (NORVASC) 5 mg tablet TAKE 1 TABLET DAILY 90 tablet 3    buPROPion (WELLBUTRIN XL) 300 mg 24 hr tablet TAKE 1 TABLET DAILY 90 tablet 1    calcium citrate-Vitamin D (CALCIUM CITRATE + D3) 200 mg-250 units Take 1 tablet by mouth daily      clindamycin (CLINDAGEL) 1 % gel   0    Icosapent Ethyl (Vascepa) 1 g CAPS Take 2 pills in the morning and 2 pills at night 360 capsule 1    Iron-Vitamin C (VITRON-C)  MG TABS Take one pill twice daily 60 tablet 1    Jardiance 25 MG TABS TAKE 1 TABLET DAILY 90 tablet 3    losartan (COZAAR) 100 MG tablet Take 1 tablet (100 mg total) by mouth daily 90 tablet 3    Multiple Vitamins-Minerals (CENTRUM ADULTS PO) Take 1 tablet by mouth daily      pantoprazole (PROTONIX) 40 mg tablet Take 1 tablet (40 mg total) by mouth daily 90 tablet 4    PreviDent 0 2 % SOLN RINSE WITH 10 milliliters for 1 MINUTE THEN SPIT USE AT BEDTIME, DO NOT SWALLOW      traMADol-acetaminophen (ULTRACET) 37 5-325 mg per tablet May Take 1 pill twice a day p r n  arthritis pain 30 tablet 0    triamcinolone (KENALOG) 0 1 % cream APPLY TO LEFT LEG SPOTS TWICE DAILY FOR UP TO 4 WEEKS       No current facility-administered medications for this visit  He is allergic to augmentin [amoxicillin-pot clavulanate], ciprofloxacin, and prolia [denosumab]       Review of Systems   Constitutional: Negative  Respiratory: Negative  Cardiovascular: Negative  Gastrointestinal: Negative  Musculoskeletal: Positive for arthralgias and gait problem  Psychiatric/Behavioral: Negative            /82   Pulse 99   Resp 16   Ht 5' 9 5" (1 765 m)   Wt 111 kg (244 lb 9 6 oz)   SpO2 97%   BMI 35 60 kg/m²     Objective:     Physical Exam  Vitals and nursing note reviewed  Constitutional:       General: He is not in acute distress  Appearance: Normal appearance  He is well-developed  He is obese  He is not ill-appearing, toxic-appearing or diaphoretic  HENT:      Head: Normocephalic and atraumatic  Eyes:      Conjunctiva/sclera: Conjunctivae normal    Cardiovascular:      Rate and Rhythm: Normal rate and regular rhythm  Pulses: no weak pulses          Dorsalis pedis pulses are 2+ on the right side and 2+ on the left side  Heart sounds: Normal heart sounds  No murmur heard  Pulmonary:      Effort: Pulmonary effort is normal  No respiratory distress  Breath sounds: Normal breath sounds  No wheezing or rales  Chest:      Chest wall: No tenderness  Musculoskeletal:      Cervical back: Neck supple  Feet:      Right foot:      Skin integrity: Callus present  Left foot:      Skin integrity: Callus present  Neurological:      General: No focal deficit present  Mental Status: He is alert and oriented to person, place, and time  Psychiatric:         Mood and Affect: Mood normal          Behavior: Behavior normal          Thought Content: Thought content normal          Judgment: Judgment normal            Patient's shoes and socks removed  Right Foot/Ankle   Right Foot Inspection  Skin Exam: callus and callus                          Toe Exam: right toe deformity  Sensory       Monofilament testing: diminished  Vascular  Capillary refills: < 3 seconds  The right DP pulse is 2+  Left Foot/Ankle  Left Foot Inspection  Skin Exam: callus                                         Sensory       Monofilament: intact  Vascular  Capillary refills: < 3 seconds  The left DP pulse is 2+  Assign Risk Category:  Deformity present; Loss of protective sensation; No weak pulses       Risk: 3    BMI Counseling: Body mass index is 35 6 kg/m²   The BMI is above normal  Nutrition recommendations include decreasing overall calorie intake, 3-5 servings of fruits/vegetables daily, reducing fast food intake, consuming healthier snacks, decreasing soda and/or juice intake and moderation in carbohydrate intake  Exercise recommendations include exercising 3-5 times per week

## 2021-06-24 NOTE — ASSESSMENT & PLAN NOTE
Blood pressure stable  To continue current antihypertensive regimen  Counseled the importance of reducing BMI and low-sodium diet  Advised to begin checking blood pressure while at home

## 2021-06-24 NOTE — ASSESSMENT & PLAN NOTE
Counseled the importance low-impact activities such as swimming or using stationary bike to help reduce BMI

## 2021-06-24 NOTE — PATIENT INSTRUCTIONS
Weight Management   AMBULATORY CARE:   Why it is important to manage your weight:  Being overweight increases your risk of health conditions such as heart disease, high blood pressure, type 2 diabetes, and certain types of cancer  It can also increase your risk for osteoarthritis, sleep apnea, and other respiratory problems  Aim for a slow, steady weight loss  Even a small amount of weight loss can lower your risk of health problems  How to lose weight safely:  A safe and healthy way to lose weight is to eat fewer calories and get regular exercise  · You can lose up about 1 pound a week by decreasing the number of calories you eat by 500 calories each day  You can decrease calories by eating smaller portion sizes or by cutting out high-calorie foods  Read labels to find out how many calories are in the foods you eat  · You can also burn calories with exercise such as walking, swimming, or biking  You will be more likely to keep weight off if you make these changes part of your lifestyle  Exercise at least 30 minutes per day on most days of the week  You can also fit in more physical activity by taking the stairs instead of the elevator or parking farther away from stores  Ask your healthcare provider about the best exercise plan for you  Healthy meal plan for weight management:  A healthy meal plan includes a variety of foods, contains fewer calories, and helps you stay healthy  A healthy meal plan includes the following:     · Eat whole-grain foods more often  A healthy meal plan should contain fiber  Fiber is the part of grains, fruits, and vegetables that is not broken down by your body  Whole-grain foods are healthy and provide extra fiber in your diet  Some examples of whole-grain foods are whole-wheat breads and pastas, oatmeal, brown rice, and bulgur  · Eat a variety of vegetables every day  Include dark, leafy greens such as spinach, kale, lennie greens, and mustard greens   Eat yellow and orange vegetables such as carrots, sweet potatoes, and winter squash  · Eat a variety of fruits every day  Choose fresh or canned fruit (canned in its own juice or light syrup) instead of juice  Fruit juice has very little or no fiber  · Eat low-fat dairy foods  Drink fat-free (skim) milk or 1% milk  Eat fat-free yogurt and low-fat cottage cheese  Try low-fat cheeses such as mozzarella and other reduced-fat cheeses  · Choose meat and other protein foods that are low in fat  Choose beans or other legumes such as split peas or lentils  Choose fish, skinless poultry (chicken or turkey), or lean cuts of red meat (beef or pork)  Before you cook meat or poultry, cut off any visible fat  · Use less fat and oil  Try baking foods instead of frying them  Add less fat, such as margarine, sour cream, regular salad dressing and mayonnaise to foods  Eat fewer high-fat foods  Some examples of high-fat foods include french fries, doughnuts, ice cream, and cakes  · Eat fewer sweets  Limit foods and drinks that are high in sugar  This includes candy, cookies, regular soda, and sweetened drinks  Ways to decrease calories:   · Eat smaller portions  ? Use a small plate with smaller servings  ? Do not eat second helpings  ? When you eat at a restaurant, ask for a box and place half of your meal in the box before you eat  ? Share an entrée with someone else  · Replace high-calorie snacks with healthy, low-calorie snacks  ? Choose fresh fruit, vegetables, fat-free rice cakes, or air-popped popcorn instead of potato chips, nuts, or chocolate  ? Choose water or calorie-free drinks instead of soda or sweetened drinks  · Do not shop for groceries when you are hungry  You may be more likely to make unhealthy food choices  Take a grocery list of healthy foods and shop after you have eaten  · Eat regular meals  Do not skip meals  Skipping meals can lead to overeating later in the day   This can make it harder for you to lose weight  Eat a healthy snack in place of a meal if you do not have time to eat a regular meal  Talk with a dietitian to help you create a meal plan and schedule that is right for you  Other things to consider as you try to lose weight:   · Be aware of situations that may give you the urge to overeat, such as eating while watching television  Find ways to avoid these situations  For example, read a book, go for a walk, or do crafts  · Meet with a weight loss support group or friends who are also trying to lose weight  This may help you stay motivated to continue working on your weight loss goals  © Copyright [address] Information is for End User's use only and may not be sold, redistributed or otherwise used for commercial purposes  All illustrations and images included in CareNotes® are the copyrighted property of [employer]  or [facility]   The above information is an  only  It is not intended as medical advice for individual conditions or treatments  Talk to your doctor, nurse or pharmacist before following any medical regimen to see if it is safe and effective for you  Calorie Counting Diet   WHAT YOU NEED TO KNOW:   What is a calorie counting diet? It is a meal plan based on counting calories each day to reach a healthy body weight  You will need to eat fewer calories if you are trying to lose weight  Weight loss may decrease your risk for certain health problems or improve your health if you have health problems  Some of these health problems include heart disease, high blood pressure, and diabetes  What foods should I avoid? Your dietitian will tell you if you need to avoid certain foods based on your body weight and health condition  You may need to avoid high-fat foods if you are at risk for or have heart disease  You may need to eat fewer foods from the breads and starches food group if you have diabetes     How many calories are in foods? The following is a list of foods and drinks with the approximate number of calories in each  Check the food label to find the exact number of calories  A dietitian can tell you how many calories you should have from each food group each day  · Carbohydrate:      ? ½ of a 3-inch bagel, 1 slice of bread, or ½ of a hamburger bun or hot dog bun (80)    ? 1 (8-inch) flour tortilla or ½ cup of cooked rice (100)    ? 1 (6-inch) corn tortilla (80)    ? 1 (6-inch) pancake or 1 cup of bran flakes cereal (110)    ? ½ cup of cooked cereal (80)    ? ½ cup of cooked pasta (85)    ? 1 ounce of pretzels (100)    ? 3 cups of air-popped popcorn without butter or oil (80)    · Dairy:      ? 1 cup of skim or 1% milk (90)    ? 1 cup of 2% milk (120)    ? 1 cup of whole milk (160)    ? 1 cup of 2% chocolate milk (220)    ? 1 ounce of low-fat cheese with 3 grams of fat per ounce (70)    ? 1 ounce of cheddar cheese (114)    ? ½ cup of 1% fat cottage cheese (80)    ? 1 cup of plain or sugar-free, fat-free yogurt (90)    · Protein foods:      ? 3 ounces of fish (not breaded or fried) (95)    ? 3 ounces of breaded, fried fish (195)    ? ¾ cup of tuna canned in water (105)    ? 3 ounces of chicken breast without skin (105)    ? 1 fried chicken breast with skin (350)    ? ¼ cup of fat free egg substitute (40)    ? 1 large egg (75)    ? 3 ounces of lean beef or pork (165)    ? 3 ounces of fried pork chop or ham (185)    ? ½ cup of cooked dried beans, such as kidney, metz, lentils, or navy (115)    ? 3 ounces of bologna or lunch meat (225)    ? 2 links of breakfast sausage (140)    · Vegetables:      ? ½ cup of sliced mushrooms (10)    ? 1 cup of salad greens, such as lettuce, spinach, or roslyn (15)    ? ½ cup of steamed asparagus (20)    ? ½ cup of cooked summer squash, zucchini squash, or green or wax beans (25)    ? 1 cup of broccoli or cauliflower florets, or 1 medium tomato (25)    ?  1 large raw carrot or ½ cup of cooked carrots (40)    ? ? of a medium cucumber or 1 stalk of celery (5)    ? 1 small baked potato (160)    ? 1 cup of breaded, fried vegetables (230)    · Fruit:      ? 1 (6-inch) banana (55)     ? ½ of a 4-inch grapefruit (55)    ? 15 grapes (60)    ? 1 medium orange or apple (70)    ? 1 large peach (65)    ? 1 cup of fresh pineapple chunks (75)    ? 1 cup of melon cubes (50)    ? 1¼ cups of whole strawberries (45)    ? ½ cup of fruit canned in juice (55)    ? ½ cup of fruit canned in heavy syrup (110)    ? ? cup of raisins (130)    ? ½ cup of unsweetened fruit juice (60)    ? ½ cup of grape, cranberry, or prune juice (90)    · Fat:      ? 10 peanuts or 2 teaspoons of peanut butter (55)    ? 2 tablespoons of avocado or 1 tablespoon of regular salad dressing (45)    ? 2 slices of chan (90)    ? 1 teaspoon of oil, such as safflower, canola, corn, or olive oil (45)    ? 2 teaspoons of low-fat margarine, or 1 tablespoon of low-fat mayonnaise (50)    ? 1 teaspoon of regular margarine (40)    ? 1 tablespoon of regular mayonnaise (135)    ? 1 tablespoon of cream cheese or 2 tablespoons of low-fat cream cheese (45)    ? 2 tablespoons of vegetable shortening (215)    · Dessert and sweets:      ? 8 animal crackers or 5 vanilla wafers (80)    ? 1 frozen fruit juice bar (80)    ? ½ cup of ice milk or low-fat frozen yogurt (90)    ? ½ cup of sherbet or sorbet (125)    ? ½ cup of sugar-free pudding or custard (60)    ? ½ cup of ice cream (140)    ? ½ cup of pudding or custard (175)    ? 1 (2-inch) square chocolate brownie (185)    · Combination foods:      ? Bean burrito made with an 8-inch tortilla, without cheese (275)    ? Chicken breast sandwich with lettuce and tomato (325)    ? 1 cup of chicken noodle soup (60)    ? 1 beef taco (175)    ? Regular hamburger with lettuce and tomato (310)    ? Regular cheeseburger with lettuce and tomato (410)     ? ¼ of a 12-inch cheese pizza (280)    ?  Fried fish sandwich with lettuce and tomato (425)    ? Hot dog and bun (275)    ? 1½ cups of macaroni and cheese (310)    ? Taco salad with a fried tortilla shell (870)    · Low-calorie foods:      ? 1 tablespoon of ketchup or 1 tablespoon of fat free sour cream (15)    ? 1 teaspoon of mustard (5)    ? ¼ cup of salsa (20)    ? 1 large dill pickle (15)    ? 1 tablespoon of fat free salad dressing (10)    ? 2 teaspoons of low-sugar, light jam or jelly, or 1 tablespoon of sugar-free syrup (15)    ? 1 sugar-free popsicle (15)    ? 1 cup of club soda, seltzer water, or diet soda (0)    CARE AGREEMENT:   You have the right to help plan your care  Discuss treatment options with your healthcare provider to decide what care you want to receive  You always have the right to refuse treatment  The above information is an  only  It is not intended as medical advice for individual conditions or treatments  Talk to your doctor, nurse or pharmacist before following any medical regimen to see if it is safe and effective for you  © Copyright [address] [facility] Information is for End User's use only and may not be sold, redistributed or otherwise used for commercial purposes   All illustrations and images included in CareNotes® are the copyrighted property of A D A M , Inc  or [address] [place], [place] [address]

## 2021-06-25 ENCOUNTER — TELEPHONE (OUTPATIENT)
Dept: FAMILY MEDICINE CLINIC | Facility: CLINIC | Age: 66
End: 2021-06-25

## 2021-06-25 NOTE — TELEPHONE ENCOUNTER
Patient called advising central scheduling called and advised him he needs a sleep study not a consultation so can the order be changed to a sleep study?

## 2021-06-28 NOTE — TELEPHONE ENCOUNTER
There is no option for that  It is either consult or consult in study  I would like him to become connected with sleep medicine so ever like the order to remain as it is

## 2021-06-30 ENCOUNTER — TELEPHONE (OUTPATIENT)
Dept: GASTROENTEROLOGY | Facility: HOSPITAL | Age: 66
End: 2021-06-30

## 2021-07-01 ENCOUNTER — ANESTHESIA (OUTPATIENT)
Dept: GASTROENTEROLOGY | Facility: HOSPITAL | Age: 66
End: 2021-07-01

## 2021-07-01 ENCOUNTER — ANESTHESIA EVENT (OUTPATIENT)
Dept: GASTROENTEROLOGY | Facility: HOSPITAL | Age: 66
End: 2021-07-01

## 2021-07-01 ENCOUNTER — HOSPITAL ENCOUNTER (OUTPATIENT)
Dept: GASTROENTEROLOGY | Facility: HOSPITAL | Age: 66
Setting detail: OUTPATIENT SURGERY
Discharge: HOME | End: 2021-07-01
Attending: INTERNAL MEDICINE | Admitting: INTERNAL MEDICINE
Payer: MEDICARE

## 2021-07-01 VITALS
WEIGHT: 243.39 LBS | TEMPERATURE: 97.6 F | HEIGHT: 70 IN | SYSTOLIC BLOOD PRESSURE: 123 MMHG | OXYGEN SATURATION: 98 % | BODY MASS INDEX: 34.84 KG/M2 | RESPIRATION RATE: 16 BRPM | HEART RATE: 64 BPM | DIASTOLIC BLOOD PRESSURE: 75 MMHG

## 2021-07-01 DIAGNOSIS — K22.70 BARRETT'S ESOPHAGUS WITHOUT DYSPLASIA: ICD-10-CM

## 2021-07-01 LAB — GLUCOSE SERPL-MCNC: 113 MG/DL (ref 65–140)

## 2021-07-01 PROCEDURE — 88305 TISSUE EXAM BY PATHOLOGIST: CPT | Performed by: PATHOLOGY

## 2021-07-01 PROCEDURE — 43239 EGD BIOPSY SINGLE/MULTIPLE: CPT | Performed by: INTERNAL MEDICINE

## 2021-07-01 PROCEDURE — 82948 REAGENT STRIP/BLOOD GLUCOSE: CPT

## 2021-07-01 RX ORDER — SODIUM CHLORIDE, SODIUM LACTATE, POTASSIUM CHLORIDE, CALCIUM CHLORIDE 600; 310; 30; 20 MG/100ML; MG/100ML; MG/100ML; MG/100ML
125 INJECTION, SOLUTION INTRAVENOUS CONTINUOUS
Status: DISCONTINUED | OUTPATIENT
Start: 2021-07-01 | End: 2021-07-05 | Stop reason: HOSPADM

## 2021-07-01 RX ORDER — PROPOFOL 10 MG/ML
INJECTION, EMULSION INTRAVENOUS AS NEEDED
Status: DISCONTINUED | OUTPATIENT
Start: 2021-07-01 | End: 2021-07-01

## 2021-07-01 RX ORDER — LIDOCAINE HYDROCHLORIDE 20 MG/ML
INJECTION, SOLUTION EPIDURAL; INFILTRATION; INTRACAUDAL; PERINEURAL AS NEEDED
Status: DISCONTINUED | OUTPATIENT
Start: 2021-07-01 | End: 2021-07-01

## 2021-07-01 RX ADMIN — PROPOFOL 100 MG: 10 INJECTION, EMULSION INTRAVENOUS at 08:03

## 2021-07-01 RX ADMIN — SODIUM CHLORIDE, SODIUM LACTATE, POTASSIUM CHLORIDE, AND CALCIUM CHLORIDE 125 ML/HR: .6; .31; .03; .02 INJECTION, SOLUTION INTRAVENOUS at 07:38

## 2021-07-01 RX ADMIN — PROPOFOL 20 MG: 10 INJECTION, EMULSION INTRAVENOUS at 08:05

## 2021-07-01 RX ADMIN — PROPOFOL 20 MG: 10 INJECTION, EMULSION INTRAVENOUS at 08:04

## 2021-07-01 RX ADMIN — LIDOCAINE HYDROCHLORIDE 100 MG: 20 INJECTION, SOLUTION EPIDURAL; INFILTRATION; INTRACAUDAL; PERINEURAL at 07:58

## 2021-07-01 NOTE — ANESTHESIA PREPROCEDURE EVALUATION
Procedure:  EGD    Relevant Problems   CARDIO   (+) Hypertension      ENDO   (+) Diabetes mellitus, type 2 (HCC)        Physical Exam    Airway    Mallampati score: III  TM Distance: >3 FB  Neck ROM: full     Dental       Cardiovascular  Cardiovascular exam normal    Pulmonary  Pulmonary exam normal     Other Findings        Anesthesia Plan  ASA Score- 3     Anesthesia Type- IV sedation with anesthesia with ASA Monitors  Additional Monitors:   Airway Plan:           Plan Factors-    Chart reviewed  Existing labs reviewed  Patient summary reviewed  Induction- intravenous  Postoperative Plan-     Informed Consent- Anesthetic plan and risks discussed with patient  I personally reviewed this patient with the CRNA  Discussed and agreed on the Anesthesia Plan with the CRNA              Malabsorption   Diabetes mellitus, type 2 ([facility] [address] [address] )   Persistent proteinuria   Hypertension   Obesity, morbid (HCC)   Back muscle spasm   Osteoporosis

## 2021-07-01 NOTE — DISCHARGE INSTRUCTIONS
Upper Endoscopy   WHAT YOU NEED TO KNOW:   An upper endoscopy is also called an upper gastrointestinal (GI) endoscopy, or an esophagogastroduodenoscopy (EGD)  It is a procedure to examine the inside of your esophagus, stomach, and duodenum (first part of the small intestine) with a scope  You may feel bloated, gassy, or have some abdominal discomfort after your procedure  Your throat may be sore for 24 to 36 hours  You may burp or pass gas from air that is still inside your body  DISCHARGE INSTRUCTIONS:   Seek care immediately if:    You have sudden, severe abdominal pain   You have problems swallowing   You have a large amount of black, sticky bowel movements or blood in your bowel movements   You have sudden trouble breathing   You feel weak, lightheaded, or faint or your heart beats faster than normal for you  Contact your healthcare provider if:    You have a fever and chills   You have nausea or are vomiting   Your abdomen is bloated or feels full and hard   You have abdominal pain   You have black, sticky bowel movements or blood in your bowel movements   You have not had a bowel movement for 3 days after your procedure   You have rash or hives   You have questions or concerns about your procedure  Activity:    Do not lift, strain, or run for 24 hours after your procedure   Rest after your procedure  You have been given medicine to relax you  Do not drive or make important decisions until the day after your procedure  Return to your normal activity as directed   Relieve gas and discomfort from bloating by lying on your right side with a heating pad on your abdomen  You may need to take short walks to help the gas move out  Eat small meals until bloating is relieved  Follow up with your healthcare provider as directed: Write down your questions so you remember to ask them during your visits       If you take a blood thinner, please review the specific instructions from your endoscopist about when you should resume it  These can be found in the Recommendation and Your Medication list sections of this After Visit Summary  Gastroesophageal Reflux Disease   WHAT YOU NEED TO KNOW:   Gastroesophageal reflux disease (GERD) is reflux that occurs more than twice a week for a few weeks  Reflux means acid and food in the stomach back up into the esophagus  It usually causes heartburn and other symptoms  GERD can cause other health problems over time if it is not treated  DISCHARGE INSTRUCTIONS:   Call your local emergency number (911 in the [address] [facility]) if:   · You have severe chest pain and sudden trouble breathing  Seek care immediately if:   · You have trouble breathing after you vomit  · You have trouble swallowing, or pain with swallowing  · Your bowel movements are black, bloody, or tarry-looking  · Your vomit looks like coffee grounds or has blood in it  Call your doctor or gastroenterologist if:   · You feel full and cannot burp or vomit  · You vomit large amounts, or you vomit often  · You are losing weight without trying  · Your symptoms get worse or do not improve with treatment  · You have questions or concerns about your condition or care  Medicines:   · Medicines  are used to decrease stomach acid  Medicine may also be used to help your lower esophageal sphincter and stomach contract (tighten) more  · Take your medicine as directed  Contact your healthcare provider if you think your medicine is not helping or if you have side effects  Tell him or her if you are allergic to any medicine  Keep a list of the medicines, vitamins, and herbs you take  Include the amounts, and when and why you take them  Bring the list or the pill bottles to follow-up visits  Carry your medicine list with you in case of an emergency  Manage GERD:   · Do not have foods or drinks that may increase heartburn    These include chocolate, peppermint, fried or fatty foods, drinks that contain caffeine, or carbonated drinks (soda)  Other foods include spicy foods, onions, tomatoes, and tomato-based foods  Do not have foods or drinks that can irritate your esophagus, such as citrus fruits, juices, and alcohol  · Do not eat large meals  When you eat a lot of food at one time, your stomach needs more acid to digest it  Eat 6 small meals each day instead of 3 large ones, and eat slowly  Do not eat meals 2 to 3 hours before bedtime  · Elevate the head of your bed  Place 6-inch blocks under the head of your bed frame  You may also use more than one pillow under your head and shoulders while you sleep  · Maintain a healthy weight  If you are overweight, weight loss may help relieve symptoms of GERD  · Do not smoke  Smoking weakens the lower esophageal sphincter and increases the risk of GERD  Ask your healthcare provider for information if you currently smoke and need help to quit  E-cigarettes or smokeless tobacco still contain nicotine  Talk to your healthcare provider before you use these products  · Do not wear clothing that is tight around your waist   Tight clothing can put pressure on your stomach and cause or worsen GERD symptoms  Follow up with your doctor or gastroenterologist as directed:  Write down your questions so you remember to ask them during your visits  © Copyright [address] Information is for End User's use only and may not be sold, redistributed or otherwise used for commercial purposes  All illustrations and images included in CareNotes® are the copyrighted property of [employer] M [employer] Inc  or [address] [place] [address]  The above information is an  only  It is not intended as medical advice for individual conditions or treatments  Talk to your doctor, nurse or pharmacist before following any medical regimen to see if it is safe and effective for you

## 2021-07-01 NOTE — ANESTHESIA POSTPROCEDURE EVALUATION
Post-Op Assessment Note    CV Status:  Stable  Pain Score: 0    Pain management: adequate     Mental Status:  Sleepy   Hydration Status:  Euvolemic   PONV Controlled:  Controlled   Airway Patency:  Patent      Post Op Vitals Reviewed: Yes      Staff: CRNA         No complications documented      BP   142/85   Temp      Pulse 60   Resp 18   SpO2 98% RA

## 2021-07-02 ENCOUNTER — TELEPHONE (OUTPATIENT)
Dept: GASTROENTEROLOGY | Facility: CLINIC | Age: 66
End: 2021-07-02

## 2021-07-02 NOTE — TELEPHONE ENCOUNTER
----- Message from Cely Farr MD sent at 7/2/2021  3:33 PM EDT -----  Please tell him the biopsies show stable Ugalde's esophagus and to have another endoscopy in 3 years

## 2021-07-06 ENCOUNTER — TELEPHONE (OUTPATIENT)
Dept: GASTROENTEROLOGY | Facility: CLINIC | Age: 66
End: 2021-07-06

## 2021-07-06 NOTE — TELEPHONE ENCOUNTER
----- Message from Jana Coombs MD sent at 7/2/2021  3:33 PM EDT -----  Please tell him the biopsies show stable Ugalde's esophagus and to have another endoscopy in 3 years

## 2021-07-06 NOTE — TELEPHONE ENCOUNTER
----- Message from Felicita Lopez MD sent at 7/2/2021  3:33 PM EDT -----  Please tell him the biopsies show stable Ugalde's esophagus and to have another endoscopy in 3 years

## 2021-07-07 ENCOUNTER — TELEPHONE (OUTPATIENT)
Dept: FAMILY MEDICINE CLINIC | Facility: CLINIC | Age: 66
End: 2021-07-07

## 2021-07-07 NOTE — TELEPHONE ENCOUNTER
Called patient to remind him that he has to come in for hi Prolia injection before his next appointment in september

## 2021-07-08 ENCOUNTER — CLINICAL SUPPORT (OUTPATIENT)
Dept: FAMILY MEDICINE CLINIC | Facility: CLINIC | Age: 66
End: 2021-07-08
Payer: MEDICARE

## 2021-07-08 DIAGNOSIS — M81.0 OSTEOPOROSIS, UNSPECIFIED OSTEOPOROSIS TYPE, UNSPECIFIED PATHOLOGICAL FRACTURE PRESENCE: Primary | ICD-10-CM

## 2021-07-08 PROCEDURE — 96372 THER/PROPH/DIAG INJ SC/IM: CPT

## 2021-07-12 ENCOUNTER — TELEPHONE (OUTPATIENT)
Dept: FAMILY MEDICINE CLINIC | Facility: CLINIC | Age: 66
End: 2021-07-12

## 2021-07-12 NOTE — TELEPHONE ENCOUNTER
Pt called, the soonest appt for the sleep study is in sept with Dr Erwin  He did schedule it but would like to know if he should be seen sooner  He has concerns because he received an email stating his current cpap is being recalled  He would like to know what he should do ? Should he stop using it ? He's on a cpap for 25 years and has concerns about stopping

## 2021-08-04 ENCOUNTER — APPOINTMENT (OUTPATIENT)
Dept: LAB | Facility: HOSPITAL | Age: 66
End: 2021-08-04
Payer: MEDICARE

## 2021-08-04 DIAGNOSIS — M81.0 OSTEOPOROSIS, UNSPECIFIED OSTEOPOROSIS TYPE, UNSPECIFIED PATHOLOGICAL FRACTURE PRESENCE: ICD-10-CM

## 2021-08-04 LAB
ANION GAP SERPL CALCULATED.3IONS-SCNC: 8 MMOL/L (ref 4–13)
BUN SERPL-MCNC: 11 MG/DL (ref 5–25)
CALCIUM SERPL-MCNC: 7.8 MG/DL (ref 8.3–10.1)
CHLORIDE SERPL-SCNC: 107 MMOL/L (ref 100–108)
CO2 SERPL-SCNC: 27 MMOL/L (ref 21–32)
CREAT SERPL-MCNC: 0.79 MG/DL (ref 0.6–1.3)
GFR SERPL CREATININE-BSD FRML MDRD: 95 ML/MIN/1.73SQ M
GLUCOSE P FAST SERPL-MCNC: 111 MG/DL (ref 65–99)
POTASSIUM SERPL-SCNC: 3.5 MMOL/L (ref 3.5–5.3)
SODIUM SERPL-SCNC: 142 MMOL/L (ref 136–145)

## 2021-08-04 PROCEDURE — 80048 BASIC METABOLIC PNL TOTAL CA: CPT

## 2021-08-04 PROCEDURE — 36415 COLL VENOUS BLD VENIPUNCTURE: CPT

## 2021-08-05 ENCOUNTER — TELEPHONE (OUTPATIENT)
Dept: FAMILY MEDICINE CLINIC | Facility: CLINIC | Age: 66
End: 2021-08-05

## 2021-08-05 NOTE — TELEPHONE ENCOUNTER
His glucose was mildly elevated but if he was not fasting this is not a problem  I would have him increase his dietary fiber for his hemorrhoids and stay well hydrated  He may try over-the-counter preparation H  If he is having any fecal incontinence please have him make an appointment

## 2021-08-05 NOTE — TELEPHONE ENCOUNTER
----- Message from Tj Zamarripa, [address] sent at 8/5/2021  3:40 PM EDT -----   Please call patient to make aware that his calcium is low which can occur after the Prolia injection  Please ensure he is taking an adequate amount of vitamin-D and calcium  He should be taking at least at 1000 mg of vitamin-D and 1200 mg of calcium  I would like him to repeat his lab work in 2 weeks

## 2021-08-05 NOTE — TELEPHONE ENCOUNTER
Patient called back, I gave him your message  He is aware  He is also concerned that his glucose was flagged  He would like you to look at that  Patient also states he has been struggling with hemorrhoids  He said they are becoming very swollen, painful, and uncomfortable  He states his pants are getting wet no matter how many times he wipes  He would like to know what to do?

## 2021-08-06 NOTE — TELEPHONE ENCOUNTER
Called and let patient know  Patient states he did fast from 10pm that night until he got the labs done around 7:50  He also said the preporation H helps, but they wont go away and it is embarrassing

## 2021-08-09 NOTE — TELEPHONE ENCOUNTER
His glucose was only mildly elevated, we will keep an eye on it  In regards to his hemorrhoids, we can refer him to a colorectal surgeon for further evaluation if he prefers

## 2021-08-13 NOTE — TELEPHONE ENCOUNTER
Left voicemail for patient  Patient states she is out of medication, she said she knows refills can take up to 72 hours to process but she thought there was a refill still available at the pharmacy. Patient apologizes for the late notice that a refill is needed and is asking if it can be filled today. Routing high priority

## 2021-08-18 ENCOUNTER — TELEPHONE (OUTPATIENT)
Dept: FAMILY MEDICINE CLINIC | Facility: CLINIC | Age: 66
End: 2021-08-18

## 2021-09-01 ENCOUNTER — OFFICE VISIT (OUTPATIENT)
Dept: PULMONOLOGY | Facility: CLINIC | Age: 66
End: 2021-09-01
Payer: MEDICARE

## 2021-09-01 VITALS
HEIGHT: 70 IN | OXYGEN SATURATION: 95 % | WEIGHT: 245 LBS | BODY MASS INDEX: 35.07 KG/M2 | TEMPERATURE: 97 F | SYSTOLIC BLOOD PRESSURE: 124 MMHG | DIASTOLIC BLOOD PRESSURE: 84 MMHG | HEART RATE: 73 BPM

## 2021-09-01 DIAGNOSIS — G47.33 OSA (OBSTRUCTIVE SLEEP APNEA): Primary | ICD-10-CM

## 2021-09-01 DIAGNOSIS — E66.9 OBESITY (BMI 30-39.9): ICD-10-CM

## 2021-09-01 PROCEDURE — 99204 OFFICE O/P NEW MOD 45 MIN: CPT | Performed by: INTERNAL MEDICINE

## 2021-09-01 NOTE — PROGRESS NOTES
Assessment/Plan:     Diagnoses and all orders for this visit:    DOE (obstructive sleep apnea)  -     Split Study; Future    Obesity (BMI 30-39  9)        Patient with history of obstructive sleep apnea for more than 20 years and 1992 has been on the CPAP last CPAP machine about 4 years ago  He states his machine has been currently recalled he has still been using it given his he states he has been very much used to the CPAP machine and cannot sleep without it  Discussed the risks of continuing with the CPAP machine which has been recalled, versus risks of not using the CPAP machine understands and verbalizes he states he would still like to continue with the CPAP machine also discussed not to use any cleaning machines, with was on, discussed soap and water cleaning and drying it up,  Given he has not had a sleep study in a long time, and also with daytime tiredness fatigue and naps, has had increased weight gain of about 40 lb in the past 3-4 years after his CAB surgery he states  Will get a split night study and follow-up   etiology pathogenesis of obstructive sleep apnea discussed in detail   Testing procedure was discussed   Recommend weight loss   Cautioned against driving when sleepy  Follow-up in 3 months or p r n  earlier as needed  No follow-ups on file  All questions are answered to the patient's satisfaction and understanding  He verbalizes understanding  He is encouraged to call with any further questions or concerns  Portions of the record may have been created with voice recognition software  Occasional wrong word or "sound a like" substitutions may have occurred due to the inherent limitations of voice recognition software  Read the chart carefully and recognize, using context, where substitutions have occurred  a    Electronically Signed by Cas Ponce MD    ______________________________________________________________________    Chief Complaint:   Chief Complaint   Patient presents with    Sleep Apnea        Patient ID: Georgi Amezcua is a 61 y o  y o  male has a past medical history of Chronic kidney disease, Diabetes ([facility] ), Diabetes mellitus ([facility] ), Edema, Hypertension, Morbid obesity due to excess calories ([facility] ), RBBB, and Sleep apnea, obstructive  9/1/2021  Patient presents today for initial visit  Georgi Amezcua  is a very pleasant 62-year-old gentleman with history of hypertension coronary artery disease, history of obstructive sleep apnea for more than 20 years states from 2001 [facility] has been the CPAP, last CPAP machine about 4 years ago he states  He  been getting his  Equipment  from [employer] he states  he has been consistently using his CPAP since then, states for the past 2-3 years has been having more daytime tiredness and fatigue and his daily sleep schedule is he goes to bed at around 11:00 p m  falls asleep right away and is out of bed at 6:00 a m  has 2-3 nocturnal awakenings for nocturia and when he is out of the bed he is refreshed but as the day progresses he states he is tired and fatigued and also he states might be more than that he takes naps during the daytime 1 or 2 naps during the daytime sometimes falls asleep the couch  Does not have any trouble when he drives he states, when he has something to do he is very much awake and finishes what he has to do he states most of the time  Does not have any history of early morning headaches no symptoms related to restless legs  Occupational/Exposure history: currently retired  Pets/Enviroment:  Travel history:  Review of Systems   Constitutional: Positive for fatigue and unexpected weight change  HENT: Negative  Eyes: Negative  Respiratory: Negative  Cardiovascular: Negative  Gastrointestinal: Negative  Endocrine: Negative  Genitourinary: Negative  Musculoskeletal: Negative  Allergic/Immunologic: Negative  Neurological: Negative  Hematological: Negative  Psychiatric/Behavioral: Negative  Social history: He reports that he quit smoking about 35 years ago  His smoking use included cigarettes  He started smoking about 50 years ago  He has a 30 00 pack-year smoking history  He has never used smokeless tobacco  He reports current drug use  Drug: Marijuana  He reports that he does not drink alcohol      Past surgical history:   Past Surgical History:   Procedure Laterality Date    BACK SURGERY      CHOLECYSTECTOMY      COLONOSCOPY      FOOT SURGERY      GASTRIC RESTRICTION SURGERY      GASTRIC STAPLING FOR MORBID OBESITY LAPAROSCOPY W/ [id number]    SHOULDER SURGERY      UVULOPALATOPLASTY       Family history:   Family History   Problem Relation Age of Onset    COPD Mother     Heart disease Mother     Mental illness Mother     Drug abuse Mother     Diabetes Father     Mental illness Father     Drug abuse Father     Lung cancer Father     Diabetes Sister        Immunization History   Administered Date(s) Administered    INFLUENZA 10/07/2004, 10/27/2005, 12/01/2006, 10/30/2007, 11/10/2008, 10/02/2009, 11/15/2010, 09/09/2011, 10/08/2012, 10/14/2013, 09/17/2014, 09/25/2015, 09/06/2018, 09/06/2019    Influenza Quadrivalent Preservative Free 3 years and older IM 09/21/2016, 09/07/2017    Influenza, recombinant, quadrivalent,injectable, preservative free 09/06/2018, 10/17/2019, 10/14/2020    Pneumococcal Conjugate 13-Valent 09/09/2016    Pneumococcal Polysaccharide PPV23 03/13/2020    SARS-CoV-2 / COVID-19 mRNA IM (Pfizer-BioNTech) 03/29/2021, 04/21/2021    Tdap 11/06/2013     Current Outpatient Medications   Medication Sig Dispense Refill    amLODIPine (NORVASC) 5 mg tablet TAKE 1 TABLET DAILY 90 tablet 3    buPROPion (WELLBUTRIN XL) 300 mg 24 hr tablet TAKE 1 TABLET DAILY 90 tablet 1    calcium citrate-Vitamin D (CALCIUM CITRATE + D3) 200 mg-250 units Take 1 tablet by mouth daily      clindamycin (CLINDAGEL) 1 % gel   0    Icosapent Ethyl (Vascepa) 1 g CAPS TAKE 2 CAPSULES IN THE MORNING AND 2 CAPSULES AT NIGHT 360 capsule 1    Iron-Vitamin C (VITRON-C)  MG TABS Take one pill twice daily 60 tablet 1    Jardiance 25 MG TABS TAKE 1 TABLET DAILY 90 tablet 3    losartan (COZAAR) 100 MG tablet Take 1 tablet (100 mg total) by mouth daily 90 tablet 3    Multiple Vitamins-Minerals (CENTRUM ADULTS PO) Take 1 tablet by mouth daily      pantoprazole (PROTONIX) 40 mg tablet Take 1 tablet (40 mg total) by mouth daily 90 tablet 4    PreviDent 0 2 % SOLN RINSE WITH 10 milliliters for 1 MINUTE THEN SPIT USE AT BEDTIME, DO NOT SWALLOW      traMADol-acetaminophen (ULTRACET) 37 5-325 mg per tablet May Take 1 pill twice a day p r n  arthritis pain 30 tablet 0    triamcinolone (KENALOG) 0 1 % cream APPLY TO LEFT LEG SPOTS TWICE DAILY FOR UP TO 4 WEEKS       No current facility-administered medications for this visit  Allergies: Augmentin [amoxicillin-pot clavulanate] and Ciprofloxacin    Objective:  Vitals:    09/01/21 0831   BP: 124/84   Pulse: 73   Temp: (!) 97 °F (36 1 °C)   SpO2: 95%   Weight: 111 kg (245 lb)   Height: 5' 10" (1 778 m)   Oxygen Therapy  SpO2: 95 %    Wt Readings from Last 3 Encounters:   09/01/21 111 kg (245 lb)   07/01/21 110 kg (243 lb 6 2 oz)   06/24/21 111 kg (244 lb 9 6 oz)     Body mass index is 35 15 kg/m²  Physical Exam  Constitutional:       Appearance: He is well-developed  HENT:      Head: Normocephalic and atraumatic  Eyes:      Pupils: Pupils are equal, round, and reactive to light  Neck:      Comments: Short and wide neck  Cardiovascular:      Rate and Rhythm: Normal rate and regular rhythm  Heart sounds: Normal heart sounds  Pulmonary:      Effort: Pulmonary effort is normal       Breath sounds: Normal breath sounds  Musculoskeletal:         General: Normal range of motion  Cervical back: Normal range of motion and neck supple  Skin:     General: Skin is warm and dry     Neurological: Mental Status: He is alert and oriented to person, place, and time  Psychiatric:         Behavior: Behavior normal          Lab Review:   not applicable    Diagnostics:  I have personally reviewed pertinent reports  none pertinent  Office Spirometry Results:     ESS: Total score: 6  No results found

## 2021-09-09 ENCOUNTER — TELEPHONE (OUTPATIENT)
Dept: FAMILY MEDICINE CLINIC | Facility: CLINIC | Age: 66
End: 2021-09-09

## 2021-09-09 NOTE — TELEPHONE ENCOUNTER
Patient is requesting a letter for [facility] authority stating He does take over the counter medications as per the direction of his physician

## 2021-09-25 ENCOUNTER — APPOINTMENT (OUTPATIENT)
Dept: LAB | Facility: HOSPITAL | Age: 66
End: 2021-09-25
Payer: MEDICARE

## 2021-09-25 DIAGNOSIS — E83.51 HYPOCALCEMIA: ICD-10-CM

## 2021-09-25 DIAGNOSIS — E11.29 TYPE 2 DIABETES MELLITUS WITH MICROALBUMINURIA, WITHOUT LONG-TERM CURRENT USE OF INSULIN (HCC): ICD-10-CM

## 2021-09-25 DIAGNOSIS — R80.9 TYPE 2 DIABETES MELLITUS WITH MICROALBUMINURIA, WITHOUT LONG-TERM CURRENT USE OF INSULIN (HCC): ICD-10-CM

## 2021-09-25 LAB
ALBUMIN SERPL BCP-MCNC: 3.8 G/DL (ref 3.5–5)
ALP SERPL-CCNC: 82 U/L (ref 46–116)
ALT SERPL W P-5'-P-CCNC: 40 U/L (ref 12–78)
ANION GAP SERPL CALCULATED.3IONS-SCNC: 6 MMOL/L (ref 4–13)
AST SERPL W P-5'-P-CCNC: 19 U/L (ref 5–45)
BACTERIA UR QL AUTO: ABNORMAL /HPF
BASOPHILS # BLD AUTO: 0.05 THOUSANDS/ΜL (ref 0–0.1)
BASOPHILS NFR BLD AUTO: 1 % (ref 0–1)
BILIRUB SERPL-MCNC: 0.72 MG/DL (ref 0.2–1)
BILIRUB UR QL STRIP: NEGATIVE
BUN SERPL-MCNC: 9 MG/DL (ref 5–25)
CALCIUM SERPL-MCNC: 8.1 MG/DL (ref 8.3–10.1)
CHLORIDE SERPL-SCNC: 105 MMOL/L (ref 100–108)
CHOLEST SERPL-MCNC: 183 MG/DL (ref 50–200)
CLARITY UR: CLEAR
CO2 SERPL-SCNC: 30 MMOL/L (ref 21–32)
COLOR UR: YELLOW
CREAT SERPL-MCNC: 0.83 MG/DL (ref 0.6–1.3)
EOSINOPHIL # BLD AUTO: 0.22 THOUSAND/ΜL (ref 0–0.61)
EOSINOPHIL NFR BLD AUTO: 4 % (ref 0–6)
ERYTHROCYTE [DISTWIDTH] IN BLOOD BY AUTOMATED COUNT: 12.1 % (ref 11.6–15.1)
EST. AVERAGE GLUCOSE BLD GHB EST-MCNC: 108 MG/DL
GFR SERPL CREATININE-BSD FRML MDRD: 93 ML/MIN/1.73SQ M
GLUCOSE P FAST SERPL-MCNC: 112 MG/DL (ref 65–99)
GLUCOSE UR STRIP-MCNC: ABNORMAL MG/DL
HBA1C MFR BLD: 5.4 %
HCT VFR BLD AUTO: 51 % (ref 36.5–49.3)
HDLC SERPL-MCNC: 38 MG/DL
HGB BLD-MCNC: 17.6 G/DL (ref 12–17)
HGB UR QL STRIP.AUTO: ABNORMAL
IMM GRANULOCYTES # BLD AUTO: 0.02 THOUSAND/UL (ref 0–0.2)
IMM GRANULOCYTES NFR BLD AUTO: 0 % (ref 0–2)
KETONES UR STRIP-MCNC: NEGATIVE MG/DL
LDLC SERPL CALC-MCNC: 90 MG/DL (ref 0–100)
LEUKOCYTE ESTERASE UR QL STRIP: ABNORMAL
LYMPHOCYTES # BLD AUTO: 1.77 THOUSANDS/ΜL (ref 0.6–4.47)
LYMPHOCYTES NFR BLD AUTO: 32 % (ref 14–44)
MCH RBC QN AUTO: 30.9 PG (ref 26.8–34.3)
MCHC RBC AUTO-ENTMCNC: 34.5 G/DL (ref 31.4–37.4)
MCV RBC AUTO: 90 FL (ref 82–98)
MONOCYTES # BLD AUTO: 0.5 THOUSAND/ΜL (ref 0.17–1.22)
MONOCYTES NFR BLD AUTO: 9 % (ref 4–12)
NEUTROPHILS # BLD AUTO: 3.03 THOUSANDS/ΜL (ref 1.85–7.62)
NEUTS SEG NFR BLD AUTO: 54 % (ref 43–75)
NITRITE UR QL STRIP: NEGATIVE
NON-SQ EPI CELLS URNS QL MICRO: ABNORMAL /HPF
NRBC BLD AUTO-RTO: 0 /100 WBCS
PH UR STRIP.AUTO: 6 [PH]
PLATELET # BLD AUTO: 219 THOUSANDS/UL (ref 149–390)
PMV BLD AUTO: 10.8 FL (ref 8.9–12.7)
POTASSIUM SERPL-SCNC: 3.5 MMOL/L (ref 3.5–5.3)
PROT SERPL-MCNC: 6.7 G/DL (ref 6.4–8.2)
PROT UR STRIP-MCNC: ABNORMAL MG/DL
RBC # BLD AUTO: 5.69 MILLION/UL (ref 3.88–5.62)
RBC #/AREA URNS AUTO: ABNORMAL /HPF
SODIUM SERPL-SCNC: 141 MMOL/L (ref 136–145)
SP GR UR STRIP.AUTO: 1.02 (ref 1–1.03)
TRIGL SERPL-MCNC: 275 MG/DL
TSH SERPL DL<=0.05 MIU/L-ACNC: 1.08 UIU/ML (ref 0.36–3.74)
UROBILINOGEN UR QL STRIP.AUTO: 0.2 E.U./DL
WBC # BLD AUTO: 5.59 THOUSAND/UL (ref 4.31–10.16)
WBC #/AREA URNS AUTO: ABNORMAL /HPF

## 2021-09-25 PROCEDURE — 81001 URINALYSIS AUTO W/SCOPE: CPT | Performed by: NURSE PRACTITIONER

## 2021-09-25 PROCEDURE — 84443 ASSAY THYROID STIM HORMONE: CPT

## 2021-09-25 PROCEDURE — 83036 HEMOGLOBIN GLYCOSYLATED A1C: CPT

## 2021-09-25 PROCEDURE — 80061 LIPID PANEL: CPT

## 2021-09-25 PROCEDURE — 36415 COLL VENOUS BLD VENIPUNCTURE: CPT

## 2021-09-25 PROCEDURE — 80053 COMPREHEN METABOLIC PANEL: CPT

## 2021-09-25 PROCEDURE — 85025 COMPLETE CBC W/AUTO DIFF WBC: CPT

## 2021-09-27 ENCOUNTER — OFFICE VISIT (OUTPATIENT)
Dept: FAMILY MEDICINE CLINIC | Facility: CLINIC | Age: 66
End: 2021-09-27
Payer: MEDICARE

## 2021-09-27 VITALS
WEIGHT: 244 LBS | OXYGEN SATURATION: 98 % | HEART RATE: 76 BPM | DIASTOLIC BLOOD PRESSURE: 80 MMHG | RESPIRATION RATE: 18 BRPM | HEIGHT: 70 IN | TEMPERATURE: 96.8 F | BODY MASS INDEX: 34.93 KG/M2 | SYSTOLIC BLOOD PRESSURE: 130 MMHG

## 2021-09-27 DIAGNOSIS — Z23 NEED FOR IMMUNIZATION AGAINST INFLUENZA: ICD-10-CM

## 2021-09-27 DIAGNOSIS — E78.1 HYPERTRIGLYCERIDEMIA: Primary | ICD-10-CM

## 2021-09-27 DIAGNOSIS — E11.29 TYPE 2 DIABETES MELLITUS WITH MICROALBUMINURIA, WITHOUT LONG-TERM CURRENT USE OF INSULIN (HCC): ICD-10-CM

## 2021-09-27 DIAGNOSIS — G47.33 SLEEP APNEA, OBSTRUCTIVE: ICD-10-CM

## 2021-09-27 DIAGNOSIS — M81.0 OSTEOPOROSIS, UNSPECIFIED OSTEOPOROSIS TYPE, UNSPECIFIED PATHOLOGICAL FRACTURE PRESENCE: ICD-10-CM

## 2021-09-27 DIAGNOSIS — R80.1 PERSISTENT PROTEINURIA: ICD-10-CM

## 2021-09-27 DIAGNOSIS — I10 ESSENTIAL HYPERTENSION: ICD-10-CM

## 2021-09-27 DIAGNOSIS — R80.9 TYPE 2 DIABETES MELLITUS WITH MICROALBUMINURIA, WITHOUT LONG-TERM CURRENT USE OF INSULIN (HCC): ICD-10-CM

## 2021-09-27 PROBLEM — M62.830 BACK MUSCLE SPASM: Status: RESOLVED | Noted: 2020-09-30 | Resolved: 2021-09-27

## 2021-09-27 PROCEDURE — 99214 OFFICE O/P EST MOD 30 MIN: CPT | Performed by: NURSE PRACTITIONER

## 2021-09-27 PROCEDURE — 90682 RIV4 VACC RECOMBINANT DNA IM: CPT

## 2021-09-27 PROCEDURE — G0008 ADMIN INFLUENZA VIRUS VAC: HCPCS

## 2021-09-27 RX ORDER — ROSUVASTATIN CALCIUM 5 MG/1
5 TABLET, COATED ORAL DAILY
Qty: 90 TABLET | Refills: 0 | Status: SHIPPED | OUTPATIENT
Start: 2021-09-27 | End: 2021-12-08

## 2021-09-27 NOTE — ASSESSMENT & PLAN NOTE
Blood pressure stable  To continue current dose of amlodipine 5 mg and losartan 100 mg  To consume a low-sodium diet  Follow-up in 6 months or sooner if needed

## 2021-09-27 NOTE — ASSESSMENT & PLAN NOTE
Lab Results   Component Value Date    HGBA1C 5 4 09/25/2021     Reviewed recent labs  To continue Jardiance 25 mg  Counseled the importance of daily physical at activity as tolerated and a low carb diet  Will continue to monitor glucose

## 2021-09-27 NOTE — ASSESSMENT & PLAN NOTE
Recent lipid panel reviewed, triglycerides elevated  ASCVD risk score=25 8%  To begin Crestor 5 mg daily  Counseled the importance of increasing dietary fiber, consuming a heart healthy diet, and beginning daily physical activity as tolerated  To repeat lab work in 3 months

## 2021-09-27 NOTE — PROGRESS NOTES
Assessment/Plan:    Sleep apnea, obstructive    To continue wearing CPAP nightly  To keep upcoming appointment for sleep study  Diabetes mellitus, type 2 ([facility] )    Lab Results   Component Value Date    HGBA1C 5 4 09/25/2021     Reviewed recent labs  To continue Jardiance 25 mg  Counseled the importance of daily physical at activity as tolerated and a low carb diet  Will continue to monitor glucose  Hypertension    Blood pressure stable  To continue current dose of amlodipine 5 mg and losartan 100 mg  To consume a low-sodium diet  Follow-up in 6 months or sooner if needed  Osteoporosis    To continue calcium and vitamin-D supplementation  To continue Prolia injections every 6 months  Persistent proteinuria    To continue losartan 100 mg  To continue care with Nephrology  Hypertriglyceridemia  Recent lipid panel reviewed, triglycerides elevated  ASCVD risk score=25 8%  To begin Crestor 5 mg daily  Counseled the importance of increasing dietary fiber, consuming a heart healthy diet, and beginning daily physical activity as tolerated  To repeat lab work in 3 months  Diagnoses and all orders for this visit:    Hypertriglyceridemia  -     rosuvastatin (CRESTOR) 5 mg tablet; Take 1 tablet (5 mg total) by mouth daily  -     Comprehensive metabolic panel; Future  -     Lipid Panel with Direct LDL reflex; Future  -     CBC and differential; Future    Essential hypertension    Osteoporosis, unspecified osteoporosis type, unspecified pathological fracture presence    Type 2 diabetes mellitus with microalbuminuria, without long-term current use of insulin ([facility])    Sleep apnea, obstructive    Persistent proteinuria          Subjective:      Patient ID: Shira Monroy is a 61 y o  male  Jazmine Watt presents for a follow-up  He recently obtained lab that he would like to review  He had also would like to obtain influenza immunization    In regards to sleep apnea, Jazmine Watt received a new CPAP in the mail  He has an upcoming appointment for sleep study on 10/19/2021  His last sleep study was 10 years ago  In regards to his hypertension, Vira Martinez takes his antihypertensives as prescribed  Denies chest pain, dizziness, shortness of breath, or headaches  He has been taking his Jardiance as prescribed as well  Vira Martinez does report to drinking a large amount of sweetened ice tea and coffee daily  He sees a cardiologist and the nephrologist annually  Overall, he is feeling well        The following portions of the patient's history were reviewed and updated as appropriate:   He   Patient Active Problem List    Diagnosis Date Noted    Hypertriglyceridemia 09/27/2021    Sleep apnea, obstructive     Osteoporosis 03/22/2021    Hypertension 02/06/2019    Obesity, morbid ([facility] ) 02/06/2019    Malabsorption 02/06/2018    Persistent proteinuria 09/28/2017    Diabetes mellitus, type 2 ([facility] ) 04/27/2017     Current Outpatient Medications   Medication Sig Dispense Refill    amLODIPine (NORVASC) 5 mg tablet TAKE 1 TABLET DAILY 90 tablet 3    buPROPion (WELLBUTRIN XL) 300 mg 24 hr tablet TAKE 1 TABLET DAILY 90 tablet 1    calcium citrate-Vitamin D (CALCIUM CITRATE + D3) 200 mg-250 units Take 1 tablet by mouth daily      clindamycin (CLINDAGEL) 1 % gel   0    Icosapent Ethyl (Vascepa) 1 g CAPS TAKE 2 CAPSULES IN THE MORNING AND 2 CAPSULES AT NIGHT 360 capsule 1    Iron-Vitamin C (VITRON-C)  MG TABS Take one pill twice daily 60 tablet 1    Jardiance 25 MG TABS TAKE 1 TABLET DAILY 90 tablet 3    losartan (COZAAR) 100 MG tablet Take 1 tablet (100 mg total) by mouth daily 90 tablet 3    Multiple Vitamins-Minerals (CENTRUM ADULTS PO) Take 1 tablet by mouth daily      pantoprazole (PROTONIX) 40 mg tablet Take 1 tablet (40 mg total) by mouth daily 90 tablet 4    PreviDent 0 2 % SOLN RINSE WITH 10 milliliters for 1 MINUTE THEN SPIT USE AT BEDTIME, DO NOT SWALLOW      traMADol-acetaminophen (ULTRACET) 37 5-325 mg per tablet May Take 1 pill twice a day p r n  arthritis pain 30 tablet 0    triamcinolone (KENALOG) 0 1 % cream APPLY TO LEFT LEG SPOTS TWICE DAILY FOR UP TO 4 WEEKS      rosuvastatin (CRESTOR) 5 mg tablet Take 1 tablet (5 mg total) by mouth daily 90 tablet 0     No current facility-administered medications for this visit  He is allergic to augmentin [amoxicillin-pot clavulanate] and ciprofloxacin       Review of Systems   Constitutional: Negative  Respiratory: Negative  Gastrointestinal: Negative  Musculoskeletal: Positive for arthralgias and back pain  Neurological: Negative  Psychiatric/Behavioral: Negative  /80   Pulse 76   Temp (!) 96 8 °F (36 °C) (Tympanic)   Resp 18   Ht 5' 10" (1 778 m)   Wt 111 kg (244 lb)   SpO2 98%   BMI 35 01 kg/m²     Objective:     Physical Exam  Vitals and nursing note reviewed  Constitutional:       General: He is not in acute distress  Appearance: Normal appearance  He is well-developed  He is not ill-appearing or toxic-appearing  HENT:      Head: Normocephalic and atraumatic  Right Ear: Tympanic membrane, ear canal and external ear normal       Left Ear: Tympanic membrane, ear canal and external ear normal       Nose: Nose normal       Mouth/Throat:      Mouth: Mucous membranes are moist       Pharynx: Oropharynx is clear  Eyes:      Conjunctiva/sclera: Conjunctivae normal    Cardiovascular:      Rate and Rhythm: Normal rate and regular rhythm  Heart sounds: Normal heart sounds  No murmur heard  Pulmonary:      Effort: Pulmonary effort is normal  No respiratory distress  Breath sounds: Normal breath sounds  No wheezing or rales  Chest:      Chest wall: No tenderness  Abdominal:      General: Abdomen is flat  Bowel sounds are normal       Palpations: Abdomen is soft  Musculoskeletal:      Cervical back: Neck supple  Right lower leg: Edema (trace) present  Left lower leg: Edema (trace) present  Lymphadenopathy:      Cervical: No cervical adenopathy  Neurological:      General: No focal deficit present  Mental Status: He is alert and oriented to person, place, and time  Psychiatric:         Mood and Affect: Mood normal          Behavior: Behavior normal          Thought Content:  Thought content normal          Judgment: Judgment normal            Results for orders placed or performed in visit on 09/25/21   CBC and differential   Result Value Ref Range    WBC 5 59 4 31 - 10 16 Thousand/uL    RBC 5 69 (H) 3 88 - 5 62 Million/uL    Hemoglobin 17 6 (H) 12 0 - 17 0 g/dL    Hematocrit 51 0 (H) 36 5 - 49 3 %    MCV 90 82 - 98 fL    MCH 30 9 26 8 - 34 3 pg    MCHC 34 5 31 4 - 37 4 g/dL    RDW 12 1 11 6 - 15 1 %    MPV 10 8 8 9 - 12 7 fL    Platelets 154 622 - 128 Thousands/uL    nRBC 0 /100 WBCs    Neutrophils Relative 54 43 - 75 %    Immat GRANS % 0 0 - 2 %    Lymphocytes Relative 32 14 - 44 %    Monocytes Relative 9 4 - 12 %    Eosinophils Relative 4 0 - 6 %    Basophils Relative 1 0 - 1 %    Neutrophils Absolute 3 03 1 85 - 7 62 Thousands/µL    Immature Grans Absolute 0 02 0 00 - 0 20 Thousand/uL    Lymphocytes Absolute 1 77 0 60 - 4 47 Thousands/µL    Monocytes Absolute 0 50 0 17 - 1 22 Thousand/µL    Eosinophils Absolute 0 22 0 00 - 0 61 Thousand/µL    Basophils Absolute 0 05 0 00 - 0 10 Thousands/µL   Comprehensive metabolic panel   Result Value Ref Range    Sodium 141 136 - 145 mmol/L    Potassium 3 5 3 5 - 5 3 mmol/L    Chloride 105 100 - 108 mmol/L    CO2 30 21 - 32 mmol/L    ANION GAP 6 4 - 13 mmol/L    BUN 9 5 - 25 mg/dL    Creatinine 0 83 0 60 - 1 30 mg/dL    Glucose, Fasting 112 (H) 65 - 99 mg/dL    Calcium 8 1 (L) 8 3 - 10 1 mg/dL    AST 19 5 - 45 U/L    ALT 40 12 - 78 U/L    Alkaline Phosphatase 82 46 - 116 U/L    Total Protein 6 7 6 4 - 8 2 g/dL    Albumin 3 8 3 5 - 5 0 g/dL    Total Bilirubin 0 72 0 20 - 1 00 mg/dL    eGFR 93 ml/min/1 73sq m Hemoglobin A1C   Result Value Ref Range    Hemoglobin A1C 5 4 Normal 3 8-5 6%; PreDiabetic 5 7-6 4%;  Diabetic >=6 5%; Glycemic control for adults with diabetes <7 0% %     mg/dl   Lipid Panel with Direct LDL reflex   Result Value Ref Range    Cholesterol 183 50 - 200 mg/dL    Triglycerides 275 (H) <=150 mg/dL    HDL, Direct 38 (L) >=40 mg/dL    LDL Calculated 90 0 - 100 mg/dL   TSH, 3rd generation with Free T4 reflex   Result Value Ref Range    TSH 3RD GENERATON 1 079 0 358 - 3 740 uIU/mL

## 2021-10-13 ENCOUNTER — TELEPHONE (OUTPATIENT)
Dept: FAMILY MEDICINE CLINIC | Facility: CLINIC | Age: 66
End: 2021-10-13

## 2021-10-21 ENCOUNTER — HOSPITAL ENCOUNTER (OUTPATIENT)
Dept: SLEEP CENTER | Facility: CLINIC | Age: 66
Discharge: HOME | End: 2021-10-21
Payer: MEDICARE

## 2021-10-21 DIAGNOSIS — G47.33 OSA (OBSTRUCTIVE SLEEP APNEA): ICD-10-CM

## 2021-10-21 PROCEDURE — 95811 POLYSOM 6/>YRS CPAP 4/> PARM: CPT | Performed by: INTERNAL MEDICINE

## 2021-10-21 PROCEDURE — 95811 POLYSOM 6/>YRS CPAP 4/> PARM: CPT

## 2021-11-01 ENCOUNTER — TELEPHONE (OUTPATIENT)
Dept: SLEEP CENTER | Facility: CLINIC | Age: 66
End: 2021-11-01

## 2021-11-17 ENCOUNTER — APPOINTMENT (OUTPATIENT)
Dept: LAB | Facility: HOSPITAL | Age: 66
End: 2021-11-17
Payer: MEDICARE

## 2021-11-17 DIAGNOSIS — N40.1 ENLARGED PROSTATE WITH URINARY OBSTRUCTION: ICD-10-CM

## 2021-11-17 DIAGNOSIS — N13.8 ENLARGED PROSTATE WITH URINARY OBSTRUCTION: ICD-10-CM

## 2021-11-17 LAB — PSA SERPL-MCNC: 1.4 NG/ML (ref 0–4)

## 2021-11-17 PROCEDURE — 36415 COLL VENOUS BLD VENIPUNCTURE: CPT

## 2021-11-17 PROCEDURE — G0103 PSA SCREENING: HCPCS

## 2021-12-02 ENCOUNTER — TELEPHONE (OUTPATIENT)
Dept: PULMONOLOGY | Facility: CLINIC | Age: 66
End: 2021-12-02

## 2021-12-02 ENCOUNTER — OFFICE VISIT (OUTPATIENT)
Dept: PULMONOLOGY | Facility: CLINIC | Age: 66
End: 2021-12-02
Payer: MEDICARE

## 2021-12-02 VITALS
HEIGHT: 70 IN | BODY MASS INDEX: 35.93 KG/M2 | HEART RATE: 79 BPM | WEIGHT: 251 LBS | DIASTOLIC BLOOD PRESSURE: 68 MMHG | RESPIRATION RATE: 12 BRPM | TEMPERATURE: 96.6 F | SYSTOLIC BLOOD PRESSURE: 122 MMHG | OXYGEN SATURATION: 100 %

## 2021-12-02 DIAGNOSIS — G47.33 OSA (OBSTRUCTIVE SLEEP APNEA): Primary | ICD-10-CM

## 2021-12-02 DIAGNOSIS — E66.9 OBESITY (BMI 30-39.9): ICD-10-CM

## 2021-12-02 PROCEDURE — 99214 OFFICE O/P EST MOD 30 MIN: CPT | Performed by: INTERNAL MEDICINE

## 2021-12-08 DIAGNOSIS — E78.1 HYPERTRIGLYCERIDEMIA: ICD-10-CM

## 2021-12-08 RX ORDER — ROSUVASTATIN CALCIUM 5 MG/1
TABLET, COATED ORAL
Qty: 90 TABLET | Refills: 3 | Status: SHIPPED | OUTPATIENT
Start: 2021-12-08

## 2021-12-09 ENCOUNTER — TELEPHONE (OUTPATIENT)
Dept: NEPHROLOGY | Facility: CLINIC | Age: 66
End: 2021-12-09

## 2021-12-16 ENCOUNTER — APPOINTMENT (OUTPATIENT)
Dept: LAB | Facility: HOSPITAL | Age: 66
End: 2021-12-16
Payer: MEDICARE

## 2021-12-16 DIAGNOSIS — E78.1 HYPERTRIGLYCERIDEMIA: ICD-10-CM

## 2021-12-16 LAB
ALBUMIN SERPL BCP-MCNC: 4 G/DL (ref 3.5–5)
ALP SERPL-CCNC: 91 U/L (ref 46–116)
ALT SERPL W P-5'-P-CCNC: 47 U/L (ref 12–78)
ANION GAP SERPL CALCULATED.3IONS-SCNC: 12 MMOL/L (ref 4–13)
AST SERPL W P-5'-P-CCNC: 34 U/L (ref 5–45)
BASOPHILS # BLD AUTO: 0.01 THOUSANDS/ΜL (ref 0–0.1)
BASOPHILS NFR BLD AUTO: 0 % (ref 0–1)
BILIRUB SERPL-MCNC: 0.75 MG/DL (ref 0.2–1)
BUN SERPL-MCNC: 11 MG/DL (ref 5–25)
CALCIUM SERPL-MCNC: 8.4 MG/DL (ref 8.3–10.1)
CHLORIDE SERPL-SCNC: 108 MMOL/L (ref 100–108)
CHOLEST SERPL-MCNC: 110 MG/DL
CO2 SERPL-SCNC: 27 MMOL/L (ref 21–32)
CREAT SERPL-MCNC: 0.98 MG/DL (ref 0.6–1.3)
EOSINOPHIL # BLD AUTO: 0.07 THOUSAND/ΜL (ref 0–0.61)
EOSINOPHIL NFR BLD AUTO: 2 % (ref 0–6)
ERYTHROCYTE [DISTWIDTH] IN BLOOD BY AUTOMATED COUNT: 12.2 % (ref 11.6–15.1)
GFR SERPL CREATININE-BSD FRML MDRD: 81 ML/MIN/1.73SQ M
GLUCOSE P FAST SERPL-MCNC: 114 MG/DL (ref 65–99)
HCT VFR BLD AUTO: 49.7 % (ref 36.5–49.3)
HDLC SERPL-MCNC: 32 MG/DL
HGB BLD-MCNC: 17.4 G/DL (ref 12–17)
IMM GRANULOCYTES # BLD AUTO: 0.01 THOUSAND/UL (ref 0–0.2)
IMM GRANULOCYTES NFR BLD AUTO: 0 % (ref 0–2)
LDLC SERPL CALC-MCNC: 56 MG/DL (ref 0–100)
LYMPHOCYTES # BLD AUTO: 1.08 THOUSANDS/ΜL (ref 0.6–4.47)
LYMPHOCYTES NFR BLD AUTO: 38 % (ref 14–44)
MCH RBC QN AUTO: 31.1 PG (ref 26.8–34.3)
MCHC RBC AUTO-ENTMCNC: 35 G/DL (ref 31.4–37.4)
MCV RBC AUTO: 89 FL (ref 82–98)
MONOCYTES # BLD AUTO: 0.48 THOUSAND/ΜL (ref 0.17–1.22)
MONOCYTES NFR BLD AUTO: 17 % (ref 4–12)
NEUTROPHILS # BLD AUTO: 1.23 THOUSANDS/ΜL (ref 1.85–7.62)
NEUTS SEG NFR BLD AUTO: 43 % (ref 43–75)
NRBC BLD AUTO-RTO: 0 /100 WBCS
PLATELET # BLD AUTO: 152 THOUSANDS/UL (ref 149–390)
PMV BLD AUTO: 10.9 FL (ref 8.9–12.7)
POTASSIUM SERPL-SCNC: 3.6 MMOL/L (ref 3.5–5.3)
PROT SERPL-MCNC: 7 G/DL (ref 6.4–8.2)
RBC # BLD AUTO: 5.59 MILLION/UL (ref 3.88–5.62)
SODIUM SERPL-SCNC: 147 MMOL/L (ref 136–145)
TRIGL SERPL-MCNC: 110 MG/DL
WBC # BLD AUTO: 2.88 THOUSAND/UL (ref 4.31–10.16)

## 2021-12-16 PROCEDURE — 80061 LIPID PANEL: CPT

## 2021-12-16 PROCEDURE — 36415 COLL VENOUS BLD VENIPUNCTURE: CPT

## 2021-12-16 PROCEDURE — 85025 COMPLETE CBC W/AUTO DIFF WBC: CPT

## 2021-12-16 PROCEDURE — 80053 COMPREHEN METABOLIC PANEL: CPT

## 2021-12-17 ENCOUNTER — IMMUNIZATIONS (OUTPATIENT)
Dept: FAMILY MEDICINE CLINIC | Facility: HOSPITAL | Age: 66
End: 2021-12-17

## 2021-12-17 DIAGNOSIS — Z23 ENCOUNTER FOR IMMUNIZATION: Primary | ICD-10-CM

## 2021-12-17 PROCEDURE — 91300 COVID-19 PFIZER VACC 0.3 ML: CPT

## 2021-12-17 PROCEDURE — 0001A COVID-19 PFIZER VACC 0.3 ML: CPT

## 2022-01-03 ENCOUNTER — APPOINTMENT (OUTPATIENT)
Dept: LAB | Facility: HOSPITAL | Age: 67
End: 2022-01-03
Payer: MEDICARE

## 2022-01-03 ENCOUNTER — TELEPHONE (OUTPATIENT)
Dept: FAMILY MEDICINE CLINIC | Facility: CLINIC | Age: 67
End: 2022-01-03

## 2022-01-03 ENCOUNTER — OFFICE VISIT (OUTPATIENT)
Dept: FAMILY MEDICINE CLINIC | Facility: CLINIC | Age: 67
End: 2022-01-03
Payer: MEDICARE

## 2022-01-03 VITALS
BODY MASS INDEX: 34.36 KG/M2 | DIASTOLIC BLOOD PRESSURE: 80 MMHG | OXYGEN SATURATION: 96 % | HEIGHT: 70 IN | TEMPERATURE: 97.8 F | HEART RATE: 88 BPM | SYSTOLIC BLOOD PRESSURE: 126 MMHG | RESPIRATION RATE: 18 BRPM | WEIGHT: 240 LBS

## 2022-01-03 DIAGNOSIS — E11.29 TYPE 2 DIABETES MELLITUS WITH MICROALBUMINURIA, WITHOUT LONG-TERM CURRENT USE OF INSULIN (HCC): ICD-10-CM

## 2022-01-03 DIAGNOSIS — Z12.5 SCREENING FOR PROSTATE CANCER: ICD-10-CM

## 2022-01-03 DIAGNOSIS — I10 PRIMARY HYPERTENSION: Primary | ICD-10-CM

## 2022-01-03 DIAGNOSIS — M81.0 OSTEOPOROSIS, UNSPECIFIED OSTEOPOROSIS TYPE, UNSPECIFIED PATHOLOGICAL FRACTURE PRESENCE: ICD-10-CM

## 2022-01-03 DIAGNOSIS — E66.01 OBESITY, MORBID (HCC): ICD-10-CM

## 2022-01-03 DIAGNOSIS — R79.89 ABNORMAL CBC: ICD-10-CM

## 2022-01-03 DIAGNOSIS — I45.10 RIGHT BUNDLE BRANCH BLOCK: ICD-10-CM

## 2022-01-03 DIAGNOSIS — E78.1 HYPERTRIGLYCERIDEMIA: ICD-10-CM

## 2022-01-03 DIAGNOSIS — R80.9 TYPE 2 DIABETES MELLITUS WITH MICROALBUMINURIA, WITHOUT LONG-TERM CURRENT USE OF INSULIN (HCC): ICD-10-CM

## 2022-01-03 DIAGNOSIS — G47.33 OSA (OBSTRUCTIVE SLEEP APNEA): ICD-10-CM

## 2022-01-03 LAB
BASOPHILS # BLD AUTO: 0.03 THOUSANDS/ΜL (ref 0–0.1)
BASOPHILS NFR BLD AUTO: 1 % (ref 0–1)
EOSINOPHIL # BLD AUTO: 0.22 THOUSAND/ΜL (ref 0–0.61)
EOSINOPHIL NFR BLD AUTO: 4 % (ref 0–6)
ERYTHROCYTE [DISTWIDTH] IN BLOOD BY AUTOMATED COUNT: 12.5 % (ref 11.6–15.1)
HCT VFR BLD AUTO: 48 % (ref 36.5–49.3)
HGB BLD-MCNC: 17 G/DL (ref 12–17)
IMM GRANULOCYTES # BLD AUTO: 0.01 THOUSAND/UL (ref 0–0.2)
IMM GRANULOCYTES NFR BLD AUTO: 0 % (ref 0–2)
LYMPHOCYTES # BLD AUTO: 1.49 THOUSANDS/ΜL (ref 0.6–4.47)
LYMPHOCYTES NFR BLD AUTO: 25 % (ref 14–44)
MCH RBC QN AUTO: 31.7 PG (ref 26.8–34.3)
MCHC RBC AUTO-ENTMCNC: 35.4 G/DL (ref 31.4–37.4)
MCV RBC AUTO: 90 FL (ref 82–98)
MONOCYTES # BLD AUTO: 0.65 THOUSAND/ΜL (ref 0.17–1.22)
MONOCYTES NFR BLD AUTO: 11 % (ref 4–12)
NEUTROPHILS # BLD AUTO: 3.54 THOUSANDS/ΜL (ref 1.85–7.62)
NEUTS SEG NFR BLD AUTO: 59 % (ref 43–75)
NRBC BLD AUTO-RTO: 0 /100 WBCS
PLATELET # BLD AUTO: 213 THOUSANDS/UL (ref 149–390)
PMV BLD AUTO: 10.8 FL (ref 8.9–12.7)
RBC # BLD AUTO: 5.36 MILLION/UL (ref 3.88–5.62)
WBC # BLD AUTO: 5.94 THOUSAND/UL (ref 4.31–10.16)

## 2022-01-03 PROCEDURE — 85025 COMPLETE CBC W/AUTO DIFF WBC: CPT

## 2022-01-03 PROCEDURE — 99214 OFFICE O/P EST MOD 30 MIN: CPT | Performed by: NURSE PRACTITIONER

## 2022-01-03 PROCEDURE — 36415 COLL VENOUS BLD VENIPUNCTURE: CPT

## 2022-01-03 NOTE — PROGRESS NOTES
Assessment/Plan:    Hypertension  Blood pressure stable  To continue current antihypertensive regimen  Counseled the importance of consuming a low-sodium diet and weight reduction  Right bundle branch block  To continue care with Cardiology  Diabetes mellitus, type 2 ([facility] )    Lab Results   Component Value Date    HGBA1C 5 4 09/25/2021   To continue Jardiance 25 mg daily  Counseled the importance of low carb diet and beginning daily physical activity as tolerated  Will repeat A1c in 3 months  Follow-up in 4 months or sooner if needed  Osteoporosis  To continue Prolia injections every 6 months  DOE (obstructive sleep apnea)  To continue wearing CPAP nightly  Diagnoses and all orders for this visit:    Primary hypertension  -     CBC and differential; Future  -     Comprehensive metabolic panel; Future  -     Hemoglobin A1C; Future  -     Lipid Panel with Direct LDL reflex; Future  -     Microalbumin / creatinine urine ratio; Future  -     TSH, 3rd generation with Free T4 reflex; Future    Type 2 diabetes mellitus with microalbuminuria, without long-term current use of insulin (HCC)  -     CBC and differential; Future  -     Comprehensive metabolic panel; Future  -     Hemoglobin A1C; Future  -     Lipid Panel with Direct LDL reflex; Future  -     Microalbumin / creatinine urine ratio; Future  -     TSH, 3rd generation with Free T4 reflex; Future    Hypertriglyceridemia    Obesity, morbid (HCC)    Right bundle branch block    Screening for prostate cancer  -     Cancel: PSA, Total Screen; Future    Osteoporosis, unspecified osteoporosis type, unspecified pathological fracture presence    DOE (obstructive sleep apnea)          Subjective:      Patient ID: Ashley Pompa is a 61 y o  male  Katey Gentle presents for a follow-up  In regards to his diabetes, he takes Jardiance as prescribed  He will occasionally watch his diet but does not exercise    In regards to his sleep apnea, he wears a CPAP nightly  He has been receiving Prolia injections to treat his osteoporosis  He is due for his next injection this month  In regards to his hypertension, he takes his amlodipine and losartan as prescribed  Denies headache, chest pain, dizziness, or lower extremity edema  He recently repeated a CBC as his white count was low  This has since resolved        The following portions of the patient's history were reviewed and updated as appropriate: He   Patient Active Problem List    Diagnosis Date Noted    Right bundle branch block 01/03/2022    Hypertriglyceridemia 09/27/2021    DOE (obstructive sleep apnea)     Osteoporosis 03/22/2021    Hypertension 02/06/2019    [facility], [facility] 02/06/2019    Malabsorption 02/06/2018    Persistent proteinuria 09/28/2017    Diabetes mellitus, type 2 ([facility] ) 04/27/2017     Current Outpatient Medications   Medication Sig Dispense Refill    amLODIPine (NORVASC) 5 mg tablet TAKE 1 TABLET DAILY 90 tablet 3    buPROPion (WELLBUTRIN XL) 300 mg 24 hr tablet TAKE 1 TABLET DAILY 90 tablet 1    calcium citrate-Vitamin D (CALCIUM CITRATE + D3) 200 mg-250 units Take 1 tablet by mouth daily      clindamycin (CLINDAGEL) 1 % gel   0    Icosapent Ethyl (Vascepa) 1 g CAPS TAKE 2 CAPSULES IN THE MORNING AND 2 CAPSULES AT NIGHT 360 capsule 1    Iron-Vitamin C (VITRON-C)  MG TABS Take one pill twice daily 60 tablet 1    Jardiance 25 MG TABS TAKE 1 TABLET DAILY 90 tablet 3    losartan (COZAAR) 100 MG tablet Take 1 tablet (100 mg total) by mouth daily 90 tablet 3    Multiple Vitamins-Minerals (CENTRUM ADULTS PO) Take 1 tablet by mouth daily      pantoprazole (PROTONIX) 40 mg tablet Take 1 tablet (40 mg total) by mouth daily 90 tablet 4    PreviDent 0 2 % SOLN RINSE WITH 10 milliliters for 1 MINUTE THEN SPIT USE AT BEDTIME, DO NOT SWALLOW      rosuvastatin (CRESTOR) 5 mg tablet TAKE 1 TABLET DAILY 90 tablet 3    traMADol-acetaminophen (ULTRACET) 37 5-325 mg per tablet May Take 1 pill twice a day p r n  arthritis pain 30 tablet 0    triamcinolone (KENALOG) 0 1 % cream APPLY TO LEFT LEG SPOTS TWICE DAILY FOR UP TO 4 WEEKS       No current facility-administered medications for this visit  He is allergic to augmentin [amoxicillin-pot clavulanate] and ciprofloxacin       Review of Systems   Constitutional: Negative  HENT: Negative  Respiratory: Negative  Cardiovascular: Negative  Gastrointestinal: Negative  Musculoskeletal: Positive for arthralgias and gait problem  Skin: Negative  Psychiatric/Behavioral: Negative  /80   Pulse 88   Temp 97 8 °F (36 6 °C) (Tympanic)   Resp 18   Ht 5' 10" (1 778 m)   Wt 109 kg (240 lb)   SpO2 96%   BMI 34 44 kg/m²     Objective:     Physical Exam  Vitals and nursing note reviewed  Constitutional:       General: He is not in acute distress  Appearance: Normal appearance  He is well-developed  He is not ill-appearing, toxic-appearing or diaphoretic  HENT:      Head: Normocephalic and atraumatic  Eyes:      Conjunctiva/sclera: Conjunctivae normal    Cardiovascular:      Rate and Rhythm: Normal rate and regular rhythm  Heart sounds: Normal heart sounds  No murmur heard  Pulmonary:      Effort: Pulmonary effort is normal  No respiratory distress  Breath sounds: Normal breath sounds  No wheezing or rales  Chest:      Chest wall: No tenderness  Abdominal:      General: Abdomen is flat  Bowel sounds are normal  There is no distension  Palpations: There is no mass  Tenderness: There is no abdominal tenderness  There is no guarding or rebound  Hernia: No hernia is present  Musculoskeletal:      Cervical back: Neck supple  Comments: Walks with a limp   Neurological:      Mental Status: He is alert and oriented to person, place, and time  Psychiatric:         Mood and Affect: Mood normal          Behavior: Behavior normal          Thought Content:  Thought content normal          Judgment: Judgment normal          BMI Counseling: Body mass index is 34 44 kg/m²  The BMI is above normal  Nutrition recommendations include decreasing overall calorie intake, 3-5 servings of fruits/vegetables daily, reducing fast food intake, consuming healthier snacks, decreasing soda and/or juice intake, moderation in carbohydrate intake and increasing intake of lean protein  Exercise recommendations include exercising 3-5 times per week

## 2022-01-03 NOTE — ASSESSMENT & PLAN NOTE
Lab Results   Component Value Date    HGBA1C 5 4 09/25/2021   To continue Jardiance 25 mg daily  Counseled the importance of low carb diet and beginning daily physical activity as tolerated  Will repeat A1c in 3 months  Follow-up in 4 months or sooner if needed

## 2022-01-03 NOTE — ASSESSMENT & PLAN NOTE
Blood pressure stable  To continue current antihypertensive regimen  Counseled the importance of consuming a low-sodium diet and weight reduction

## 2022-01-03 NOTE — PATIENT INSTRUCTIONS
Weight Management   AMBULATORY CARE:   Why it is important to manage your weight:  Being overweight increases your risk of health conditions such as heart disease, high blood pressure, type 2 diabetes, and certain types of cancer  It can also increase your risk for osteoarthritis, sleep apnea, and other respiratory problems  Aim for a slow, steady weight loss  Even a small amount of weight loss can lower your risk of health problems  How to lose weight safely:  A safe and healthy way to lose weight is to eat fewer calories and get regular exercise  · You can lose up about 1 pound a week by decreasing the number of calories you eat by 500 calories each day  You can decrease calories by eating smaller portion sizes or by cutting out high-calorie foods  Read labels to find out how many calories are in the foods you eat  · You can also burn calories with exercise such as walking, swimming, or biking  You will be more likely to keep weight off if you make these changes part of your lifestyle  Exercise at least 30 minutes per day on most days of the week  You can also fit in more physical activity by taking the stairs instead of the elevator or parking farther away from stores  Ask your healthcare provider about the best exercise plan for you  Healthy meal plan for weight management:  A healthy meal plan includes a variety of foods, contains fewer calories, and helps you stay healthy  A healthy meal plan includes the following:     · Eat whole-grain foods more often  A healthy meal plan should contain fiber  Fiber is the part of grains, fruits, and vegetables that is not broken down by your body  Whole-grain foods are healthy and provide extra fiber in your diet  Some examples of whole-grain foods are whole-wheat breads and pastas, oatmeal, brown rice, and bulgur  · Eat a variety of vegetables every day  Include dark, leafy greens such as spinach, kale, lennie greens, and mustard greens   Eat yellow and orange vegetables such as carrots, sweet potatoes, and winter squash  · Eat a variety of fruits every day  Choose fresh or canned fruit (canned in its own juice or light syrup) instead of juice  Fruit juice has very little or no fiber  · Eat low-fat dairy foods  Drink fat-free (skim) milk or 1% milk  Eat fat-free yogurt and low-fat cottage cheese  Try low-fat cheeses such as mozzarella and other reduced-fat cheeses  · Choose meat and other protein foods that are low in fat  Choose beans or other legumes such as split peas or lentils  Choose fish, skinless poultry (chicken or turkey), or lean cuts of red meat (beef or pork)  Before you cook meat or poultry, cut off any visible fat  · Use less fat and oil  Try baking foods instead of frying them  Add less fat, such as margarine, sour cream, regular salad dressing and mayonnaise to foods  Eat fewer high-fat foods  Some examples of high-fat foods include french fries, doughnuts, ice cream, and cakes  · Eat fewer sweets  Limit foods and drinks that are high in sugar  This includes candy, cookies, regular soda, and sweetened drinks  Ways to decrease calories:   · Eat smaller portions  ? Use a small plate with smaller servings  ? Do not eat second helpings  ? When you eat at a restaurant, ask for a box and place half of your meal in the box before you eat  ? Share an entrée with someone else  · Replace high-calorie snacks with healthy, low-calorie snacks  ? Choose fresh fruit, vegetables, fat-free rice cakes, or air-popped popcorn instead of potato chips, nuts, or chocolate  ? Choose water or calorie-free drinks instead of soda or sweetened drinks  · Do not shop for groceries when you are hungry  You may be more likely to make unhealthy food choices  Take a grocery list of healthy foods and shop after you have eaten  · Eat regular meals  Do not skip meals  Skipping meals can lead to overeating later in the day  This can make it harder for you to lose weight  Eat a healthy snack in place of a meal if you do not have time to eat a regular meal  Talk with a dietitian to help you create a meal plan and schedule that is right for you  Other things to consider as you try to lose weight:   · Be aware of situations that may give you the urge to overeat, such as eating while watching television  Find ways to avoid these situations  For example, read a book, go for a walk, or do crafts  · Meet with a weight loss support group or friends who are also trying to lose weight  This may help you stay motivated to continue working on your weight loss goals  © Copyright [employer] 2021 Information is for End User's use only and may not be sold, redistributed or otherwise used for commercial purposes  All illustrations and images included in CareNotes® are the copyrighted property of A D A M [employer] Inc  or [facility]   The above information is an  only  It is not intended as medical advice for individual conditions or treatments  Talk to your doctor, nurse or pharmacist before following any medical regimen to see if it is safe and effective for you  Heart Healthy Diet   AMBULATORY CARE:   A heart healthy diet  is an eating plan low in unhealthy fats and sodium (salt)  The plan is high in healthy fats and fiber  A heart healthy diet helps improve your cholesterol levels and lowers your risk for heart disease and stroke  A dietitian will teach you how to read and understand food labels  Heart healthy diet guidelines to follow:   · Choose foods that contain healthy fats  ? Unsaturated fats  include monounsaturated and polyunsaturated fats  Unsaturated fat is found in foods such as soybean, canola, olive, corn, and safflower oils  It is also found in soft tub margarine that is made with liquid vegetable oil      ? Omega-3 fat  is found in certain fish, such as salmon, tuna, and trout, and in walnuts and flaxseed  Eat fish high in omega-3 fats at least 2 times a week  · Get 20 to 30 grams of fiber each day  Fruits, vegetables, whole-grain foods, and legumes (cooked beans) are good sources of fiber  · Limit or do not have unhealthy fats  ? Cholesterol  is found in animal foods, such as eggs and lobster, and in dairy products made from whole milk  Limit cholesterol to less than 200 mg each day  ? Saturated fat  is found in meats, such as chan and hamburger  It is also found in chicken or turkey skin, whole milk, and butter  Limit saturated fat to less than 7% of your total daily calories  ? Trans fat  is found in packaged foods, such as potato chips and cookies  It is also in hard margarine, some fried foods, and shortening  Do not eat foods that contain trans fats  · Limit sodium as directed  You may be told to limit sodium to 2,000 to 2,300 mg each day  Choose low-sodium or no-salt-added foods  Add little or no salt to food you prepare  Use herbs and spices in place of salt  Include the following in your heart healthy plan:  Ask your dietitian or healthcare provider how many servings to have from each of the following food groups:  · Grains:      ? Whole-wheat breads, cereals, and pastas, and brown rice    ? Low-fat, low-sodium crackers and chips    · Vegetables:      ? Broccoli, green beans, green peas, and spinach    ? Collards, kale, and lima beans    ? Carrots, sweet potatoes, tomatoes, and peppers    ? Canned vegetables with no salt added    · Fruits:      ? Bananas, peaches, pears, and pineapple    ? Grapes, raisins, and dates    ? Oranges, tangerines, grapefruit, orange juice, and grapefruit juice    ? Apricots, mangoes, melons, and papaya    ? Raspberries and strawberries    ? Canned fruit with no added sugar    · Low-fat dairy:      ? Nonfat (skim) milk, 1% milk, and low-fat almond, cashew, or soy milks fortified with calcium    ?  Low-fat cheese, regular or frozen yogurt, and cottage cheese    · Meats and proteins:      ? Lean cuts of beef and pork (loin, leg, round), skinless chicken and turkey    ? Legumes, soy products, egg whites, or nuts    Limit or do not include the following in your heart healthy plan:   · Unhealthy fats and oils:      ? Whole or 2% milk, cream cheese, sour cream, or cheese    ? High-fat cuts of beef (T-bone steaks, ribs), chicken or turkey with skin, and organ meats such as liver    ? Butter, stick margarine, shortening, and cooking oils such as coconut or palm oil    · Foods and liquids high in sodium:      ? Packaged foods, such as frozen dinners, cookies, macaroni and cheese, and cereals with more than 300 mg of sodium per serving    ? Vegetables with added sodium, such as instant potatoes, vegetables with added sauces, or regular canned vegetables    ? Cured or smoked meats, such as hot dogs, chan, and sausage    ? High-sodium ketchup, barbecue sauce, salad dressing, pickles, olives, soy sauce, or miso    · Foods and liquids high in sugar:      ? Candy, cake, cookies, pies, or doughnuts    ? Soft drinks (soda), sports drinks, or sweetened tea    ? Canned or dry mixes for cakes, soups, sauces, or gravies    Other healthy heart guidelines:   · Do not smoke  Nicotine and other chemicals in cigarettes and cigars can cause lung and heart damage  Ask your healthcare provider for information if you currently smoke and need help to quit  E-cigarettes or smokeless tobacco still contain nicotine  Talk to your healthcare provider before you use these products  · Limit or do not drink alcohol as directed  Alcohol can damage your heart and raise your blood pressure  Your healthcare provider may give you specific daily and weekly limits  The general recommended limit is 1 drink a day for women 21 or older and for men 72 or older  Do not have more than 3 drinks in a day or 7 in a week   The recommended limit is 2 drinks a day for men 21 to 64 years of age  Bushra Margo not have more than 4 drinks in a day or 14 in a week  A drink of alcohol is 12 ounces of beer, 5 ounces of wine, or 1½ ounces of liquor  · Exercise regularly  Exercise can help you maintain a healthy weight and improve your blood pressure and cholesterol levels  Regular exercise can also decrease your risk for heart problems  Ask your healthcare provider about the best exercise plan for you  Do not start an exercise program without asking your healthcare provider  Follow up with your doctor or cardiologist as directed:  Write down your questions so you remember to ask them during your visits  © [employer] 2021 Information is for End User's use only and may not be sold, redistributed or otherwise used for commercial purposes  All illustrations and images included in CareNotes® are the copyrighted property of [employer]  or Shanthi Morton   The above information is an  only  It is not intended as medical advice for individual conditions or treatments  Talk to your doctor, nurse or pharmacist before following any medical regimen to see if it is safe and effective for you

## 2022-01-10 ENCOUNTER — TELEPHONE (OUTPATIENT)
Dept: FAMILY MEDICINE CLINIC | Facility: CLINIC | Age: 67
End: 2022-01-10

## 2022-01-10 NOTE — TELEPHONE ENCOUNTER
Called patient to make an apt for his Prolia injection   He can make an apt for any day and time on nurse schedule

## 2022-01-11 ENCOUNTER — CLINICAL SUPPORT (OUTPATIENT)
Dept: FAMILY MEDICINE CLINIC | Facility: CLINIC | Age: 67
End: 2022-01-11
Payer: MEDICARE

## 2022-01-11 DIAGNOSIS — M81.0 OSTEOPOROSIS, UNSPECIFIED OSTEOPOROSIS TYPE, UNSPECIFIED PATHOLOGICAL FRACTURE PRESENCE: Primary | ICD-10-CM

## 2022-01-11 PROCEDURE — 96372 THER/PROPH/DIAG INJ SC/IM: CPT

## 2022-02-23 DIAGNOSIS — I10 HYPERTENSION, UNSPECIFIED TYPE: ICD-10-CM

## 2022-02-23 DIAGNOSIS — K22.70 BARRETT'S ESOPHAGUS WITHOUT DYSPLASIA: ICD-10-CM

## 2022-02-24 RX ORDER — AMLODIPINE BESYLATE 5 MG/1
TABLET ORAL
Qty: 90 TABLET | Refills: 3 | Status: SHIPPED | OUTPATIENT
Start: 2022-02-24

## 2022-02-24 RX ORDER — PANTOPRAZOLE SODIUM 40 MG/1
TABLET, DELAYED RELEASE ORAL
Qty: 90 TABLET | Refills: 3 | Status: SHIPPED | OUTPATIENT
Start: 2022-02-24

## 2022-03-15 ENCOUNTER — TELEPHONE (OUTPATIENT)
Dept: NEPHROLOGY | Facility: CLINIC | Age: 67
End: 2022-03-15

## 2022-03-15 NOTE — TELEPHONE ENCOUNTER
Called spoke with Pt reminded Pt to get labs done prior to 3/23 Appt, Pt understood and was ok with it

## 2022-03-18 ENCOUNTER — APPOINTMENT (OUTPATIENT)
Dept: LAB | Facility: HOSPITAL | Age: 67
End: 2022-03-18
Payer: MEDICARE

## 2022-03-18 DIAGNOSIS — R80.9 TYPE 2 DIABETES MELLITUS WITH MICROALBUMINURIA, WITHOUT LONG-TERM CURRENT USE OF INSULIN (HCC): ICD-10-CM

## 2022-03-18 DIAGNOSIS — I10 PRIMARY HYPERTENSION: ICD-10-CM

## 2022-03-18 DIAGNOSIS — I10 ESSENTIAL HYPERTENSION: ICD-10-CM

## 2022-03-18 DIAGNOSIS — E11.29 TYPE 2 DIABETES MELLITUS WITH MICROALBUMINURIA, WITHOUT LONG-TERM CURRENT USE OF INSULIN (HCC): ICD-10-CM

## 2022-03-18 LAB
ANION GAP SERPL CALCULATED.3IONS-SCNC: 8 MMOL/L (ref 4–13)
BACTERIA UR QL AUTO: NORMAL /HPF
BASOPHILS # BLD AUTO: 0.05 THOUSANDS/ΜL (ref 0–0.1)
BASOPHILS NFR BLD AUTO: 1 % (ref 0–1)
BILIRUB UR QL STRIP: NEGATIVE
BUN SERPL-MCNC: 12 MG/DL (ref 5–25)
CALCIUM SERPL-MCNC: 8 MG/DL (ref 8.3–10.1)
CHLORIDE SERPL-SCNC: 109 MMOL/L (ref 100–108)
CLARITY UR: CLEAR
CO2 SERPL-SCNC: 28 MMOL/L (ref 21–32)
COLOR UR: YELLOW
CREAT SERPL-MCNC: 0.9 MG/DL (ref 0.6–1.3)
CREAT UR-MCNC: 129 MG/DL
EOSINOPHIL # BLD AUTO: 0.31 THOUSAND/ΜL (ref 0–0.61)
EOSINOPHIL NFR BLD AUTO: 5 % (ref 0–6)
ERYTHROCYTE [DISTWIDTH] IN BLOOD BY AUTOMATED COUNT: 12.3 % (ref 11.6–15.1)
GFR SERPL CREATININE-BSD FRML MDRD: 89 ML/MIN/1.73SQ M
GLUCOSE P FAST SERPL-MCNC: 128 MG/DL (ref 65–99)
GLUCOSE UR STRIP-MCNC: ABNORMAL MG/DL
HCT VFR BLD AUTO: 46.5 % (ref 36.5–49.3)
HGB BLD-MCNC: 16.9 G/DL (ref 12–17)
HGB UR QL STRIP.AUTO: ABNORMAL
IMM GRANULOCYTES # BLD AUTO: 0.02 THOUSAND/UL (ref 0–0.2)
IMM GRANULOCYTES NFR BLD AUTO: 0 % (ref 0–2)
KETONES UR STRIP-MCNC: NEGATIVE MG/DL
LEUKOCYTE ESTERASE UR QL STRIP: ABNORMAL
LYMPHOCYTES # BLD AUTO: 1.84 THOUSANDS/ΜL (ref 0.6–4.47)
LYMPHOCYTES NFR BLD AUTO: 28 % (ref 14–44)
MCH RBC QN AUTO: 32.1 PG (ref 26.8–34.3)
MCHC RBC AUTO-ENTMCNC: 36.3 G/DL (ref 31.4–37.4)
MCV RBC AUTO: 88 FL (ref 82–98)
MONOCYTES # BLD AUTO: 0.57 THOUSAND/ΜL (ref 0.17–1.22)
MONOCYTES NFR BLD AUTO: 9 % (ref 4–12)
NEUTROPHILS # BLD AUTO: 3.9 THOUSANDS/ΜL (ref 1.85–7.62)
NEUTS SEG NFR BLD AUTO: 57 % (ref 43–75)
NITRITE UR QL STRIP: NEGATIVE
NON-SQ EPI CELLS URNS QL MICRO: NORMAL /HPF
NRBC BLD AUTO-RTO: 0 /100 WBCS
PH UR STRIP.AUTO: 5.5 [PH]
PLATELET # BLD AUTO: 193 THOUSANDS/UL (ref 149–390)
PMV BLD AUTO: 10.9 FL (ref 8.9–12.7)
POTASSIUM SERPL-SCNC: 3.6 MMOL/L (ref 3.5–5.3)
PROT UR STRIP-MCNC: ABNORMAL MG/DL
PROT UR-MCNC: 66 MG/DL
PROT/CREAT UR: 0.51 MG/G{CREAT} (ref 0–0.1)
RBC # BLD AUTO: 5.27 MILLION/UL (ref 3.88–5.62)
RBC #/AREA URNS AUTO: NORMAL /HPF
SODIUM SERPL-SCNC: 145 MMOL/L (ref 136–145)
SP GR UR STRIP.AUTO: 1.02 (ref 1–1.03)
UROBILINOGEN UR QL STRIP.AUTO: 0.2 E.U./DL
WBC # BLD AUTO: 6.69 THOUSAND/UL (ref 4.31–10.16)
WBC #/AREA URNS AUTO: NORMAL /HPF

## 2022-03-18 PROCEDURE — 84156 ASSAY OF PROTEIN URINE: CPT

## 2022-03-18 PROCEDURE — 82570 ASSAY OF URINE CREATININE: CPT

## 2022-03-18 PROCEDURE — 80048 BASIC METABOLIC PNL TOTAL CA: CPT

## 2022-03-18 PROCEDURE — 85025 COMPLETE CBC W/AUTO DIFF WBC: CPT

## 2022-03-18 PROCEDURE — 81001 URINALYSIS AUTO W/SCOPE: CPT

## 2022-03-18 PROCEDURE — 36415 COLL VENOUS BLD VENIPUNCTURE: CPT

## 2022-03-22 ENCOUNTER — TELEPHONE (OUTPATIENT)
Dept: NEPHROLOGY | Facility: CLINIC | Age: 67
End: 2022-03-22

## 2022-03-23 ENCOUNTER — OFFICE VISIT (OUTPATIENT)
Dept: NEPHROLOGY | Facility: CLINIC | Age: 67
End: 2022-03-23
Payer: MEDICARE

## 2022-03-23 VITALS
TEMPERATURE: 97.5 F | HEIGHT: 70 IN | HEART RATE: 64 BPM | BODY MASS INDEX: 34.47 KG/M2 | DIASTOLIC BLOOD PRESSURE: 70 MMHG | OXYGEN SATURATION: 100 % | WEIGHT: 240.8 LBS | SYSTOLIC BLOOD PRESSURE: 120 MMHG | RESPIRATION RATE: 16 BRPM

## 2022-03-23 DIAGNOSIS — E66.01 OBESITY, MORBID (HCC): ICD-10-CM

## 2022-03-23 DIAGNOSIS — I10 PRIMARY HYPERTENSION: ICD-10-CM

## 2022-03-23 DIAGNOSIS — R80.9 TYPE 2 DIABETES MELLITUS WITH MICROALBUMINURIA, WITHOUT LONG-TERM CURRENT USE OF INSULIN (HCC): ICD-10-CM

## 2022-03-23 DIAGNOSIS — E11.29 TYPE 2 DIABETES MELLITUS WITH MICROALBUMINURIA, WITHOUT LONG-TERM CURRENT USE OF INSULIN (HCC): ICD-10-CM

## 2022-03-23 DIAGNOSIS — R80.1 PERSISTENT PROTEINURIA: Primary | ICD-10-CM

## 2022-03-23 PROCEDURE — 99214 OFFICE O/P EST MOD 30 MIN: CPT | Performed by: INTERNAL MEDICINE

## 2022-03-23 NOTE — PROGRESS NOTES
NEPHROLOGY OFFICE FOLLOW UP  Prakash Zepeda 61 y o  male MRN: [id number]    Encounter: [id number] 3/23/2022    REASON FOR VISIT: Prakash Zepeda is a 61 y o  male who is here on 3/23/2022 for Proteinuria and Follow-up    HPI:    Maria Fernanda Vicente came in today for follow-up of proteinuria  [de-identified] gentleman with history of hypertension and diabetes  Overall doing quite well denies any acute complaint  Trying to lose weight    No chest pain no palpitation or shortness of breath     No nausea no vomiting     No abdominal discomfort      REVIEW OF SYSTEMS:    Review of Systems   Constitutional: Negative for activity change and fatigue  HENT: Negative for congestion and ear discharge  Eyes: Negative for photophobia and pain  Respiratory: Negative for apnea and choking  Cardiovascular: Negative for chest pain and palpitations  Gastrointestinal: Negative for abdominal distention and blood in stool  Endocrine: Negative for heat intolerance and polyphagia  Genitourinary: Negative for flank pain and urgency  Musculoskeletal: Negative for neck pain and neck stiffness  Skin: Negative for color change and wound  Allergic/Immunologic: Negative for food allergies and immunocompromised state  Neurological: Negative for seizures and facial asymmetry  Hematological: Negative for adenopathy  Does not bruise/bleed easily  Psychiatric/Behavioral: Negative for self-injury and suicidal ideas           PAST MEDICAL HISTORY:  Past Medical History:   Diagnosis Date    Chronic kidney disease     Diabetes ([facility] [address] )     LAST ASSESSED: 7/23/14    Diabetes mellitus ([facility] [address] )     Edema     LAST ASSESSED:6/12/12    Hypertension     Morbid obesity due to excess calories ([facility] [address] )     LAST ASSESSED: 6/12/12    RBBB     Sleep apnea, obstructive        PAST SURGICAL HISTORY:  Past Surgical History:   Procedure Laterality Date    BACK SURGERY      CHOLECYSTECTOMY      COLONOSCOPY      FOOT SURGERY      GASTRIC RESTRICTION SURGERY      GASTRIC STAPLING FOR MORBID OBESITY LAPAROSCOPY W/ [id number]    SHOULDER SURGERY      UVULOPALATOPLASTY         SOCIAL HISTORY:  Social History     Substance and Sexual Activity   Alcohol Use No     Social History     Substance and Sexual Activity   Drug Use Yes    Types: Marijuana    Comment: everyday      Social History     Tobacco Use   Smoking Status Former Smoker    Packs/day: 2 00    Years: 15 00    Pack years: 30 00    Types: Cigarettes    Start date:     Quit date: 12    Years since quittin 2   Smokeless Tobacco Never Used       FAMILY HISTORY:  Family History   Problem Relation Age of Onset   [facility] COPD Mother     Heart disease Mother     Mental illness Mother     Drug abuse Mother     Diabetes Father     Mental illness Father     Drug abuse Father     Lung cancer Father     Diabetes Sister        MEDICATIONS:    Current Outpatient Medications:     amLODIPine (NORVASC) 5 mg tablet, TAKE 1 TABLET DAILY, Disp: 90 tablet, Rfl: 3    buPROPion (WELLBUTRIN XL) 300 mg 24 hr tablet, TAKE 1 TABLET DAILY, Disp: 90 tablet, Rfl: 1    calcium citrate-Vitamin D (CALCIUM CITRATE + D3) 200 mg-250 units, Take 1 tablet by mouth daily, Disp: , Rfl:     clindamycin (CLINDAGEL) 1 % gel, , Disp: , Rfl: 0    Icosapent Ethyl (Vascepa) 1 g CAPS, TAKE 2 CAPSULES IN THE MORNING AND 2 CAPSULES AT NIGHT, Disp: 360 capsule, Rfl: 1    Iron-Vitamin C (VITRON-C)  MG TABS, Take one pill twice daily, Disp: 60 tablet, Rfl: 1    Jardiance 25 MG TABS, TAKE 1 TABLET DAILY, Disp: 90 tablet, Rfl: 3    losartan (COZAAR) 100 MG tablet, Take 1 tablet (100 mg total) by mouth daily, Disp: 90 tablet, Rfl: 3    Multiple Vitamins-Minerals (CENTRUM ADULTS PO), Take 1 tablet by mouth daily, Disp: , Rfl:     pantoprazole (PROTONIX) 40 mg tablet, TAKE 1 TABLET DAILY, Disp: 90 tablet, Rfl: 3    rosuvastatin (CRESTOR) 5 mg tablet, TAKE 1 TABLET DAILY, Disp: 90 tablet, Rfl: 3    traMADol-acetaminophen (ULTRACET) 37 5-325 mg per tablet, May Take 1 pill twice a day p r n  arthritis pain, Disp: 30 tablet, Rfl: 0    triamcinolone (KENALOG) 0 1 % cream, APPLY TO LEFT LEG SPOTS TWICE DAILY FOR UP TO 4 WEEKS, Disp: , Rfl:     PreviDent 0 2 % SOLN, RINSE WITH 10 milliliters for 1 MINUTE THEN SPIT USE AT BEDTIME, DO NOT SWALLOW (Patient not taking: Reported on 3/23/2022), Disp: , Rfl:     PHYSICAL EXAM:  Vitals:    03/23/22 1028   BP: 120/70   BP Location: Right arm   Patient Position: Sitting   Pulse: 64   Resp: 16   Temp: 97 5 °F (36 4 °C)   TempSrc: Temporal   SpO2: 100%   Weight: 109 kg (240 lb 12 8 oz)   Height: 5' 10" (1 778 m)     Body mass index is 34 55 kg/m²  Physical Exam  Constitutional:       General: He is not in acute distress  Appearance: He is well-developed  He is obese  HENT:      Head: Normocephalic  Eyes:      General: No scleral icterus  Conjunctiva/sclera: Conjunctivae normal    Neck:      Vascular: No JVD  Cardiovascular:      Rate and Rhythm: Normal rate and regular rhythm  Heart sounds: Normal heart sounds  Pulmonary:      Effort: Pulmonary effort is normal       Breath sounds: Normal breath sounds  No wheezing  Abdominal:      General: Bowel sounds are normal       Palpations: Abdomen is soft  Tenderness: There is no abdominal tenderness  Musculoskeletal:         General: No swelling  Normal range of motion  Cervical back: Normal range of motion and neck supple  Skin:     General: Skin is warm  Findings: No rash  Neurological:      General: No focal deficit present  Mental Status: He is alert and oriented to person, place, and time     Psychiatric:         Behavior: Behavior normal          LAB RESULTS:  Results for orders placed or performed in visit on 13/90/61   Basic metabolic panel   Result Value Ref Range    Sodium 145 136 - 145 mmol/L    Potassium 3 6 3 5 - 5 3 mmol/L    Chloride 109 (H) 100 - 108 mmol/L    CO2 28 21 - 32 mmol/L ANION GAP 8 4 - 13 mmol/L    BUN 12 5 - 25 mg/dL    Creatinine 0 90 0 60 - 1 30 mg/dL    Glucose, Fasting 128 (H) 65 - 99 mg/dL    Calcium 8 0 (L) 8 3 - 10 1 mg/dL    eGFR 89 ml/min/1 73sq m   CBC and differential   Result Value Ref Range    WBC 6 69 4 31 - 10 16 Thousand/uL    RBC 5 27 3 88 - 5 62 Million/uL    Hemoglobin 16 9 12 0 - 17 0 g/dL    Hematocrit 46 5 36 5 - 49 3 %    MCV 88 82 - 98 fL    MCH 32 1 26 8 - 34 3 pg    MCHC 36 3 31 4 - 37 4 g/dL    RDW 12 3 11 6 - 15 1 %    MPV 10 9 8 9 - 12 7 fL    Platelets 394 527 - 131 Thousands/uL    nRBC 0 /100 WBCs    Neutrophils Relative 57 43 - 75 %    Immat GRANS % 0 0 - 2 %    Lymphocytes Relative 28 14 - 44 %    Monocytes Relative 9 4 - 12 %    Eosinophils Relative 5 0 - 6 %    Basophils Relative 1 0 - 1 %    Neutrophils Absolute 3 90 1 85 - 7 62 Thousands/µL    Immature Grans Absolute 0 02 0 00 - 0 20 Thousand/uL    Lymphocytes Absolute 1 84 0 60 - 4 47 Thousands/µL    Monocytes Absolute 0 57 0 17 - 1 22 Thousand/µL    Eosinophils Absolute 0 31 0 00 - 0 61 Thousand/µL    Basophils Absolute 0 05 0 00 - 0 10 Thousands/µL   UA (URINE) with reflex to Scope   Result Value Ref Range    Color, UA Yellow     Clarity, UA Clear     Specific Gravity, UA 1 025 1 003 - 1 030    pH, UA 5 5 4 5, 5 0, 5 5, 6 0, 6 5, 7 0, 7 5, 8 0    Leukocytes, UA (A) Negative     Elevated glucose may cause decreased leukocyte values   See urine microscopic for [facility] result/    Nitrite, UA Negative Negative    Protein, UA 30 (1+) (A) Negative mg/dl    Glucose, UA >=1000 (1%) (A) Negative mg/dl    Ketones, UA Negative Negative mg/dl    Urobilinogen, UA 0 2 0 2, 1 0 E U /dl E U /dl    Bilirubin, UA Negative Negative    Blood, UA Trace-Intact (A) Negative   Protein / creatinine ratio, urine   Result Value Ref Range    Creatinine, Ur 129 0 mg/dL    Protein Urine Random 66 mg/dL    Prot/Creat Ratio, Ur 0 51 (H) 0 00 - 0 10   Urine Microscopic   Result Value Ref Range    RBC, UA 0-1 None Seen, 0-1, 1-2, 2-4, 0-5 /hpf    WBC, UA 0-1 None Seen, 0-1, 1-2, 0-5, 2-4 /hpf    Epithelial Cells Occasional None Seen, Occasional /hpf    Bacteria, UA None Seen None Seen, Occasional /hpf       ASSESSMENT and PLAN:      Persistent proteinuria  Non nephrotic range  Likely because of diabetic nephropathy along with weight contributing     Patient is on ARB blocker as well as Jardiance which I will continue  Need to lose weight and is well aware of it    Diabetes mellitus, type 2 [facility])    Lab Results   Component Value Date    HGBA1C 5 4 09/25/2021   Seems to be reasonably well control  Importance of diabetic control and effect on kidney disease discussed with him    Hypertension  Quite stable with help of losartan which I will continue    Obesity, morbid ([address] )  Definitely contributing to overall his medical problem including proteinuria  Weight reduction discussed with him      Everything discussed at length with the patient  I will see him back in 1 year again  Will get blood and urine test before that visit        Portions of the record may have been created with voice recognition software  Occasional wrong word or "sound a like" substitutions may have occurred due to the inherent limitations of voice recognition software  Read the chart carefully and recognize, using context, where substitutions have occurred  If you have any questions, please contact the dictating provider

## 2022-03-23 NOTE — ASSESSMENT & PLAN NOTE
Non nephrotic range  Likely because of diabetic nephropathy along with weight contributing     Patient is on ARB blocker as well as Jardiance which I will continue    Need to lose weight and is well aware of it

## 2022-03-23 NOTE — ASSESSMENT & PLAN NOTE
Definitely contributing to overall his medical problem including proteinuria    Weight reduction discussed with him

## 2022-03-23 NOTE — ASSESSMENT & PLAN NOTE
Lab Results   Component Value Date    HGBA1C 5 4 09/25/2021   Seems to be reasonably well control    Importance of diabetic control and effect on kidney disease discussed with him

## 2022-03-23 NOTE — PATIENT INSTRUCTIONS
Chronic Kidney Disease   AMBULATORY CARE:   Chronic kidney disease (CKD)  is the gradual and permanent loss of kidney function  Normally, the kidneys remove fluid, chemicals, and waste from your blood  These wastes are turned into urine by your kidneys  CKD may worsen over time and lead to kidney failure  Common signs and symptoms include the following:   · Changes in how often you need to urinate    · Swelling in your arms, legs, or feet    · Shortness of breath    · Fatigue or weakness    · Bad or bitter taste in your mouth    · Nausea, vomiting, or loss of appetite    Call your local emergency number (911 in the [address] [facility]) if:   · You have a seizure  · You have shortness of breath  Seek care immediately if:   · You are confused and very drowsy  Call your doctor or nephrologist if:   · You suddenly gain or lose more weight than your healthcare provider has told you is okay  · You have itchy skin or a rash  · You urinate more or less than you normally do  · You have blood in your urine  · You have nausea and are vomiting  · You have fatigue or muscle weakness  · You have hiccups that will not stop  · You have questions or concerns about your condition or care  How CKD is diagnosed:  CKD has 5 stages  Your healthcare provider will use results from the following tests to find the stage of CKD you have:  · Blood and urine tests  show how well your kidneys are working  They may also show the cause of your CKD  · Ultrasound, CT scan, or MRI  pictures may be used to check your kidneys  You may be given contrast liquid to help your kidneys show up better in the pictures  Tell the healthcare provider if you have ever had an allergic reaction to contrast liquid  Do not enter the MRI room with anything metal  Metal can cause serious injury  Tell the healthcare provider if you have any metal in or on your body      · A biopsy  is a procedure to remove a small piece of tissue from your kidney  It is done to find the cause of your CKD  Treatment  can help control signs and symptoms, and prevent a worse stage of CKD  Your care team may include specialists, such as a dietitian or a heart specialist  This depends on the stage of your CKD and if you have other health conditions to manage  Healthcare providers will work with you to create a plan based on your decisions for treatment  Your treatment plan may include any of the following:  · Medicines  may be given to decrease your blood pressure and get rid of extra fluid  You may also receive medicine to manage health conditions that may occur with CKD, such as anemia, diabetes, and heart disease  · Dialysis  is a treatment to remove chemicals and waste from your blood when your kidneys can no longer do this  · Surgery  may be needed to create an arteriovenous fistula (AVF) in your arm or insert a catheter into your abdomen  This is done so you can receive dialysis  · A kidney transplant  may be done if your CKD becomes severe  What you can do to manage CKD: Management may include making some lifestyle changes  Tell your healthcare provider if you have any concerns about being able to make changes  He or she can help you find solutions, including working with specialists  Ask for help creating a plan to break large goals into smaller steps  Your plan may include any of the following:  · Manage other health conditions  Your healthcare provider will work with you to make a care plan that meets your needs  You will be checked regularly for heart disease or other conditions that can make CKD worse, such as diabetes  Your blood pressure will be closely monitored  You will also get a target blood pressure and help making a plan to reach your target  This may include taking your blood pressure at home  · Maintain a healthy weight  Your weight and body mass index (BMI) will be checked regularly   BMI helps find if your weight is healthy for your height  Your healthcare provider will use other tests to check your muscle and protein levels  Extra weight can strain your kidneys  A low weight or low muscle mass can make you feel more tired  You may have trouble doing your daily activities  Ask your provider what a healthy weight is for you  He or she can help you create a plan to lose or gain weight safely, if needed  The plan may include keeping a food diary  This is a list of foods and liquids you have each day  Your provider will use the diary to help you make changes, if needed  Changes are based on your health and any other conditions you have, such as diabetes  · Create an exercise plan  Regular exercise can help you manage CKD, high blood pressure, and diabetes  Exercise also helps control weight  Your provider can help you create exercise goals and a plan to reach those goals  For example, your goal may be to exercise for 30 minutes in a day  Your plan can include breaking exercise into 10 minute sessions, 3 times during the day  · Create a healthy eating plan  Your provider may tell you to eat food low in potassium, phosphorus, or protein  Your provider may also recommend vitamin or mineral supplements  Do not take any supplements without talking to your provider  A dietitian can help you plan meals if needed  Ask how much liquid to drink each day and which liquids are best for you  · Limit sodium (salt) as directed  You may need to limit sodium to less than 2,300 milligrams (mg) each day  Ask your dietitian or healthcare provider how much sodium you can have each day  The amount depends on your stage of kidney disease  Table salt, canned foods, soups, salted snacks, and processed meats, like deli meats and sausage, are high in sodium  Your provider or a dietitian can show you how to read food labels for sodium  · Limit alcohol as directed  Alcohol can cause fluid retention and can affect your kidneys   Ask how much alcohol is safe for you  A drink of alcohol is 12 ounces of beer, 5 ounces of wine, or 1½ ounces of liquor  · Do not smoke  Nicotine and other chemicals in cigarettes and cigars can cause kidney damage  Ask your provider for information if you currently smoke and need help to quit  E-cigarettes or smokeless tobacco still contain nicotine  Talk to your provider before you use these products  · Ask about over-the-counter medicines  Medicines such as NSAIDs and laxatives may harm your kidneys  Some cough and cold medicines can raise your blood pressure  Always ask if a medicine is safe before you take it  · Ask about vaccines you may need  CKD can increase your risk for infections such as pneumonia, influenza, and hepatitis  Vaccines lower your risk for infection  Your healthcare provider will tell you which vaccines you need and when to get them  Follow up with your doctor or nephrologist as directed: You will need to return for tests to monitor your kidney and nerve function, and your parathyroid hormone level  Your medicines may be changed, based on certain test results  Write down your questions so you remember to ask them during your visits  © Copyright [employer] 2022 Information is for End User's use only and may not be sold, redistributed or otherwise used for commercial purposes  All illustrations and images included in CareNotes® are the copyrighted property of [employer]  or Shanthi Holt  The above information is an  only  It is not intended as medical advice for individual conditions or treatments  Talk to your doctor, nurse or pharmacist before following any medical regimen to see if it is safe and effective for you

## 2022-03-28 ENCOUNTER — APPOINTMENT (OUTPATIENT)
Dept: LAB | Facility: HOSPITAL | Age: 67
End: 2022-03-28
Payer: MEDICARE

## 2022-03-28 DIAGNOSIS — R23.8 VESICULAR ERUPTION OF SKIN: ICD-10-CM

## 2022-03-28 DIAGNOSIS — L30.8 ACUTE VESICULAR DERMATITIS: ICD-10-CM

## 2022-03-28 LAB
BASOPHILS # BLD AUTO: 0.08 THOUSANDS/ΜL (ref 0–0.1)
BASOPHILS NFR BLD AUTO: 1 % (ref 0–1)
EOSINOPHIL # BLD AUTO: 0.23 THOUSAND/ΜL (ref 0–0.61)
EOSINOPHIL NFR BLD AUTO: 4 % (ref 0–6)
ERYTHROCYTE [DISTWIDTH] IN BLOOD BY AUTOMATED COUNT: 12.1 % (ref 11.6–15.1)
HCT VFR BLD AUTO: 47.8 % (ref 36.5–49.3)
HGB BLD-MCNC: 16.9 G/DL (ref 12–17)
IMM GRANULOCYTES # BLD AUTO: 0.01 THOUSAND/UL (ref 0–0.2)
IMM GRANULOCYTES NFR BLD AUTO: 0 % (ref 0–2)
LYMPHOCYTES # BLD AUTO: 1.77 THOUSANDS/ΜL (ref 0.6–4.47)
LYMPHOCYTES NFR BLD AUTO: 30 % (ref 14–44)
MCH RBC QN AUTO: 31.1 PG (ref 26.8–34.3)
MCHC RBC AUTO-ENTMCNC: 35.4 G/DL (ref 31.4–37.4)
MCV RBC AUTO: 88 FL (ref 82–98)
MONOCYTES # BLD AUTO: 0.49 THOUSAND/ΜL (ref 0.17–1.22)
MONOCYTES NFR BLD AUTO: 8 % (ref 4–12)
NEUTROPHILS # BLD AUTO: 3.4 THOUSANDS/ΜL (ref 1.85–7.62)
NEUTS SEG NFR BLD AUTO: 57 % (ref 43–75)
NRBC BLD AUTO-RTO: 0 /100 WBCS
PLATELET # BLD AUTO: 211 THOUSANDS/UL (ref 149–390)
PMV BLD AUTO: 10.8 FL (ref 8.9–12.7)
RBC # BLD AUTO: 5.44 MILLION/UL (ref 3.88–5.62)
WBC # BLD AUTO: 5.98 THOUSAND/UL (ref 4.31–10.16)

## 2022-03-28 PROCEDURE — 84165 PROTEIN E-PHORESIS SERUM: CPT | Performed by: PATHOLOGY

## 2022-03-28 PROCEDURE — 86038 ANTINUCLEAR ANTIBODIES: CPT

## 2022-03-28 PROCEDURE — 84165 PROTEIN E-PHORESIS SERUM: CPT

## 2022-03-28 PROCEDURE — 86618 LYME DISEASE ANTIBODY: CPT

## 2022-03-28 PROCEDURE — 36415 COLL VENOUS BLD VENIPUNCTURE: CPT

## 2022-03-28 PROCEDURE — 85025 COMPLETE CBC W/AUTO DIFF WBC: CPT
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
## 2022-03-29 LAB
ALBUMIN SERPL ELPH-MCNC: 4.12 G/DL (ref 3.5–5)
ALBUMIN SERPL ELPH-MCNC: 66.4 % (ref 52–65)
ALPHA1 GLOB SERPL ELPH-MCNC: 0.29 G/DL (ref 0.1–0.4)
ALPHA1 GLOB SERPL ELPH-MCNC: 4.7 % (ref 2.5–5)
ALPHA2 GLOB SERPL ELPH-MCNC: 0.61 G/DL (ref 0.4–1.2)
ALPHA2 GLOB SERPL ELPH-MCNC: 9.8 % (ref 7–13)
BETA GLOB ABNORMAL SERPL ELPH-MCNC: 0.41 G/DL (ref 0.4–0.8)
BETA1 GLOB SERPL ELPH-MCNC: 6.6 % (ref 5–13)
BETA2 GLOB SERPL ELPH-MCNC: 4.2 % (ref 2–8)
BETA2+GAMMA GLOB SERPL ELPH-MCNC: 0.26 G/DL (ref 0.2–0.5)
GAMMA GLOB ABNORMAL SERPL ELPH-MCNC: 0.51 G/DL (ref 0.5–1.6)
GAMMA GLOB SERPL ELPH-MCNC: 8.3 % (ref 12–22)
IGG/ALB SER: 1.98 {RATIO} (ref 1.1–1.8)
PROT PATTERN SERPL ELPH-IMP: ABNORMAL
PROT SERPL-MCNC: 6.2 G/DL (ref 6.4–8.2)

## 2022-03-31 LAB — MISCELLANEOUS LAB TEST RESULT: NORMAL

## 2022-04-04 DIAGNOSIS — I10 HYPERTENSION, UNSPECIFIED TYPE: ICD-10-CM

## 2022-04-04 RX ORDER — LOSARTAN POTASSIUM 100 MG/1
100 TABLET ORAL DAILY
Qty: 90 TABLET | Refills: 1 | Status: SHIPPED | OUTPATIENT
Start: 2022-04-04

## 2022-04-07 LAB — B BURGDOR IGG+IGM SER-ACNC: 21

## 2022-04-15 ENCOUNTER — APPOINTMENT (OUTPATIENT)
Dept: LAB | Facility: HOSPITAL | Age: 67
End: 2022-04-15
Payer: MEDICARE

## 2022-04-15 DIAGNOSIS — R21 RASH AND OTHER NONSPECIFIC SKIN ERUPTION: ICD-10-CM

## 2022-04-15 LAB
ALBUMIN SERPL BCP-MCNC: 4.1 G/DL (ref 3.5–5)
ALP SERPL-CCNC: 89 U/L (ref 46–116)
ALT SERPL W P-5'-P-CCNC: 50 U/L (ref 12–78)
ANION GAP SERPL CALCULATED.3IONS-SCNC: 10 MMOL/L (ref 4–13)
AST SERPL W P-5'-P-CCNC: 24 U/L (ref 5–45)
BASOPHILS # BLD AUTO: 0.04 THOUSANDS/ΜL (ref 0–0.1)
BASOPHILS NFR BLD AUTO: 1 % (ref 0–1)
BILIRUB SERPL-MCNC: 0.79 MG/DL (ref 0.2–1)
BUN SERPL-MCNC: 14 MG/DL (ref 5–25)
CALCIUM SERPL-MCNC: 9 MG/DL (ref 8.3–10.1)
CHLORIDE SERPL-SCNC: 106 MMOL/L (ref 100–108)
CHOLEST SERPL-MCNC: 134 MG/DL
CO2 SERPL-SCNC: 30 MMOL/L (ref 21–32)
CREAT SERPL-MCNC: 0.85 MG/DL (ref 0.6–1.3)
CREAT UR-MCNC: 128 MG/DL
EOSINOPHIL # BLD AUTO: 0.27 THOUSAND/ΜL (ref 0–0.61)
EOSINOPHIL NFR BLD AUTO: 4 % (ref 0–6)
ERYTHROCYTE [DISTWIDTH] IN BLOOD BY AUTOMATED COUNT: 12.2 % (ref 11.6–15.1)
EST. AVERAGE GLUCOSE BLD GHB EST-MCNC: 111 MG/DL
GFR SERPL CREATININE-BSD FRML MDRD: 92 ML/MIN/1.73SQ M
GLUCOSE P FAST SERPL-MCNC: 94 MG/DL (ref 65–99)
HBA1C MFR BLD: 5.5 %
HCT VFR BLD AUTO: 49.3 % (ref 36.5–49.3)
HDLC SERPL-MCNC: 38 MG/DL
HGB BLD-MCNC: 17.5 G/DL (ref 12–17)
IMM GRANULOCYTES # BLD AUTO: 0.02 THOUSAND/UL (ref 0–0.2)
IMM GRANULOCYTES NFR BLD AUTO: 0 % (ref 0–2)
LDLC SERPL CALC-MCNC: 45 MG/DL (ref 0–100)
LYMPHOCYTES # BLD AUTO: 1.64 THOUSANDS/ΜL (ref 0.6–4.47)
LYMPHOCYTES NFR BLD AUTO: 25 % (ref 14–44)
MCH RBC QN AUTO: 31.6 PG (ref 26.8–34.3)
MCHC RBC AUTO-ENTMCNC: 35.5 G/DL (ref 31.4–37.4)
MCV RBC AUTO: 89 FL (ref 82–98)
MICROALBUMIN UR-MCNC: 466 MG/L (ref 0–20)
MICROALBUMIN/CREAT 24H UR: 364 MG/G CREATININE (ref 0–30)
MONOCYTES # BLD AUTO: 0.55 THOUSAND/ΜL (ref 0.17–1.22)
MONOCYTES NFR BLD AUTO: 9 % (ref 4–12)
NEUTROPHILS # BLD AUTO: 3.96 THOUSANDS/ΜL (ref 1.85–7.62)
NEUTS SEG NFR BLD AUTO: 61 % (ref 43–75)
NRBC BLD AUTO-RTO: 0 /100 WBCS
PLATELET # BLD AUTO: 210 THOUSANDS/UL (ref 149–390)
PMV BLD AUTO: 11 FL (ref 8.9–12.7)
POTASSIUM SERPL-SCNC: 3.2 MMOL/L (ref 3.5–5.3)
PROT SERPL-MCNC: 7 G/DL (ref 6.4–8.2)
RBC # BLD AUTO: 5.54 MILLION/UL (ref 3.88–5.62)
SODIUM SERPL-SCNC: 146 MMOL/L (ref 136–145)
TRIGL SERPL-MCNC: 257 MG/DL
TSH SERPL DL<=0.05 MIU/L-ACNC: 0.86 UIU/ML (ref 0.45–4.5)
WBC # BLD AUTO: 6.48 THOUSAND/UL (ref 4.31–10.16)

## 2022-04-15 PROCEDURE — 82570 ASSAY OF URINE CREATININE: CPT

## 2022-04-15 PROCEDURE — 83036 HEMOGLOBIN GLYCOSYLATED A1C: CPT

## 2022-04-15 PROCEDURE — 86618 LYME DISEASE ANTIBODY: CPT

## 2022-04-15 PROCEDURE — 85025 COMPLETE CBC W/AUTO DIFF WBC: CPT

## 2022-04-15 PROCEDURE — 80061 LIPID PANEL: CPT

## 2022-04-15 PROCEDURE — 84443 ASSAY THYROID STIM HORMONE: CPT

## 2022-04-15 PROCEDURE — 36415 COLL VENOUS BLD VENIPUNCTURE: CPT

## 2022-04-15 PROCEDURE — 82043 UR ALBUMIN QUANTITATIVE: CPT

## 2022-04-15 PROCEDURE — 80053 COMPREHEN METABOLIC PANEL: CPT

## 2022-04-16 LAB — B BURGDOR IGG+IGM SER-ACNC: 6

## 2022-04-25 ENCOUNTER — TELEPHONE (OUTPATIENT)
Dept: FAMILY MEDICINE CLINIC | Facility: CLINIC | Age: 67
End: 2022-04-25

## 2022-04-25 NOTE — TELEPHONE ENCOUNTER
Returned patient's telephone call  To begin potassium supplement 1 tab every 12 hours for 2 days  To repeat BMP in 1 week  To keep upcoming appointment on 05/05/2022  Patient feeling well  Denies symptoms  Verbalized agreement and understanding of plan of care, denies additional concerns

## 2022-04-25 NOTE — TELEPHONE ENCOUNTER
Patient called stating he had a prior engagement in [facility] when you called him to go over his labs  He is requesting that you call him again to go over them and discuss the medications you wanted him to be on  Please call the patient if possible

## 2022-04-29 ENCOUNTER — RA CDI HCC (OUTPATIENT)
Dept: OTHER | Facility: HOSPITAL | Age: 67
End: 2022-04-29

## 2022-04-29 NOTE — PROGRESS NOTES
[facility] [address]  coding opportunities       Chart reviewed, no opportunity found: CHART REVIEWED, NO OPPORTUNITY FOUND        Patients Insurance     Medicare Insurance: Medicare

## 2022-05-04 ENCOUNTER — APPOINTMENT (OUTPATIENT)
Dept: LAB | Facility: HOSPITAL | Age: 67
End: 2022-05-04
Payer: MEDICARE

## 2022-05-04 DIAGNOSIS — E87.6 HYPOKALEMIA: ICD-10-CM

## 2022-05-04 LAB
ANION GAP SERPL CALCULATED.3IONS-SCNC: 8 MMOL/L (ref 4–13)
BUN SERPL-MCNC: 12 MG/DL (ref 5–25)
CALCIUM SERPL-MCNC: 8.4 MG/DL (ref 8.3–10.1)
CHLORIDE SERPL-SCNC: 106 MMOL/L (ref 100–108)
CO2 SERPL-SCNC: 29 MMOL/L (ref 21–32)
CREAT SERPL-MCNC: 0.73 MG/DL (ref 0.6–1.3)
GFR SERPL CREATININE-BSD FRML MDRD: 98 ML/MIN/1.73SQ M
GLUCOSE P FAST SERPL-MCNC: 128 MG/DL (ref 65–99)
POTASSIUM SERPL-SCNC: 3.5 MMOL/L (ref 3.5–5.3)
SODIUM SERPL-SCNC: 143 MMOL/L (ref 136–145)

## 2022-05-04 PROCEDURE — 80048 BASIC METABOLIC PNL TOTAL CA: CPT

## 2022-05-04 PROCEDURE — 36415 COLL VENOUS BLD VENIPUNCTURE: CPT

## 2022-05-05 ENCOUNTER — OFFICE VISIT (OUTPATIENT)
Dept: FAMILY MEDICINE CLINIC | Facility: CLINIC | Age: 67
End: 2022-05-05
Payer: MEDICARE

## 2022-05-05 VITALS
RESPIRATION RATE: 14 BRPM | HEART RATE: 64 BPM | BODY MASS INDEX: 35.22 KG/M2 | OXYGEN SATURATION: 99 % | HEIGHT: 70 IN | TEMPERATURE: 97.2 F | WEIGHT: 246 LBS | SYSTOLIC BLOOD PRESSURE: 138 MMHG | DIASTOLIC BLOOD PRESSURE: 84 MMHG

## 2022-05-05 DIAGNOSIS — I49.9 IRREGULAR HEART BEATS: ICD-10-CM

## 2022-05-05 DIAGNOSIS — R80.9 TYPE 2 DIABETES MELLITUS WITH MICROALBUMINURIA, WITHOUT LONG-TERM CURRENT USE OF INSULIN (HCC): ICD-10-CM

## 2022-05-05 DIAGNOSIS — Z00.00 ENCOUNTER FOR ANNUAL WELLNESS VISIT (AWV) IN MEDICARE PATIENT: Primary | ICD-10-CM

## 2022-05-05 DIAGNOSIS — E87.6 HYPOKALEMIA: ICD-10-CM

## 2022-05-05 DIAGNOSIS — E11.29 TYPE 2 DIABETES MELLITUS WITH MICROALBUMINURIA, WITHOUT LONG-TERM CURRENT USE OF INSULIN (HCC): ICD-10-CM

## 2022-05-05 DIAGNOSIS — G47.33 OSA (OBSTRUCTIVE SLEEP APNEA): ICD-10-CM

## 2022-05-05 DIAGNOSIS — I10 PRIMARY HYPERTENSION: ICD-10-CM

## 2022-05-05 DIAGNOSIS — I45.10 RIGHT BUNDLE BRANCH BLOCK: ICD-10-CM

## 2022-05-05 DIAGNOSIS — F11.20 CONTINUOUS OPIOID DEPENDENCE (HCC): ICD-10-CM

## 2022-05-05 PROCEDURE — G0439 PPPS, SUBSEQ VISIT: HCPCS | Performed by: NURSE PRACTITIONER

## 2022-05-05 PROCEDURE — 99214 OFFICE O/P EST MOD 30 MIN: CPT | Performed by: NURSE PRACTITIONER

## 2022-05-05 PROCEDURE — 93000 ELECTROCARDIOGRAM COMPLETE: CPT | Performed by: NURSE PRACTITIONER

## 2022-05-05 RX ORDER — IBUPROFEN 600 MG/1
600 TABLET ORAL EVERY 6 HOURS PRN
COMMUNITY
Start: 2022-05-02

## 2022-05-05 NOTE — ASSESSMENT & PLAN NOTE
EKG obtained  Presence of sinus bradycardia with frequent PACs in the pattern of bigeminy  Spoke "Mohit Law" to make aware  Will review EKG results with Dr Beverly Phelps and fax EKG results over

## 2022-05-05 NOTE — PROGRESS NOTES
Assessment and Plan:     Problem List Items Addressed This Visit        Endocrine    Diabetes mellitus, type 2 ([place] [address] )       Lab Results   Component Value Date    HGBA1C 5 5 04/15/2022   DM well controlled  To continue Jardiance 25 mg daily  Counseled the importance of a low carb diet  Foot exam completed today  Follow-up in 4 months or sooner if needed  Respiratory    DOE (obstructive sleep apnea)     To continue use CPAP nightly  Cardiovascular and Mediastinum    Hypertension     Blood pressure acceptable  To continue amlodipine 5 mg daily  Counseled on increasing water intake and decreasing caffeine  Right bundle branch block     EKG obtained  Presence of sinus bradycardia with frequent PACs in the pattern of bigeminy  Will review EKG results with Dr Malvin Cárdenas and fax EKG results over  Other    Encounter for annual wellness visit (AWV) in Medicare patient - Primary    Continuous opioid dependence ([place] [address] )      Other Visit Diagnoses     Hypokalemia        Relevant Orders    Basic metabolic panel    Irregular heart beats        Relevant Orders    POCT ECG           Preventive health issues were discussed with patient, and age appropriate screening tests were ordered as noted in patient's After Visit Summary  Personalized health advice and appropriate referrals for health education or preventive services given if needed, as noted in patient's After Visit Summary       History of Present Illness:     Patient presents for Medicare Annual Wellness visit    Patient Care Team:  TIFFANY Lopes as PCP - General (Family Medicine)  Keri Fermin MD as Consulting Physician (Gastroenterology)  Maria R Mobile as Consulting Physician (Optometry)  Lane Estrada MD as Consulting Physician (Nephrology)  [facility], PA-C     Problem List:     Patient Active Problem List   Diagnosis    Malabsorption    Diabetes mellitus, type 2 ([facility] )    Persistent proteinuria    Hypertension    Obesity, morbid ([facility] )    Osteoporosis    DOE (obstructive sleep apnea)    Hypertriglyceridemia    Right bundle branch block    Encounter for annual wellness visit (AWV) in Medicare patient    Continuous opioid dependence [facility])      Past Medical and Surgical History:     Past Medical History:   Diagnosis Date    Chronic kidney disease     Diabetes ([facility] )     LAST ASSESSED: 14    Diabetes mellitus ([place] [address] )     Edema     LAST ASSESSED:12    Hypertension     Morbid obesity due to excess calories ([place] [address] )     LAST ASSESSED: 12    RBBB     Sleep apnea, obstructive      Past Surgical History:   Procedure Laterality Date    BACK SURGERY      CHOLECYSTECTOMY      COLONOSCOPY      FOOT SURGERY      GASTRIC RESTRICTION SURGERY      GASTRIC STAPLING FOR MORBID OBESITY LAPAROSCOPY W/ [id number]    SHOULDER SURGERY      UVULOPALATOPLASTY        Family History:     Family History   Problem Relation Age of Onset   Julieann Frankel COPD Mother     Heart disease Mother     Mental illness Mother    Julieann Frankel Drug abuse Mother     Diabetes Father     Mental illness Father     Drug abuse Father     Lung cancer Father     Diabetes Sister       Social History:     Social History     Socioeconomic History    Marital status: Single     Spouse name: None    Number of children: None    Years of education: None    Highest education level: None   Occupational History    Occupation: DISABLED   Tobacco Use    Smoking status: Former Smoker     Packs/day: 2 00     Years: 15 00     Pack years: 30 00     Types: Cigarettes     Start date:      Quit date:      Years since quittin 3    Smokeless tobacco: Never Used   Vaping Use    Vaping Use: Never used   Substance and Sexual Activity    Alcohol use: No    Drug use: Yes     Types: Marijuana     Comment: everyday     Sexual activity: Not Currently     Partners: Female   Other Topics Concern    None   Social History Narrative    None     Social Determinants of Health     Financial Resource Strain: Not on file   Food Insecurity: Not on file   Transportation Needs: Not on file   Physical Activity: Not on file   Stress: Not on file   Social Connections: Not on file   Intimate Partner Violence: Not on file   Housing Stability: Not on file      Medications and Allergies:     Current Outpatient Medications   Medication Sig Dispense Refill    amLODIPine (NORVASC) 5 mg tablet TAKE 1 TABLET DAILY 90 tablet 3    buPROPion (WELLBUTRIN XL) 300 mg 24 hr tablet TAKE 1 TABLET DAILY 90 tablet 1    calcium citrate-Vitamin D (CALCIUM CITRATE + D3) 200 mg-250 units Take 1 tablet by mouth daily      Empagliflozin (Jardiance) 25 MG TABS Take 1 tablet (25 mg total) by mouth daily 90 tablet 0    Icosapent Ethyl (Vascepa) 1 g CAPS TAKE 2 CAPSULES IN THE MORNING AND 2 CAPSULES AT NIGHT 360 capsule 1    Iron-Vitamin C (VITRON-C)  MG TABS Take one pill twice daily 60 tablet 1    losartan (COZAAR) 100 MG tablet Take 1 tablet (100 mg total) by mouth daily 90 tablet 1    Multiple Vitamins-Minerals (CENTRUM ADULTS PO) Take 1 tablet by mouth daily      pantoprazole (PROTONIX) 40 mg tablet TAKE 1 TABLET DAILY 90 tablet 3    potassium chloride (K-DUR,KLOR-CON) 20 mEq tablet Take 1 tablet (20 mEq total) by mouth 2 (two) times a day for 2 days 4 tablet 0    rosuvastatin (CRESTOR) 5 mg tablet TAKE 1 TABLET DAILY 90 tablet 3    traMADol-acetaminophen (ULTRACET) 37 5-325 mg per tablet May Take 1 pill twice a day p r n  arthritis pain 30 tablet 0    clindamycin (CLINDAGEL) 1 % gel   0    ibuprofen (MOTRIN) 600 mg tablet Take 600 mg by mouth every 6 (six) hours as needed      PreviDent 0 2 % SOLN RINSE WITH 10 milliliters for 1 MINUTE THEN SPIT USE AT BEDTIME, DO NOT SWALLOW (Patient not taking: Reported on 3/23/2022)      triamcinolone (KENALOG) 0 1 % cream APPLY TO LEFT LEG SPOTS TWICE DAILY FOR UP TO 4 WEEKS       No current facility-administered medications for this visit  Allergies   Allergen Reactions    Augmentin [Amoxicillin-Pot Clavulanate] Diarrhea    Ciprofloxacin Diarrhea      Immunizations:     Immunization History   Administered Date(s) Administered    COVID-19 PFIZER VACCINE 0 3 ML IM 03/29/2021, 04/21/2021, 12/17/2021    INFLUENZA 10/07/2004, 10/27/2005, 12/01/2006, 10/30/2007, 11/10/2008, 10/02/2009, 11/15/2010, 09/09/2011, 10/08/2012, 10/14/2013, 09/17/2014, 09/25/2015, 09/06/2018, 09/06/2019    Influenza Quadrivalent Preservative Free 3 years and older IM 09/21/2016, 09/07/2017    Influenza, recombinant, quadrivalent,injectable, preservative free 09/06/2018, 10/17/2019, 10/14/2020, 09/27/2021    Pneumococcal Conjugate 13-Valent 09/09/2016    Pneumococcal Polysaccharide PPV23 03/13/2020    Tdap 11/06/2013      Health Maintenance:         Topic Date Due    HIV Screening  01/04/2024 (Originally 7/4/1970)    Colorectal Cancer Screening  03/09/2027    Hepatitis C Screening  Completed     There are no preventive care reminders to display for this patient  Medicare Health Risk Assessment:     /84   Pulse 64   Temp (!) 97 2 °F (36 2 °C)   Resp 14   Ht 5' 10" (1 778 m)   Wt 112 kg (246 lb)   SpO2 99%   BMI 35 30 kg/m²      Shaina Francois is here for his Subsequent Wellness visit  Last Medicare Wellness visit information reviewed, patient interviewed and updates made to the record today  Health Risk Assessment:   Patient rates overall health as good  Patient feels that their physical health rating is same  Patient is satisfied with their life  Eyesight was rated as same  Hearing was rated as same  Patient feels that their emotional and mental health rating is same  Patients states they are sometimes angry  Patient states they are often unusually tired/fatigued  Pain experienced in the last 7 days has been a lot  Patient's pain rating has been 7/10  Patient states that he has experienced no weight loss or gain in last 6 months       Depression Screening:   PHQ-2 Score: 0  PHQ-9 Score: 0      Fall Risk Screening: In the past year, patient has experienced: no history of falling in past year      Home Safety:  Patient does not have trouble with stairs inside or outside of their home  Patient has no working smoke alarms and has no working carbon monoxide detector  Home safety hazards include: none  Nutrition:   Current diet is Regular  Medications:   Patient is currently taking over-the-counter supplements  OTC medications include: see medication list  Patient is able to manage medications  Activities of Daily Living (ADLs)/Instrumental Activities of Daily Living (IADLs):   Walk and transfer into and out of bed and chair?: Yes  Dress and groom yourself?: Yes    Bathe or shower yourself?: Yes    Feed yourself? Yes  Do your laundry/housekeeping?: Yes  Manage your money, pay your bills and track your expenses?: Yes  Make your own meals?: Yes    Do your own shopping?: Yes    Cognitive Screening:   Provider or family/friend/caregiver concerned regarding cognition?: No    PREVENTIVE SCREENINGS      Cardiovascular Screening:    General: Screening Not Indicated and History Lipid Disorder      Diabetes Screening:     General: Screening Not Indicated and History Diabetes      Colorectal Cancer Screening:     General: Screening Current      Prostate Cancer Screening:    General: Screening Current      Osteoporosis Screening:    General: Screening Not Indicated and History Osteoporosis      Abdominal Aortic Aneurysm (AAA) Screening:    Risk factors include: tobacco use        Lung Cancer Screening:     General: Screening Not Indicated      Hepatitis C Screening:    General: Screening Current    Screening, Brief Intervention, and Referral to Treatment (SBIRT)    Screening  Typical number of drinks in a day: 0  Typical number of drinks in a week: 0  Interpretation: Low risk drinking behavior      Single Item Drug Screening:  How often have you used an illegal drug (including marijuana) or a prescription medication for non-medical reasons in the past year? never    Single Item Drug Screen Score: 0  Interpretation: Negative screen for possible drug use disorder    Review of Current Opioid Use    Opioid Risk Tool (ORT) Interpretation: Complete Opioid Risk Tool (ORT)    Other Counseling Topics:   Car/seat belt/driving safety, skin self-exam, sunscreen and calcium and vitamin D intake and regular weightbearing exercise         TIFFANY Phillips

## 2022-05-05 NOTE — PATIENT INSTRUCTIONS
Medicare Preventive Visit Patient Instructions  Thank you for completing your Welcome to Medicare Visit or Medicare Annual Wellness Visit today  Your next wellness visit will be due in one year (5/6/2023)  The screening/preventive services that you may require over the next 5-10 years are detailed below  Some tests may not apply to you based off risk factors and/or age  Screening tests ordered at today's visit but not completed yet may show as past due  Also, please note that scanned in results may not display below  Preventive Screenings:  Service Recommendations Previous Testing/Comments   Colorectal Cancer Screening  · Colonoscopy    · Fecal Occult Blood Test (FOBT)/Fecal Immunochemical Test (FIT)  · Fecal DNA/Cologuard Test  · Flexible Sigmoidoscopy Age: 54-65 years old   Colonoscopy: every 10 years (May be performed more frequently if at higher risk)  OR  FOBT/FIT: every 1 year  OR  Cologuard: every 3 years  OR  Sigmoidoscopy: every 5 years  Screening may be recommended earlier than age 48 if at higher risk for colorectal cancer  Also, an individualized decision between you and your healthcare provider will decide whether screening between the ages of 74-80 would be appropriate   Colonoscopy: 03/09/2017  FOBT/FIT: Not on file  Cologuard: Not on file  Sigmoidoscopy: Not on file    Screening Current     Prostate Cancer Screening Individualized decision between patient and health care provider in men between ages of 53-78   Medicare will cover every 12 months beginning on the day after your 50th birthday PSA: 1 4 ng/mL     Screening Current     Hepatitis C Screening Once for adults born between 1945 and 1965  More frequently in patients at high risk for Hepatitis C Hep C Antibody: 02/08/2019    Screening Current   Diabetes Screening 1-2 times per year if you're at risk for diabetes or have pre-diabetes Fasting glucose: 128 mg/dL   A1C: 5 5 %    Screening Not Indicated  History Diabetes   Cholesterol Screening Once every 5 years if you don't have a lipid disorder  May order more often based on risk factors  Lipid panel: 04/15/2022    Screening Not Indicated  History Lipid Disorder      Other Preventive Screenings Covered by Medicare:  1  Abdominal Aortic Aneurysm (AAA) Screening: covered once if your at risk  You're considered to be at risk if you have a family history of AAA or a male between the age of 73-68 who smoking at least 100 cigarettes in your lifetime  2  Lung Cancer Screening: covers low dose CT scan once per year if you meet all of the following conditions: (1) Age 50-69; (2) No signs or symptoms of lung cancer; (3) Current smoker or have quit smoking within the last 15 years; (4) You have a tobacco smoking history of at least 30 pack years (packs per day x number of years you smoked); (5) You get a written order from a healthcare provider  3  Glaucoma Screening: covered annually if you're considered high risk: (1) You have diabetes OR (2) Family history of glaucoma OR (3)  aged 48 and older OR (3)  American aged 72 and older  3  Osteoporosis Screening: covered every 2 years if you meet one of the following conditions: (1) Have a vertebral abnormality; (2) On glucocorticoid therapy for more than 3 months; (3) Have primary hyperparathyroidism; (4) On osteoporosis medications and need to assess response to drug therapy  5  HIV Screening: covered annually if you're between the age of 12-76  Also covered annually if you are younger than 13 and older than 72 with risk factors for HIV infection  For pregnant patients, it is covered up to 3 times per pregnancy      Immunizations:  Immunization Recommendations   Influenza Vaccine Annual influenza vaccination during flu season is recommended for all persons aged >= 6 months who do not have contraindications   Pneumococcal Vaccine (Prevnar and Pneumovax)  * Prevnar = PCV13  * Pneumovax = PPSV23 Adults 25-60 years old: 1-3 doses may be recommended based on certain risk factors  Adults 72 years old: Prevnar (PCV13) vaccine recommended followed by Pneumovax (PPSV23) vaccine  If already received PPSV23 since turning 65, then PCV13 recommended at least one year after PPSV23 dose  Hepatitis B Vaccine 3 dose series if at intermediate or high risk (ex: diabetes, end stage renal disease, liver disease)   Tetanus (Td) Vaccine - COST NOT COVERED BY MEDICARE PART B Following completion of primary series, a booster dose should be given every 10 years to maintain immunity against tetanus  Td may also be given as tetanus wound prophylaxis  Tdap Vaccine - COST NOT COVERED BY MEDICARE PART B Recommended at least once for all adults  For pregnant patients, recommended with each pregnancy  Shingles Vaccine (Shingrix) - COST NOT COVERED BY MEDICARE PART B  2 shot series recommended in those aged 48 and above     Health Maintenance Due:      Topic Date Due    HIV Screening  01/04/2024 (Originally 7/4/1970)    Colorectal Cancer Screening  03/09/2027    Hepatitis C Screening  Completed     Immunizations Due:  There are no preventive care reminders to display for this patient  Advance Directives   What are advance directives? Advance directives are legal documents that state your wishes and plans for medical care  These plans are made ahead of time in case you lose your ability to make decisions for yourself  Advance directives can apply to any medical decision, such as the treatments you want, and if you want to donate organs  What are the types of advance directives? There are many types of advance directives, and each state has rules about how to use them  You may choose a combination of any of the following:  · Living will: This is a written record of the treatment you want  You can also choose which treatments you do not want, which to limit, and which to stop at a certain time  This includes surgery, medicine, IV fluid, and tube feedings  · Durable power of  for healthcare [facility]): This is a written record that states who you want to make healthcare choices for you when you are unable to make them for yourself  This person, called a proxy, is usually a family member or a friend  You may choose more than 1 proxy  · Do not resuscitate (DNR) order:  A DNR order is used in case your heart stops beating or you stop breathing  It is a request not to have certain forms of treatment, such as CPR  A DNR order may be included in other types of advance directives  · Medical directive: This covers the care that you want if you are in a coma, near death, or unable to make decisions for yourself  You can list the treatments you want for each condition  Treatment may include pain medicine, surgery, blood transfusions, dialysis, IV or tube feedings, and a ventilator (breathing machine)  · Values history: This document has questions about your views, beliefs, and how you feel and think about life  This information can help others choose the care that you would choose  Why are advance directives important? An advance directive helps you control your care  Although spoken wishes may be used, it is better to have your wishes written down  Spoken wishes can be misunderstood, or not followed  Treatments may be given even if you do not want them  An advance directive may make it easier for your family to make difficult choices about your care  Weight Management   Why it is important to manage your weight:  Being overweight increases your risk of health conditions such as heart disease, high blood pressure, type 2 diabetes, and certain types of cancer  It can also increase your risk for osteoarthritis, sleep apnea, and other respiratory problems  Aim for a slow, steady weight loss  Even a small amount of weight loss can lower your risk of health problems    How to lose weight safely:  A safe and healthy way to lose weight is to eat fewer calories and get regular exercise  You can lose up about 1 pound a week by decreasing the number of calories you eat by 500 calories each day  Healthy meal plan for weight management:  A healthy meal plan includes a variety of foods, contains fewer calories, and helps you stay healthy  A healthy meal plan includes the following:  · Eat whole-grain foods more often  A healthy meal plan should contain fiber  Fiber is the part of grains, fruits, and vegetables that is not broken down by your body  Whole-grain foods are healthy and provide extra fiber in your diet  Some examples of whole-grain foods are whole-wheat breads and pastas, oatmeal, brown rice, and bulgur  · Eat a variety of vegetables every day  Include dark, leafy greens such as spinach, kale, lennie greens, and mustard greens  Eat yellow and orange vegetables such as carrots, sweet potatoes, and winter squash  · Eat a variety of fruits every day  Choose fresh or canned fruit (canned in its own juice or light syrup) instead of juice  Fruit juice has very little or no fiber  · Eat low-fat dairy foods  Drink fat-free (skim) milk or 1% milk  Eat fat-free yogurt and low-fat cottage cheese  Try low-fat cheeses such as mozzarella and other reduced-fat cheeses  · Choose meat and other protein foods that are low in fat  Choose beans or other legumes such as split peas or lentils  Choose fish, skinless poultry (chicken or turkey), or lean cuts of red meat (beef or pork)  Before you cook meat or poultry, cut off any visible fat  · Use less fat and oil  Try baking foods instead of frying them  Add less fat, such as margarine, sour cream, regular salad dressing and mayonnaise to foods  Eat fewer high-fat foods  Some examples of high-fat foods include french fries, doughnuts, ice cream, and cakes  · Eat fewer sweets  Limit foods and drinks that are high in sugar  This includes candy, cookies, regular soda, and sweetened drinks    Exercise:  Exercise at least 30 minutes per day on most days of the week  Some examples of exercise include walking, biking, dancing, and swimming  You can also fit in more physical activity by taking the stairs instead of the elevator or parking farther away from stores  Ask your healthcare provider about the best exercise plan for you  Narcotic (Opioid) Safety    Use narcotics safely:  · Take prescribed narcotics exactly as directed  · Do not give narcotics to others or take narcotics that belong to someone else  · Do not mix narcotics without medicines or alcohol  · Do not drive or operate heavy machinery after you take the narcotic  · Monitor for side effects and notify your healthcare provider if you experienced side effects such as nausea, sleepiness, itching, or trouble thinking clearly  Manage constipation:    Constipation is the most common side effect of narcotic medicine  Constipation is when you have hard, dry bowel movements, or you go longer than usual between bowel movements  Tell your healthcare provider about all changes in your bowel movements while you are taking narcotics  He or she may recommend laxative medicine to help you have a bowel movement  He or she may also change the kind of narcotic you are taking, or change when you take it  The following are more ways you can prevent or relieve constipation:    · Drink liquids as directed  You may need to drink extra liquids to help soften and move your bowels  Ask how much liquid to drink each day and which liquids are best for you  · Eat high-fiber foods  This may help decrease constipation by adding bulk to your bowel movements  High-fiber foods include fruits, vegetables, whole-grain breads and cereals, and beans  Your healthcare provider or dietitian can help you create a high-fiber meal plan  Your provider may also recommend a fiber supplement if you cannot get enough fiber from food  · Exercise regularly  Regular physical activity can help stimulate your intestines  Walking is a good exercise to prevent or relieve constipation  Ask which exercises are best for you  · Schedule a time each day to have a bowel movement  This may help train your body to have regular bowel movements  Bend forward while you are on the toilet to help move the bowel movement out  Sit on the toilet for at least 10 minutes, even if you do not have a bowel movement  Store narcotics safely:   · Store narcotics where others cannot easily get them  Keep them in a locked cabinet or secure area  Do not  keep them in a purse or other bag you carry with you  A person may be looking for something else and find the narcotics  · Make sure narcotics are stored out of the reach of children  A child can easily overdose on narcotics  Narcotics may look like candy to a small child  The best way to dispose of narcotics: The laws vary by country and area  In the [place], the best way is to return the narcotics through a take-back program  This program is offered by the [employer]  The following are options for using the program:  · Take the narcotics to a CAROLYN collection site  The site is often a law enforcement center  Call your local law enforcement center for scheduled take-back days in your area  You will be given information on where to go if the collection site is in a different location  · Take the narcotics to an approved pharmacy or hospital   A pharmacy or hospital may be set up as a collection site  You will need to ask if it is a CAROLYN collection site if you were not directed there  A pharmacy or doctor's office may not be able to take back narcotics unless it is a CAROLYN site  · Use a mail-back system  This means you are given containers to put the narcotics into  You will then mail them in the containers  · Use a take-back drop box  This is a place to leave the narcotics at any time  People and animals will not be able to get into the box   Your local law enforcement agency can tell you where to find a drop box in your area  Other ways to manage pain:   · Ask your healthcare provider about non-narcotic medicines to control pain  Nonprescription medicines include NSAIDs (such as ibuprofen) and acetaminophen  Prescription medicines include muscle relaxers, antidepressants, and steroids  · Pain may be managed without any medicines  Some ways to relieve pain include massage, aromatherapy, or meditation  Physical or occupational therapy may also help  For more information:   · Drug Enforcement Administration  [facility] [address]  Phone: 2- 625 - 047-3288  Web Address: [facility]/drug_disposal/    · [address]  and Drug Administration  [facility] [address] [facility]  Phone: 2- 265 - 513-6436  Web Address: http://[employer]/     © Copyright [employer] 2018 Information is for End User's use only and may not be sold, redistributed or otherwise used for commercial purposes   All illustrations and images included in CareNotes® are the copyrighted property of A D A M , Inc  or [address] [place] [address] [facility]

## 2022-05-05 NOTE — ASSESSMENT & PLAN NOTE
Lab Results   Component Value Date    HGBA1C 5 5 04/15/2022   DM well controlled  To continue Jardiance 25 mg daily  Counseled the importance of a low carb diet  Foot exam completed today  Follow-up in 4 months or sooner if needed

## 2022-05-05 NOTE — ASSESSMENT & PLAN NOTE
Blood pressure acceptable  To continue amlodipine 5 mg daily  Counseled on increasing water intake and decreasing caffeine

## 2022-05-05 NOTE — PROGRESS NOTES
Assessment/Plan:    Hypertension  Blood pressure acceptable  To continue amlodipine 5 mg daily  Counseled on increasing water intake and decreasing caffeine  Diabetes mellitus, type 2 ([facility] [address] )    Lab Results   Component Value Date    HGBA1C 5 5 04/15/2022   DM well controlled  To continue Jardiance 25 mg daily  Counseled the importance of a low carb diet  Foot exam completed today  Follow-up in 4 months or sooner if needed  DOE (obstructive sleep apnea)  To continue use CPAP nightly  Right bundle branch block  EKG obtained  Presence of sinus bradycardia with frequent PACs in the pattern of bigeminy  Spoke "Gunner Shen" to make aware  Will review EKG results with Dr Kassidy Rome and fax EKG results over  Diagnoses and all orders for this visit:    Encounter for annual wellness visit (AWV) in Medicare patient    Primary hypertension    Hypokalemia  -     Basic metabolic panel; Future    Irregular heart beats  -     POCT ECG    Continuous opioid dependence (HCC)    Type 2 diabetes mellitus with microalbuminuria, without long-term current use of insulin (HCC)    Right bundle branch block    DOE (obstructive sleep apnea)    Other orders  -     ibuprofen (MOTRIN) 600 mg tablet; Take 600 mg by mouth every 6 (six) hours as needed          Subjective:      Patient ID: Jessenia Rhodes is a 61 y o  male  [facility] presents for a follow-up  He recently had an episode of hypokalemia which was replaced  He repeated his BMP on Monday which was normal   He does report drinking a large amount of coffee and ice tea and a limited amount of water  Denies vomiting or diarrhea but will frequently have loose stools  Overall, he is feeling well  He is to be fitted for diabetic shoes on 05/11/2022        The following portions of the patient's history were reviewed and updated as appropriate: He   Patient Active Problem List    Diagnosis Date Noted    Encounter for annual wellness visit (AWV) in Medicare patient 05/05/2022  Continuous opioid dependence ([place] [address] ) 05/05/2022    Right bundle branch block 01/03/2022    Hypertriglyceridemia 09/27/2021    DOE (obstructive sleep apnea)     Osteoporosis 03/22/2021    Hypertension 02/06/2019    Obesity, morbid ([place] [address] ) 02/06/2019    Malabsorption 02/06/2018    Persistent proteinuria 09/28/2017    Diabetes mellitus, type 2 ([place] [address] ) 04/27/2017     Current Outpatient Medications   Medication Sig Dispense Refill    amLODIPine (NORVASC) 5 mg tablet TAKE 1 TABLET DAILY 90 tablet 3    buPROPion (WELLBUTRIN XL) 300 mg 24 hr tablet TAKE 1 TABLET DAILY 90 tablet 1    calcium citrate-Vitamin D (CALCIUM CITRATE + D3) 200 mg-250 units Take 1 tablet by mouth daily      Empagliflozin (Jardiance) 25 MG TABS Take 1 tablet (25 mg total) by mouth daily 90 tablet 0    Icosapent Ethyl (Vascepa) 1 g CAPS TAKE 2 CAPSULES IN THE MORNING AND 2 CAPSULES AT NIGHT 360 capsule 1    Iron-Vitamin C (VITRON-C)  MG TABS Take one pill twice daily 60 tablet 1    losartan (COZAAR) 100 MG tablet Take 1 tablet (100 mg total) by mouth daily 90 tablet 1    Multiple Vitamins-Minerals (CENTRUM ADULTS PO) Take 1 tablet by mouth daily      pantoprazole (PROTONIX) 40 mg tablet TAKE 1 TABLET DAILY 90 tablet 3    potassium chloride (K-DUR,KLOR-CON) 20 mEq tablet Take 1 tablet (20 mEq total) by mouth 2 (two) times a day for 2 days 4 tablet 0    rosuvastatin (CRESTOR) 5 mg tablet TAKE 1 TABLET DAILY 90 tablet 3    traMADol-acetaminophen (ULTRACET) 37 5-325 mg per tablet May Take 1 pill twice a day p r n  arthritis pain 30 tablet 0    clindamycin (CLINDAGEL) 1 % gel   0    ibuprofen (MOTRIN) 600 mg tablet Take 600 mg by mouth every 6 (six) hours as needed      PreviDent 0 2 % SOLN RINSE WITH 10 milliliters for 1 MINUTE THEN SPIT USE AT BEDTIME, DO NOT SWALLOW (Patient not taking: Reported on 3/23/2022)      triamcinolone (KENALOG) 0 1 % cream APPLY TO LEFT LEG SPOTS TWICE DAILY FOR UP TO 4 WEEKS       No current facility-administered medications for this visit  He is allergic to augmentin [amoxicillin-pot clavulanate] and ciprofloxacin       Review of Systems   Respiratory: Negative  Cardiovascular: Negative  Gastrointestinal: Negative  Musculoskeletal: Positive for arthralgias and gait problem  Psychiatric/Behavioral: Negative  /84   Pulse 64   Temp (!) 97 2 °F (36 2 °C)   Resp 14   Ht 5' 10" (1 778 m)   Wt 112 kg (246 lb)   SpO2 99%   BMI 35 30 kg/m²     Objective:     Physical Exam  Vitals and nursing note reviewed  Constitutional:       General: He is not in acute distress  Appearance: Normal appearance  He is well-developed  He is not ill-appearing, toxic-appearing or diaphoretic  HENT:      Head: Normocephalic and atraumatic  Eyes:      Conjunctiva/sclera: Conjunctivae normal    Cardiovascular:      Rate and Rhythm: Normal rate  Rhythm regularly irregular  Pulses: Pulses are weak  Dorsalis pedis pulses are 1+ on the right side and 1+ on the left side  Heart sounds: Normal heart sounds  No murmur heard  Pulmonary:      Effort: Pulmonary effort is normal  No respiratory distress  Breath sounds: Normal breath sounds  No wheezing or rales  Chest:      Chest wall: No tenderness  Musculoskeletal:      Cervical back: Neck supple  Right lower leg: Edema (trace) present  Left lower leg: Edema (trace) present  Feet:      Right foot:      Skin integrity: Skin breakdown (in between toes), callus and dry skin present  Left foot:      Skin integrity: Skin breakdown (in between toes), callus and dry skin present  Neurological:      General: No focal deficit present  Mental Status: He is alert and oriented to person, place, and time  Psychiatric:         Mood and Affect: Mood normal          Behavior: Behavior normal          Thought Content:  Thought content normal          Judgment: Judgment normal            Patient's shoes and socks removed  Right Foot/Ankle   Right Foot Inspection  Skin Exam: dry skin, callus, maceration (in between toes) and callus  Toe Exam: right toe deformity  Sensory   Monofilament testing: absent    Vascular  The right DP pulse is 1+  Left Foot/Ankle  Left Foot Inspection  Skin Exam: dry skin, maceration (in between toes) and callus  Toe Exam: ROM and strength within normal limits  Sensory   Monofilament testing: diminished    Vascular  The left DP pulse is 1+       Assign Risk Category  Deformity present  Loss of protective sensation  Weak pulses  Risk: 2

## 2022-05-13 ENCOUNTER — TELEPHONE (OUTPATIENT)
Dept: FAMILY MEDICINE CLINIC | Facility: CLINIC | Age: 67
End: 2022-05-13

## 2022-05-13 NOTE — TELEPHONE ENCOUNTER
Patient called wanting to know if he should get the 4th covid shot  He already had the first two, and the booster  Now he wants to know if you think he is ok to get the fourth vaccine  Patient is aware you are out until Monday

## 2022-05-20 DIAGNOSIS — F32.A DEPRESSION, UNSPECIFIED DEPRESSION TYPE: ICD-10-CM

## 2022-05-20 RX ORDER — BUPROPION HYDROCHLORIDE 300 MG/1
TABLET ORAL
Qty: 90 TABLET | Refills: 3 | Status: SHIPPED | OUTPATIENT
Start: 2022-05-20

## 2022-06-07 ENCOUNTER — APPOINTMENT (OUTPATIENT)
Dept: LAB | Facility: HOSPITAL | Age: 67
End: 2022-06-07
Payer: MEDICARE

## 2022-06-07 DIAGNOSIS — E87.6 HYPOKALEMIA: ICD-10-CM

## 2022-06-07 LAB
ANION GAP SERPL CALCULATED.3IONS-SCNC: 8 MMOL/L (ref 4–13)
BUN SERPL-MCNC: 15 MG/DL (ref 5–25)
CALCIUM SERPL-MCNC: 8.5 MG/DL (ref 8.3–10.1)
CHLORIDE SERPL-SCNC: 106 MMOL/L (ref 100–108)
CO2 SERPL-SCNC: 29 MMOL/L (ref 21–32)
CREAT SERPL-MCNC: 0.93 MG/DL (ref 0.6–1.3)
GFR SERPL CREATININE-BSD FRML MDRD: 86 ML/MIN/1.73SQ M
GLUCOSE P FAST SERPL-MCNC: 128 MG/DL (ref 65–99)
POTASSIUM SERPL-SCNC: 4 MMOL/L (ref 3.5–5.3)
SODIUM SERPL-SCNC: 143 MMOL/L (ref 136–145)

## 2022-06-07 PROCEDURE — 80048 BASIC METABOLIC PNL TOTAL CA: CPT

## 2022-06-07 PROCEDURE — 36415 COLL VENOUS BLD VENIPUNCTURE: CPT

## 2022-06-14 ENCOUNTER — OFFICE VISIT (OUTPATIENT)
Dept: FAMILY MEDICINE CLINIC | Facility: CLINIC | Age: 67
End: 2022-06-14
Payer: MEDICARE

## 2022-06-14 VITALS
WEIGHT: 247 LBS | HEART RATE: 88 BPM | DIASTOLIC BLOOD PRESSURE: 60 MMHG | HEIGHT: 70 IN | BODY MASS INDEX: 35.36 KG/M2 | TEMPERATURE: 98.4 F | SYSTOLIC BLOOD PRESSURE: 130 MMHG | RESPIRATION RATE: 14 BRPM | OXYGEN SATURATION: 97 %

## 2022-06-14 DIAGNOSIS — R05.9 COUGH: Primary | ICD-10-CM

## 2022-06-14 PROCEDURE — 99213 OFFICE O/P EST LOW 20 MIN: CPT | Performed by: NURSE PRACTITIONER

## 2022-06-14 RX ORDER — CHLORHEXIDINE GLUCONATE ORAL RINSE 1.2 MG/ML
SOLUTION DENTAL
COMMUNITY
Start: 2022-05-17 | End: 2022-06-14 | Stop reason: ALTCHOICE

## 2022-06-14 RX ORDER — PREDNISONE 20 MG/1
40 TABLET ORAL DAILY
Qty: 10 TABLET | Refills: 0 | Status: SHIPPED | OUTPATIENT
Start: 2022-06-14 | End: 2022-06-19

## 2022-06-14 NOTE — PATIENT INSTRUCTIONS
Acute Cough   WHAT YOU NEED TO KNOW:   What is an acute cough? An acute cough can last up to 3 weeks  Common causes of an acute cough include a cold, allergies, or a lung infection  How is the cause of an acute cough diagnosed? Your healthcare provider will examine you and listen to your lungs  Tell your healthcare provider if you cough up any mucus, or have a fever or shortness of breath  Also tell your provider what makes the cough better or worse  Depending on your symptoms, you may need a chest x-ray  A sample of mucus may be collected and tested for infection  How is an acute cough treated? An acute cough usually goes away on its own  Ask your healthcare provider about medicines you can take to decrease your cough  You may need medicine to stop the cough, decrease swelling in your airways, or help open your airways  Medicine may also be given to help you cough up mucus  If you have an infection caused by bacteria, you may need antibiotics  What can I do to manage my cough? Do not smoke and stay away from others who smoke  Nicotine and other chemicals in cigarettes and cigars can cause lung damage and make your cough worse  Ask your healthcare provider for information if you currently smoke and need help to quit  E-cigarettes or smokeless tobacco still contain nicotine  Talk to your healthcare provider before you use these products  Drink extra liquids as directed  Liquids will help thin and loosen mucus so you can cough it up  Liquids will also help prevent dehydration  Examples of good liquids to drink include water, fruit juice, and broth  Do not drink liquids that contain caffeine  Caffeine can increase your risk for dehydration  Ask your healthcare provider how much liquid to drink each day  Rest as directed  Do not do activities that make your cough worse, such as exercise  Use a humidifier or vaporizer  Use a cool mist humidifier or a vaporizer to increase air moisture in your home  This may make it easier for you to breathe and help decrease your cough  Eat 2 to 5 mL of honey 2 times each day  Honey can help thin mucus and decrease your cough  Use cough drops or lozenges  These can help decrease throat irritation and your cough  When should I seek immediate care? You have trouble breathing or feel short of breath  You cough up blood, or you see blood in your mucus  You faint or feel weak or dizzy  You have chest pain when you cough or take a deep breath  You have new wheezing  When should I contact my healthcare provider? You have a fever  Your cough lasts longer than 4 weeks  Your symptoms do not improve with treatment  You have questions or concerns about your condition or care  CARE AGREEMENT:   You have the right to help plan your care  Learn about your health condition and how it may be treated  Discuss treatment options with your healthcare providers to decide what care you want to receive  You always have the right to refuse treatment  The above information is an  only  It is not intended as medical advice for individual conditions or treatments  Talk to your doctor, nurse or pharmacist before following any medical regimen to see if it is safe and effective for you  © Copyright [employer] 2022 Information is for End User's use only and may not be sold, redistributed or otherwise used for commercial purposes   All illustrations and images included in CareNotes® are the copyrighted property of A D A M , Inc  or [address] [place] [address] [employer]

## 2022-06-23 ENCOUNTER — OFFICE VISIT (OUTPATIENT)
Dept: FAMILY MEDICINE CLINIC | Facility: CLINIC | Age: 67
End: 2022-06-23
Payer: MEDICARE

## 2022-06-23 VITALS
HEIGHT: 70 IN | TEMPERATURE: 97.8 F | WEIGHT: 238.2 LBS | HEART RATE: 84 BPM | OXYGEN SATURATION: 96 % | SYSTOLIC BLOOD PRESSURE: 138 MMHG | DIASTOLIC BLOOD PRESSURE: 72 MMHG | BODY MASS INDEX: 34.1 KG/M2

## 2022-06-23 DIAGNOSIS — I10 PRIMARY HYPERTENSION: ICD-10-CM

## 2022-06-23 DIAGNOSIS — J40 BRONCHITIS: Primary | ICD-10-CM

## 2022-06-23 PROBLEM — Z00.00 ENCOUNTER FOR ANNUAL WELLNESS VISIT (AWV) IN MEDICARE PATIENT: Status: RESOLVED | Noted: 2022-05-05 | Resolved: 2022-06-23

## 2022-06-23 LAB
SARS-COV-2 AG UPPER RESP QL IA: NEGATIVE
VALID CONTROL: NORMAL

## 2022-06-23 PROCEDURE — 99214 OFFICE O/P EST MOD 30 MIN: CPT | Performed by: NURSE PRACTITIONER

## 2022-06-23 PROCEDURE — 87811 SARS-COV-2 COVID19 W/OPTIC: CPT | Performed by: NURSE PRACTITIONER

## 2022-06-23 RX ORDER — AZITHROMYCIN 250 MG/1
TABLET, FILM COATED ORAL
Qty: 6 TABLET | Refills: 0 | Status: SHIPPED | OUTPATIENT
Start: 2022-06-23 | End: 2022-06-27

## 2022-06-23 RX ORDER — AZITHROMYCIN 250 MG/1
TABLET, FILM COATED ORAL
Qty: 6 TABLET | Refills: 0 | Status: SHIPPED | OUTPATIENT
Start: 2022-06-23 | End: 2022-06-23 | Stop reason: SDUPTHER

## 2022-06-23 RX ORDER — BENZONATATE 200 MG/1
200 CAPSULE ORAL 3 TIMES DAILY PRN
Qty: 20 CAPSULE | Refills: 0 | Status: SHIPPED | OUTPATIENT
Start: 2022-06-23

## 2022-06-23 RX ORDER — PREDNISONE 20 MG/1
TABLET ORAL
COMMUNITY
Start: 2022-06-14 | End: 2022-06-23 | Stop reason: ALTCHOICE

## 2022-06-23 RX ORDER — BENZONATATE 200 MG/1
200 CAPSULE ORAL 3 TIMES DAILY PRN
Qty: 20 CAPSULE | Refills: 0 | Status: SHIPPED | OUTPATIENT
Start: 2022-06-23 | End: 2022-06-23 | Stop reason: SDUPTHER

## 2022-06-23 NOTE — PROGRESS NOTES
Assessment/Plan:    Bronchitis  To begin azithromycin 2 tabs today then 1 for the next 4 days and benzonatate as needed for cough  To begin Mucinex every 12 hours as well  Counseled the importance of increasing fluid intake and avoiding respiratory irritants  Follow-up if no improvement in 1 week or sooner if symptoms worsen  Hypertension  Blood pressure stable  To continue current antihypertensive regimen  Diagnoses and all orders for this visit:    Bronchitis  -     Discontinue: azithromycin (ZITHROMAX) 250 mg tablet; Take 2 tabs today, then 1 for the next 4 days  -     Discontinue: benzonatate (TESSALON) 200 MG capsule; Take 1 capsule (200 mg total) by mouth 3 (three) times a day as needed for cough  -     benzonatate (TESSALON) 200 MG capsule; Take 1 capsule (200 mg total) by mouth 3 (three) times a day as needed for cough  -     azithromycin (ZITHROMAX) 250 mg tablet; Take 2 tabs today, then 1 for the next 4 days    Primary hypertension    Other orders  -     Discontinue: predniSONE 20 mg tablet          Subjective:      Patient ID: Joan Caldwell is a 61 y o  male  Maria Bradying presents reporting productive cough for the past 3 weeks  Denies fever, shortness of breath, wheezing, rhinorrhea, or nasal congestion or sick contacts  He was evaluated last week and started on a prednisone burst which provided minimal improvement  He has not been tested for COVID but is up-to-date on his COVID immunizations        The following portions of the patient's history were reviewed and updated as appropriate:   He   Patient Active Problem List    Diagnosis Date Noted    Bronchitis 06/23/2022    Continuous opioid dependence ([facility] [address] ) 05/05/2022    Right bundle branch block 01/03/2022    Hypertriglyceridemia 09/27/2021    DOE (obstructive sleep apnea)     Osteoporosis 03/22/2021    Hypertension 02/06/2019    Obesity, morbid ([facility] [address] ) 02/06/2019    Malabsorption 02/06/2018    Persistent proteinuria 09/28/2017    Diabetes mellitus, type 2 ([facility] [address] ) 04/27/2017     Current Outpatient Medications   Medication Sig Dispense Refill    amLODIPine (NORVASC) 5 mg tablet TAKE 1 TABLET DAILY 90 tablet 3    azithromycin (ZITHROMAX) 250 mg tablet Take 2 tabs today, then 1 for the next 4 days 6 tablet 0    benzonatate (TESSALON) 200 MG capsule Take 1 capsule (200 mg total) by mouth 3 (three) times a day as needed for cough 20 capsule 0    buPROPion (WELLBUTRIN XL) 300 mg 24 hr tablet TAKE 1 TABLET DAILY 90 tablet 3    calcium citrate-Vitamin D 200 mg-250 units Take 1 tablet by mouth daily      clindamycin (CLINDAGEL) 1 % gel   0    Empagliflozin (Jardiance) 25 MG TABS Take 1 tablet (25 mg total) by mouth daily 90 tablet 0    ibuprofen (MOTRIN) 600 mg tablet Take 600 mg by mouth every 6 (six) hours as needed      Icosapent Ethyl (Vascepa) 1 g CAPS TAKE 2 CAPSULES IN THE MORNING AND 2 CAPSULES AT NIGHT 360 capsule 1    Iron-Vitamin C (VITRON-C)  MG TABS Take one pill twice daily 60 tablet 1    losartan (COZAAR) 100 MG tablet Take 1 tablet (100 mg total) by mouth daily 90 tablet 1    Multiple Vitamins-Minerals (CENTRUM ADULTS PO) Take 1 tablet by mouth daily      pantoprazole (PROTONIX) 40 mg tablet TAKE 1 TABLET DAILY 90 tablet 3    rosuvastatin (CRESTOR) 5 mg tablet TAKE 1 TABLET DAILY 90 tablet 3    traMADol-acetaminophen (ULTRACET) 37 5-325 mg per tablet May Take 1 pill twice a day p r n  arthritis pain 30 tablet 0    potassium chloride (K-DUR,KLOR-CON) 20 mEq tablet Take 1 tablet (20 mEq total) by mouth 2 (two) times a day for 2 days 4 tablet 0     No current facility-administered medications for this visit  He is allergic to augmentin [amoxicillin-pot clavulanate] and ciprofloxacin       Review of Systems   HENT: Positive for postnasal drip  Respiratory: Positive for cough  Negative for chest tightness, shortness of breath and wheezing  Cardiovascular: Negative      Neurological: Positive for headaches (after coughing )  Psychiatric/Behavioral: Negative  /72   Pulse 84   Temp 97 8 °F (36 6 °C)   Ht 5' 10" (1 778 m)   Wt 108 kg (238 lb 3 2 oz)   SpO2 96%   BMI 34 18 kg/m²     Objective:     Physical Exam  Vitals and nursing note reviewed  Constitutional:       General: He is not in acute distress  Appearance: Normal appearance  He is well-developed  He is not ill-appearing, toxic-appearing or diaphoretic  HENT:      Head: Normocephalic and atraumatic  Right Ear: Tympanic membrane, ear canal and external ear normal       Left Ear: Tympanic membrane, ear canal and external ear normal       Nose: Nose normal       Mouth/Throat:      Mouth: Mucous membranes are moist       Pharynx: Posterior oropharyngeal erythema present  Comments: Uvula absent  Eyes:      Conjunctiva/sclera: Conjunctivae normal    Cardiovascular:      Rate and Rhythm: Normal rate and regular rhythm  Heart sounds: Normal heart sounds  No murmur heard  Pulmonary:      Effort: Pulmonary effort is normal  No respiratory distress  Breath sounds: Examination of the left-lower field reveals rhonchi  Rhonchi present  No wheezing or rales  Chest:      Chest wall: No tenderness  Musculoskeletal:      Cervical back: Neck supple  Lymphadenopathy:      Cervical: Cervical adenopathy present  Neurological:      General: No focal deficit present  Mental Status: He is alert and oriented to person, place, and time  Psychiatric:         Mood and Affect: Mood normal          Behavior: Behavior normal          Thought Content:  Thought content normal          Judgment: Judgment normal

## 2022-06-23 NOTE — ASSESSMENT & PLAN NOTE
To begin azithromycin 2 tabs today then 1 for the next 4 days and benzonatate as needed for cough  To begin Mucinex every 12 hours as well  Counseled the importance of increasing fluid intake and avoiding respiratory irritants  Follow-up if no improvement in 1 week or sooner if symptoms worsen

## 2022-07-11 ENCOUNTER — CLINICAL SUPPORT (OUTPATIENT)
Dept: FAMILY MEDICINE CLINIC | Facility: CLINIC | Age: 67
End: 2022-07-11
Payer: MEDICARE

## 2022-07-11 DIAGNOSIS — M81.0 OSTEOPOROSIS, UNSPECIFIED OSTEOPOROSIS TYPE, UNSPECIFIED PATHOLOGICAL FRACTURE PRESENCE: Primary | ICD-10-CM

## 2022-07-11 PROCEDURE — 96372 THER/PROPH/DIAG INJ SC/IM: CPT

## 2022-07-18 ENCOUNTER — TELEPHONE (OUTPATIENT)
Dept: FAMILY MEDICINE CLINIC | Facility: CLINIC | Age: 67
End: 2022-07-18

## 2022-07-18 NOTE — TELEPHONE ENCOUNTER
Ramiro Spencer,     I just wanted to make you aware that Christian Naylawilma called and stated he has been seen by [facility] however he is not satisfied with the care he has been receiving  He is looking to switch to a different podiatrist  I provided the phone number for Dr Karine Hyman for him to contact

## 2022-08-17 ENCOUNTER — TELEPHONE (OUTPATIENT)
Dept: FAMILY MEDICINE CLINIC | Facility: CLINIC | Age: 67
End: 2022-08-17

## 2022-08-18 DIAGNOSIS — R80.9 TYPE 2 DIABETES MELLITUS WITH MICROALBUMINURIA, WITHOUT LONG-TERM CURRENT USE OF INSULIN: ICD-10-CM

## 2022-08-18 DIAGNOSIS — E11.29 TYPE 2 DIABETES MELLITUS WITH MICROALBUMINURIA, WITHOUT LONG-TERM CURRENT USE OF INSULIN: ICD-10-CM

## 2022-08-18 DIAGNOSIS — I10 HYPERTENSION, UNSPECIFIED TYPE: Primary | ICD-10-CM

## 2022-09-02 ENCOUNTER — RA CDI HCC (OUTPATIENT)
Dept: OTHER | Facility: HOSPITAL | Age: 67
End: 2022-09-02

## 2022-09-04 ENCOUNTER — HOSPITAL ENCOUNTER (EMERGENCY)
Facility: HOSPITAL | Age: 67
Discharge: HOME | End: 2022-09-04
Attending: EMERGENCY MEDICINE
Payer: MEDICARE

## 2022-09-04 ENCOUNTER — APPOINTMENT (OUTPATIENT)
Dept: RADIOLOGY | Facility: HOSPITAL | Age: 67
End: 2022-09-04
Payer: MEDICARE

## 2022-09-04 VITALS
DIASTOLIC BLOOD PRESSURE: 75 MMHG | RESPIRATION RATE: 18 BRPM | HEART RATE: 52 BPM | SYSTOLIC BLOOD PRESSURE: 169 MMHG | WEIGHT: 238 LBS | BODY MASS INDEX: 34.15 KG/M2 | OXYGEN SATURATION: 96 % | TEMPERATURE: 98.7 F

## 2022-09-04 DIAGNOSIS — M25.472 LEFT ANKLE SWELLING: Primary | ICD-10-CM

## 2022-09-04 DIAGNOSIS — Z98.84 HISTORY OF ROUX-EN-Y GASTRIC BYPASS: ICD-10-CM

## 2022-09-04 LAB
ALBUMIN SERPL BCP-MCNC: 3.7 G/DL (ref 3.5–5)
ALP SERPL-CCNC: 80 U/L (ref 46–116)
ALT SERPL W P-5'-P-CCNC: 39 U/L (ref 12–78)
ANION GAP SERPL CALCULATED.3IONS-SCNC: 8 MMOL/L (ref 4–13)
APTT PPP: 27 SECONDS (ref 23–37)
AST SERPL W P-5'-P-CCNC: 21 U/L (ref 5–45)
BASOPHILS # BLD AUTO: 0.05 THOUSANDS/ΜL (ref 0–0.1)
BASOPHILS NFR BLD AUTO: 1 % (ref 0–1)
BILIRUB DIRECT SERPL-MCNC: 0.1 MG/DL (ref 0–0.2)
BILIRUB SERPL-MCNC: 0.47 MG/DL (ref 0.2–1)
BUN SERPL-MCNC: 12 MG/DL (ref 5–25)
CALCIUM SERPL-MCNC: 8.4 MG/DL (ref 8.3–10.1)
CHLORIDE SERPL-SCNC: 105 MMOL/L (ref 96–108)
CO2 SERPL-SCNC: 29 MMOL/L (ref 21–32)
CREAT SERPL-MCNC: 0.87 MG/DL (ref 0.6–1.3)
CRP SERPL QL: 4 MG/L
EOSINOPHIL # BLD AUTO: 0.26 THOUSAND/ΜL (ref 0–0.61)
EOSINOPHIL NFR BLD AUTO: 3 % (ref 0–6)
ERYTHROCYTE [DISTWIDTH] IN BLOOD BY AUTOMATED COUNT: 12.3 % (ref 11.6–15.1)
ERYTHROCYTE [SEDIMENTATION RATE] IN BLOOD: 1 MM/HOUR (ref 0–19)
GFR SERPL CREATININE-BSD FRML MDRD: 90 ML/MIN/1.73SQ M
GLUCOSE SERPL-MCNC: 129 MG/DL (ref 65–140)
HCT VFR BLD AUTO: 46.6 % (ref 36.5–49.3)
HGB BLD-MCNC: 16.4 G/DL (ref 12–17)
IMM GRANULOCYTES # BLD AUTO: 0.02 THOUSAND/UL (ref 0–0.2)
IMM GRANULOCYTES NFR BLD AUTO: 0 % (ref 0–2)
INR PPP: 1 (ref 0.84–1.19)
LYMPHOCYTES # BLD AUTO: 1.64 THOUSANDS/ΜL (ref 0.6–4.47)
LYMPHOCYTES NFR BLD AUTO: 20 % (ref 14–44)
MCH RBC QN AUTO: 31.6 PG (ref 26.8–34.3)
MCHC RBC AUTO-ENTMCNC: 35.2 G/DL (ref 31.4–37.4)
MCV RBC AUTO: 90 FL (ref 82–98)
MONOCYTES # BLD AUTO: 0.7 THOUSAND/ΜL (ref 0.17–1.22)
MONOCYTES NFR BLD AUTO: 8 % (ref 4–12)
NEUTROPHILS # BLD AUTO: 5.72 THOUSANDS/ΜL (ref 1.85–7.62)
NEUTS SEG NFR BLD AUTO: 68 % (ref 43–75)
NRBC BLD AUTO-RTO: 0 /100 WBCS
PLATELET # BLD AUTO: 188 THOUSANDS/UL (ref 149–390)
PMV BLD AUTO: 10.8 FL (ref 8.9–12.7)
POTASSIUM SERPL-SCNC: 3.2 MMOL/L (ref 3.5–5.3)
PROT SERPL-MCNC: 6.5 G/DL (ref 6.4–8.4)
PROTHROMBIN TIME: 13 SECONDS (ref 11.6–14.5)
RBC # BLD AUTO: 5.19 MILLION/UL (ref 3.88–5.62)
SODIUM SERPL-SCNC: 142 MMOL/L (ref 135–147)
WBC # BLD AUTO: 8.39 THOUSAND/UL (ref 4.31–10.16)

## 2022-09-04 PROCEDURE — 85730 THROMBOPLASTIN TIME PARTIAL: CPT | Performed by: EMERGENCY MEDICINE

## 2022-09-04 PROCEDURE — 85652 RBC SED RATE AUTOMATED: CPT | Performed by: EMERGENCY MEDICINE

## 2022-09-04 PROCEDURE — 87040 BLOOD CULTURE FOR BACTERIA: CPT | Performed by: EMERGENCY MEDICINE

## 2022-09-04 PROCEDURE — 80048 BASIC METABOLIC PNL TOTAL CA: CPT | Performed by: EMERGENCY MEDICINE

## 2022-09-04 PROCEDURE — 86140 C-REACTIVE PROTEIN: CPT | Performed by: EMERGENCY MEDICINE

## 2022-09-04 PROCEDURE — 85025 COMPLETE CBC W/AUTO DIFF WBC: CPT | Performed by: EMERGENCY MEDICINE

## 2022-09-04 PROCEDURE — 99284 EMERGENCY DEPT VISIT MOD MDM: CPT | Performed by: EMERGENCY MEDICINE

## 2022-09-04 PROCEDURE — 36415 COLL VENOUS BLD VENIPUNCTURE: CPT | Performed by: EMERGENCY MEDICINE

## 2022-09-04 PROCEDURE — 73610 X-RAY EXAM OF ANKLE: CPT

## 2022-09-04 PROCEDURE — 99283 EMERGENCY DEPT VISIT LOW MDM: CPT

## 2022-09-04 PROCEDURE — 85610 PROTHROMBIN TIME: CPT | Performed by: EMERGENCY MEDICINE

## 2022-09-04 PROCEDURE — 80076 HEPATIC FUNCTION PANEL: CPT | Performed by: EMERGENCY MEDICINE

## 2022-09-04 RX ORDER — COLCHICINE 0.6 MG/1
0.6 TABLET ORAL DAILY
Qty: 7 TABLET | Refills: 0 | Status: SHIPPED | OUTPATIENT
Start: 2022-09-05 | End: 2022-09-12

## 2022-09-04 RX ORDER — DEXAMETHASONE SODIUM PHOSPHATE 4 MG/ML
6 INJECTION, SOLUTION INTRA-ARTICULAR; INTRALESIONAL; INTRAMUSCULAR; INTRAVENOUS; SOFT TISSUE ONCE
Status: DISCONTINUED | OUTPATIENT
Start: 2022-09-04 | End: 2022-09-04

## 2022-09-04 RX ORDER — COLCHICINE 0.6 MG/1
0.6 TABLET ORAL ONCE
Status: COMPLETED | OUTPATIENT
Start: 2022-09-04 | End: 2022-09-04

## 2022-09-04 RX ADMIN — COLCHICINE 0.6 MG: 0.6 TABLET ORAL at 11:47

## 2022-09-04 NOTE — ED PROVIDER NOTES
History  Chief Complaint   Patient presents with    Ankle Swelling     Pt c/o left ankle swelling and pain, denies recent injuries  75 yo male with 2 days of progressively worsening L ankle pain and swelling with resultant inability to ambulate  Occurs in context of generally poor mobility and significant prior spinal cord injury with baseline severe difficulty ambulating due primarily to R leg weakness and atrophy  He denies recent trauma  He denies fever, chills, nausea, vomiting and systemic illness  He denies a h/o prior oligoarticular swelling  He states that he is still able to stand on LLE and bear weight but movement of the joint and ambulation significantly worsen his pain  Denies a prior h/o gout  Wife provides additional history at bedside  PSH significant for trisha-en-y bypass  Prior to Admission Medications   Prescriptions Last Dose Informant Patient Reported? Taking?    Icosapent Ethyl (Vascepa) 1 g CAPS   No Yes   Sig: TAKE 2 CAPSULES IN THE MORNING AND 2 CAPSULES AT NIGHT   Iron-Vitamin C (VITRON-C)  MG TABS  Self No Yes   Sig: Take one pill twice daily   Jardiance 25 MG TABS   No Yes   Sig: TAKE 1 TABLET DAILY   Multiple Vitamins-Minerals (CENTRUM ADULTS PO)  Self Yes Yes   Sig: Take 1 tablet by mouth daily   amLODIPine (NORVASC) 5 mg tablet  Self No Yes   Sig: TAKE 1 TABLET DAILY   buPROPion (WELLBUTRIN XL) 300 mg 24 hr tablet   No Yes   Sig: TAKE 1 TABLET DAILY   calcium citrate-Vitamin D 200 mg-250 units  Self Yes Yes   Sig: Take 1 tablet by mouth daily   clindamycin (CLINDAGEL) 1 % gel  Self Yes Yes   ibuprofen (MOTRIN) 600 mg tablet   Yes Yes   Sig: Take 600 mg by mouth every 6 (six) hours as needed   losartan (COZAAR) 100 MG tablet   No Yes   Sig: Take 1 tablet (100 mg total) by mouth daily   pantoprazole (PROTONIX) 40 mg tablet  Self No Yes   Sig: TAKE 1 TABLET DAILY   potassium chloride (K-DUR,KLOR-CON) 20 mEq tablet   No Yes   Sig: Take 1 tablet (20 mEq total) by mouth 2 (two) times a day for 2 days   rosuvastatin (CRESTOR) 5 mg tablet  Self No Yes   Sig: TAKE 1 TABLET DAILY   traMADol-acetaminophen (ULTRACET) 37 5-325 mg per tablet  Self No Yes   Sig: May Take 1 pill twice a day p r n  arthritis pain      Facility-Administered Medications: None       Past Medical History:   Diagnosis Date    Chronic kidney disease     Diabetes ([facility] [address] )     LAST ASSESSED: 14    Diabetes mellitus ([facility] [address] )     Edema     LAST ASSESSED:12    Hypertension     Morbid obesity due to excess calories (HCC)     LAST ASSESSED: 12    RBBB     Sleep apnea, obstructive        Past Surgical History:   Procedure Laterality Date    BACK SURGERY      CHOLECYSTECTOMY      COLONOSCOPY      FOOT SURGERY      GASTRIC RESTRICTION SURGERY      GASTRIC STAPLING FOR MORBID OBESITY LAPAROSCOPY W/ MARCY-EN-Y    SHOULDER SURGERY      UVULOPALATOPLASTY         Family History   Problem Relation Age of Onset    COPD Mother     Heart disease Mother     Mental illness Mother     Drug abuse Mother     Diabetes Father     Mental illness Father     Drug abuse Father     Lung cancer Father     Diabetes Sister      I have reviewed and agree with the history as documented  E-Cigarette/Vaping    E-Cigarette Use Never User      E-Cigarette/Vaping Substances     Social History     Tobacco Use    Smoking status: Former Smoker     Packs/day: 2 00     Years: 15 00     Pack years: 30 00     Types: Cigarettes     Start date:      Quit date:      Years since quittin 7    Smokeless tobacco: Never Used   Vaping Use    Vaping Use: Never used   Substance Use Topics    Alcohol use: No    Drug use: Yes     Types: Marijuana     Comment: everyday        Review of Systems   Musculoskeletal: Positive for arthralgias and gait problem  All other systems reviewed and are negative  Physical Exam  Physical Exam  Vitals and nursing note reviewed     Constitutional:       General: He is not in acute distress  Appearance: Normal appearance  He is well-developed  He is not ill-appearing, toxic-appearing or diaphoretic  HENT:      Head: Normocephalic and atraumatic  Eyes:      General:         Right eye: No discharge  Left eye: No discharge  Conjunctiva/sclera: Conjunctivae normal       Pupils: Pupils are equal, round, and reactive to light  Neck:      Vascular: No JVD  Pulmonary:      Effort: Pulmonary effort is normal  No respiratory distress  Breath sounds: No stridor  Musculoskeletal:         General: Swelling and deformity present  Normal range of motion  Cervical back: Normal range of motion and neck supple  Comments: Swelling or L ankle, moderate  Mild pain with passive ROM L ankle  Normal perfusion  No ecchymosis, warmth or fluctuance  Skin:     General: Skin is warm and dry  Capillary Refill: Capillary refill takes less than 2 seconds  Coloration: Skin is not pale  Findings: No erythema or rash  Neurological:      Mental Status: He is alert and oriented to person, place, and time  Mental status is at baseline  Cranial Nerves: No cranial nerve deficit  Sensory: No sensory deficit  Motor: No abnormal muscle tone        Coordination: Coordination normal          Vital Signs  ED Triage Vitals [09/04/22 0853]   Temperature Pulse Respirations Blood Pressure SpO2   98 7 °F (37 1 °C) (!) 52 18 169/75 96 %      Temp Source Heart Rate Source Patient Position - Orthostatic VS BP Location FiO2 (%)   Oral Monitor Sitting Left arm --      Pain Score       --           Vitals:    09/04/22 0853   BP: 169/75   Pulse: (!) 52   Patient Position - Orthostatic VS: Sitting         Visual Acuity      ED Medications  Medications   colchicine (COLCRYS) tablet 0 6 mg (0 6 mg Oral Given 9/4/22 1147)       Diagnostic Studies  Results Reviewed     Procedure Component Value Units Date/Time    Blood culture #2 [[id number]] Collected: 09/04/22 1025    Lab Status: Preliminary result Specimen: Blood from Arm, Right Updated: 09/09/22 0003     Blood Culture No Growth After 4 Days  Blood culture #1 [[id number]] Collected: 09/04/22 1025    Lab Status: Preliminary result Specimen: Blood from Arm, Left Updated: 09/09/22 0003     Blood Culture No Growth After 4 Days      Sedimentation rate, automated [[id number]]  (Normal) Collected: 09/04/22 1025    Lab Status: Final result Specimen: Blood from Arm, Right Updated: 09/04/22 1122     Sed Rate 1 mm/hour     Hepatic function panel [[id number]]  (Normal) Collected: 09/04/22 1025    Lab Status: Final result Specimen: Blood from Arm, Right Updated: 09/04/22 1116     Total Bilirubin 0 47 mg/dL      Bilirubin, Direct 0 10 mg/dL      Alkaline Phosphatase 80 U/L      AST 21 U/L      ALT 39 U/L      Total Protein 6 5 g/dL      Albumin 3 7 g/dL     C-reactive protein [[id number]]  (Abnormal) Collected: 09/04/22 1025    Lab Status: Final result Specimen: Blood from Arm, Right Updated: 09/04/22 1116     CRP 4 0 mg/L     Basic metabolic panel [[id number]]  (Abnormal) Collected: 09/04/22 1025    Lab Status: Final result Specimen: Blood from Arm, Right Updated: 09/04/22 1106     Sodium 142 mmol/L      Potassium 3 2 mmol/L      Chloride 105 mmol/L      CO2 29 mmol/L      ANION GAP 8 mmol/L      BUN 12 mg/dL      Creatinine 0 87 mg/dL      Glucose 129 mg/dL      Calcium 8 4 mg/dL      eGFR 90 ml/min/1 73sq m     Narrative:      [place] guidelines for Chronic Kidney Disease (CKD):     Stage 1 with normal or high GFR (GFR > 90 mL/min/1 73 square meters)    Stage 2 Mild CKD (GFR = 60-89 mL/min/1 73 square meters)    Stage 3A Moderate CKD (GFR = 45-59 mL/min/1 73 square meters)    Stage 3B Moderate CKD (GFR = 30-44 mL/min/1 73 square meters)    Stage 4 Severe CKD (GFR = 15-29 mL/min/1 73 square meters)    Stage 5 End Stage CKD (GFR <15 mL/min/1 73 square meters)  Note: GFR calculation is accurate only with a steady state creatinine    Protime-INR [[id number]]  (Normal) Collected: 09/04/22 1025    Lab Status: Final result Specimen: Blood from Arm, Right Updated: 09/04/22 1057     Protime 13 0 seconds      INR 1 00    APTT [[id number]]  (Normal) Collected: 09/04/22 1025    Lab Status: Final result Specimen: Blood from Arm, Right Updated: 09/04/22 1057     PTT 27 seconds     CBC and differential [[id number]] Collected: 09/04/22 1025    Lab Status: Final result Specimen: Blood from Arm, Right Updated: 09/04/22 1042     WBC 8 39 Thousand/uL      RBC 5 19 Million/uL      Hemoglobin 16 4 g/dL      Hematocrit 46 6 %      MCV 90 fL      MCH 31 6 pg      MCHC 35 2 g/dL      RDW 12 3 %      MPV 10 8 fL      Platelets 389 Thousands/uL      nRBC 0 /100 WBCs      Neutrophils Relative 68 %      Immat GRANS % 0 %      Lymphocytes Relative 20 %      Monocytes Relative 8 %      Eosinophils Relative 3 %      Basophils Relative 1 %      Neutrophils Absolute 5 72 Thousands/µL      Immature Grans Absolute 0 02 Thousand/uL      Lymphocytes Absolute 1 64 Thousands/µL      Monocytes Absolute 0 70 Thousand/µL      Eosinophils Absolute 0 26 Thousand/µL      Basophils Absolute 0 05 Thousands/µL                  XR ankle 3+ views LEFT   ED Interpretation by Kristen Rivera MD (09/04 1112)   Chronic changes  No acute abnormality  Final Result by Kelly Tan MD (09/04 1934)   Moderate ankle degenerative changes  No acute osseous abnormality  Workstation performed: [id number]                    Procedures  Procedures         ED Course  ED Course as of 09/09/22 1802   Aaliyah Jc Sep 04, 2022   1112 Pts wife outside of the room, she stopped me and stated she was upset and that they should have gone to [facility]  She stated they have been here for hours and have not yet been seen by a doctor   I reminded her that I am the physician that is caring for him and that I already evaluated him and spoke at length with each of them and that as we discussed before the blood work would take some time to result  She is not happy with this but it seems that she for some reason does not recall the details of our initial (and somewhat extensive) evaluation and discussion  Identification of Seniors at [address] [place] [address] Most Recent Value   (ISAR) Identification of Seniors at Risk    Before the illness or injury that brought you to the Emergency, did you need someone to help you on a regular basis? 0 Filed at: 09/04/2022 0853   In the last 24 hours, have you needed more help than usual? 0 Filed at: 09/04/2022 5326   Have you been hospitalized for one or more nights during the past 6 months? 0 Filed at: 09/04/2022 0853   In general, do you see well? 0 Filed at: 09/04/2022 0853   In general, do you have serious problems with your memory? 0 Filed at: 09/04/2022 6381   Do you take more than three different medications every day? 1 Filed at: 09/04/2022 0853   ISAR Score 1 Filed at: 09/04/2022 6362                                    MDM  Number of Diagnoses or Management Options  Left ankle swelling  Diagnosis management comments: L ankle swelling  Low suspicion for septic arthritis  Based on SDM no aspiration at this time  Gout still on the ddx  Pt agreeable to empiric tx with colchicine for possible gout, f/u orthopedics as outpt  Unable to give steroids due to prior trudi-en-y          Amount and/or Complexity of Data Reviewed  Clinical lab tests: ordered and reviewed  Tests in the radiology section of CPT®: ordered and reviewed  Review and summarize past medical records: yes  Independent visualization of images, tracings, or specimens: yes        Disposition  Final diagnoses:   Left ankle swelling   History of Trudi-en-Y gastric bypass     Time reflects when diagnosis was documented in both MDM as applicable and the Disposition within this note     Time User Action Codes Description Comment    9/4/2022 11:35 AM Ronna Ashton Add [[id number]] Left ankle swelling 9/4/2022  6:13 PM Chester Orellana [[id number]] History of Trudi-en-Y gastric bypass       ED Disposition     ED Disposition   Discharge    Condition   Stable    Date/Time   Sun Sep 4, 2022 11:35 AM    Comment   Estela eHnriquez discharge to home/self care                 Follow-up Information     Follow up With Specialties Details Why Contact Info    Oliver Fox MD Orthopedic Surgery In 1 week if you continue to have swelling or pain in your ankle [address] [facility]  [address] [place] [address]            Discharge Medication List as of 9/4/2022 11:36 AM      START taking these medications    Details   colchicine (COLCRYS) 0 6 mg tablet Take 1 tablet (0 6 mg total) by mouth daily for 7 days, Starting Mon 9/5/2022, Until Mon 9/12/2022, Print         CONTINUE these medications which have NOT CHANGED    Details   amLODIPine (NORVASC) 5 mg tablet TAKE 1 TABLET DAILY, Normal      buPROPion (WELLBUTRIN XL) 300 mg 24 hr tablet TAKE 1 TABLET DAILY, Normal      calcium citrate-Vitamin D 200 mg-250 units Take 1 tablet by mouth daily, Historical Med      clindamycin (CLINDAGEL) 1 % gel Starting Mon 11/11/2019, Historical Med      ibuprofen (MOTRIN) 600 mg tablet Take 600 mg by mouth every 6 (six) hours as needed, Starting Mon 5/2/2022, Historical Med      Icosapent Ethyl (Vascepa) 1 g CAPS TAKE 2 CAPSULES IN THE MORNING AND 2 CAPSULES AT NIGHT, Normal      Iron-Vitamin C (VITRON-C)  MG TABS Take one pill twice daily, Normal      Jardiance 25 MG TABS TAKE 1 TABLET DAILY, Normal      losartan (COZAAR) 100 MG tablet Take 1 tablet (100 mg total) by mouth daily, Starting Mon 4/4/2022, Normal      Multiple Vitamins-Minerals (CENTRUM ADULTS PO) Take 1 tablet by mouth daily, Historical Med      pantoprazole (PROTONIX) 40 mg tablet TAKE 1 TABLET DAILY, Normal      potassium chloride (K-DUR,KLOR-CON) 20 mEq tablet Take 1 tablet (20 mEq total) by mouth 2 (two) times a day for 2 days, Starting Mon 4/25/2022, Until Sun 9/4/2022, Normal      rosuvastatin (CRESTOR) 5 mg tablet TAKE 1 TABLET DAILY, Normal      traMADol-acetaminophen (ULTRACET) 37 5-325 mg per tablet May Take 1 pill twice a day p r n  arthritis pain, Normal      benzonatate (TESSALON) 200 MG capsule Take 1 capsule (200 mg total) by mouth 3 (three) times a day as needed for cough, Starting Thu 6/23/2022, Normal             No discharge procedures on file      PDMP Review       Value Time User    PDMP Reviewed  Yes 3/22/2021 10:41 AM TIFFANY Box          ED Provider  Electronically Signed by           Travis Fine MD  09/09/22 9369

## 2022-09-08 ENCOUNTER — OFFICE VISIT (OUTPATIENT)
Dept: FAMILY MEDICINE CLINIC | Facility: CLINIC | Age: 67
End: 2022-09-08
Payer: MEDICARE

## 2022-09-08 VITALS
DIASTOLIC BLOOD PRESSURE: 86 MMHG | HEART RATE: 63 BPM | HEIGHT: 70 IN | WEIGHT: 245.8 LBS | TEMPERATURE: 97.4 F | SYSTOLIC BLOOD PRESSURE: 138 MMHG | OXYGEN SATURATION: 98 % | BODY MASS INDEX: 35.19 KG/M2

## 2022-09-08 DIAGNOSIS — M10.9 ACUTE GOUT OF LEFT ANKLE, UNSPECIFIED CAUSE: ICD-10-CM

## 2022-09-08 DIAGNOSIS — E11.29 TYPE 2 DIABETES MELLITUS WITH MICROALBUMINURIA, WITHOUT LONG-TERM CURRENT USE OF INSULIN (HCC): Primary | ICD-10-CM

## 2022-09-08 DIAGNOSIS — R80.9 TYPE 2 DIABETES MELLITUS WITH MICROALBUMINURIA, WITHOUT LONG-TERM CURRENT USE OF INSULIN (HCC): Primary | ICD-10-CM

## 2022-09-08 DIAGNOSIS — E78.1 HYPERTRIGLYCERIDEMIA: ICD-10-CM

## 2022-09-08 DIAGNOSIS — G47.33 OSA (OBSTRUCTIVE SLEEP APNEA): ICD-10-CM

## 2022-09-08 DIAGNOSIS — I10 HYPERTENSION, UNSPECIFIED TYPE: ICD-10-CM

## 2022-09-08 DIAGNOSIS — K90.9 INTESTINAL MALABSORPTION, UNSPECIFIED TYPE: ICD-10-CM

## 2022-09-08 DIAGNOSIS — Z23 NEED FOR INFLUENZA VACCINATION: ICD-10-CM

## 2022-09-08 PROBLEM — J40 BRONCHITIS: Status: RESOLVED | Noted: 2022-06-23 | Resolved: 2022-09-08

## 2022-09-08 LAB — SL AMB POCT HEMOGLOBIN AIC: 5.7 (ref ?–6.5)

## 2022-09-08 PROCEDURE — 99214 OFFICE O/P EST MOD 30 MIN: CPT | Performed by: NURSE PRACTITIONER

## 2022-09-08 PROCEDURE — G0008 ADMIN INFLUENZA VIRUS VAC: HCPCS | Performed by: NURSE PRACTITIONER

## 2022-09-08 PROCEDURE — 83036 HEMOGLOBIN GLYCOSYLATED A1C: CPT | Performed by: NURSE PRACTITIONER

## 2022-09-08 PROCEDURE — 90662 IIV NO PRSV INCREASED AG IM: CPT | Performed by: NURSE PRACTITIONER

## 2022-09-08 RX ORDER — SODIUM FLUORIDE 0.9 MG/ML
MOUTHWASH DENTAL
COMMUNITY
Start: 2022-08-08

## 2022-09-08 NOTE — PROGRESS NOTES
Assessment/Plan:    Diabetes mellitus, type 2 ([facility] [address] )    Lab Results   Component Value Date    HGBA1C 5 7 09/08/2022   Reviewed A1c  To continue Jardiance 25 mg     DOE (obstructive sleep apnea)  Blood pressure acceptable  To continue current antihypertensive regimen  Acute gout of left ankle  To continue colchicine  Counseled the importance of increasing water intake and consuming a low purine diet  Hypertriglyceridemia  To obtain lipid panel, will call with results  Diagnoses and all orders for this visit:    Type 2 diabetes mellitus with microalbuminuria, without long-term current use of insulin ([facility])  -     POCT hemoglobin A1c    Acute gout of left ankle, unspecified cause  -     Comprehensive metabolic panel; Future  -     Uric acid; Future  -     UA (URINE) with reflex to Scope    Hypertension, unspecified type    Hypertriglyceridemia  -     Lipid Panel with Direct LDL reflex; Future    Need for influenza vaccination  -     influenza vaccine, high-dose, PF 0 7 mL (FLUZONE HIGH-DOSE)    Intestinal malabsorption, unspecified type    DOE (obstructive sleep apnea)    Other orders  -     SODIUM FLUORIDE, DENTAL RINSE, 0 2 % SOLN; swish 10 milliliter  IN MOUTH for 1 MINUTE THEN SPIT once daily at bedtime          Subjective:      Patient ID: Gisell Preciado is a 74 y o  male  Barbra Orta presents for a follow-up  He was seen in the ER over the weekend on Sunday, 09/04/2022 due to pain and swelling in his left foot  Lab work and x-ray were obtained  His potassium was low  X-ray of his left ankle showed moderate degenerative changes  He was treated for gout with colchicine which significantly improved his symptoms  Barbra Orta has had hypokalemia in the past   He drinks primarily coffee and a limited amount of water  He also has a history of gastric bypass with malabsorption         The following portions of the patient's history were reviewed and updated as appropriate: He   Patient Active Problem List    Diagnosis Date Noted    Acute gout of left ankle 09/08/2022    Continuous opioid dependence ([place] [address] ) 05/05/2022    Right bundle branch block 01/03/2022    Hypertriglyceridemia 09/27/2021    DEO (obstructive sleep apnea)     Osteoporosis 03/22/2021    Hypertension 02/06/2019    Obesity, morbid ([place] [address] ) 02/06/2019    Malabsorption 02/06/2018    Persistent proteinuria 09/28/2017    Diabetes mellitus, type 2 ([place] [address] ) 04/27/2017     Current Outpatient Medications   Medication Sig Dispense Refill    amLODIPine (NORVASC) 5 mg tablet TAKE 1 TABLET DAILY 90 tablet 3    buPROPion (WELLBUTRIN XL) 300 mg 24 hr tablet TAKE 1 TABLET DAILY 90 tablet 3    calcium citrate-Vitamin D 200 mg-250 units Take 1 tablet by mouth daily      clindamycin (CLINDAGEL) 1 % gel   0    colchicine (COLCRYS) 0 6 mg tablet Take 1 tablet (0 6 mg total) by mouth daily for 7 days 7 tablet 0    ibuprofen (MOTRIN) 600 mg tablet Take 600 mg by mouth every 6 (six) hours as needed      Icosapent Ethyl (Vascepa) 1 g CAPS TAKE 2 CAPSULES IN THE MORNING AND 2 CAPSULES AT NIGHT 360 capsule 1    Iron-Vitamin C (VITRON-C)  MG TABS Take one pill twice daily 60 tablet 1    Jardiance 25 MG TABS TAKE 1 TABLET DAILY 90 tablet 3    losartan (COZAAR) 100 MG tablet Take 1 tablet (100 mg total) by mouth daily 90 tablet 1    Multiple Vitamins-Minerals (CENTRUM ADULTS PO) Take 1 tablet by mouth daily      pantoprazole (PROTONIX) 40 mg tablet TAKE 1 TABLET DAILY 90 tablet 3    rosuvastatin (CRESTOR) 5 mg tablet TAKE 1 TABLET DAILY 90 tablet 3    SODIUM FLUORIDE, DENTAL RINSE, 0 2 % SOLN swish 10 milliliter  IN MOUTH for 1 MINUTE THEN SPIT once daily at bedtime      traMADol-acetaminophen (ULTRACET) 37 5-325 mg per tablet May Take 1 pill twice a day p r n  arthritis pain 30 tablet 0    potassium chloride (K-DUR,KLOR-CON) 20 mEq tablet Take 1 tablet (20 mEq total) by mouth 2 (two) times a day for 2 days 4 tablet 0     No current facility-administered medications for this visit  He is allergic to augmentin [amoxicillin-pot clavulanate] and ciprofloxacin       Review of Systems   Constitutional: Negative  Respiratory: Negative  Cardiovascular: Negative  Musculoskeletal: Positive for arthralgias and joint swelling  Neurological: Negative  Psychiatric/Behavioral: Negative  /86   Pulse 63   Temp (!) 97 4 °F (36 3 °C) (Tympanic)   Ht 5' 10" (1 778 m)   Wt 111 kg (245 lb 12 8 oz)   SpO2 98%   BMI 35 27 kg/m²     Objective:     Physical Exam  Vitals and nursing note reviewed  Constitutional:       Appearance: He is well-developed  HENT:      Head: Normocephalic and atraumatic  Eyes:      Conjunctiva/sclera: Conjunctivae normal    Cardiovascular:      Rate and Rhythm: Normal rate  Rhythm irregular  Heart sounds: Normal heart sounds  No murmur heard  Pulmonary:      Effort: Pulmonary effort is normal  No respiratory distress  Breath sounds: Normal breath sounds  No wheezing or rales  Chest:      Chest wall: No tenderness  Musculoskeletal:      Cervical back: Neck supple  Right lower leg: Edema ( trace) present  Left lower leg: Edema (trace) present  Comments: Presence of mild swelling, tenderness, and warmth in lateral aspect of left ankle  Presence of deformity from prior injury  Less than 3 seconds capillary refill  Neurological:      Mental Status: He is alert and oriented to person, place, and time  Psychiatric:         Behavior: Behavior normal          Thought Content: Thought content normal          Judgment: Judgment normal            BMI Counseling: Body mass index is 35 27 kg/m²   The BMI is above normal  Nutrition recommendations include decreasing overall calorie intake, 3-5 servings of fruits/vegetables daily, reducing fast food intake, consuming healthier snacks, decreasing soda and/or juice intake, moderation in carbohydrate intake and increasing intake of lean protein  Exercise recommendations include exercising 3-5 times per week

## 2022-09-08 NOTE — ASSESSMENT & PLAN NOTE
To continue colchicine  Counseled the importance of increasing water intake and consuming a low purine diet

## 2022-09-08 NOTE — ASSESSMENT & PLAN NOTE
Lab Results   Component Value Date    HGBA1C 5 7 09/08/2022   Reviewed A1c  To continue Jardiance 25 mg

## 2022-09-08 NOTE — PATIENT INSTRUCTIONS
Gout   WHAT YOU NEED TO KNOW:   Gout is a form of arthritis that causes severe joint pain, redness, swelling, and stiffness  Acute gout pain starts suddenly, gets worse quickly, and stops on its own  Acute gout can become chronic and cause permanent damage to the joints  DISCHARGE INSTRUCTIONS:   Return to the emergency department if:   You have severe pain in one or more of your joints that you cannot tolerate  You have a fever or redness that spreads beyond the joint area  Call your doctor if:   You have new symptoms, such as a rash, after you start gout treatment  Your joint pain and swelling do not go away, even after treatment  You are not urinating as much or as often as you usually do  You have trouble taking your gout medicines  You have questions or concerns about your condition or care  Medicines: You may need any of the following:  Prescription pain medicine  may be given  Ask your healthcare provider how to take this medicine safely  Some prescription pain medicines contain acetaminophen  Do not take other medicines that contain acetaminophen without talking to your healthcare provider  Too much acetaminophen may cause liver damage  Prescription pain medicine may cause constipation  Ask your healthcare provider how to prevent or treat constipation  NSAIDs , such as ibuprofen, help decrease swelling, pain, and fever  This medicine is available with or without a doctor's order  NSAIDs can cause stomach bleeding or kidney problems in certain people  If you take blood thinner medicine, always ask your healthcare provider if NSAIDs are safe for you  Always read the medicine label and follow directions  Gout medicine  decreases joint pain and swelling  It may also be given to prevent new gout attacks  Steroids  reduce inflammation and can help your joint stiffness and pain during gout attacks      Uric acid medicine  may be given to reduce the amount of uric acid your body makes  Some medicines may help you pass more uric acid when you urinate  Take your medicine as directed  Contact your healthcare provider if you think your medicine is not helping or if you have side effects  Tell him or her if you are allergic to any medicine  Keep a list of the medicines, vitamins, and herbs you take  Include the amounts, and when and why you take them  Bring the list or the pill bottles to follow-up visits  Carry your medicine list with you in case of an emergency  Manage your symptoms:       Rest your painful joint so it can heal   Your healthcare provider may recommend crutches or a walker if the affected joint is in a leg  Apply ice to your joint  Ice decreases pain and swelling  Use an ice pack, or put crushed ice in a plastic bag  Cover the ice pack or bag with a towel before you apply it to your painful joint  Apply ice for 15 to 20 minutes every hour, or as directed  Elevate your joint  Elevation helps reduce swelling and pain  Raise your joint above the level of your heart as often as you can  Prop your painful joint on pillows to keep it above your heart comfortably  Go to physical therapy if directed  A physical therapist can teach you exercises to improve flexibility and range of motion  Help prevent gout attacks:   Do not eat high-purine foods  These foods include meats, seafood, asparagus, spinach, cauliflower, and some types of beans  Healthcare providers may tell you to eat more low-fat milk products, such as yogurt  Milk products may decrease your risk for gout attacks  Vitamin C and coffee may also help  Your healthcare provider or dietitian can help you create a meal plan  Drink liquids as directed  Liquids such as water help remove uric acid from your body  Ask how much liquid to drink each day and which liquids are best for you  Maintain a healthy weight  Weight loss may decrease the amount of uric acid in your body   Ask your healthcare provider what a healthy weight is for you  Ask him or her to help you create a weight loss plan if you are overweight  Control your blood sugar level if you have diabetes  Keep your blood sugar level in a normal range  This can help prevent gout attacks  Limit or do not drink alcohol as directed  Alcohol can trigger a gout attack  Alcohol also increases your risk for dehydration  Ask your healthcare provider if alcohol is safe for you  Follow up with your doctor as directed: You may be referred to a rheumatologist or podiatrist  Write down your questions so you remember to ask them during your visits  © Copyright [employer] 2022 Information is for End User's use only and may not be sold, redistributed or otherwise used for commercial purposes  All illustrations and images included in CareNotes® are the copyrighted property of [employer]  or [employer]  The above information is an  only  It is not intended as medical advice for individual conditions or treatments  Talk to your doctor, nurse or pharmacist before following any medical regimen to see if it is safe and effective for you

## 2022-09-10 ENCOUNTER — APPOINTMENT (OUTPATIENT)
Dept: LAB | Facility: HOSPITAL | Age: 67
End: 2022-09-10
Payer: MEDICARE

## 2022-09-10 DIAGNOSIS — M10.9 ACUTE GOUT OF LEFT ANKLE, UNSPECIFIED CAUSE: ICD-10-CM

## 2022-09-10 DIAGNOSIS — E78.1 HYPERTRIGLYCERIDEMIA: ICD-10-CM

## 2022-09-10 DIAGNOSIS — R80.9 TYPE 2 DIABETES MELLITUS WITH MICROALBUMINURIA, WITHOUT LONG-TERM CURRENT USE OF INSULIN (HCC): ICD-10-CM

## 2022-09-10 DIAGNOSIS — I10 HYPERTENSION, UNSPECIFIED TYPE: ICD-10-CM

## 2022-09-10 DIAGNOSIS — E11.29 TYPE 2 DIABETES MELLITUS WITH MICROALBUMINURIA, WITHOUT LONG-TERM CURRENT USE OF INSULIN (HCC): ICD-10-CM

## 2022-09-10 LAB
ALBUMIN SERPL BCP-MCNC: 3.8 G/DL (ref 3.5–5)
ALP SERPL-CCNC: 84 U/L (ref 46–116)
ALT SERPL W P-5'-P-CCNC: 40 U/L (ref 12–78)
ANION GAP SERPL CALCULATED.3IONS-SCNC: 8 MMOL/L (ref 4–13)
AST SERPL W P-5'-P-CCNC: 20 U/L (ref 5–45)
BACTERIA BLD CULT: NORMAL
BACTERIA BLD CULT: NORMAL
BACTERIA UR QL AUTO: ABNORMAL /HPF
BASOPHILS # BLD AUTO: 0.06 THOUSANDS/ΜL (ref 0–0.1)
BASOPHILS NFR BLD AUTO: 1 % (ref 0–1)
BILIRUB SERPL-MCNC: 0.84 MG/DL (ref 0.2–1)
BILIRUB UR QL STRIP: NEGATIVE
BUN SERPL-MCNC: 10 MG/DL (ref 5–25)
CALCIUM SERPL-MCNC: 8 MG/DL (ref 8.3–10.1)
CHLORIDE SERPL-SCNC: 108 MMOL/L (ref 96–108)
CHOLEST SERPL-MCNC: 119 MG/DL
CLARITY UR: CLEAR
CO2 SERPL-SCNC: 28 MMOL/L (ref 21–32)
COLOR UR: YELLOW
CREAT SERPL-MCNC: 0.84 MG/DL (ref 0.6–1.3)
EOSINOPHIL # BLD AUTO: 0.3 THOUSAND/ΜL (ref 0–0.61)
EOSINOPHIL NFR BLD AUTO: 5 % (ref 0–6)
ERYTHROCYTE [DISTWIDTH] IN BLOOD BY AUTOMATED COUNT: 12.1 % (ref 11.6–15.1)
EST. AVERAGE GLUCOSE BLD GHB EST-MCNC: 114 MG/DL
GFR SERPL CREATININE-BSD FRML MDRD: 91 ML/MIN/1.73SQ M
GLUCOSE P FAST SERPL-MCNC: 126 MG/DL (ref 65–99)
GLUCOSE UR STRIP-MCNC: ABNORMAL MG/DL
HBA1C MFR BLD: 5.6 %
HCT VFR BLD AUTO: 45.6 % (ref 36.5–49.3)
HDLC SERPL-MCNC: 36 MG/DL
HGB BLD-MCNC: 16.4 G/DL (ref 12–17)
HGB UR QL STRIP.AUTO: ABNORMAL
HYALINE CASTS #/AREA URNS LPF: ABNORMAL /LPF
IMM GRANULOCYTES # BLD AUTO: 0.01 THOUSAND/UL (ref 0–0.2)
IMM GRANULOCYTES NFR BLD AUTO: 0 % (ref 0–2)
KETONES UR STRIP-MCNC: NEGATIVE MG/DL
LDLC SERPL CALC-MCNC: 44 MG/DL (ref 0–100)
LEUKOCYTE ESTERASE UR QL STRIP: ABNORMAL
LYMPHOCYTES # BLD AUTO: 1.61 THOUSANDS/ΜL (ref 0.6–4.47)
LYMPHOCYTES NFR BLD AUTO: 28 % (ref 14–44)
MCH RBC QN AUTO: 31.1 PG (ref 26.8–34.3)
MCHC RBC AUTO-ENTMCNC: 36 G/DL (ref 31.4–37.4)
MCV RBC AUTO: 87 FL (ref 82–98)
MONOCYTES # BLD AUTO: 0.59 THOUSAND/ΜL (ref 0.17–1.22)
MONOCYTES NFR BLD AUTO: 10 % (ref 4–12)
NEUTROPHILS # BLD AUTO: 3.24 THOUSANDS/ΜL (ref 1.85–7.62)
NEUTS SEG NFR BLD AUTO: 56 % (ref 43–75)
NITRITE UR QL STRIP: NEGATIVE
NON-SQ EPI CELLS URNS QL MICRO: ABNORMAL /HPF
NRBC BLD AUTO-RTO: 0 /100 WBCS
PH UR STRIP.AUTO: 5.5 [PH]
PLATELET # BLD AUTO: 189 THOUSANDS/UL (ref 149–390)
PMV BLD AUTO: 10.5 FL (ref 8.9–12.7)
POTASSIUM SERPL-SCNC: 3.3 MMOL/L (ref 3.5–5.3)
PROT SERPL-MCNC: 6.5 G/DL (ref 6.4–8.4)
PROT UR STRIP-MCNC: ABNORMAL MG/DL
RBC # BLD AUTO: 5.27 MILLION/UL (ref 3.88–5.62)
RBC #/AREA URNS AUTO: ABNORMAL /HPF
SODIUM SERPL-SCNC: 144 MMOL/L (ref 135–147)
SP GR UR STRIP.AUTO: 1.02 (ref 1–1.03)
TRIGL SERPL-MCNC: 196 MG/DL
URATE SERPL-MCNC: 4.4 MG/DL (ref 3.5–8.5)
UROBILINOGEN UR QL STRIP.AUTO: 0.2 E.U./DL
WBC # BLD AUTO: 5.81 THOUSAND/UL (ref 4.31–10.16)
WBC #/AREA URNS AUTO: ABNORMAL /HPF

## 2022-09-10 PROCEDURE — 85025 COMPLETE CBC W/AUTO DIFF WBC: CPT

## 2022-09-10 PROCEDURE — 84550 ASSAY OF BLOOD/URIC ACID: CPT

## 2022-09-10 PROCEDURE — 83036 HEMOGLOBIN GLYCOSYLATED A1C: CPT

## 2022-09-10 PROCEDURE — 36415 COLL VENOUS BLD VENIPUNCTURE: CPT

## 2022-09-10 PROCEDURE — 80061 LIPID PANEL: CPT

## 2022-09-10 PROCEDURE — 81001 URINALYSIS AUTO W/SCOPE: CPT | Performed by: NURSE PRACTITIONER

## 2022-09-10 PROCEDURE — 80053 COMPREHEN METABOLIC PANEL: CPT

## 2022-09-14 ENCOUNTER — TELEPHONE (OUTPATIENT)
Dept: FAMILY MEDICINE CLINIC | Facility: CLINIC | Age: 67
End: 2022-09-14

## 2022-09-16 ENCOUNTER — TELEPHONE (OUTPATIENT)
Dept: FAMILY MEDICINE CLINIC | Facility: CLINIC | Age: 67
End: 2022-09-16

## 2022-09-19 NOTE — TELEPHONE ENCOUNTER
Please call patient to make aware that his uric acid was in normal range    He may have gout flares in the future

## 2022-11-11 LAB
LEFT EYE DIABETIC RETINOPATHY: NORMAL
RIGHT EYE DIABETIC RETINOPATHY: NORMAL

## 2022-11-14 NOTE — TELEPHONE ENCOUNTER
----- Message from Lenard Johnson MD sent at 11/14/2022 10:18 AM CST -----  Normal and no concerns   Maybe he should be switched because he doesn't want the shot ever again he said   He can come in to discuss with you

## 2022-11-21 ENCOUNTER — TELEPHONE (OUTPATIENT)
Dept: FAMILY MEDICINE CLINIC | Facility: CLINIC | Age: 67
End: 2022-11-21

## 2022-12-06 DIAGNOSIS — E78.1 HYPERTRIGLYCERIDEMIA: ICD-10-CM

## 2022-12-06 RX ORDER — ROSUVASTATIN CALCIUM 5 MG/1
TABLET, COATED ORAL
Qty: 90 TABLET | Refills: 3 | Status: SHIPPED | OUTPATIENT
Start: 2022-12-06

## 2023-01-17 DIAGNOSIS — E78.1 HYPERTRIGLYCERIDEMIA: ICD-10-CM

## 2023-01-20 RX ORDER — ICOSAPENT ETHYL 1000 MG/1
CAPSULE ORAL
Qty: 360 CAPSULE | Refills: 3 | Status: SHIPPED | OUTPATIENT
Start: 2023-01-20

## 2023-01-26 ENCOUNTER — TELEPHONE (OUTPATIENT)
Dept: FAMILY MEDICINE CLINIC | Facility: CLINIC | Age: 68
End: 2023-01-26

## 2023-01-26 DIAGNOSIS — E11.29 TYPE 2 DIABETES MELLITUS WITH MICROALBUMINURIA, WITHOUT LONG-TERM CURRENT USE OF INSULIN: Primary | ICD-10-CM

## 2023-01-26 DIAGNOSIS — R80.9 TYPE 2 DIABETES MELLITUS WITH MICROALBUMINURIA, WITHOUT LONG-TERM CURRENT USE OF INSULIN: Primary | ICD-10-CM

## 2023-01-26 NOTE — TELEPHONE ENCOUNTER
Left message for patient to call back to let him know that [facility] has placed lab orders for him to have done before his appointment

## 2023-02-01 ENCOUNTER — APPOINTMENT (OUTPATIENT)
Dept: LAB | Facility: HOSPITAL | Age: 68
End: 2023-02-01

## 2023-02-01 DIAGNOSIS — E11.29 TYPE 2 DIABETES MELLITUS WITH MICROALBUMINURIA, WITHOUT LONG-TERM CURRENT USE OF INSULIN (HCC): ICD-10-CM

## 2023-02-01 DIAGNOSIS — R80.9 TYPE 2 DIABETES MELLITUS WITH MICROALBUMINURIA, WITHOUT LONG-TERM CURRENT USE OF INSULIN (HCC): ICD-10-CM

## 2023-02-01 LAB
ALBUMIN SERPL BCP-MCNC: 3.7 G/DL (ref 3.5–5)
ALP SERPL-CCNC: 91 U/L (ref 46–116)
ALT SERPL W P-5'-P-CCNC: 34 U/L (ref 12–78)
ANION GAP SERPL CALCULATED.3IONS-SCNC: 5 MMOL/L (ref 4–13)
AST SERPL W P-5'-P-CCNC: 18 U/L (ref 5–45)
BASOPHILS # BLD AUTO: 0.04 THOUSANDS/ÂΜL (ref 0–0.1)
BASOPHILS NFR BLD AUTO: 1 % (ref 0–1)
BILIRUB SERPL-MCNC: 1.09 MG/DL (ref 0.2–1)
BUN SERPL-MCNC: 11 MG/DL (ref 5–25)
CALCIUM SERPL-MCNC: 8.8 MG/DL (ref 8.3–10.1)
CHLORIDE SERPL-SCNC: 107 MMOL/L (ref 96–108)
CHOLEST SERPL-MCNC: 112 MG/DL
CO2 SERPL-SCNC: 30 MMOL/L (ref 21–32)
CREAT SERPL-MCNC: 0.8 MG/DL (ref 0.6–1.3)
CREAT UR-MCNC: 118 MG/DL
EOSINOPHIL # BLD AUTO: 0.26 THOUSAND/ÂΜL (ref 0–0.61)
EOSINOPHIL NFR BLD AUTO: 5 % (ref 0–6)
ERYTHROCYTE [DISTWIDTH] IN BLOOD BY AUTOMATED COUNT: 12 % (ref 11.6–15.1)
EST. AVERAGE GLUCOSE BLD GHB EST-MCNC: 108 MG/DL
GFR SERPL CREATININE-BSD FRML MDRD: 93 ML/MIN/1.73SQ M
GLUCOSE P FAST SERPL-MCNC: 122 MG/DL (ref 65–99)
HBA1C MFR BLD: 5.4 %
HCT VFR BLD AUTO: 48.6 % (ref 36.5–49.3)
HDLC SERPL-MCNC: 34 MG/DL
HGB BLD-MCNC: 17.1 G/DL (ref 12–17)
IMM GRANULOCYTES # BLD AUTO: 0.01 THOUSAND/UL (ref 0–0.2)
IMM GRANULOCYTES NFR BLD AUTO: 0 % (ref 0–2)
LDLC SERPL CALC-MCNC: 45 MG/DL (ref 0–100)
LYMPHOCYTES # BLD AUTO: 1.89 THOUSANDS/ÂΜL (ref 0.6–4.47)
LYMPHOCYTES NFR BLD AUTO: 33 % (ref 14–44)
MCH RBC QN AUTO: 30.9 PG (ref 26.8–34.3)
MCHC RBC AUTO-ENTMCNC: 35.2 G/DL (ref 31.4–37.4)
MCV RBC AUTO: 88 FL (ref 82–98)
MICROALBUMIN UR-MCNC: 523 MG/L (ref 0–20)
MICROALBUMIN/CREAT 24H UR: 443 MG/G CREATININE (ref 0–30)
MONOCYTES # BLD AUTO: 0.56 THOUSAND/ÂΜL (ref 0.17–1.22)
MONOCYTES NFR BLD AUTO: 10 % (ref 4–12)
NEUTROPHILS # BLD AUTO: 3.01 THOUSANDS/ÂΜL (ref 1.85–7.62)
NEUTS SEG NFR BLD AUTO: 51 % (ref 43–75)
NRBC BLD AUTO-RTO: 0 /100 WBCS
PLATELET # BLD AUTO: 209 THOUSANDS/UL (ref 149–390)
PMV BLD AUTO: 10.8 FL (ref 8.9–12.7)
POTASSIUM SERPL-SCNC: 3.2 MMOL/L (ref 3.5–5.3)
PROT SERPL-MCNC: 6.3 G/DL (ref 6.4–8.4)
RBC # BLD AUTO: 5.53 MILLION/UL (ref 3.88–5.62)
SODIUM SERPL-SCNC: 142 MMOL/L (ref 135–147)
TRIGL SERPL-MCNC: 163 MG/DL
TSH SERPL DL<=0.05 MIU/L-ACNC: 0.8 UIU/ML (ref 0.45–4.5)
WBC # BLD AUTO: 5.77 THOUSAND/UL (ref 4.31–10.16)

## 2023-02-02 ENCOUNTER — OFFICE VISIT (OUTPATIENT)
Dept: FAMILY MEDICINE CLINIC | Facility: CLINIC | Age: 68
End: 2023-02-02

## 2023-02-02 VITALS
SYSTOLIC BLOOD PRESSURE: 138 MMHG | HEIGHT: 70 IN | HEART RATE: 71 BPM | WEIGHT: 239.2 LBS | DIASTOLIC BLOOD PRESSURE: 70 MMHG | BODY MASS INDEX: 34.24 KG/M2 | TEMPERATURE: 97 F | OXYGEN SATURATION: 97 %

## 2023-02-02 DIAGNOSIS — M81.0 OSTEOPOROSIS, UNSPECIFIED OSTEOPOROSIS TYPE, UNSPECIFIED PATHOLOGICAL FRACTURE PRESENCE: Primary | ICD-10-CM

## 2023-02-02 DIAGNOSIS — E11.29 TYPE 2 DIABETES MELLITUS WITH MICROALBUMINURIA, WITHOUT LONG-TERM CURRENT USE OF INSULIN (HCC): ICD-10-CM

## 2023-02-02 DIAGNOSIS — K90.9 INTESTINAL MALABSORPTION, UNSPECIFIED TYPE: ICD-10-CM

## 2023-02-02 DIAGNOSIS — E87.6 HYPOKALEMIA: ICD-10-CM

## 2023-02-02 DIAGNOSIS — R80.9 TYPE 2 DIABETES MELLITUS WITH MICROALBUMINURIA, WITHOUT LONG-TERM CURRENT USE OF INSULIN (HCC): ICD-10-CM

## 2023-02-02 DIAGNOSIS — E66.01 OBESITY, MORBID (HCC): ICD-10-CM

## 2023-02-02 DIAGNOSIS — G47.33 OSA (OBSTRUCTIVE SLEEP APNEA): ICD-10-CM

## 2023-02-02 DIAGNOSIS — F11.20 CONTINUOUS OPIOID DEPENDENCE (HCC): ICD-10-CM

## 2023-02-02 RX ORDER — POTASSIUM CHLORIDE 20 MEQ/1
20 TABLET, EXTENDED RELEASE ORAL 2 TIMES DAILY
Qty: 4 TABLET | Refills: 0 | Status: SHIPPED | OUTPATIENT
Start: 2023-02-02 | End: 2023-02-04

## 2023-02-02 RX ORDER — POTASSIUM CHLORIDE 20 MEQ/1
20 TABLET, EXTENDED RELEASE ORAL 2 TIMES DAILY
Qty: 4 TABLET | Refills: 0 | Status: SHIPPED | OUTPATIENT
Start: 2023-02-02 | End: 2023-02-02 | Stop reason: SDUPTHER

## 2023-02-02 RX ORDER — MOMETASONE FUROATE 1 MG/G
OINTMENT TOPICAL
COMMUNITY
Start: 2022-11-28

## 2023-02-02 NOTE — PROGRESS NOTES
Assessment/Plan:    Type 2 diabetes mellitus with microalbuminuria, without long-term current use of insulin ([facility])    Lab Results   Component Value Date    HGBA1C 5 4 02/01/2023   A1c remains at goal   To continue Jardiance 25 mg daily  Counseled on the importance of consuming a low-carb diet and engaging in daily physical activity as tolerated  Follow-up in 6 months or sooner if needed  DOE (obstructive sleep apnea)  To continue use of CPAP nightly  Osteoporosis  To continue Prolia injections every 6 months  Obesity, morbid ([facility] [address] )  Counseled the importance of reducing BMI  Continuous opioid dependence (HCC)  Stable  To use tramadol-acetaminophen judiciously  Hypokalemia  Potassium mildly low  Stressed the importance of adequate fluid intake  To begin potassium supplement and repeat BMP in 1 week  Diagnoses and all orders for this visit:    Osteoporosis, unspecified osteoporosis type, unspecified pathological fracture presence  -     denosumab (PROLIA) subcutaneous injection 60 mg    Hypokalemia  -     Discontinue: potassium chloride (K-DUR,KLOR-CON) 20 mEq tablet; Take 1 tablet (20 mEq total) by mouth 2 (two) times a day for 2 days  -     Basic metabolic panel; Future  -     potassium chloride (K-DUR,KLOR-CON) 20 mEq tablet; Take 1 tablet (20 mEq total) by mouth 2 (two) times a day for 2 days    Continuous opioid dependence (HCC)    Obesity, morbid ([facility] )    Type 2 diabetes mellitus with microalbuminuria, without long-term current use of insulin ([facility])    Intestinal malabsorption, unspecified type    DOE (obstructive sleep apnea)    Other orders  -     mometasone (ELOCON) 0 1 % ointment; APPLY TO DRY SKIN ON LEGS NIGHTLY IF NEEDED          Subjective:      Patient ID: Rosa Wing is a 74 y o  male  Kings Ruelas presents for a follow-up  He is taking his medications as prescribed  He recently obtained lab work yesterday  Feeling well, denies concerns        The following portions of the patient's history were reviewed and updated as appropriate:   He   Patient Active Problem List    Diagnosis Date Noted   • Hypokalemia 02/02/2023   • Acute gout of left ankle 09/08/2022   • Continuous opioid dependence ([facility] ) 05/05/2022   • Right bundle branch block 01/03/2022   • Hypertriglyceridemia 09/27/2021   • DOE (obstructive sleep apnea)    • Osteoporosis 03/22/2021   • Hypertension 02/06/2019   • Obesity, morbid ([facility] ) 02/06/2019   • Type 2 diabetes mellitus with microalbuminuria, without long-term current use of insulin ([facility])    • Malabsorption 02/06/2018   • Persistent proteinuria 09/28/2017     Current Outpatient Medications   Medication Sig Dispense Refill   • amLODIPine (NORVASC) 5 mg tablet TAKE 1 TABLET DAILY 90 tablet 3   • buPROPion (WELLBUTRIN XL) 300 mg 24 hr tablet TAKE 1 TABLET DAILY 90 tablet 3   • calcium citrate-Vitamin D 200 mg-250 units Take 1 tablet by mouth daily     • clindamycin (CLINDAGEL) 1 % gel   0   • ibuprofen (MOTRIN) 600 mg tablet Take 600 mg by mouth every 6 (six) hours as needed     • Icosapent Ethyl (Vascepa) 1 g CAPS TAKE 2 CAPSULES IN THE MORNING AND 2 CAPSULES AT NIGHT 360 capsule 3   • Iron-Vitamin C (VITRON-C)  MG TABS Take one pill twice daily 60 tablet 1   • Jardiance 25 MG TABS TAKE 1 TABLET DAILY 90 tablet 3   • losartan (COZAAR) 100 MG tablet TAKE 1 TABLET DAILY 90 tablet 1   • mometasone (ELOCON) 0 1 % ointment APPLY TO DRY SKIN ON LEGS NIGHTLY IF NEEDED     • Multiple Vitamins-Minerals (CENTRUM ADULTS PO) Take 1 tablet by mouth daily     • pantoprazole (PROTONIX) 40 mg tablet TAKE 1 TABLET DAILY 90 tablet 3   • potassium chloride (K-DUR,KLOR-CON) 20 mEq tablet Take 1 tablet (20 mEq total) by mouth 2 (two) times a day for 2 days 4 tablet 0   • rosuvastatin (CRESTOR) 5 mg tablet TAKE 1 TABLET DAILY 90 tablet 3   • SODIUM FLUORIDE, DENTAL RINSE, 0 2 % SOLN swish 10 milliliter  IN MOUTH for 1 MINUTE THEN SPIT once daily at bedtime     • traMADol-acetaminophen (ULTRACET) 37 5-325 mg per tablet May Take 1 pill twice a day p r n  arthritis pain 30 tablet 0   • colchicine (COLCRYS) 0 6 mg tablet Take 1 tablet (0 6 mg total) by mouth daily for 7 days 7 tablet 0     No current facility-administered medications for this visit  He is allergic to augmentin [amoxicillin-pot clavulanate] and ciprofloxacin       Review of Systems   Constitutional: Negative  Respiratory: Negative  Cardiovascular: Negative  Gastrointestinal:        Loose stools   Musculoskeletal: Positive for arthralgias  Skin: Negative  Neurological: Negative  Psychiatric/Behavioral: Negative  /70 (BP Location: Right arm, Patient Position: Sitting)   Pulse 71   Temp (!) 97 °F (36 1 °C) (Tympanic)   Ht 5' 10" (1 778 m)   Wt 109 kg (239 lb 3 2 oz)   SpO2 97%   BMI 34 32 kg/m²     Objective:     Physical Exam  Vitals and nursing note reviewed  Constitutional:       General: He is not in acute distress  Appearance: Normal appearance  He is well-developed  He is not ill-appearing, toxic-appearing or diaphoretic  HENT:      Head: Normocephalic and atraumatic  Eyes:      Conjunctiva/sclera: Conjunctivae normal    Cardiovascular:      Rate and Rhythm: Normal rate and regular rhythm  Heart sounds: Normal heart sounds  No murmur heard  Pulmonary:      Effort: Pulmonary effort is normal  No respiratory distress  Breath sounds: Normal breath sounds  No wheezing or rales  Chest:      Chest wall: No tenderness  Musculoskeletal:      Cervical back: Neck supple  Comments: Limps with ambulating    Neurological:      General: No focal deficit present  Mental Status: He is alert and oriented to person, place, and time  Psychiatric:         Mood and Affect: Mood normal          Behavior: Behavior normal          Thought Content:  Thought content normal          Judgment: Judgment normal

## 2023-02-02 NOTE — ASSESSMENT & PLAN NOTE
Potassium mildly low  Stressed the importance of adequate fluid intake  To begin potassium supplement and repeat BMP in 1 week

## 2023-02-02 NOTE — ASSESSMENT & PLAN NOTE
Lab Results   Component Value Date    HGBA1C 5 4 02/01/2023   A1c remains at goal   To continue Jardiance 25 mg daily  Counseled on the importance of consuming a low-carb diet and engaging in daily physical activity as tolerated  Follow-up in 6 months or sooner if needed

## 2023-02-06 NOTE — TELEPHONE ENCOUNTER
Does he want to hold on treatment for now? Partial Purse String (Simple) Text: Given the location of the defect and the characteristics of the surrounding skin a simple purse string closure was deemed most appropriate.  Undermining was performed circumferentially around the surgical defect.  A purse string suture was then placed and tightened. Wound tension only allowed a partial closure of the circular defect.

## 2023-02-10 ENCOUNTER — APPOINTMENT (OUTPATIENT)
Dept: LAB | Facility: HOSPITAL | Age: 68
End: 2023-02-10

## 2023-02-10 DIAGNOSIS — E11.29 TYPE 2 DIABETES MELLITUS WITH MICROALBUMINURIA, WITHOUT LONG-TERM CURRENT USE OF INSULIN (HCC): ICD-10-CM

## 2023-02-10 DIAGNOSIS — E87.6 HYPOKALEMIA: ICD-10-CM

## 2023-02-10 DIAGNOSIS — I10 PRIMARY HYPERTENSION: ICD-10-CM

## 2023-02-10 DIAGNOSIS — R80.9 TYPE 2 DIABETES MELLITUS WITH MICROALBUMINURIA, WITHOUT LONG-TERM CURRENT USE OF INSULIN (HCC): ICD-10-CM

## 2023-02-10 DIAGNOSIS — R80.1 PERSISTENT PROTEINURIA: ICD-10-CM

## 2023-02-10 LAB
ANION GAP SERPL CALCULATED.3IONS-SCNC: 8 MMOL/L (ref 4–13)
BASOPHILS # BLD AUTO: 0.05 THOUSANDS/ÂΜL (ref 0–0.1)
BASOPHILS NFR BLD AUTO: 1 % (ref 0–1)
BUN SERPL-MCNC: 10 MG/DL (ref 5–25)
CALCIUM SERPL-MCNC: 7.8 MG/DL (ref 8.3–10.1)
CHLORIDE SERPL-SCNC: 108 MMOL/L (ref 96–108)
CO2 SERPL-SCNC: 28 MMOL/L (ref 21–32)
CREAT SERPL-MCNC: 0.78 MG/DL (ref 0.6–1.3)
CREAT UR-MCNC: 119 MG/DL
EOSINOPHIL # BLD AUTO: 0.26 THOUSAND/ÂΜL (ref 0–0.61)
EOSINOPHIL NFR BLD AUTO: 4 % (ref 0–6)
ERYTHROCYTE [DISTWIDTH] IN BLOOD BY AUTOMATED COUNT: 12.4 % (ref 11.6–15.1)
GFR SERPL CREATININE-BSD FRML MDRD: 94 ML/MIN/1.73SQ M
GLUCOSE P FAST SERPL-MCNC: 124 MG/DL (ref 65–99)
HCT VFR BLD AUTO: 48.4 % (ref 36.5–49.3)
HGB BLD-MCNC: 17 G/DL (ref 12–17)
IMM GRANULOCYTES # BLD AUTO: 0.01 THOUSAND/UL (ref 0–0.2)
IMM GRANULOCYTES NFR BLD AUTO: 0 % (ref 0–2)
LYMPHOCYTES # BLD AUTO: 2.07 THOUSANDS/ÂΜL (ref 0.6–4.47)
LYMPHOCYTES NFR BLD AUTO: 34 % (ref 14–44)
MCH RBC QN AUTO: 31 PG (ref 26.8–34.3)
MCHC RBC AUTO-ENTMCNC: 35.1 G/DL (ref 31.4–37.4)
MCV RBC AUTO: 88 FL (ref 82–98)
MONOCYTES # BLD AUTO: 0.61 THOUSAND/ÂΜL (ref 0.17–1.22)
MONOCYTES NFR BLD AUTO: 10 % (ref 4–12)
NEUTROPHILS # BLD AUTO: 3.18 THOUSANDS/ÂΜL (ref 1.85–7.62)
NEUTS SEG NFR BLD AUTO: 51 % (ref 43–75)
NRBC BLD AUTO-RTO: 0 /100 WBCS
PLATELET # BLD AUTO: 203 THOUSANDS/UL (ref 149–390)
PMV BLD AUTO: 10.7 FL (ref 8.9–12.7)
POTASSIUM SERPL-SCNC: 3.7 MMOL/L (ref 3.5–5.3)
PROT UR-MCNC: 61 MG/DL
PROT/CREAT UR: 0.51 MG/G{CREAT} (ref 0–0.1)
RBC # BLD AUTO: 5.48 MILLION/UL (ref 3.88–5.62)
SODIUM SERPL-SCNC: 144 MMOL/L (ref 135–147)
WBC # BLD AUTO: 6.18 THOUSAND/UL (ref 4.31–10.16)

## 2023-02-16 ENCOUNTER — TELEPHONE (OUTPATIENT)
Dept: FAMILY MEDICINE CLINIC | Facility: CLINIC | Age: 68
End: 2023-02-16

## 2023-02-16 NOTE — TELEPHONE ENCOUNTER
The Potassium test was done under Dr Reyes Yves   Can you look at that and call Henok with the result

## 2023-02-23 DIAGNOSIS — K22.70 BARRETT'S ESOPHAGUS WITHOUT DYSPLASIA: ICD-10-CM

## 2023-02-23 RX ORDER — PANTOPRAZOLE SODIUM 40 MG/1
TABLET, DELAYED RELEASE ORAL
Qty: 90 TABLET | Refills: 3 | Status: SHIPPED | OUTPATIENT
Start: 2023-02-23

## 2023-03-03 DIAGNOSIS — J06.9 UPPER RESPIRATORY TRACT INFECTION, UNSPECIFIED TYPE: Primary | ICD-10-CM

## 2023-03-03 RX ORDER — METHYLPREDNISOLONE 4 MG/1
TABLET ORAL
Qty: 21 TABLET | Refills: 0 | Status: SHIPPED | OUTPATIENT
Start: 2023-03-03 | End: 2023-03-09

## 2023-03-24 ENCOUNTER — TELEPHONE (OUTPATIENT)
Dept: NEPHROLOGY | Facility: CLINIC | Age: 68
End: 2023-03-24

## 2023-03-27 ENCOUNTER — OFFICE VISIT (OUTPATIENT)
Dept: NEPHROLOGY | Facility: CLINIC | Age: 68
End: 2023-03-27

## 2023-03-27 VITALS
HEART RATE: 69 BPM | SYSTOLIC BLOOD PRESSURE: 110 MMHG | OXYGEN SATURATION: 97 % | BODY MASS INDEX: 33.51 KG/M2 | HEIGHT: 70 IN | TEMPERATURE: 98 F | RESPIRATION RATE: 16 BRPM | WEIGHT: 234.08 LBS | DIASTOLIC BLOOD PRESSURE: 70 MMHG

## 2023-03-27 DIAGNOSIS — E66.01 OBESITY, MORBID (HCC): Primary | ICD-10-CM

## 2023-03-27 DIAGNOSIS — I10 HYPERTENSION, UNSPECIFIED TYPE: ICD-10-CM

## 2023-03-27 DIAGNOSIS — E11.29 TYPE 2 DIABETES MELLITUS WITH MICROALBUMINURIA, WITHOUT LONG-TERM CURRENT USE OF INSULIN (HCC): ICD-10-CM

## 2023-03-27 DIAGNOSIS — R80.9 TYPE 2 DIABETES MELLITUS WITH MICROALBUMINURIA, WITHOUT LONG-TERM CURRENT USE OF INSULIN (HCC): ICD-10-CM

## 2023-03-27 DIAGNOSIS — I10 PRIMARY HYPERTENSION: ICD-10-CM

## 2023-03-27 DIAGNOSIS — R80.1 PERSISTENT PROTEINURIA: ICD-10-CM

## 2023-03-27 RX ORDER — AMLODIPINE BESYLATE 5 MG/1
TABLET ORAL
Qty: 90 TABLET | Refills: 3 | Status: SHIPPED | OUTPATIENT
Start: 2023-03-27

## 2023-03-27 NOTE — ASSESSMENT & PLAN NOTE
Lab Results   Component Value Date    HGBA1C 5 4 02/01/2023   Very well controlled with present medication    Importance of diabetic control and effect on kidney disease discussed with him

## 2023-03-27 NOTE — PROGRESS NOTES
NEPHROLOGY OFFICE FOLLOW UP  Michelle Vargas 74 y o  male MRN: [id number]    Encounter: [id number] 3/27/2023    REASON FOR VISIT: Michelle Vargas is a 74 y o  male who is here on 3/27/2023 for Proteinuria and Follow-up    HPI:    Laquita Grijalva came in today for yearly nephrology follow-up because of proteinuria  Patient overall feeling well  He still obese    Denies any other acute complaint    No chest pain no palpitation no shortness of breath    No nausea no vomiting  Does take nonsteroidal painkiller as and when necessary because of joint pain      REVIEW OF SYSTEMS:    Review of Systems   Constitutional: Negative for activity change and fatigue  HENT: Negative for congestion and ear discharge  Eyes: Negative for photophobia and pain  Respiratory: Negative for apnea and choking  Cardiovascular: Negative for chest pain and palpitations  Gastrointestinal: Negative for abdominal distention and blood in stool  Endocrine: Negative for heat intolerance and polyphagia  Genitourinary: Negative for flank pain and urgency  Musculoskeletal: Negative for neck pain and neck stiffness  Skin: Negative for color change and wound  Allergic/Immunologic: Negative for food allergies and immunocompromised state  Neurological: Negative for seizures and facial asymmetry  Hematological: Negative for adenopathy  Does not bruise/bleed easily  Psychiatric/Behavioral: Negative for self-injury and suicidal ideas           PAST MEDICAL HISTORY:  Past Medical History:   Diagnosis Date   • Chronic kidney disease    • Diabetes ([facility] [address] )     LAST ASSESSED: 7/23/14   • Diabetes mellitus ([facility] [address] )    • Edema     LAST ASSESSED:6/12/12   • Hypertension    • Morbid obesity due to excess calories ([facility] [address] )     LAST ASSESSED: 6/12/12   • RBBB    • Sleep apnea, obstructive        PAST SURGICAL HISTORY:  Past Surgical History:   Procedure Laterality Date   • BACK SURGERY     • CHOLECYSTECTOMY     • COLONOSCOPY     • FOOT SURGERY     • GASTRIC RESTRICTION SURGERY      GASTRIC STAPLING FOR MORBID OBESITY LAPAROSCOPY W/ MARCY-EN-Y   • SHOULDER SURGERY     • UVULOPALATOPLASTY         SOCIAL HISTORY:  Social History     Substance and Sexual Activity   Alcohol Use No     Social History     Substance and Sexual Activity   Drug Use Yes   • Types: Marijuana    Comment: everyday      Social History     Tobacco Use   Smoking Status Former   • Packs/day: 2 00   • Years: 15 00   • Pack years: 30 00   • Types: Cigarettes   • Start date: 28   • Quit date: 12   • Years since quittin 2   Smokeless Tobacco Never       FAMILY HISTORY:  Family History   Problem Relation Age of Onset   • COPD Mother    • Heart disease Mother    • Mental illness Mother    • Drug abuse Mother    • Diabetes Father    • Mental illness Father    • Drug abuse Father    • Lung cancer Father    • Diabetes Sister        MEDICATIONS:    Current Outpatient Medications:   •  amLODIPine (NORVASC) 5 mg tablet, TAKE 1 TABLET DAILY, Disp: 90 tablet, Rfl: 3  •  buPROPion (WELLBUTRIN XL) 300 mg 24 hr tablet, TAKE 1 TABLET DAILY, Disp: 90 tablet, Rfl: 3  •  calcium citrate-Vitamin D 200 mg-250 units, Take 1 tablet by mouth daily, Disp: , Rfl:   •  clindamycin (CLINDAGEL) 1 % gel, if needed, Disp: , Rfl: 0  •  colchicine (COLCRYS) 0 6 mg tablet, Take 1 tablet (0 6 mg total) by mouth daily for 7 days, Disp: 7 tablet, Rfl: 0  •  ibuprofen (MOTRIN) 600 mg tablet, Take 600 mg by mouth every 6 (six) hours as needed, Disp: , Rfl:   •  Icosapent Ethyl (Vascepa) 1 g CAPS, TAKE 2 CAPSULES IN THE MORNING AND 2 CAPSULES AT NIGHT, Disp: 360 capsule, Rfl: 3  •  Iron-Vitamin C (VITRON-C)  MG TABS, Take one pill twice daily, Disp: 60 tablet, Rfl: 1  •  Jardiance 25 MG TABS, TAKE 1 TABLET DAILY, Disp: 90 tablet, Rfl: 3  •  losartan (COZAAR) 100 MG tablet, TAKE 1 TABLET DAILY, Disp: 90 tablet, Rfl: 1  •  mometasone (ELOCON) 0 1 % ointment, APPLY TO DRY SKIN ON LEGS NIGHTLY IF NEEDED, Disp: , Rfl:   • "Multiple Vitamins-Minerals (CENTRUM ADULTS PO), Take 1 tablet by mouth daily, Disp: , Rfl:   •  pantoprazole (PROTONIX) 40 mg tablet, TAKE 1 TABLET DAILY, Disp: 90 tablet, Rfl: 3  •  potassium chloride (K-DUR,KLOR-CON) 20 mEq tablet, Take 1 tablet (20 mEq total) by mouth 2 (two) times a day for 2 days, Disp: 4 tablet, Rfl: 0  •  rosuvastatin (CRESTOR) 5 mg tablet, TAKE 1 TABLET DAILY, Disp: 90 tablet, Rfl: 3  •  SODIUM FLUORIDE, DENTAL RINSE, 0 2 % SOLN, swish 10 milliliter  IN MOUTH for 1 MINUTE THEN SPIT once daily at bedtime, Disp: , Rfl:     PHYSICAL EXAM:  Vitals:    03/27/23 1022   BP: 110/70   BP Location: Right arm   Patient Position: Sitting   Pulse: 69   Resp: 16   Temp: 98 °F (36 7 °C)   TempSrc: Temporal   SpO2: 97%   Weight: 106 kg (234 lb 1 3 oz)   Height: 5' 10\" (1 778 m)     Body mass index is 33 59 kg/m²  Physical Exam  Constitutional:       General: He is not in acute distress  Appearance: He is well-developed  He is obese  HENT:      Head: Normocephalic  Eyes:      General: No scleral icterus  Conjunctiva/sclera: Conjunctivae normal    Neck:      Vascular: No JVD  Cardiovascular:      Rate and Rhythm: Normal rate  Heart sounds: Normal heart sounds  Pulmonary:      Effort: Pulmonary effort is normal       Breath sounds: No wheezing  Abdominal:      Palpations: Abdomen is soft  Tenderness: There is no abdominal tenderness  Musculoskeletal:         General: Normal range of motion  Cervical back: Neck supple  Skin:     General: Skin is warm  Findings: No rash  Neurological:      Mental Status: He is alert and oriented to person, place, and time     Psychiatric:         Behavior: Behavior normal          LAB RESULTS:  Results for orders placed or performed in visit on 96/87/91   Basic metabolic panel   Result Value Ref Range    Sodium 144 135 - 147 mmol/L    Potassium 3 7 3 5 - 5 3 mmol/L    Chloride 108 96 - 108 mmol/L    CO2 28 21 - 32 mmol/L    ANION GAP " 8 4 - 13 mmol/L    BUN 10 5 - 25 mg/dL    Creatinine 0 78 0 60 - 1 30 mg/dL    Glucose, Fasting 124 (H) 65 - 99 mg/dL    Calcium 7 8 (L) 8 3 - 10 1 mg/dL    eGFR 94 ml/min/1 73sq m   CBC and differential   Result Value Ref Range    WBC 6 18 4 31 - 10 16 Thousand/uL    RBC 5 48 3 88 - 5 62 Million/uL    Hemoglobin 17 0 12 0 - 17 0 g/dL    Hematocrit 48 4 36 5 - 49 3 %    MCV 88 82 - 98 fL    MCH 31 0 26 8 - 34 3 pg    MCHC 35 1 31 4 - 37 4 g/dL    RDW 12 4 11 6 - 15 1 %    MPV 10 7 8 9 - 12 7 fL    Platelets 098 433 - 155 Thousands/uL    nRBC 0 /100 WBCs    Neutrophils Relative 51 43 - 75 %    Immat GRANS % 0 0 - 2 %    Lymphocytes Relative 34 14 - 44 %    Monocytes Relative 10 4 - 12 %    Eosinophils Relative 4 0 - 6 %    Basophils Relative 1 0 - 1 %    Neutrophils Absolute 3 18 1 85 - 7 62 Thousands/µL    Immature Grans Absolute 0 01 0 00 - 0 20 Thousand/uL    Lymphocytes Absolute 2 07 0 60 - 4 47 Thousands/µL    Monocytes Absolute 0 61 0 17 - 1 22 Thousand/µL    Eosinophils Absolute 0 26 0 00 - 0 61 Thousand/µL    Basophils Absolute 0 05 0 00 - 0 10 Thousands/µL   Protein / creatinine ratio, urine   Result Value Ref Range    Creatinine, Ur 119 0 mg/dL    Protein Urine Random 61 mg/dL    Prot/Creat Ratio, Ur 0 51 (H) 0 00 - 0 10       ASSESSMENT and PLAN:      Persistent proteinuria  Nonnephrotic range and overall stable  On losartan which I will continue    Obesity, morbid ([address] )  Need to reduce weight and he is well aware of it likely contributing to proteinuria    Hypertension  Very well controlled with present medication which include losartan    Type 2 diabetes mellitus with microalbuminuria, without long-term current use of insulin ([address] )    Lab Results   Component Value Date    HGBA1C 5 4 02/01/2023   Very well controlled with present medication  Importance of diabetic control and effect on kidney disease discussed with him        Everything discussed at length with the patient    I will see him back in 1 "year again  We will get blood and urine test before that visit      Portions of the record may have been created with voice recognition software  Occasional wrong word or \"sound a like\" substitutions may have occurred due to the inherent limitations of voice recognition software  Read the chart carefully and recognize, using context, where substitutions have occurred  If you have any questions, please contact the dictating provider     "

## 2023-04-05 DIAGNOSIS — I10 HYPERTENSION, UNSPECIFIED TYPE: ICD-10-CM

## 2023-04-05 RX ORDER — LOSARTAN POTASSIUM 100 MG/1
TABLET ORAL
Qty: 90 TABLET | Refills: 3 | Status: SHIPPED | OUTPATIENT
Start: 2023-04-05

## 2023-05-17 DIAGNOSIS — F32.A DEPRESSION, UNSPECIFIED DEPRESSION TYPE: ICD-10-CM

## 2023-05-18 RX ORDER — BUPROPION HYDROCHLORIDE 300 MG/1
TABLET ORAL
Qty: 90 TABLET | Refills: 3 | Status: SHIPPED | OUTPATIENT
Start: 2023-05-18

## 2023-06-08 ENCOUNTER — TELEPHONE (OUTPATIENT)
Dept: FAMILY MEDICINE CLINIC | Facility: CLINIC | Age: 68
End: 2023-06-08

## 2023-06-08 NOTE — TELEPHONE ENCOUNTER
Spoke with patient- he stated that he is eligible for new CPAP equipment  I verified this with [facility]/    We need a script signed by either Felicity Osei or Dr Jihan Piedra for the CPAP equipment  I will take care of faxing it to [facility] as they need some office notes as well  Please keep me posted, thank you!

## 2023-06-21 LAB

## 2023-06-28 DIAGNOSIS — I10 HYPERTENSION, UNSPECIFIED TYPE: ICD-10-CM

## 2023-06-28 RX ORDER — EMPAGLIFLOZIN 25 MG/1
TABLET, FILM COATED ORAL
Qty: 90 TABLET | Refills: 3 | Status: SHIPPED | OUTPATIENT
Start: 2023-06-28

## 2023-08-03 ENCOUNTER — TELEPHONE (OUTPATIENT)
Dept: FAMILY MEDICINE CLINIC | Facility: CLINIC | Age: 68
End: 2023-08-03

## 2023-08-03 ENCOUNTER — OFFICE VISIT (OUTPATIENT)
Dept: FAMILY MEDICINE CLINIC | Facility: CLINIC | Age: 68
End: 2023-08-03
Payer: MEDICARE

## 2023-08-03 ENCOUNTER — TELEPHONE (OUTPATIENT)
Dept: ADMINISTRATIVE | Facility: OTHER | Age: 68
End: 2023-08-03

## 2023-08-03 VITALS
BODY MASS INDEX: 34.27 KG/M2 | OXYGEN SATURATION: 97 % | SYSTOLIC BLOOD PRESSURE: 130 MMHG | TEMPERATURE: 97.6 F | DIASTOLIC BLOOD PRESSURE: 72 MMHG | WEIGHT: 239.4 LBS | HEART RATE: 74 BPM | HEIGHT: 70 IN

## 2023-08-03 DIAGNOSIS — M81.0 OSTEOPOROSIS, UNSPECIFIED OSTEOPOROSIS TYPE, UNSPECIFIED PATHOLOGICAL FRACTURE PRESENCE: ICD-10-CM

## 2023-08-03 DIAGNOSIS — Z00.00 ENCOUNTER FOR ANNUAL WELLNESS VISIT (AWV) IN MEDICARE PATIENT: Primary | ICD-10-CM

## 2023-08-03 DIAGNOSIS — G47.33 OSA (OBSTRUCTIVE SLEEP APNEA): ICD-10-CM

## 2023-08-03 DIAGNOSIS — I10 PRIMARY HYPERTENSION: ICD-10-CM

## 2023-08-03 DIAGNOSIS — Z12.5 SCREENING FOR PROSTATE CANCER: ICD-10-CM

## 2023-08-03 DIAGNOSIS — E11.29 TYPE 2 DIABETES MELLITUS WITH MICROALBUMINURIA, WITHOUT LONG-TERM CURRENT USE OF INSULIN (HCC): ICD-10-CM

## 2023-08-03 DIAGNOSIS — E66.01 OBESITY, MORBID (HCC): ICD-10-CM

## 2023-08-03 DIAGNOSIS — R80.9 TYPE 2 DIABETES MELLITUS WITH MICROALBUMINURIA, WITHOUT LONG-TERM CURRENT USE OF INSULIN (HCC): ICD-10-CM

## 2023-08-03 DIAGNOSIS — R26.89 IMPAIRMENT OF BALANCE: ICD-10-CM

## 2023-08-03 PROBLEM — F11.20 CONTINUOUS OPIOID DEPENDENCE (HCC): Status: RESOLVED | Noted: 2022-05-05 | Resolved: 2023-08-03

## 2023-08-03 PROBLEM — M10.9 ACUTE GOUT OF LEFT ANKLE: Status: RESOLVED | Noted: 2022-09-08 | Resolved: 2023-08-03

## 2023-08-03 LAB — SL AMB POCT HEMOGLOBIN AIC: 5.8 (ref ?–6.5)

## 2023-08-03 PROCEDURE — 99214 OFFICE O/P EST MOD 30 MIN: CPT | Performed by: NURSE PRACTITIONER

## 2023-08-03 PROCEDURE — 96372 THER/PROPH/DIAG INJ SC/IM: CPT

## 2023-08-03 PROCEDURE — G0439 PPPS, SUBSEQ VISIT: HCPCS | Performed by: NURSE PRACTITIONER

## 2023-08-03 PROCEDURE — 83036 HEMOGLOBIN GLYCOSYLATED A1C: CPT | Performed by: NURSE PRACTITIONER

## 2023-08-03 RX ORDER — CEPHALEXIN 500 MG/1
CAPSULE ORAL
COMMUNITY
Start: 2023-08-02

## 2023-08-03 NOTE — PROGRESS NOTES
Assessment and Plan:     Problem List Items Addressed This Visit        Endocrine    Type 2 diabetes mellitus with microalbuminuria, without long-term current use of insulin ([address])       Lab Results   Component Value Date    HGBA1C 5.8 08/03/2023   Diabetes well controlled. To continue Jardiance 25 mg daily. Counseled on the importance of consuming a low-carb diet and engaging in daily physical activity. Patient is up-to-date on eye exam and foot exam.  Follow-up in 4 months or sooner if needed. Relevant Orders    POCT hemoglobin A1c (Completed)    Comprehensive metabolic panel    Lipid Panel with Direct LDL reflex       Respiratory    DOE (obstructive sleep apnea)     Uses CPAP nightly, compliant. Cardiovascular and Mediastinum    Hypertension     Blood pressure stable, to continue current antihypertensive regimen. Counseled on the importance of maintaining a healthy weight and consuming a low-sodium diet. Musculoskeletal and Integument    Osteoporosis     To repeat DEXA scan. To continue Prolia every 6 months. Relevant Medications    denosumab (PROLIA) subcutaneous injection 60 mg (Completed)    Other Relevant Orders    DXA bone density spine hip and pelvis       Other    Obesity, morbid ([address])     Counseled the importance of healthy food choices and engaging in daily physical activity. Encounter for annual wellness visit (AWV) in Medicare patient - Primary   Other Visit Diagnoses     Impairment of balance        Provided referral to PT. Relevant Orders    Ambulatory referral to Physical Therapy    Screening for prostate cancer        Relevant Orders    PSA, Total Screen          Falls Plan of Care: balance, strength, and gait training instructions were provided. Preventive health issues were discussed with patient, and age appropriate screening tests were ordered as noted in patient's After Visit Summary.   Personalized health advice and appropriate referrals for health education or preventive services given if needed, as noted in patient's After Visit Summary. History of Present Illness:     Patient presents for a Medicare Wellness Visit    Sasha Cannon presents for an AWV. He is begin treated with Keflex due to an early foot ulcer by podiatry. He is to follow up in 5 days. Otherwise, feeling well. Patient Care Team:  TIFFANY Martinez as PCP - General (Family Medicine)  Jeremy Alejandre MD as Consulting Physician (Gastroenterology)  Benny Ford as Consulting Physician (Optometry)  Jenni Murillo MD as Consulting Physician (Nephrology)  Dotty Lopez PA-C     Review of Systems:     Review of Systems   Constitutional: Negative. HENT: Negative. Respiratory: Negative. Cardiovascular: Negative. Gastrointestinal: Negative. Genitourinary: Negative. Musculoskeletal: Positive for gait problem (difficulty with balance ). Skin: Positive for wound (treated with keflex by podiatry ). Psychiatric/Behavioral: Negative.          Problem List:     Patient Active Problem List   Diagnosis   • Malabsorption   • Type 2 diabetes mellitus with microalbuminuria, without long-term current use of insulin ([facility])   • Persistent proteinuria   • Hypertension   • Obesity, morbid ([facility])   • Osteoporosis   • DOE (obstructive sleep apnea)   • Hypertriglyceridemia   • Right bundle branch block   • Encounter for annual wellness visit (AWV) in Medicare patient   • Hypokalemia      Past Medical and Surgical History:     Past Medical History:   Diagnosis Date   • Chronic kidney disease    • Diabetes ([address])     LAST ASSESSED: 7/23/14   • Diabetes mellitus ([address])    • Edema     LAST ASSESSED:6/12/12   • Hypertension    • Morbid obesity due to excess calories ([address])     LAST ASSESSED: 6/12/12   • RBBB    • Sleep apnea, obstructive      Past Surgical History:   Procedure Laterality Date   • BACK SURGERY     • CHOLECYSTECTOMY     • COLONOSCOPY     • FOOT SURGERY     • GASTRIC RESTRICTION SURGERY      GASTRIC STAPLING FOR MORBID OBESITY LAPAROSCOPY W/ MARCY-EN-Y   • SHOULDER SURGERY     • UVULOPALATOPLASTY        Family History:     Family History   Problem Relation Age of Onset   • COPD Mother    • Heart disease Mother    • Mental illness Mother    • Drug abuse Mother    • Diabetes Father    • Mental illness Father    • Drug abuse Father    • Lung cancer Father    • Diabetes Sister       Social History:     Social History     Socioeconomic History   • Marital status: Single     Spouse name: None   • Number of children: None   • Years of education: None   • Highest education level: None   Occupational History   • Occupation: DISABLED   Tobacco Use   • Smoking status: Former     Packs/day: 2.00     Years: 15.00     Total pack years: 30.00     Types: Cigarettes     Start date:      Quit date:  [employer]     Years since quittin.6   • Smokeless tobacco: Never   Vaping Use   • Vaping Use: Never used   Substance and Sexual Activity   • Alcohol use: No   • Drug use: Yes     Types: Marijuana     Comment: everyday    • Sexual activity: Not Currently     Partners: Female   Other Topics Concern   • None   Social History Narrative   • None     Social Determinants of Health     Financial Resource Strain: Low Risk  (8/3/2023)    Overall Financial Resource Strain (CARDIA)    • Difficulty of Paying Living Expenses: Not very hard   Food Insecurity: Not on file   Transportation Needs: No Transportation Needs (8/3/2023)    PRAPARE - Transportation    • Lack of Transportation (Medical): No    • Lack of Transportation (Non-Medical):  No   Physical Activity: Not on file   Stress: Not on file   Social Connections: Not on file   Intimate Partner Violence: Not on file   Housing Stability: Not on file      Medications and Allergies:     Current Outpatient Medications   Medication Sig Dispense Refill   • amLODIPine (NORVASC) 5 mg tablet TAKE 1 TABLET DAILY 90 tablet 3   • buPROPion (WELLBUTRIN XL) 300 mg 24 hr tablet TAKE 1 TABLET DAILY 90 tablet 3   • calcium citrate-Vitamin D 200 mg-250 units Take 1 tablet by mouth daily     • clindamycin (CLINDAGEL) 1 % gel if needed  0   • Icosapent Ethyl (Vascepa) 1 g CAPS TAKE 2 CAPSULES IN THE MORNING AND 2 CAPSULES AT NIGHT 360 capsule 3   • Iron-Vitamin C (VITRON-C)  MG TABS Take one pill twice daily 60 tablet 1   • Jardiance 25 MG TABS TAKE 1 TABLET DAILY 90 tablet 3   • losartan (COZAAR) 100 MG tablet TAKE 1 TABLET DAILY 90 tablet 3   • mometasone (ELOCON) 0.1 % ointment APPLY TO DRY SKIN ON LEGS NIGHTLY IF NEEDED     • Multiple Vitamins-Minerals (CENTRUM ADULTS PO) Take 1 tablet by mouth daily     • pantoprazole (PROTONIX) 40 mg tablet TAKE 1 TABLET DAILY 90 tablet 3   • rosuvastatin (CRESTOR) 5 mg tablet TAKE 1 TABLET DAILY 90 tablet 3   • SODIUM FLUORIDE, DENTAL RINSE, 0.2 % SOLN swish 10 milliliter  IN MOUTH for 1 MINUTE THEN SPIT once daily at bedtime     • cephalexin (KEFLEX) 500 mg capsule take 1 capsule by mouth three times a day for 10 days     • colchicine (COLCRYS) 0.6 mg tablet Take 1 tablet (0.6 mg total) by mouth daily for 7 days 7 tablet 0     No current facility-administered medications for this visit.      Allergies   Allergen Reactions   • Augmentin [Amoxicillin-Pot Clavulanate] Diarrhea   • Ciprofloxacin Diarrhea      Immunizations:     Immunization History   Administered Date(s) Administered   • COVID-19 PFIZER VACCINE 0.3 ML IM 03/29/2021, 04/21/2021, 12/17/2021   • INFLUENZA 10/07/2004, 10/27/2005, 12/01/2006, 10/30/2007, 11/10/2008, 10/02/2009, 11/15/2010, 09/09/2011, 10/08/2012, 10/14/2013, 09/17/2014, 09/25/2015, 09/06/2018, 09/06/2019, 09/08/2022   • Influenza Quadrivalent Preservative Free 3 years and older IM 09/21/2016, 09/07/2017   • Influenza, high dose seasonal 0.7 mL 09/08/2022   • Influenza, recombinant, quadrivalent,injectable, preservative free 09/06/2018, 10/17/2019, 10/14/2020, 09/27/2021   • Pneumococcal Conjugate 13-Valent 09/09/2016 • Pneumococcal Polysaccharide PPV23 03/13/2020   • Tdap 11/06/2013      Health Maintenance:         Topic Date Due   • Colorectal Cancer Screening  03/09/2027   • Hepatitis C Screening  Completed         Topic Date Due   • COVID-19 Vaccine (4 - Pfizer series) 02/11/2022   • Influenza Vaccine (1) 09/01/2023      Medicare Screening Tests and Risk Assessments:     Chikis Ventura is here for his Subsequent Wellness visit. Last Medicare Wellness visit information reviewed, patient interviewed and updates made to the record today. Health Risk Assessment:   Patient rates overall health as fair. Patient feels that their physical health rating is same. Patient is satisfied with their life. Eyesight was rated as same. Hearing was rated as same. Patient feels that their emotional and mental health rating is same. Patients states they are never, rarely angry. Patient states they are sometimes unusually tired/fatigued. Pain experienced in the last 7 days has been none. Patient states that he has experienced no weight loss or gain in last 6 months. Fall Risk Screening: In the past year, patient has experienced: history of falling in past year    Number of falls: 2 or more  Injured during fall?: No    Feels unsteady when standing or walking?: Yes    Worried about falling?: Yes      Home Safety:  Patient does not have trouble with stairs inside or outside of their home. Patient has working smoke alarms and has no working carbon monoxide detector. Home safety hazards include: none. Nutrition:   Current diet is Regular. Medications:   Patient is currently taking over-the-counter supplements. OTC medications include: see medication list. Patient is able to manage medications. Activities of Daily Living (ADLs)/Instrumental Activities of Daily Living (IADLs):   Walk and transfer into and out of bed and chair?: Yes  Dress and groom yourself?: Yes    Bathe or shower yourself?: Yes    Feed yourself?  Yes  Do your laundry/housekeeping?: Yes  Manage your money, pay your bills and track your expenses?: Yes  Make your own meals?: Yes    Do your own shopping?: Yes    Previous Hospitalizations:   Any hospitalizations or ED visits within the last 12 months?: No      Advance Care Planning:   Living will: No    Advanced directive counseling given: Yes    Five wishes given: Yes    End of Life Decisions reviewed with patient: Yes    Provider agrees with end of life decisions: Yes      Cognitive Screening:   Provider or family/friend/caregiver concerned regarding cognition?: No    PREVENTIVE SCREENINGS      Cardiovascular Screening:    General: Screening Not Indicated and History Lipid Disorder    Due for: Lipid Panel      Diabetes Screening:     General: Screening Not Indicated and History Diabetes      Colorectal Cancer Screening:     General: Screening Current      Prostate Cancer Screening:    General: Risks and Benefits Discussed    Due for: PSA      Osteoporosis Screening:    General: Screening Not Indicated and History Osteoporosis      Abdominal Aortic Aneurysm (AAA) Screening:    Risk factors include: age between 70-77 yo and tobacco use        Lung Cancer Screening:     General: Screening Not Indicated      Hepatitis C Screening:    General: Screening Current    Screening, Brief Intervention, and Referral to Treatment (SBIRT)    Screening  Typical number of drinks in a day: 0  Typical number of drinks in a week: 0  Interpretation: Low risk drinking behavior. Single Item Drug Screening:  How often have you used an illegal drug (including marijuana) or a prescription medication for non-medical reasons in the past year? never    Single Item Drug Screen Score: 0  Interpretation: Negative screen for possible drug use disorder    Other Counseling Topics:   Car/seat belt/driving safety, skin self-exam, sunscreen and calcium and vitamin D intake and regular weightbearing exercise. No results found.      Physical Exam:     BP 130/72 (BP Location: Left arm, Patient Position: Sitting, Cuff Size: Standard)   Pulse 74   Temp 97.6 °F (36.4 °C) (Tympanic)   Ht 5' 10" (1.778 m)   Wt 109 kg (239 lb 6.4 oz)   SpO2 97%   BMI 34.35 kg/m²     Physical Exam  Vitals and nursing note reviewed. Constitutional:       General: He is not in acute distress. Appearance: Normal appearance. He is well-developed. He is not ill-appearing, toxic-appearing or diaphoretic. HENT:      Head: Normocephalic and atraumatic. Right Ear: Tympanic membrane and external ear normal.      Left Ear: Tympanic membrane and external ear normal.      Nose: Nose normal.      Mouth/Throat:      Pharynx: No oropharyngeal exudate. Eyes:      General: Lids are normal.      Conjunctiva/sclera: Conjunctivae normal.      Pupils: Pupils are equal, round, and reactive to light. Cardiovascular:      Rate and Rhythm: Normal rate and regular rhythm. Heart sounds: No murmur heard. Pulmonary:      Effort: Pulmonary effort is normal. No respiratory distress. Breath sounds: Normal breath sounds. No stridor. No wheezing or rales. Chest:      Chest wall: No tenderness. Abdominal:      General: Bowel sounds are normal. There is no distension. Palpations: Abdomen is soft. There is no mass. Tenderness: There is no abdominal tenderness. There is no guarding or rebound. Hernia: No hernia is present. Musculoskeletal:         General: Normal range of motion. Cervical back: Normal range of motion and neck supple. Lymphadenopathy:      Cervical: No cervical adenopathy. Skin:     General: Skin is warm and dry. Capillary Refill: Capillary refill takes less than 2 seconds. Neurological:      General: No focal deficit present. Mental Status: He is alert and oriented to person, place, and time. Psychiatric:         Mood and Affect: Mood normal.         Behavior: Behavior normal.         Thought Content:  Thought content normal. Judgment: Judgment normal.        BMI Counseling: Body mass index is 34.35 kg/m². The BMI is above normal. Nutrition recommendations include reducing portion sizes, decreasing overall calorie intake, 3-5 servings of fruits/vegetables daily, reducing fast food intake, consuming healthier snacks, decreasing soda and/or juice intake, moderation in carbohydrate intake and increasing intake of lean protein. Exercise recommendations include exercising 3-5 times per week.   TIFFANY Ruiz

## 2023-08-03 NOTE — TELEPHONE ENCOUNTER
Called pt's podiatry office of Dr. Herminia Blanchard. They stated we needed to fax a request over to them first before they could fax us back the progress office notes of the visit from yesterday 08/02/23. Fax request sent. Will see when progress office notes get sent back to us.

## 2023-08-03 NOTE — TELEPHONE ENCOUNTER
Upon review of the In Basket request and the patient's chart, initial outreach has been made via fax to facility. Please see Contacts section for details.      Thank you  Mulugeta Kulkarni MA

## 2023-08-03 NOTE — ASSESSMENT & PLAN NOTE
Blood pressure stable, to continue current antihypertensive regimen. Counseled on the importance of maintaining a healthy weight and consuming a low-sodium diet.

## 2023-08-03 NOTE — TELEPHONE ENCOUNTER
----- Message from Verla Soulier sent at 8/3/2023 11:08 AM EDT -----  08/03/23 11:10 AM    Almaz, our patient Benjamin Anderson has had Diabetic Eye Exam completed/performed. Please assist in updating the patient chart by making an External outreach to Dr. Mateo Cason facility located in [facility]. The date of service is 2023.     Thank you,  Claire Zuñiga   [facility]

## 2023-08-03 NOTE — LETTER
Diabetic Eye Exam Form    Date Requested: 23  Patient: Christine Ellsworth  Patient : 1955   Referring Provider: TIFFANY Saldana      DIABETIC Eye Exam Date _______________________________      Type of Exam MUST be documented for Diabetic Eye Exams. Please CHECK ONE. Retinal Exam       Dilated Retinal Exam       OCT       Optomap-Iris Exam      Fundus Photography       Left Eye - Please check Retinopathy or No Retinopathy        Exam did show retinopathy    Exam did not show retinopathy       Right Eye - Please check Retinopathy or No Retinopathy       Exam did show retinopathy    Exam did not show retinopathy       Comments __________________________________________________________    Practice Providing Exam ______________________________________________    Exam Performed By (print name) _______________________________________      Provider Signature ___________________________________________________      These reports are needed for  compliance. Please fax this completed form and a copy of the Diabetic Eye Exam report to our office located at [address] [place] [address] as soon as possible via Fax [phone] attention Berenda Lie: Phone [phone]  We thank you for your assistance in treating our mutual patient.

## 2023-08-04 NOTE — TELEPHONE ENCOUNTER
Upon review of the In Basket request we were able to locate, review, and update the patient chart as requested for Diabetic Eye Exam.    Any additional questions or concerns should be emailed to the Practice Liaisons via the appropriate education email address, please do not reply via In Basket.     Thank you  Sintia Nam MA

## 2023-08-09 NOTE — H&P
History and Physical - [facility]  Luisana Blake 63 y o  male MRN: [id number]                  HPI: Luisana Blake is a 63y o  year old male who presents for endoscopy for evaluation of GERD and Ugalde's esophagus      REVIEW OF SYSTEMS: Per the HPI, and otherwise unremarkable  Historical Information   Past Medical History:   Diagnosis Date    Chronic kidney disease     Diabetes ([address] )     LAST ASSESSED: 14    Edema     LAST ASSESSED:12    Hypertension     Morbid obesity due to excess calories (HCC)     LAST ASSESSED: 12    RBBB      Past Surgical History:   Procedure Laterality Date    BACK SURGERY      CHOLECYSTECTOMY      COLONOSCOPY      FOOT SURGERY      GASTRIC RESTRICTION SURGERY      GASTRIC STAPLING FOR MORBID OBESITY LAPAROSCOPY W/ MARCY-EN-Y    SHOULDER SURGERY      UVULOPALATOPLASTY       Social History   Social History     Substance and Sexual Activity   Alcohol Use No     Social History     Substance and Sexual Activity   Drug Use Yes    Types: Marijuana    Comment: everyday      Social History     Tobacco Use   Smoking Status Former Smoker    Types: Cigarettes    Quit date: 12    Years since quittin 5   Smokeless Tobacco Never Used     Family History   Problem Relation Age of Onset   Addie Silence COPD Mother     Heart disease Mother     Mental illness Mother     Drug abuse Mother     Diabetes Father     Mental illness Father     Drug abuse Father     Lung cancer Father     Diabetes Sister        Meds/Allergies     (Not in a hospital admission)      Allergies   Allergen Reactions    Augmentin [Amoxicillin-Pot Clavulanate] Diarrhea    Ciprofloxacin Diarrhea       Objective     Blood pressure 132/77, pulse 64, temperature 97 7 °F (36 5 °C), temperature source Temporal, resp  rate 16, height 5' 10" (1 778 m), weight 110 kg (243 lb 6 2 oz), SpO2 97 %        PHYSICAL EXAM    /77   Pulse 64   Temp 97 7 °F (36 5 °C) (Temporal)   Resp 16   Ht 5' 10" (1 778 m)   Wt 110 kg (243 lb 6 2 oz)   SpO2 97%   BMI 34 92 kg/m²       Gen: NAD  CV: RRR  CHEST: Clear  ABD: soft, NT/ND  EXT: no edema      ASSESSMENT/PLAN:  This is a 63y o  year old male here for endoscopy, and he is stable and optimized for his procedure  Tremfya Counseling: I discussed with the patient the risks of guselkumab including but not limited to immunosuppression, serious infections, and drug reactions.  The patient understands that monitoring is required including a PPD at baseline and must alert us or the primary physician if symptoms of infection or other concerning signs are noted.

## 2023-08-18 ENCOUNTER — EVALUATION (OUTPATIENT)
Age: 68
End: 2023-08-18
Payer: MEDICARE

## 2023-08-18 DIAGNOSIS — R26.9 GAIT DIFFICULTY: ICD-10-CM

## 2023-08-18 DIAGNOSIS — M21.371 RIGHT FOOT DROP: ICD-10-CM

## 2023-08-18 DIAGNOSIS — R26.89 BALANCE DISORDER: Primary | ICD-10-CM

## 2023-08-18 PROCEDURE — 97161 PT EVAL LOW COMPLEX 20 MIN: CPT

## 2023-08-18 PROCEDURE — 97530 THERAPEUTIC ACTIVITIES: CPT

## 2023-08-18 NOTE — PROGRESS NOTES
PT Evaluation          POC expires Start date  Expiration date PT/OT + Visit Limit? Co-Insurance   11/10/23 8/18/23 11/10/23 no Yes                                 Visit/Unit Tracking                                                 Today's date: 2023  Patient name: Grace Vargas  : 1955  MRN: [id number]  Referring provider: TIFFANY Martinez  Dx:   Encounter Diagnosis     ICD-10-CM    1. Balance disorder  R26.89 Ambulatory referral to Physical Therapy    Provided referral to PT. 2. Right foot drop  M21.371       3. Gait difficulty  R26.9           Assessment  Assessment details: Patient is a 79 y.o. Male who presents to skilled outpatient PT with balance and gait instability. Sig PMHx of foot drop, Lumbar fusion, and foot deformity B/L. Upon initial evaluation, pt presents with gait dysfunction, dec LE strength B/L (R<L), dec sensation in B/L feet, dec cardiovascular endurance, dec balance, dec righting reactions, and impaired ROM on B/L LE. Per testing, he is at an increased risk for falls and is below age-related norms. He will benefit from skilled OP PT to improve above impairments, dec risk for falls, and maximize functional mobility independence. Significant amount of time spent completing patient education on POC, HEP, brace for foot drop, diagnosis, and prognosis. Emphasized importance of brace for energy conservation, due to inc risk for falls due to gait deviations. Will see pt 2x/week and pt agreeable. NV progress HEP.      Impairments: Abnormal coordination, Abnormal gait, Abnormal muscle tone, Abnormal or restricted ROM, Activity intolerance, Impaired balance, Impaired physical strength, Lacks appropriate HEP, Poor posture, Poor body mechanics, Pain with function, Safety issue, Weight-bearing intolerance, Abnormal movement, Difficulty understanding, Abnormal muscle firing  Understanding of Dx/Px/POC: Good  Prognosis: Good    Patient verbalized understanding of POC. Please contact me if you have any questions or recommendations. Thank you for the referral and the opportunity to share in [address] care. Plan  Plan details: Will see pt 2x/week  Patient would benefit from: PT Eval and Skilled PT  Planned modality interventions: Biofeedback, Cryotherapy, TENS, Thermotherapy  Planned therapy interventions: Abdominal trunk stabilization, ADL training, Balance, Balance/WB training, Breathing training, Body mechanics training, Coordination, Functional ROM exercises, Gait training, HEP, Joint Mobilization, Manual Therapy, Michaud taping, Motor coordination training, Neuromuscular re-education, Patient education, Postural training, Strengthening, Stretching, Therapeutic activities, Therapeutic exercises, Therapeutic training, Transfer training, Activity modification, Work reintegration  Frequency: 2x/wk  Duration in weeks: 12  Plan of Care beginning date: 8/18/23  Plan of Care expiration date: 12 weeks - 11/10/2023  Treatment plan discussed with: Patient     Goals  Short Term Goals (4 weeks):    - Patient will improve time on TUG by 2.9 seconds to facilitate improved safety in all ambulation  - Patient will be independent in basic HEP 2-3 weeks  - Patient will improve 5xSTS score by 2.3 seconds to promote improved LE functional strength needed for ADLs  - Patient will improve ABC to >55%  - Pt will complete 6mwt    Long Term Goals (12 weeks):  - Patient will be independent in a comprehensive home exercise program  - Patient will improve scoring on DGI by 2.6 points to progress safety  - Patient will improve gait speed by 0.18 m/s to improve safety with community ambulation  - Patient will improve TOMPKINS by 6 points in order to improve static balance and reduce risk for falls  - Patient will be able to demonstrate HT in gait without veering  - Patient will improve 6 Minute Walk Test score by 190 feet to promote improved cardiovascular endurance  - Patient will report 50% reduction in near falls in order to improve safety with functional tasks and reduce his risk for falls  - Patient will report going on walks at least 3 days per week to promote independence and improved cardiovascular endurance  - Patient will be able to ascend/descend stairs reciprocally with 1 UE assist to promote independence and safety with ADLs  - Patient will report 50% reduction in near falls when ambulating on uneven terrain  - Pt will improve ABC to >67%    Subjective  History of Present Illness  - Mechanism of injury: Pt reports he has had balance difficulties for 30 years. Did have a fall when weed whacking- trouble stepping up on curbs. Has drop foot on RLE and IR on LLE. He has PMHx of L5 fusion. Did have accidents resulting in B/L foot deformities. Noting hx of wounds.  Has had a foot drop brace for RLE however reports it gave him    - Primary AD: none    Pain  - Current pain ratin/10 "none"    Social Support  - Stairs in house: FF at [employer]  - Lives with: alone  - Employment status: no  - Hand dominance: L    Objective   UE MMT: WFL    LE MMT  - R Hip Flexion: 3+/5   L Hip Flexion: 4/5  - R Hip Abduction: 3+/5  L Hip Abduction: 3+/5  - R Knee Extension: 4/5  L Knee Extension: 4/5  - R Ankle DF: 2-/5   L Ankle DF: 4/5  - R Ankle PF: 4/5   L Ankle PF: 4/5    Sensation  - Light touch: WFL  - Per pt dec sensation in B/L feet    Coordination  - Heel to Shin: WFL  - Alternate Toe Taps: WFL      Gait  - Abnormalities: Ambulating with forward flexion,lateral trunk flexion B/L, R drop foot/slap, L LE IR with initial contact on Lateral aspect of foot, noting medial foot not in contact with floor, dec gait speed, dec step length, exaggerated R hip hike and flexion to compensate for drop foot     Outcome Measures Initial Eval          5xSTS 15.21 sec        TUG  - Regular   13.56 sec        10 meter  0.88 m/s        mCTSIB  - FTEO (firm)  - FTEC (firm)  - FTEO (foam)  - FTEC (foam)   30 sec  12 sec  30 sec  4 sec        2MWT 300 ft        ABC 48.75%            Precautions:   Past Medical History:   Diagnosis Date   • Chronic kidney disease    • Diabetes ([address])     LAST ASSESSED: 7/23/14   • Diabetes mellitus ([address])    • Edema     LAST ASSESSED:6/12/12   • Hypertension    • Morbid obesity due to excess calories ([address])     LAST ASSESSED: 6/12/12   • RBBB    • Sleep apnea, obstructive

## 2023-08-25 ENCOUNTER — APPOINTMENT (OUTPATIENT)
Age: 68
End: 2023-08-25
Payer: MEDICARE

## 2023-08-25 ENCOUNTER — OFFICE VISIT (OUTPATIENT)
Age: 68
End: 2023-08-25
Payer: MEDICARE

## 2023-08-25 DIAGNOSIS — R26.9 GAIT DIFFICULTY: ICD-10-CM

## 2023-08-25 DIAGNOSIS — E11.29 TYPE 2 DIABETES MELLITUS WITH MICROALBUMINURIA, WITHOUT LONG-TERM CURRENT USE OF INSULIN (HCC): ICD-10-CM

## 2023-08-25 DIAGNOSIS — R26.89 BALANCE DISORDER: Primary | ICD-10-CM

## 2023-08-25 DIAGNOSIS — M21.371 RIGHT FOOT DROP: ICD-10-CM

## 2023-08-25 DIAGNOSIS — R80.9 TYPE 2 DIABETES MELLITUS WITH MICROALBUMINURIA, WITHOUT LONG-TERM CURRENT USE OF INSULIN (HCC): ICD-10-CM

## 2023-08-25 DIAGNOSIS — Z12.5 SCREENING FOR PROSTATE CANCER: ICD-10-CM

## 2023-08-25 LAB — PSA SERPL-MCNC: 1.62 NG/ML (ref 0–4)

## 2023-08-25 PROCEDURE — 36415 COLL VENOUS BLD VENIPUNCTURE: CPT

## 2023-08-25 PROCEDURE — G0103 PSA SCREENING: HCPCS

## 2023-08-25 PROCEDURE — 97112 NEUROMUSCULAR REEDUCATION: CPT

## 2023-08-25 NOTE — PROGRESS NOTES
Daily Note     Today's date: 2023  Patient name: Nupur Duncan  : 1955  MRN: [id number]  Referring provider: TIFFANY Carpio  Dx:   Encounter Diagnosis     ICD-10-CM    1. Balance disorder  R26.89       2. Right foot drop  M21.371       3. Gait difficulty  R26.9                      Subjective: Pt reports nothing new since last session. Objective: See treatment diary below    NMR:  - STS 2x15  - Hip abduction 3s holds 2x10  - Hip extension 3s holds 2x10  - high knee march 3s holds 2x10  - mini squats 2x15  - Heel raises 2x10  - tandem 3 laps  - SLS  15s holds x4 reps    Assessment: Pt tolerated treatment well. Trialled tx with EStim- noting no true DF noted and minimal success. Initiated LE strengthening and balance treatment in the // bars. Pt required 1-2UE assist throughout exercises. HEP progressed and physical handouts provided to complete independently as pt demo good technique. Patient would benefit from continued PT to maximize functional mobility independence and dec risk for falls. Plan: Continue per plan of care. POC expires Start date  Expiration date PT/OT + Visit Limit?  Co-Insurance   11/10/23 8/18/23 11/10/23 no Yes                                 Visit/Unit Tracking

## 2023-08-28 ENCOUNTER — OFFICE VISIT (OUTPATIENT)
Age: 68
End: 2023-08-28
Payer: MEDICARE

## 2023-08-28 DIAGNOSIS — M21.371 RIGHT FOOT DROP: ICD-10-CM

## 2023-08-28 DIAGNOSIS — R26.9 GAIT DIFFICULTY: ICD-10-CM

## 2023-08-28 DIAGNOSIS — R26.89 BALANCE DISORDER: Primary | ICD-10-CM

## 2023-08-28 PROCEDURE — 97112 NEUROMUSCULAR REEDUCATION: CPT

## 2023-08-30 ENCOUNTER — OFFICE VISIT (OUTPATIENT)
Age: 68
End: 2023-08-30
Payer: MEDICARE

## 2023-08-30 DIAGNOSIS — M21.371 RIGHT FOOT DROP: ICD-10-CM

## 2023-08-30 DIAGNOSIS — R26.89 BALANCE DISORDER: Primary | ICD-10-CM

## 2023-08-30 DIAGNOSIS — R26.9 GAIT DIFFICULTY: ICD-10-CM

## 2023-08-30 PROCEDURE — 97112 NEUROMUSCULAR REEDUCATION: CPT

## 2023-08-30 NOTE — PROGRESS NOTES
Daily Note     Today's date: 2023  Patient name: Evelyn Simms  : 1955  MRN: [id number]  Referring provider: TIFFANY Diallo  Dx:   Encounter Diagnosis     ICD-10-CM    1. Balance disorder  R26.89       2. Right foot drop  M21.371       3. Gait difficulty  R26.9                      Subjective: Pt reports that his ankle does get sore after therapy. Objective: See treatment diary below    NMR:  - STS 3x10 with 10# TT  - high knee march 3s holds 2x10  - mini squats alternating with reverse lunges alternating x10 ea   - Heel raises 2x10   - tandem 3 laps  - SLS 15s holds x4 reps   - hurdles fwd/lat 4 laps ea  - FTEC 30s x4  - FT head turns 30x ea H/V  - step ups fwd and lateral 2 x 10    Assessment: Pt tolerated treatment well. Continued LE strengthening and balance treatment in the // bars. Patient continues to require one UE assist with most exercises. Patient was able to perform FT with head turns without holding on but did have some swaying. Patient did well with hurdles clearing all of them. Patient had more difficulty with lateral step ups. Patient would benefit from continued PT to maximize functional mobility independence and dec risk for falls. Plan: Continue per plan of care. POC expires Start date  Expiration date PT/OT + Visit Limit?  Co-Insurance   11/10/23 8/18/23 11/10/23 no Yes                                 Visit/Unit Tracking

## 2023-09-06 ENCOUNTER — OFFICE VISIT (OUTPATIENT)
Age: 68
End: 2023-09-06
Payer: MEDICARE

## 2023-09-06 ENCOUNTER — HOSPITAL ENCOUNTER (OUTPATIENT)
Age: 68
Discharge: HOME | End: 2023-09-06
Payer: MEDICARE

## 2023-09-06 VITALS — HEIGHT: 68 IN | BODY MASS INDEX: 35.61 KG/M2 | WEIGHT: 235 LBS

## 2023-09-06 DIAGNOSIS — R26.9 GAIT DIFFICULTY: ICD-10-CM

## 2023-09-06 DIAGNOSIS — M81.0 OSTEOPOROSIS, UNSPECIFIED OSTEOPOROSIS TYPE, UNSPECIFIED PATHOLOGICAL FRACTURE PRESENCE: ICD-10-CM

## 2023-09-06 DIAGNOSIS — M21.371 RIGHT FOOT DROP: ICD-10-CM

## 2023-09-06 DIAGNOSIS — R26.89 BALANCE DISORDER: Primary | ICD-10-CM

## 2023-09-06 PROCEDURE — 97112 NEUROMUSCULAR REEDUCATION: CPT

## 2023-09-06 PROCEDURE — 77080 DXA BONE DENSITY AXIAL: CPT

## 2023-09-06 NOTE — PROGRESS NOTES
Daily Note     Today's date: 2023  Patient name: Sachi Clayton  : 1955  MRN: [id number]  Referring provider: TIFFANY Jacobs  Dx:   Encounter Diagnosis     ICD-10-CM    1. Balance disorder  R26.89       2. Right foot drop  M21.371       3. Gait difficulty  R26.9                      Subjective: Pt reports that his feels good today. Objective: See treatment diary below    NMR:  - STS 3x10 with 10# TT  - high knee march 3s hold 4 laps   - mini squats  x20 ea   - Heel raises 2x10   - tandem 3 laps  - SLS 15s holds x4 reps   - hurdles fwd/lat 4 laps ea  - FTEC 30s x4  - FT head turns 30x ea H/V  - step ups fwd and lateral 2 x 10    Assessment: Pt tolerated treatment well. Continued LE strengthening and balance treatment in the // bars. Patient did have some increased swelling at the beginning of the session. Patient did well progressing to [facility] in the bars but does require cues to hold for 3 seconds. Patient is progressing to using less UE assist throughout exercises. Patient would benefit from continued PT to maximize functional mobility independence and dec risk for falls. Plan: Continue per plan of care. POC expires Start date  Expiration date PT/OT + Visit Limit?  Co-Insurance   11/10/23 8/18/23 11/10/23 no Yes                                 Visit/Unit Tracking

## 2023-09-08 ENCOUNTER — OFFICE VISIT (OUTPATIENT)
Age: 68
End: 2023-09-08
Payer: MEDICARE

## 2023-09-08 DIAGNOSIS — R26.9 GAIT DIFFICULTY: ICD-10-CM

## 2023-09-08 DIAGNOSIS — M21.371 RIGHT FOOT DROP: ICD-10-CM

## 2023-09-08 DIAGNOSIS — R26.89 BALANCE DISORDER: Primary | ICD-10-CM

## 2023-09-08 PROCEDURE — 97112 NEUROMUSCULAR REEDUCATION: CPT

## 2023-09-08 NOTE — PROGRESS NOTES
Daily Note     Today's date: 2023  Patient name: Sabina Humphrey  : 1955  MRN: [id number]  Referring provider: TIFFANY Stapleton  Dx:   Encounter Diagnosis     ICD-10-CM    1. Balance disorder  R26.89       2. Right foot drop  M21.371       3. Gait difficulty  R26.9                      Subjective: Pt reports nothing new since last session- was without power last night    Objective: See treatment diary below    NMR:  - STS 3x10 with 20# TT  - high knee march 3s hold 4 laps   - mini squats  x20 ea   - Heel raises 2x10 - 3s eccentric on the way down  - tandem 3 laps  - SLS 15s holds x4 reps ea  - hurdles fwd/lat 4 laps ea  - FTEC 30s x4  - FT head turns 30x ea H/V  - Step ups fwd 6" and lateral 4" 2 x 10   - Standing ankle mob with forward lunge onto 6" step 10x ea with 3s hold    Assessment: Pt tolerated treatment well. Occasional cueing for technique. Inc difficulty with SLS without UE most notably on L LE SLS. No pain or swelling noted throughout session. Patient would benefit from continued PT to maximize functional mobility independence and dec risk for falls. Plan: Continue per plan of care. POC expires Start date  Expiration date PT/OT + Visit Limit?  Co-Insurance   11/10/23 8/18/23 11/10/23 no Yes                                 Visit/Unit Tracking

## 2023-09-11 ENCOUNTER — OFFICE VISIT (OUTPATIENT)
Age: 68
End: 2023-09-11
Payer: MEDICARE

## 2023-09-11 DIAGNOSIS — M21.371 RIGHT FOOT DROP: ICD-10-CM

## 2023-09-11 DIAGNOSIS — R26.89 BALANCE DISORDER: Primary | ICD-10-CM

## 2023-09-11 DIAGNOSIS — R26.9 GAIT DIFFICULTY: ICD-10-CM

## 2023-09-11 PROCEDURE — 97112 NEUROMUSCULAR REEDUCATION: CPT

## 2023-09-11 NOTE — PROGRESS NOTES
Daily Note     Today's date: 2023  Patient name: Godfrey Mosley  : 1955  MRN: [id number]  Referring provider: TIFFANY Lr  Dx:   Encounter Diagnosis     ICD-10-CM    1. Balance disorder  R26.89       2. Right foot drop  M21.371       3. Gait difficulty  R26.9                      Subjective: Pt reports that he feels good. Patient reports that he lost power over the weekend. Objective: See treatment diary below    NMR:  - STS 3x10 with 20# TT  - high knee march 3s hold 4 laps   - mini squats  x20 ea   - Heel raises 2x10 - 3s eccentric on the way down  - tandem 3 laps  - SLS 15s holds x4 reps ea  - hurdles fwd/lat 4 laps ea  - FTEC 30s x4  - FT head turns 30x ea H/V  - Step ups fwd 6" and lateral 4" 2 x 10   - Standing ankle mob with forward lunge onto 6" step 10x ea with 3s hold    Assessment: Pt tolerated treatment well. Cued patient to try not to use UE assist throughout session. Patient did experience LOB with HKM and did require UE assist to recover. Patient still has most difficulty with lateral step ups and SLS requiring UE assist the most. Patient improved with tandem walking this session requiring no UE assist.  Patient would benefit from continued PT to maximize functional mobility independence and dec risk for falls. Plan: Continue per plan of care. POC expires Start date  Expiration date PT/OT + Visit Limit?  Co-Insurance   11/10/23 8/18/23 11/10/23 no Yes                                 Visit/Unit Tracking

## 2023-09-12 ENCOUNTER — CLINICAL SUPPORT (OUTPATIENT)
Dept: FAMILY MEDICINE CLINIC | Facility: CLINIC | Age: 68
End: 2023-09-12
Payer: MEDICARE

## 2023-09-12 DIAGNOSIS — Z23 NEED FOR INFLUENZA VACCINATION: Primary | ICD-10-CM

## 2023-09-12 PROCEDURE — G0008 ADMIN INFLUENZA VIRUS VAC: HCPCS

## 2023-09-12 PROCEDURE — 90662 IIV NO PRSV INCREASED AG IM: CPT

## 2023-09-13 ENCOUNTER — OFFICE VISIT (OUTPATIENT)
Age: 68
End: 2023-09-13
Payer: MEDICARE

## 2023-09-13 DIAGNOSIS — M21.371 RIGHT FOOT DROP: ICD-10-CM

## 2023-09-13 DIAGNOSIS — R26.89 BALANCE DISORDER: Primary | ICD-10-CM

## 2023-09-13 DIAGNOSIS — M81.0 OSTEOPOROSIS, UNSPECIFIED OSTEOPOROSIS TYPE, UNSPECIFIED PATHOLOGICAL FRACTURE PRESENCE: Primary | ICD-10-CM

## 2023-09-13 PROCEDURE — 97112 NEUROMUSCULAR REEDUCATION: CPT

## 2023-09-13 RX ORDER — SODIUM CHLORIDE 9 MG/ML
20 INJECTION, SOLUTION INTRAVENOUS ONCE
OUTPATIENT
Start: 2023-09-13

## 2023-09-13 RX ORDER — ZOLEDRONIC ACID 5 MG/100ML
5 INJECTION, SOLUTION INTRAVENOUS ONCE
OUTPATIENT
Start: 2023-09-13

## 2023-09-13 NOTE — PATIENT INSTRUCTIONS
Osteoporosis Education   Osteoporosis  is a long-term medical condition that causes your bones to become weak, brittle, and more likely to fracture. Osteoporosis occurs when your body absorbs more bone than it makes. It is also caused by a lack of calcium and estrogen (female hormone). Common symptoms include the following: You may not have any signs or symptoms. You may break a bone after a muscle strain, bump, or fall. A break usually occurs in the hip, spine, or wrist. A collapsed vertebra (bone in your spine) may cause severe back pain or loss of height from bent posture. Call your doctor if:   ·  You have severe pain. ·  You have increasing pain after a fall. ·  You have pain when you do your daily activities. ·  You have questions or concerns about your condition or care. Diagnosis of osteoporosis:   · Blood and urine tests  measure your calcium, vitamin D, and estrogen levels. · An x-ray or CT may show thinned bones or a fracture. You may be given contrast liquid to help the bones show up better in the pictures. Tell the healthcare provider if you have ever had an allergic reaction to contrast liquid. Do not enter the MRI room with anything metal. Metal can cause serious injury. Tell the healthcare provider if you have any metal in or on your body. · A bone density test  compares your bone thickness with what is expected for someone of your age, gender, and ethnicity. Treatment for osteoporosis may include medicines to prevent bone loss, build new bone, and increase estrogen. These medicines help prevent fractures and may be given as a pill or injection. Ask your healthcare provider for more information on these medicines. Prevent bone loss:  · Eat healthy foods that are high in calcium. This helps keep your bones strong. Good sources of calcium are milk, cheese, broccoli, tofu, almonds, and canned salmon and sardines. Recommended to get at least 1200mg daily of calcium.   · Increase your vitamin D intake. Vitamin D is in fish oils, some vegetables, and fortified milk, cereal, and bread. Vitamin D is also formed in the skin when it is exposed to the sun. Ask your healthcare provider how much sunlight is safe for you. You will require at least 800 units of vitamin D daily taken as a supplement. · Drink liquids as directed. Ask your healthcare provider how much liquid to drink each day and which liquids are best for you. Do not have alcohol or caffeine. They decrease bone mineral density, which can weaken your bones. · Exercise regularly. Ask your healthcare provider about the best exercise plan for you. Weight bearing exercise for 30 minutes, 3 times a week can help build and strengthen bone. · Do not smoke. Nicotine and other chemicals in cigarettes and cigars can cause lung damage. Ask your healthcare provider for information if you currently smoke and need help to quit. E-cigarettes or smokeless tobacco still contain nicotine. Talk to your healthcare provider before you use these products. · Go to physical therapy as directed. A physical therapist teaches you exercises to help improve movement and muscle strength. · Alcohol. It is recommended to avoid heavy alcohol use as increased consumption of alcohol is known to cause bone loss  © Copyright [employer] 2021 Information is for End User's use only and may not be sold, redistributed or otherwise used for commercial purposes.  All illustrations and images included in CareNotes® are the copyrighted property of [employer].D.A.M., Inc. or  [address]

## 2023-09-13 NOTE — PROGRESS NOTES
Daily Note     Today's date: 2023  Patient name: Thania Arredondo  : 1955  MRN: [id number]  Referring provider: TIFFANY Griffiths  Dx:   Encounter Diagnosis     ICD-10-CM    1. Balance disorder  R26.89       2. Right foot drop  M21.371                      Subjective: Pt reports that he feels good. Patient reports that he lost power over the weekend. Objective: See treatment diary below    NMR:  - STS 2x15 with 20# TT  - Split stance (1 foot on ground/ 1 foot on river rock medium) + core ROT with 20# TT: until fatigue on ea side   - FWD in/out of agility ladder (GB for CG): 2 cycles total   - Speed walking with head nods vs head turns: 10 ft x 6 cycles down/back  - Posterior resisted (blue) FWD lunges on medium river rocks: 20 reps ea side, CG  - Posterior resisted (blue) + step up on river rocks: 15 reps ea side  - FWD/BCK step up on foam beam + side step over: 4 cycles down/back  - Posterior resisted (blue) + step over lyndsey FWD/BCK 6'': 12 reps total   - Posterior resisted (blue) + step over lyndsey LAT 6'': 12 reps total     Assessment: Patient tolerated treatment well with focus on functional mobility. Patient verbally describes an increase in fear avoidance without use of haptic touch, however denies use of AD at home/community. Provided education regarding importance of practicing without an AD while in a controlled setting. Patient with good understanding. Despite increased fear he was able to complete dynamic movement in and out of his DOYLE with good ability to elicit ankle strategy. Patient would benefit from continued PT to maximize functional mobility independence and dec risk for falls. Plan: Continue per plan of care. POC expires Start date  Expiration date PT/OT + Visit Limit?  Co-Insurance   11/10/23 8/18/23 11/10/23 no Yes                                 Visit/Unit Tracking

## 2023-09-14 ENCOUNTER — TELEPHONE (OUTPATIENT)
Dept: FAMILY MEDICINE CLINIC | Facility: CLINIC | Age: 68
End: 2023-09-14

## 2023-09-14 NOTE — TELEPHONE ENCOUNTER
Patient returned Diamond's call. Advised once the appt is made for the testing Nahomy Held will call patient back.

## 2023-09-14 NOTE — TELEPHONE ENCOUNTER
I was able to make an appointment for the patient's Reclast infusion on 9/21/2025 at 3:30pm. Patient is aware. Needed to do lab work before the infusion.

## 2023-09-18 ENCOUNTER — OFFICE VISIT (OUTPATIENT)
Age: 68
End: 2023-09-18
Payer: MEDICARE

## 2023-09-18 DIAGNOSIS — R26.9 GAIT DIFFICULTY: ICD-10-CM

## 2023-09-18 DIAGNOSIS — R26.89 BALANCE DISORDER: Primary | ICD-10-CM

## 2023-09-18 DIAGNOSIS — M21.371 RIGHT FOOT DROP: ICD-10-CM

## 2023-09-18 PROCEDURE — 97112 NEUROMUSCULAR REEDUCATION: CPT

## 2023-09-18 NOTE — PROGRESS NOTES
Daily Note     Today's date: 2023  Patient name: Tesfaye Vasquez  : 1955  MRN: [id number]  Referring provider: TIFFANY Brown  Dx:   Encounter Diagnosis     ICD-10-CM    1. Balance disorder  R26.89       2. Right foot drop  M21.371       3. Gait difficulty  R26.9                      Subjective: Pt reports that he feels good this morning. Patient did feel sore after last session. Objective: See treatment diary below    NMR:  - STS 2x15 with 20# TT  - Split stance (1 foot on ground/ 1 foot on river rock medium) + core ROT with 20# TT: until fatigue on ea side   - hurdles 6 inches fwd and lat 4 laps   - Speed walking with head nods vs head turns: 10 ft x 6 cycles down/back  - mini squats  x20 ea   - Heel raises 2x10 - 3s eccentric on the way down  - tandem 3 laps  -riverro[place] step up and mini squat 2 x 10    Assessment: Patient tolerated treatment well with focus on dynamic balance. Patient did well with exercises noted above. Patient performed exercises without UE assist this session. Trialed river [place] step ups with a squat. Patient demonstrated good form and was able to keep his balance without using UE assist. Patient Right leg felt more fatigued this session and required increased rest breaks. Patient would benefit from continued PT to maximize functional mobility independence and dec risk for falls. Plan: Continue per plan of care. POC expires Start date  Expiration date PT/OT + Visit Limit?  Co-Insurance   11/10/23 8/18/23 11/10/23 no Yes                                 Visit/Unit Tracking

## 2023-09-21 ENCOUNTER — TELEPHONE (OUTPATIENT)
Dept: FAMILY MEDICINE CLINIC | Facility: CLINIC | Age: 68
End: 2023-09-21

## 2023-09-21 ENCOUNTER — OFFICE VISIT (OUTPATIENT)
Dept: FAMILY MEDICINE CLINIC | Facility: CLINIC | Age: 68
End: 2023-09-21
Payer: MEDICARE

## 2023-09-21 VITALS
TEMPERATURE: 98.2 F | HEIGHT: 68 IN | DIASTOLIC BLOOD PRESSURE: 78 MMHG | BODY MASS INDEX: 35.49 KG/M2 | WEIGHT: 234.2 LBS | HEART RATE: 69 BPM | OXYGEN SATURATION: 99 % | SYSTOLIC BLOOD PRESSURE: 120 MMHG

## 2023-09-21 DIAGNOSIS — J02.0 STREP THROAT: Primary | ICD-10-CM

## 2023-09-21 LAB
SARS-COV-2 AG UPPER RESP QL IA: NEGATIVE
VALID CONTROL: NORMAL

## 2023-09-21 PROCEDURE — 99214 OFFICE O/P EST MOD 30 MIN: CPT

## 2023-09-21 PROCEDURE — 87811 SARS-COV-2 COVID19 W/OPTIC: CPT

## 2023-09-21 PROCEDURE — 87880 STREP A ASSAY W/OPTIC: CPT

## 2023-09-21 RX ORDER — DOXYCYCLINE HYCLATE 100 MG/1
100 CAPSULE ORAL EVERY 12 HOURS SCHEDULED
Qty: 20 CAPSULE | Refills: 0 | Status: SHIPPED | OUTPATIENT
Start: 2023-09-21 | End: 2023-10-01

## 2023-09-21 RX ORDER — PREDNISONE 20 MG/1
20 TABLET ORAL DAILY
Qty: 5 TABLET | Refills: 0 | Status: SHIPPED | OUTPATIENT
Start: 2023-09-21 | End: 2023-09-26

## 2023-09-21 RX ORDER — LEVOCETIRIZINE DIHYDROCHLORIDE 5 MG/1
5 TABLET, FILM COATED ORAL EVERY EVENING
Qty: 90 TABLET | Refills: 1 | Status: SHIPPED | OUTPATIENT
Start: 2023-09-21

## 2023-09-21 RX ORDER — FLUTICASONE PROPIONATE 50 MCG
1 SPRAY, SUSPENSION (ML) NASAL DAILY
Qty: 15.8 ML | Refills: 1 | Status: SHIPPED | OUTPATIENT
Start: 2023-09-21

## 2023-09-21 NOTE — PATIENT INSTRUCTIONS
Problem List Items Addressed This Visit    None  Visit Diagnoses       Strep throat    -  Primary    Start doxycycline and steroid, use flonase and xyzal daily, stay well hydrated, follow up if symptoms do not improved.      Relevant Medications    fluticasone (FLONASE) 50 mcg/act nasal spray    predniSONE 20 mg tablet    doxycycline hyclate (VIBRAMYCIN) 100 mg capsule    levocetirizine (XYZAL) 5 MG tablet    Other Relevant Orders    POCT Rapid Covid Ag    POCT rapid strepA

## 2023-09-21 NOTE — PROGRESS NOTES
Assessment/Plan:         Problem List Items Addressed This Visit    None  Visit Diagnoses     Strep throat    -  Primary    Start doxycycline and steroid, use flonase and xyzal daily, stay well hydrated, follow up if symptoms do not improved. Relevant Medications    fluticasone (FLONASE) 50 mcg/act nasal spray    predniSONE 20 mg tablet    doxycycline hyclate (VIBRAMYCIN) 100 mg capsule    levocetirizine (XYZAL) 5 MG tablet    Other Relevant Orders    POCT Rapid Covid Ag    POCT rapid strepA            Subjective:      Patient ID: Radha Booker is a 79 y.o. male. Lalit Hwang started to get sick abou 4 days ago. He has been taking Mucinex. Girlfriend is also sick. The following portions of the patient's history were reviewed and updated as appropriate:   Past Medical History:  He has a past medical history of Chronic kidney disease, Diabetes ([address]), Diabetes mellitus ([address]), Edema, Hypertension, Morbid obesity due to excess calories ([address]), RBBB, and Sleep apnea, obstructive. ,  _______________________________________________________________________  Medical Problems:  does not have any pertinent problems on file.,  _______________________________________________________________________  Past Surgical History:   has a past surgical history that includes Back surgery; Cholecystectomy; Colonoscopy; Foot surgery; Shoulder surgery; Uvulopalatoplasty; and Gastric restriction surgery. ,  _______________________________________________________________________  Family History:  family history includes COPD in his mother; Diabetes in his father and sister; Drug abuse in his father and mother; Heart disease in his mother; Lung cancer in his father; Mental illness in his father and mother.,  _______________________________________________________________________  Social History:   reports that he quit smoking about 37 years ago. His smoking use included cigarettes. He started smoking about 52 years ago.  He has a 30.00 pack-year smoking history. He has never used smokeless tobacco. He reports current drug use. Drug: Marijuana. He reports that he does not drink alcohol.,  _______________________________________________________________________  Allergies:  is allergic to augmentin [amoxicillin-pot clavulanate] and ciprofloxacin. .  _______________________________________________________________________  Current Outpatient Medications   Medication Sig Dispense Refill   • amLODIPine (NORVASC) 5 mg tablet TAKE 1 TABLET DAILY 90 tablet 3   • buPROPion (WELLBUTRIN XL) 300 mg 24 hr tablet TAKE 1 TABLET DAILY 90 tablet 3   • calcium citrate-Vitamin D 200 mg-250 units Take 1 tablet by mouth daily     • clindamycin (CLINDAGEL) 1 % gel if needed  0   • doxycycline hyclate (VIBRAMYCIN) 100 mg capsule Take 1 capsule (100 mg total) by mouth every 12 (twelve) hours for 10 days 20 capsule 0   • fluticasone (FLONASE) 50 mcg/act nasal spray 1 spray into each nostril daily 15.8 mL 1   • Icosapent Ethyl (Vascepa) 1 g CAPS TAKE 2 CAPSULES IN THE MORNING AND 2 CAPSULES AT NIGHT 360 capsule 3   • Iron-Vitamin C (VITRON-C)  MG TABS Take one pill twice daily 60 tablet 1   • Jardiance 25 MG TABS TAKE 1 TABLET DAILY 90 tablet 3   • levocetirizine (XYZAL) 5 MG tablet Take 1 tablet (5 mg total) by mouth every evening 90 tablet 1   • losartan (COZAAR) 100 MG tablet TAKE 1 TABLET DAILY 90 tablet 3   • mometasone (ELOCON) 0.1 % ointment APPLY TO DRY SKIN ON LEGS NIGHTLY IF NEEDED     • Multiple Vitamins-Minerals (CENTRUM ADULTS PO) Take 1 tablet by mouth daily     • pantoprazole (PROTONIX) 40 mg tablet TAKE 1 TABLET DAILY 90 tablet 3   • predniSONE 20 mg tablet Take 1 tablet (20 mg total) by mouth daily for 5 days 5 tablet 0   • rosuvastatin (CRESTOR) 5 mg tablet TAKE 1 TABLET DAILY 90 tablet 3   • SODIUM FLUORIDE, DENTAL RINSE, 0.2 % SOLN swish 10 milliliter  IN MOUTH for 1 MINUTE THEN SPIT once daily at bedtime       No current facility-administered medications for this visit.     _______________________________________________________________________  Review of Systems   Constitutional: Negative for chills, diaphoresis and fever. HENT: Positive for congestion and sore throat. Negative for ear pain, postnasal drip, rhinorrhea, sinus pressure and sinus pain. Eyes: Negative for pain and visual disturbance. Respiratory: Positive for cough, chest tightness and shortness of breath. Negative for wheezing. Cardiovascular: Negative for chest pain and palpitations. Gastrointestinal: Negative for abdominal pain, constipation, diarrhea, nausea and vomiting. Genitourinary: Negative for dysuria, frequency, hematuria and urgency. Musculoskeletal: Positive for arthralgias and myalgias. Negative for back pain. Skin: Negative for color change and rash. Neurological: Negative for dizziness, seizures, syncope, light-headedness and headaches. Psychiatric/Behavioral: Negative for sleep disturbance. All other systems reviewed and are negative. Objective:  Vitals:    09/21/23 0935   BP: 120/78   BP Location: Right arm   Patient Position: Sitting   Cuff Size: Large   Pulse: 69   Temp: 98.2 °F (36.8 °C)   SpO2: 99%   Weight: 106 kg (234 lb 3.2 oz)   Height: 5' 8" (1.727 m)     Body mass index is 35.61 kg/m². Physical Exam  Vitals and nursing note reviewed. Constitutional:       Appearance: Normal appearance. He is ill-appearing. HENT:      Head: Normocephalic. Right Ear: Tympanic membrane, ear canal and external ear normal. Swelling present. There is no impacted cerumen. Left Ear: Tympanic membrane, ear canal and external ear normal. Swelling present. There is no impacted cerumen. Nose: Congestion present. Mouth/Throat:      Mouth: Mucous membranes are moist.      Pharynx: Posterior oropharyngeal erythema present. Eyes:      Extraocular Movements: Extraocular movements intact.       Conjunctiva/sclera: Conjunctivae normal. Pupils: Pupils are equal, round, and reactive to light. Cardiovascular:      Rate and Rhythm: Normal rate and regular rhythm. Heart sounds: Normal heart sounds. No murmur heard. Pulmonary:      Effort: Pulmonary effort is normal.      Breath sounds: Normal breath sounds. No wheezing. Abdominal:      General: Bowel sounds are normal.      Palpations: Abdomen is soft. Tenderness: There is no abdominal tenderness. Musculoskeletal:      Cervical back: Normal range of motion. No tenderness. Right lower leg: Edema present. Left lower leg: Edema present. Skin:     General: Skin is warm and dry. Neurological:      General: No focal deficit present. Mental Status: He is alert.    Psychiatric:         Mood and Affect: Mood normal.         Behavior: Behavior normal.

## 2023-09-25 ENCOUNTER — EVALUATION (OUTPATIENT)
Age: 68
End: 2023-09-25
Payer: MEDICARE

## 2023-09-25 DIAGNOSIS — R26.89 BALANCE DISORDER: Primary | ICD-10-CM

## 2023-09-25 DIAGNOSIS — R26.9 GAIT DIFFICULTY: ICD-10-CM

## 2023-09-25 DIAGNOSIS — M21.371 RIGHT FOOT DROP: ICD-10-CM

## 2023-09-25 PROCEDURE — 97112 NEUROMUSCULAR REEDUCATION: CPT

## 2023-09-25 NOTE — PROGRESS NOTES
PT Re-Evaluation          POC expires Start date  Expiration date PT/OT + Visit Limit? Co-Insurance   11/10/23 8/18/23 11/10/23 no Yes                                    Visit/Unit Tracking                                                                   Today's date: 2023  Patient name: Evelyn Simms  : 1955  MRN: [id number]  Referring provider: TIFFANY Diallo  Dx:   Encounter Diagnosis     ICD-10-CM    1. Balance disorder  R26.89       2. Gait difficulty  R26.9       3. Right foot drop  M21.371         Assessment  Assessment details: Patient is a 79 y.o. Male who presents to skilled outpatient PT with balance and gait instability. Sig PMHx of foot drop, Lumbar fusion, and foot deformity B/L. Since initial evaluation, pt demonstrates improved cardiovascular endurance noting completed 6mWT, improved 5xSTS demo improved LE strength, and improved confidence with functional mobility per ABC. He continues to be challenged with balance and gait dysfunction. He is below age related norms, however he is making significant progress towards goals (achieved 5/5 STG, and 3/10). . Significant amount of time spent completing patient education on POC, HEP, brace for foot drop, diagnosis, and prognosis. New this session, pt has wound on R 5th toe, is f/u with podiatry. Will continue PT 2x/week for another month then reassess to improve balance deficits and dec risk for falls. Pt agreeable.     Impairments: Abnormal coordination, Abnormal gait, Abnormal muscle tone, Abnormal or restricted ROM, Activity intolerance, Impaired balance, Impaired physical strength, Lacks appropriate HEP, Poor posture, Poor body mechanics, Pain with function, Safety issue, Weight-bearing intolerance, Abnormal movement, Difficulty understanding, Abnormal muscle firing  Understanding of Dx/Px/POC: Good  Prognosis: Good    Patient verbalized understanding of POC. Please contact me if you have any questions or recommendations. Thank you for the referral and the opportunity to share in [address] care. Plan  Plan details: Will see pt 2x/week  Patient would benefit from: PT Eval and Skilled PT  Planned modality interventions: Biofeedback, Cryotherapy, TENS, Thermotherapy  Planned therapy interventions: Abdominal trunk stabilization, ADL training, Balance, Balance/WB training, Breathing training, Body mechanics training, Coordination, Functional ROM exercises, Gait training, HEP, Joint Mobilization, Manual Therapy, Michaud taping, Motor coordination training, Neuromuscular re-education, Patient education, Postural training, Strengthening, Stretching, Therapeutic activities, Therapeutic exercises, Therapeutic training, Transfer training, Activity modification, Work reintegration  Frequency: 2x/wk  Duration in weeks: 12  Plan of Care beginning date: 8/18/23  Plan of Care expiration date: 12 weeks - 11/01/23  Treatment plan discussed with: Patient     Goals  Short Term Goals (4 weeks):    - Patient will improve time on TUG by 2.9 seconds to facilitate improved safety in all ambulation- MET  - Patient will be independent in basic HEP 2-3 weeks-MET  - Patient will improve 5xSTS score by 2.3 seconds to promote improved LE functional strength needed for ADLs-MET  - Patient will improve ABC to >55%-MET  - Pt will complete 6mwt-MET    Long Term Goals (12 weeks):  - Patient will be independent in a comprehensive home exercise program-ongoing  - Patient will improve scoring on DGI by 2.6 points to progress safety-NT  - Patient will improve gait speed by 0.18 m/s to improve safety with community ambulation-ongoing  - Patient will improve TOMPKINS by 6 points in order to improve static balance and reduce risk for falls-NT  - Patient will be able to demonstrate HT in gait without veering -MET  - Patient will improve 6 Minute Walk Test score by 190 feet to promote improved cardiovascular endurance- onoing  - Patient will report 50% reduction in near falls in order to improve safety with functional tasks and reduce his risk for falls-MET  - Patient will report going on walks at least 3 days per week to promote independence and improved cardiovascular endurance-ongoing  - Patient will be able to ascend/descend stairs reciprocally with 1 UE assist to promote independence and safety with ADLs-ongoing  - Patient will report 50% reduction in near falls when ambulating on uneven terrain-ongoing  - Pt will improve ABC to >67%-MET    Subjective  History of Present Illness  - Mechanism of injury: Pt reports he has had balance difficulties for 30 years. Did have a fall when weed whacking- trouble stepping up on curbs. Has drop foot on RLE and IR on LLE. He has PMHx of L5 fusion. Did have accidents resulting in B/L foot deformities. Noting hx of wounds. Has had a foot drop brace for RLE however reports it gave him    UPDATE: 23: Pt reports he had a tendon cut about a month ago and now has an open wound on his R pinky toe which was a callous. He is following up with podiatry.      - Primary AD: none    Pain  - Current pain ratin/10 "none"    Social Support  - Stairs in house: FF at [employer]  - Lives with: alone  - Employment status: no  - Hand dominance: L    Objective   UE MMT: WFL    LE MMT  - R Hip Flexion: 3+/5   L Hip Flexion: 4/5  - R Hip Abduction: 3+/5  L Hip Abduction: 3+/5  - R Knee Extension: 4/5  L Knee Extension: 4/5  - R Ankle DF: 2-/5   L Ankle DF: 4/5  - R Ankle PF: 4/5   L Ankle PF: 4/5    Sensation  - Light touch: WFL  - Per pt dec sensation in B/L feet    Coordination  - Heel to Shin: WFL  - Alternate Toe Taps: WFL    Gait  - Abnormalities: Ambulating with forward flexion,lateral trunk flexion B/L, R drop foot/slap, L LE IR with initial contact on Lateral aspect of foot, noting medial foot not in contact with floor, dec gait speed, dec step length, exaggerated R hip hike and flexion to compensate for drop foot     Outcome Measures Initial Eval  8/18 9/25       5xSTS 15.21 sec 11.36s no UE       TUG  - Regular   13.56 sec 9.43s       10 meter  0.88 m/s 0.8m/s       mCTSIB  - FTEO (firm)  - FTEC (firm)  - FTEO (foam)  - FTEC (foam)   30 sec  12 sec  30 sec  4 sec   30s  15s  30s  2s       2MWT 300 ft 800ft no AD  On 6mWT    250ft on 2MWT       ABC 48.75% 72.5%           Precautions:   Past Medical History:   Diagnosis Date   • Chronic kidney disease    • Diabetes ([address])     LAST ASSESSED: 7/23/14   • Diabetes mellitus ([address])    • Edema     LAST ASSESSED:6/12/12   • Hypertension    • Morbid obesity due to excess calories ([address])     LAST ASSESSED: 6/12/12   • RBBB    • Sleep apnea, obstructive

## 2023-09-26 ENCOUNTER — OFFICE VISIT (OUTPATIENT)
Dept: GASTROENTEROLOGY | Facility: CLINIC | Age: 68
End: 2023-09-26
Payer: MEDICARE

## 2023-09-26 VITALS
WEIGHT: 230.1 LBS | SYSTOLIC BLOOD PRESSURE: 140 MMHG | HEIGHT: 70 IN | DIASTOLIC BLOOD PRESSURE: 84 MMHG | HEART RATE: 85 BPM | BODY MASS INDEX: 32.94 KG/M2

## 2023-09-26 DIAGNOSIS — K62.5 RECTAL BLEEDING: Primary | ICD-10-CM

## 2023-09-26 PROCEDURE — 99204 OFFICE O/P NEW MOD 45 MIN: CPT | Performed by: INTERNAL MEDICINE

## 2023-09-26 NOTE — PROGRESS NOTES
Consultation - [address] [place] [address] [facility]  Anabela Rachel 1955 79 y.o. male     ASSESSMENT @ PLAN:   He is a 53-year-old male with known gastroesophageal reflux disease and short segment Ugalde's esophagus with no dysplasia coming in with continued rectal bleeding. He reports loose stools 2-3 times a day with occasional soaking blood in his underpants. He had a normal colonoscopy with me in 2017.    1 we will do colonoscopy to investigate. 2 we will continue on his [address] for now. He will need to have definitive management with colorectal surgery with banding if he has internal hemorrhoids as the etiology of his bleeding    3 we will continue on his pantoprazole. She had he had endoscopy with me in July 2021 with Trudi-en-Y gastric bypass anatomy and Ugalde's esophagus with pathology showing stable Ugalde's no dysplasia; will be due for recall in 2024    Chief Complaint: GERD and Ugalde's and rectal bleed    HPI: He is eight 53-year-old male with ongoing rectal bleeding for months. He reports that he chronically has loose stools 2-3 times a day. Normally brown in color occasionally it is bloody. Sometimes he will have soaking rectal bleeding. Catches in cars because of rectal bleeding. Symptoms that soaks through his underwear. He has no weight loss no fevers chills no nausea vomiting no family history of ulcerative colitis Crohn's or colon malignancy. He had a colonoscopy with me in March 2017 that was normal.  He had an endoscopy with me in July 2021 with a Trudi-en-Y gastric bypass anatomy and short segment Ugalde's esophagus. Biopsy shows stable Ugalde's intestinal metaplasia no dysplasia. He has been doing [address] and has not helped him. REVIEW OF SYSTEMS:     CONSTITUTIONAL: Denies any fever, chills, or rigors. Good appetite, and no recent weight loss. HEENT: No earache or tinnitus. Denies hearing loss or visual disturbances. CARDIOVASCULAR: No chest pain or palpitations. RESPIRATORY: Denies any cough, hemoptysis, shortness of breath or dyspnea on exertion. GASTROINTESTINAL: As noted in the History of Present Illness. GENITOURINARY: No problems with urination. Denies any hematuria or dysuria. NEUROLOGIC: No dizziness or vertigo, denies headaches. MUSCULOSKELETAL: Denies any muscle or joint pain. SKIN: Denies skin rashes or itching. ENDOCRINE: Denies excessive thirst. Denies intolerance to heat or cold. PSYCHOSOCIAL: Denies depression or anxiety. Denies any recent memory loss.      Past Medical History:   Diagnosis Date   • Chronic kidney disease    • Diabetes ([address])     LAST ASSESSED: 14   • Diabetes mellitus ([address])    • Edema     LAST ASSESSED:12   • Hypertension    • Morbid obesity due to excess calories ([address])     LAST ASSESSED: 12   • RBBB    • Sleep apnea, obstructive       Past Surgical History:   Procedure Laterality Date   • BACK SURGERY     • CHOLECYSTECTOMY     • COLONOSCOPY     • FOOT SURGERY     • GASTRIC RESTRICTION SURGERY      GASTRIC STAPLING FOR MORBID OBESITY LAPAROSCOPY W/ MARCY-EN-Y   • SHOULDER SURGERY     • UVULOPALATOPLASTY       Social History     Socioeconomic History   • Marital status: Single     Spouse name: Not on file   • Number of children: Not on file   • Years of education: Not on file   • Highest education level: Not on file   Occupational History   • Occupation: DISABLED   Tobacco Use   • Smoking status: Former     Packs/day: 2.00     Years: 15.00     Total pack years: 30.00     Types: Cigarettes     Start date:      Quit date:      Years since quittin.7   • Smokeless tobacco: Never   Vaping Use   • Vaping Use: Never used   Substance and Sexual Activity   • Alcohol use: No   • Drug use: Yes     Types: Marijuana     Comment: everyday    • Sexual activity: Not Currently     Partners: Female   Other Topics Concern   • Not on file   Social History Narrative   • Not on file     Social Determinants of Health Financial Resource Strain: Low Risk  (8/3/2023)    Overall Financial Resource Strain (CARDIA)    • Difficulty of Paying Living Expenses: Not very hard   Food Insecurity: Not on file   Transportation Needs: No Transportation Needs (8/3/2023)    PRAPARE - Transportation    • Lack of Transportation (Medical): No    • Lack of Transportation (Non-Medical):  No   Physical Activity: Not on file   Stress: Not on file   Social Connections: Not on file   Intimate Partner Violence: Not on file   Housing Stability: Not on file     Family History   Problem Relation Age of Onset   • COPD Mother    • Heart disease Mother    • Mental illness Mother    • Drug abuse Mother    • Diabetes Father    • Mental illness Father    • Drug abuse Father    • Lung cancer Father    • Diabetes Sister      Augmentin [amoxicillin-pot clavulanate] and Ciprofloxacin  Current Outpatient Medications   Medication Sig Dispense Refill   • amLODIPine (NORVASC) 5 mg tablet TAKE 1 TABLET DAILY 90 tablet 3   • buPROPion (WELLBUTRIN XL) 300 mg 24 hr tablet TAKE 1 TABLET DAILY 90 tablet 3   • calcium citrate-Vitamin D 200 mg-250 units Take 1 tablet by mouth daily     • clindamycin (CLINDAGEL) 1 % gel if needed  0   • doxycycline hyclate (VIBRAMYCIN) 100 mg capsule Take 1 capsule (100 mg total) by mouth every 12 (twelve) hours for 10 days 20 capsule 0   • fluticasone (FLONASE) 50 mcg/act nasal spray 1 spray into each nostril daily 15.8 mL 1   • Icosapent Ethyl (Vascepa) 1 g CAPS TAKE 2 CAPSULES IN THE MORNING AND 2 CAPSULES AT NIGHT 360 capsule 3   • Iron-Vitamin C (VITRON-C)  MG TABS Take one pill twice daily 60 tablet 1   • Jardiance 25 MG TABS TAKE 1 TABLET DAILY 90 tablet 3   • levocetirizine (XYZAL) 5 MG tablet Take 1 tablet (5 mg total) by mouth every evening 90 tablet 1   • losartan (COZAAR) 100 MG tablet TAKE 1 TABLET DAILY 90 tablet 3   • mometasone (ELOCON) 0.1 % ointment APPLY TO DRY SKIN ON LEGS NIGHTLY IF NEEDED     • Multiple Vitamins-Minerals (CENTRUM ADULTS PO) Take 1 tablet by mouth daily     • pantoprazole (PROTONIX) 40 mg tablet TAKE 1 TABLET DAILY 90 tablet 3   • rosuvastatin (CRESTOR) 5 mg tablet TAKE 1 TABLET DAILY 90 tablet 3   • SODIUM FLUORIDE, DENTAL RINSE, 0.2 % SOLN swish 10 milliliter  IN MOUTH for 1 MINUTE THEN SPIT once daily at bedtime     • predniSONE 20 mg tablet Take 1 tablet (20 mg total) by mouth daily for 5 days (Patient not taking: Reported on 9/26/2023) 5 tablet 0     No current facility-administered medications for this visit. Blood pressure 140/84, pulse 85, height 5' 10" (1.778 m), weight 104 kg (230 lb 1.6 oz). PHYSICAL EXAM:     General Appearance:   Alert, cooperative, no distress, appears stated age    HEENT:   Normocephalic, atraumatic, anicteric.     Neck:  Supple, symmetrical, trachea midline, no adenopathy;    thyroid: no enlargement/tenderness/nodules; no carotid  bruit or JVD    Lungs:   Clear to auscultation bilaterally; no rales, rhonchi or wheezing; respirations unlabored    Heart[de-identified]   S1 and S2 normal; regular rate and rhythm; no murmur, rub, or gallop.    Abdomen:   Soft, non-tender, non-distended; normal bowel sounds; no masses, no organomegaly    Genitalia:   Deferred    Rectal:   Deferred    Extremities:  No cyanosis, clubbing or edema    Pulses:  2+ and symmetric all extremities    Skin:  Skin color, texture, turgor normal, no rashes or lesions    Lymph nodes:  No palpable cervical, axillary or inguinal lymphadenopathy        Lab Results   Component Value Date    WBC 6.18 02/10/2023    HGB 17.0 02/10/2023    HCT 48.4 02/10/2023    MCV 88 02/10/2023     02/10/2023     Lab Results   Component Value Date    CALCIUM 7.8 (L) 02/10/2023    K 3.7 02/10/2023    CO2 28 02/10/2023     02/10/2023    BUN 10 02/10/2023    CREATININE 0.78 02/10/2023     Lab Results   Component Value Date    ALT 34 02/01/2023    AST 18 02/01/2023    ALKPHOS 91 02/01/2023     Lab Results   Component Value Date    INR 1.00 09/04/2022    PROTIME 13.0 09/04/2022

## 2023-09-26 NOTE — H&P (VIEW-ONLY)
Consultation - [address] [place] [address] [facility]  Nadya Peters 1955 79 y.o. male     ASSESSMENT @ PLAN:   He is a 73-year-old male with known gastroesophageal reflux disease and short segment Ugalde's esophagus with no dysplasia coming in with continued rectal bleeding. He reports loose stools 2-3 times a day with occasional soaking blood in his underpants. He had a normal colonoscopy with me in 2017.    1 we will do colonoscopy to investigate. 2 we will continue on his [address] for now. He will need to have definitive management with colorectal surgery with banding if he has internal hemorrhoids as the etiology of his bleeding    3 we will continue on his pantoprazole. She had he had endoscopy with me in July 2021 with Trudi-en-Y gastric bypass anatomy and Ugalde's esophagus with pathology showing stable Ugalde's no dysplasia; will be due for recall in 2024    Chief Complaint: GERD and Ugalde's and rectal bleed    HPI: He is eight 73-year-old male with ongoing rectal bleeding for months. He reports that he chronically has loose stools 2-3 times a day. Normally brown in color occasionally it is bloody. Sometimes he will have soaking rectal bleeding. Catches in cars because of rectal bleeding. Symptoms that soaks through his underwear. He has no weight loss no fevers chills no nausea vomiting no family history of ulcerative colitis Crohn's or colon malignancy. He had a colonoscopy with me in March 2017 that was normal.  He had an endoscopy with me in July 2021 with a Trudi-en-Y gastric bypass anatomy and short segment Ugalde's esophagus. Biopsy shows stable Ugalde's intestinal metaplasia no dysplasia. He has been doing [address] and has not helped him. REVIEW OF SYSTEMS:     CONSTITUTIONAL: Denies any fever, chills, or rigors. Good appetite, and no recent weight loss. HEENT: No earache or tinnitus. Denies hearing loss or visual disturbances. CARDIOVASCULAR: No chest pain or palpitations. RESPIRATORY: Denies any cough, hemoptysis, shortness of breath or dyspnea on exertion. GASTROINTESTINAL: As noted in the History of Present Illness. GENITOURINARY: No problems with urination. Denies any hematuria or dysuria. NEUROLOGIC: No dizziness or vertigo, denies headaches. MUSCULOSKELETAL: Denies any muscle or joint pain. SKIN: Denies skin rashes or itching. ENDOCRINE: Denies excessive thirst. Denies intolerance to heat or cold. PSYCHOSOCIAL: Denies depression or anxiety. Denies any recent memory loss.      Past Medical History:   Diagnosis Date   • Chronic kidney disease    • Diabetes ([address])     LAST ASSESSED: 14   • Diabetes mellitus ([address])    • Edema     LAST ASSESSED:12   • Hypertension    • Morbid obesity due to excess calories ([address])     LAST ASSESSED: 12   • RBBB    • Sleep apnea, obstructive       Past Surgical History:   Procedure Laterality Date   • BACK SURGERY     • CHOLECYSTECTOMY     • COLONOSCOPY     • FOOT SURGERY     • GASTRIC RESTRICTION SURGERY      GASTRIC STAPLING FOR MORBID OBESITY LAPAROSCOPY W/ MARCY-EN-Y   • SHOULDER SURGERY     • UVULOPALATOPLASTY       Social History     Socioeconomic History   • Marital status: Single     Spouse name: Not on file   • Number of children: Not on file   • Years of education: Not on file   • Highest education level: Not on file   Occupational History   • Occupation: DISABLED   Tobacco Use   • Smoking status: Former     Packs/day: 2.00     Years: 15.00     Total pack years: 30.00     Types: Cigarettes     Start date:      Quit date:      Years since quittin.7   • Smokeless tobacco: Never   Vaping Use   • Vaping Use: Never used   Substance and Sexual Activity   • Alcohol use: No   • Drug use: Yes     Types: Marijuana     Comment: everyday    • Sexual activity: Not Currently     Partners: Female   Other Topics Concern   • Not on file   Social History Narrative   • Not on file     Social Determinants of Health Financial Resource Strain: Low Risk  (8/3/2023)    Overall Financial Resource Strain (CARDIA)    • Difficulty of Paying Living Expenses: Not very hard   Food Insecurity: Not on file   Transportation Needs: No Transportation Needs (8/3/2023)    PRAPARE - Transportation    • Lack of Transportation (Medical): No    • Lack of Transportation (Non-Medical):  No   Physical Activity: Not on file   Stress: Not on file   Social Connections: Not on file   Intimate Partner Violence: Not on file   Housing Stability: Not on file     Family History   Problem Relation Age of Onset   • COPD Mother    • Heart disease Mother    • Mental illness Mother    • Drug abuse Mother    • Diabetes Father    • Mental illness Father    • Drug abuse Father    • Lung cancer Father    • Diabetes Sister      Augmentin [amoxicillin-pot clavulanate] and Ciprofloxacin  Current Outpatient Medications   Medication Sig Dispense Refill   • amLODIPine (NORVASC) 5 mg tablet TAKE 1 TABLET DAILY 90 tablet 3   • buPROPion (WELLBUTRIN XL) 300 mg 24 hr tablet TAKE 1 TABLET DAILY 90 tablet 3   • calcium citrate-Vitamin D 200 mg-250 units Take 1 tablet by mouth daily     • clindamycin (CLINDAGEL) 1 % gel if needed  0   • doxycycline hyclate (VIBRAMYCIN) 100 mg capsule Take 1 capsule (100 mg total) by mouth every 12 (twelve) hours for 10 days 20 capsule 0   • fluticasone (FLONASE) 50 mcg/act nasal spray 1 spray into each nostril daily 15.8 mL 1   • Icosapent Ethyl (Vascepa) 1 g CAPS TAKE 2 CAPSULES IN THE MORNING AND 2 CAPSULES AT NIGHT 360 capsule 3   • Iron-Vitamin C (VITRON-C)  MG TABS Take one pill twice daily 60 tablet 1   • Jardiance 25 MG TABS TAKE 1 TABLET DAILY 90 tablet 3   • levocetirizine (XYZAL) 5 MG tablet Take 1 tablet (5 mg total) by mouth every evening 90 tablet 1   • losartan (COZAAR) 100 MG tablet TAKE 1 TABLET DAILY 90 tablet 3   • mometasone (ELOCON) 0.1 % ointment APPLY TO DRY SKIN ON LEGS NIGHTLY IF NEEDED     • Multiple Vitamins-Minerals (CENTRUM ADULTS PO) Take 1 tablet by mouth daily     • pantoprazole (PROTONIX) 40 mg tablet TAKE 1 TABLET DAILY 90 tablet 3   • rosuvastatin (CRESTOR) 5 mg tablet TAKE 1 TABLET DAILY 90 tablet 3   • SODIUM FLUORIDE, DENTAL RINSE, 0.2 % SOLN swish 10 milliliter  IN MOUTH for 1 MINUTE THEN SPIT once daily at bedtime     • predniSONE 20 mg tablet Take 1 tablet (20 mg total) by mouth daily for 5 days (Patient not taking: Reported on 9/26/2023) 5 tablet 0     No current facility-administered medications for this visit. Blood pressure 140/84, pulse 85, height 5' 10" (1.778 m), weight 104 kg (230 lb 1.6 oz). PHYSICAL EXAM:     General Appearance:   Alert, cooperative, no distress, appears stated age    HEENT:   Normocephalic, atraumatic, anicteric.     Neck:  Supple, symmetrical, trachea midline, no adenopathy;    thyroid: no enlargement/tenderness/nodules; no carotid  bruit or JVD    Lungs:   Clear to auscultation bilaterally; no rales, rhonchi or wheezing; respirations unlabored    Heart[de-identified]   S1 and S2 normal; regular rate and rhythm; no murmur, rub, or gallop.    Abdomen:   Soft, non-tender, non-distended; normal bowel sounds; no masses, no organomegaly    Genitalia:   Deferred    Rectal:   Deferred    Extremities:  No cyanosis, clubbing or edema    Pulses:  2+ and symmetric all extremities    Skin:  Skin color, texture, turgor normal, no rashes or lesions    Lymph nodes:  No palpable cervical, axillary or inguinal lymphadenopathy        Lab Results   Component Value Date    WBC 6.18 02/10/2023    HGB 17.0 02/10/2023    HCT 48.4 02/10/2023    MCV 88 02/10/2023     02/10/2023     Lab Results   Component Value Date    CALCIUM 7.8 (L) 02/10/2023    K 3.7 02/10/2023    CO2 28 02/10/2023     02/10/2023    BUN 10 02/10/2023    CREATININE 0.78 02/10/2023     Lab Results   Component Value Date    ALT 34 02/01/2023    AST 18 02/01/2023    ALKPHOS 91 02/01/2023     Lab Results   Component Value Date    INR 1.00 09/04/2022    PROTIME 13.0 09/04/2022 normal... Well appearing, awake, alert, oriented to person, place, time/situation and in no apparent distress.

## 2023-09-26 NOTE — PATIENT INSTRUCTIONS
Scheduled date of colonoscopy (as of today):10/5/23  Physician performing colonoscopy: Ole  Location of colonoscopy:[facility]  Bowel prep reviewed with patient:miralax/dulcolax  Instructions reviewed with patient by:Shantal DIEZ  Clearances:  none

## 2023-09-27 ENCOUNTER — OFFICE VISIT (OUTPATIENT)
Age: 68
End: 2023-09-27
Payer: MEDICARE

## 2023-09-27 DIAGNOSIS — M21.371 RIGHT FOOT DROP: ICD-10-CM

## 2023-09-27 DIAGNOSIS — R26.9 GAIT DIFFICULTY: ICD-10-CM

## 2023-09-27 DIAGNOSIS — R26.89 BALANCE DISORDER: Primary | ICD-10-CM

## 2023-09-27 PROCEDURE — 97112 NEUROMUSCULAR REEDUCATION: CPT

## 2023-09-27 NOTE — PROGRESS NOTES
Daily Note     Today's date: 2023  Patient name: Naif Herman  : 1955  MRN: [id number]  Referring provider: TIFFANY Collins  Dx:   Encounter Diagnosis     ICD-10-CM    1. Balance disorder  R26.89       2. Gait difficulty  R26.9       3. Right foot drop  M21.371                      Subjective: Pt reports that he went and got his foot checked after last Monday. Everything feels fine and he will go back next Monday to get it looked at again. Objective: See treatment diary below    NMR:  - STS 2x15 with 20# TT  - Split stance (1 foot on ground/ 1 foot on river rock medium) + core ROT with 20# TT: until fatigue on ea side   - hurdles 6 inches fwd and lat 4 laps   - Speed walking with head nods vs head turns: 10 ft x 6 cycles down/back  - mini squats  x20 ea   - Heel raises 2x10 - 3s eccentric on the way down  - tandem 3 laps  -riverrocks step up and mini squat 2 x 10  -HKM 3 laps   - step ups    Fwd 20x   Lat 20x     Assessment: Patient tolerated treatment well with focus on dynamic balance. Patient did well with exercises noted above. Patient has noted improvement with not using UE assist. Patient did experience LOB with ambulating with head turns and squatting on river rocks. Patient did well with step ups experiencing no pain in his toes. Patient would benefit from continued PT to maximize functional mobility independence and dec risk for falls. Plan: Continue per plan of care. POC expires Start date  Expiration date PT/OT + Visit Limit?  Co-Insurance   11/10/23 8/18/23 11/10/23 no Yes                                 Visit/Unit Tracking

## 2023-10-02 ENCOUNTER — OFFICE VISIT (OUTPATIENT)
Age: 68
End: 2023-10-02
Payer: MEDICARE

## 2023-10-02 DIAGNOSIS — R26.89 BALANCE DISORDER: Primary | ICD-10-CM

## 2023-10-02 DIAGNOSIS — M21.371 RIGHT FOOT DROP: ICD-10-CM

## 2023-10-02 DIAGNOSIS — R26.9 GAIT DIFFICULTY: ICD-10-CM

## 2023-10-02 PROBLEM — Z00.00 ENCOUNTER FOR ANNUAL WELLNESS VISIT (AWV) IN MEDICARE PATIENT: Status: RESOLVED | Noted: 2022-05-05 | Resolved: 2023-10-02

## 2023-10-02 PROCEDURE — 97112 NEUROMUSCULAR REEDUCATION: CPT

## 2023-10-02 NOTE — PROGRESS NOTES
Daily Note     Today's date: 10/2/2023  Patient name: Mariluz Goodman  : 1955  MRN: [id number]  Referring provider: TIFFANY Phillip  Dx:   Encounter Diagnosis     ICD-10-CM    1. Balance disorder  R26.89       2. Gait difficulty  R26.9       3. Right foot drop  M21.371                      Subjective: Pt reports that he feels good. Patient reports no blisters on his feet. Objective: See treatment diary below    NMR:  - STS 2x15 with 20# TT  - hurdles 6 inches fwd and lat 4 laps   - Speed walking with head nods vs head turns: 10 ft x 6 cycles down/back  - mini squats  x20 ea   - Heel raises 2x10 - 3s eccentric on the way down  - tandem 3 laps  -riverrocks step up and mini squat 2 x 10  -HKM 3 laps   - step ups    Fwd 20x   Lat 20x     Assessment: Patient tolerated treatment well with focus on dynamic balance. Patient performed all exercises without use of UE assist. Patient required minimal rest breaks this session. Patient did experience LOB ambulating with head turns. Patient would benefit from continued PT to maximize functional mobility independence and dec risk for falls. Plan: Continue per plan of care. POC expires Start date  Expiration date PT/OT + Visit Limit?  Co-Insurance   11/10/23 8/18/23 11/10/23 no Yes                                 Visit/Unit Tracking  8/18 8/25 8/28 8/30 9/6 9/8 9/11 9/13 9/18 9/25 9/27 10/2

## 2023-10-04 ENCOUNTER — OFFICE VISIT (OUTPATIENT)
Age: 68
End: 2023-10-04
Payer: MEDICARE

## 2023-10-04 DIAGNOSIS — R26.9 GAIT DIFFICULTY: ICD-10-CM

## 2023-10-04 DIAGNOSIS — R26.89 BALANCE DISORDER: Primary | ICD-10-CM

## 2023-10-04 DIAGNOSIS — M21.371 RIGHT FOOT DROP: ICD-10-CM

## 2023-10-04 PROCEDURE — 97112 NEUROMUSCULAR REEDUCATION: CPT

## 2023-10-04 NOTE — PROGRESS NOTES
Daily Note     Today's date: 10/4/2023  Patient name: Nadya Peters  : 1955  MRN: [id number]  Referring provider: TIFFANY Wolff  Dx:   Encounter Diagnosis     ICD-10-CM    1. Balance disorder  R26.89       2. Gait difficulty  R26.9       3. Right foot drop  M21.371                      Subjective: Pt reports that he feels good. Objective: See treatment diary below    NMR:  - STS 2x15 with 20# TT  - hurdles 6 inches and 9 inches fwd and lat 4 laps   - Speed walking with head nods vs head turns: 10 ft x 6 cycles down/back  - Heel raises 2x10 - 3s eccentric on the way down  - tandem 4 laps  -riverrocks step up and mini squat 3 x 10  -HKM 3 laps   - step ups    Fwd 20x   Lat 20x     Assessment: Patient tolerated treatment well with focus on dynamic balance. Increased difficulty with hurdles this session, increasing two hurdles to 9 inches. Patient had difficulty performing forward hurdles but did well performing lateral hurdles. Patient did well increasing reps for river rock squats. Patient would benefit from continued PT to maximize functional mobility independence and dec risk for falls. Plan: Continue per plan of care. POC expires Start date  Expiration date PT/OT + Visit Limit?  Co-Insurance   11/10/23 8/18/23 11/10/23 no Yes                                 Visit/Unit Tracking  8/18 8/25 8/28 8/30 9/6 9/8 9/11 9/13 9/18 9/25 9/27 10/2   10/4

## 2023-10-05 ENCOUNTER — ANESTHESIA (OUTPATIENT)
Dept: GASTROENTEROLOGY | Facility: HOSPITAL | Age: 68
End: 2023-10-05

## 2023-10-05 ENCOUNTER — HOSPITAL ENCOUNTER (OUTPATIENT)
Dept: GASTROENTEROLOGY | Facility: HOSPITAL | Age: 68
Setting detail: OUTPATIENT SURGERY
End: 2023-10-05
Attending: INTERNAL MEDICINE
Payer: MEDICARE

## 2023-10-05 ENCOUNTER — ANESTHESIA EVENT (OUTPATIENT)
Dept: GASTROENTEROLOGY | Facility: HOSPITAL | Age: 68
End: 2023-10-05

## 2023-10-05 VITALS
DIASTOLIC BLOOD PRESSURE: 76 MMHG | SYSTOLIC BLOOD PRESSURE: 143 MMHG | TEMPERATURE: 98.2 F | BODY MASS INDEX: 32.45 KG/M2 | RESPIRATION RATE: 15 BRPM | OXYGEN SATURATION: 98 % | WEIGHT: 226.63 LBS | HEIGHT: 70 IN | HEART RATE: 70 BPM

## 2023-10-05 DIAGNOSIS — K62.5 RECTAL BLEEDING: ICD-10-CM

## 2023-10-05 LAB — GLUCOSE SERPL-MCNC: 127 MG/DL (ref 65–140)

## 2023-10-05 PROCEDURE — 82948 REAGENT STRIP/BLOOD GLUCOSE: CPT

## 2023-10-05 RX ORDER — SODIUM CHLORIDE, SODIUM LACTATE, POTASSIUM CHLORIDE, CALCIUM CHLORIDE 600; 310; 30; 20 MG/100ML; MG/100ML; MG/100ML; MG/100ML
INJECTION, SOLUTION INTRAVENOUS CONTINUOUS PRN
Status: DISCONTINUED | OUTPATIENT
Start: 2023-10-05 | End: 2023-10-05

## 2023-10-05 RX ORDER — SODIUM CHLORIDE, SODIUM LACTATE, POTASSIUM CHLORIDE, CALCIUM CHLORIDE 600; 310; 30; 20 MG/100ML; MG/100ML; MG/100ML; MG/100ML
50 INJECTION, SOLUTION INTRAVENOUS CONTINUOUS
OUTPATIENT
Start: 2023-10-05

## 2023-10-05 RX ORDER — PROPOFOL 10 MG/ML
INJECTION, EMULSION INTRAVENOUS AS NEEDED
Status: DISCONTINUED | OUTPATIENT
Start: 2023-10-05 | End: 2023-10-05

## 2023-10-05 RX ORDER — LIDOCAINE HYDROCHLORIDE 20 MG/ML
INJECTION, SOLUTION EPIDURAL; INFILTRATION; INTRACAUDAL; PERINEURAL AS NEEDED
Status: DISCONTINUED | OUTPATIENT
Start: 2023-10-05 | End: 2023-10-05

## 2023-10-05 RX ADMIN — SODIUM CHLORIDE, SODIUM LACTATE, POTASSIUM CHLORIDE, AND CALCIUM CHLORIDE: .6; .31; .03; .02 INJECTION, SOLUTION INTRAVENOUS at 07:19

## 2023-10-05 RX ADMIN — PROPOFOL 50 MG: 10 INJECTION, EMULSION INTRAVENOUS at 08:28

## 2023-10-05 RX ADMIN — LIDOCAINE HYDROCHLORIDE 60 MG: 20 INJECTION, SOLUTION EPIDURAL; INFILTRATION; INTRACAUDAL; PERINEURAL at 08:24

## 2023-10-05 RX ADMIN — PROPOFOL 30 MG: 10 INJECTION, EMULSION INTRAVENOUS at 08:34

## 2023-10-05 RX ADMIN — PROPOFOL 120 MG: 10 INJECTION, EMULSION INTRAVENOUS at 08:24

## 2023-10-05 RX ADMIN — PROPOFOL 30 MG: 10 INJECTION, EMULSION INTRAVENOUS at 08:31

## 2023-10-05 NOTE — ANESTHESIA POSTPROCEDURE EVALUATION
Post-Op Assessment Note    CV Status:  Stable  Pain Score: 0    Pain management: adequate     Mental Status:  Alert and awake   Hydration Status:  Stable   PONV Controlled:  None   Airway Patency:  Patent      Post Op Vitals Reviewed: Yes      Staff: CRNA         No notable events documented.     BP   125/69   Temp  98.2   Pulse  60   Resp   16   SpO2   99

## 2023-10-05 NOTE — ANESTHESIA PREPROCEDURE EVALUATION
Procedure:  COLONOSCOPY    Relevant Problems   CARDIO   (+) Hypertension   (+) Hypertriglyceridemia   (+) Right bundle branch block      ENDO   (+) Type 2 diabetes mellitus with microalbuminuria, without long-term current use of insulin       GI/HEPATIC   (+) Rectal bleeding      PULMONARY   (+) DOE (obstructive sleep apnea)        Physical Exam    Airway    Mallampati score: II  TM Distance: >3 FB  Neck ROM: full     Dental       Cardiovascular  Cardiovascular exam normal    Pulmonary  Pulmonary exam normal     Other Findings        Anesthesia Plan  ASA Score- 2     Anesthesia Type- IV sedation with anesthesia with ASA Monitors. Additional Monitors:   Airway Plan:           Plan Factors-Exercise tolerance (METS): >4 METS. Chart reviewed. EKG reviewed. Imaging results reviewed. Existing labs reviewed. Patient summary reviewed. Induction- intravenous. Postoperative Plan-     Informed Consent- Anesthetic plan and risks discussed with patient. I personally reviewed this patient with the CRNA. Discussed and agreed on the Anesthesia Plan with the CRNA. Efe Olvera

## 2023-10-05 NOTE — INTERVAL H&P NOTE
H&P reviewed. After examining the patient I find no changes in the patients condition since the H&P had been written.     Vitals:    10/05/23 0710   BP: 142/72   Pulse: 71   Resp: 17   Temp: (!) 97.4 °F (36.3 °C)   SpO2: 97%

## 2023-10-09 ENCOUNTER — OFFICE VISIT (OUTPATIENT)
Age: 68
End: 2023-10-09
Payer: MEDICARE

## 2023-10-09 DIAGNOSIS — M21.371 RIGHT FOOT DROP: ICD-10-CM

## 2023-10-09 DIAGNOSIS — R26.89 BALANCE DISORDER: Primary | ICD-10-CM

## 2023-10-09 DIAGNOSIS — R26.9 GAIT DIFFICULTY: ICD-10-CM

## 2023-10-09 PROCEDURE — 97112 NEUROMUSCULAR REEDUCATION: CPT

## 2023-10-09 NOTE — PROGRESS NOTES
Daily Note     Today's date: 10/9/2023  Patient name: Eitan Blanchard  : 1955  MRN: [id number]  Referring provider: TIFFANY Gallagher  Dx:   Encounter Diagnosis     ICD-10-CM    1. Balance disorder  R26.89       2. Gait difficulty  R26.9       3. Right foot drop  M21.371                      Subjective: Pt reports that he feels good. Objective: See treatment diary below    NMR:  - STS 2x15 with 20# TT  - hurdles 6 inches and 9 inches fwd and lat 4 laps   - Speed walking with head nods vs head turns: 10 ft x 6 cycles down/back - deferred   - Heel raises 2x10 - 3s eccentric on the way down  - tandem 4 laps  -riverrocks step up and mini squat 3 x 10  -HKM 3 laps   - step ups    Fwd 20x   Lat 20x     Assessment: Patient tolerated treatment well with focus on dynamic balance. Patient did well with hurdles this session. Patient performed all exercises at long bar this session. Patient did experience LOB with HKM but was able to self recover. Patient would benefit from continued PT to maximize functional mobility independence and dec risk for falls. Plan: Continue per plan of care. POC expires Start date  Expiration date PT/OT + Visit Limit?  Co-Insurance   11/10/23 8/18/23 11/10/23 no Yes                                 Visit/Unit Tracking  8/18 8/25 8/28 8/30 9/6 9/8 9/11 9/13 9/18 9/25 9/27 10/2   10/4 10/9

## 2023-10-11 ENCOUNTER — OFFICE VISIT (OUTPATIENT)
Age: 68
End: 2023-10-11
Payer: MEDICARE

## 2023-10-11 DIAGNOSIS — M21.371 RIGHT FOOT DROP: ICD-10-CM

## 2023-10-11 DIAGNOSIS — R26.89 BALANCE DISORDER: Primary | ICD-10-CM

## 2023-10-11 DIAGNOSIS — R26.9 GAIT DIFFICULTY: ICD-10-CM

## 2023-10-11 PROCEDURE — 97112 NEUROMUSCULAR REEDUCATION: CPT

## 2023-10-11 NOTE — PROGRESS NOTES
Daily Note     Today's date: 10/11/2023  Patient name: Godfrey Mosley  : 1955  MRN: [id number]  Referring provider: TIFFANY Lr  Dx:   Encounter Diagnosis     ICD-10-CM    1. Balance disorder  R26.89       2. Gait difficulty  R26.9       3. Right foot drop  M21.371                      Subjective: Pt reports that he feels good. Objective: See treatment diary below    NMR:  - STS 2x15 with 20# TT  - hurdles 6 inches and 9 inches fwd and lat 4 laps   - Speed walking with head nods vs head turns: 10 ft x 6 cycles down/back   - Heel raises 2x10 - 3s eccentric on the way down  - tandem 4 laps  -riverrocks step up and mini squat 3 x 10  -HKM 3 laps   - step ups    Fwd 20x   Lat 20x   Step taps on 6in 20x   Mini squats 20x   Lateral walking 4 laps      Assessment: Patient tolerated treatment well with focus on dynamic balance. Patient did experience LOB with lateral hurdles this session but was able to self recover. Patient is able to perform exercises without UE assist. Patient would benefit from continued PT to maximize functional mobility independence and dec risk for falls. Plan: Continue per plan of care. POC expires Start date  Expiration date PT/OT + Visit Limit?  Co-Insurance   11/10/23 8/18/23 11/10/23 no Yes                                 Visit/Unit Tracking  8/18 8/25 8/28 8/30 9/6 9/8 9/11 9/13 9/18 9/25 9/27 10/2   10/4 10/9 10/11

## 2023-10-16 ENCOUNTER — OFFICE VISIT (OUTPATIENT)
Age: 68
End: 2023-10-16
Payer: MEDICARE

## 2023-10-16 DIAGNOSIS — R26.9 GAIT DIFFICULTY: ICD-10-CM

## 2023-10-16 DIAGNOSIS — M21.371 RIGHT FOOT DROP: ICD-10-CM

## 2023-10-16 DIAGNOSIS — R26.89 BALANCE DISORDER: Primary | ICD-10-CM

## 2023-10-16 PROCEDURE — 97112 NEUROMUSCULAR REEDUCATION: CPT

## 2023-10-16 NOTE — PROGRESS NOTES
Daily Note     Today's date: 10/16/2023  Patient name: Pat Meredith  : 1955  MRN: [id number]  Referring provider: TIFFANY Donaldson  Dx:   Encounter Diagnosis     ICD-10-CM    1. Balance disorder  R26.89       2. Gait difficulty  R26.9       3. Right foot drop  M21.371                      Subjective: Pt reports that he feels good. Objective: See treatment diary below    NMR:  - STS 2x15 with 20# TT  - hurdles 6 inches and 9 inches fwd and lat 4 laps   - Speed walking with head nods vs head turns: 10 ft x 6 cycles down/back   - Heel raises 2x10 - 3s eccentric on the way down  - tandem 4 laps  -riverrocks step up and mini squat 3 x 10  -HKM 3 laps   - step ups    Fwd 20x   Lat 20x   Step taps on 6in 20x   Mini squats 20x   Lateral walking 4 laps      Assessment: Patient tolerated treatment well with focus on dynamic balance. Patient continues to experience LOB posteriorly with river rock step ups but is able to recover with stepping. Patient is still challenged with lateral hurdles clearing around 90% of the time. Patient would benefit from continued PT to maximize functional mobility independence and dec risk for falls. Plan: Continue per plan of care. POC expires Start date  Expiration date PT/OT + Visit Limit?  Co-Insurance   11/10/23 8/18/23 11/10/23 no Yes                                 Visit/Unit Tracking  8/18 8/25 8/28 8/30 9/6 9/8 9/11 9/13 9/18 9/25 9/27 10/2   10/4 10/9 10/11 10/16

## 2023-10-18 ENCOUNTER — OFFICE VISIT (OUTPATIENT)
Age: 68
End: 2023-10-18
Payer: MEDICARE

## 2023-10-18 DIAGNOSIS — R26.9 GAIT DIFFICULTY: ICD-10-CM

## 2023-10-18 DIAGNOSIS — R26.89 BALANCE DISORDER: Primary | ICD-10-CM

## 2023-10-18 DIAGNOSIS — M21.371 RIGHT FOOT DROP: ICD-10-CM

## 2023-10-18 PROCEDURE — 97112 NEUROMUSCULAR REEDUCATION: CPT

## 2023-10-18 NOTE — PROGRESS NOTES
Daily Note     Today's date: 10/18/2023  Patient name: Thomasine Gaucher  : 1955  MRN: [id number]  Referring provider: TIFFANY Lowery  Dx:   Encounter Diagnosis     ICD-10-CM    1. Balance disorder  R26.89       2. Right foot drop  M21.371       3. Gait difficulty  R26.9                      Subjective: Pt reports that he feels good. Objective: See treatment diary below    NMR:  - Lateral walking resisted with G TB 4 laps  - Step tap split tap with vertical head turns 30x   - FTEC with head turns 20x H/V   - HKM 3 laps   - tandem 4 laps  - Speed walking with head nods vs head turns: 10 ft x 6 cycles down/back   - Heel raises 2x10 - 3s eccentric on the way down  - hurdles 6 inches and 9 inches fwd and lat 4 laps   - 1 lyndsey lateral stepping 10x ea   - riverrocks step up and mini squat 3 x 10        Assessment: Patient tolerated treatment well with focus on dynamic balance. Inc difficulty with head turns, as well as lateral stepping. Progressed for sensory reweighting to rely more on inner ear and vision due to ankle deficits. Patient would benefit from continued PT to maximize functional mobility independence and dec risk for falls. NV re-evaluation and potential d/c with comprehensive HEP. Plan: Continue per plan of care. POC expires Start date  Expiration date PT/OT + Visit Limit?  Co-Insurance   11/10/23 8/18/23 11/10/23 no Yes                                 Visit/Unit Tracking  8/18 8/25 8/28 8/30 9/6 9/8 9/11 9/13 9/18 9/25 9/27 10/2   10/4 10/9 10/11 10/16

## 2023-10-23 ENCOUNTER — EVALUATION (OUTPATIENT)
Age: 68
End: 2023-10-23
Payer: MEDICARE

## 2023-10-23 DIAGNOSIS — R26.9 GAIT DIFFICULTY: ICD-10-CM

## 2023-10-23 DIAGNOSIS — R26.89 BALANCE DISORDER: Primary | ICD-10-CM

## 2023-10-23 DIAGNOSIS — M21.371 RIGHT FOOT DROP: ICD-10-CM

## 2023-10-23 PROCEDURE — 97164 PT RE-EVAL EST PLAN CARE: CPT

## 2023-10-23 PROCEDURE — 97112 NEUROMUSCULAR REEDUCATION: CPT

## 2023-10-23 PROCEDURE — 97530 THERAPEUTIC ACTIVITIES: CPT

## 2023-10-23 NOTE — PROGRESS NOTES
PT Re-Evaluation          POC expires Start date  Expiration date PT/OT + Visit Limit? Co-Insurance   11/10/23 8/18/23 11/10/23 no Yes                                 Visit/Unit Tracking  8/18 8/25 8/28 8/30 9/6 9/8 9/11 9/13 9/18 9/25 9/27 10/2   10/4 10/9 10/11 10/16  10/23                                                    Today's date: 10/23/2023  Patient name: Naif Herman  : 1955  MRN: [id number]  Referring provider: TIFFANY Collins  Dx:   Encounter Diagnosis     ICD-10-CM    1. Balance disorder  R26.89       2. Gait difficulty  R26.9       3. Right foot drop  M21.371         Assessment  Assessment details: Patient is a 79 y.o. Male who presents to skilled outpatient PT with balance and gait instability. Sig PMHx of foot drop, Lumbar fusion, and foot deformity B/L. Since last progress note, pt demonstrates improved cardiovascular endurance noting improved 6mWT by 470ft, continued 5xSTS demo dec risk for falls, and improved confidence with functional mobility per ABC. He has achieved all STGs and LTGs. Significant amount of time spent completing patient education on POC, HEP, brace for foot drop, diagnosis, and prognosis. He is independent with HEP and exercises. Progressed HEP today and pt demo good technique, no cueing required. At this time, pt will be discharged home with comprehensive HEP. Pt agreeable. Should pt wish to return to PT it would reqiure a new script. Impairments: Abnormal coordination, Abnormal gait, Abnormal muscle tone, Abnormal or restricted ROM, Activity intolerance, Impaired balance, Impaired physical strength, Lacks appropriate HEP, Poor posture, Poor body mechanics, Pain with function, Safety issue, Weight-bearing intolerance, Abnormal movement, Difficulty understanding, Abnormal muscle firing  Understanding of Dx/Px/POC: Good  Prognosis: Good  .       Plan  Plan details: discharge      Goals  Short Term Goals (4 weeks):    - Patient will improve time on TUG by 2.9 seconds to facilitate improved safety in all ambulation- MET  - Patient will be independent in basic HEP 2-3 weeks-MET  - Patient will improve 5xSTS score by 2.3 seconds to promote improved LE functional strength needed for ADLs-MET  - Patient will improve ABC to >55%-MET  - Pt will complete 6mwt-MET    Long Term Goals (12 weeks):  - Patient will be independent in a comprehensive home exercise program-ongoing  - Patient will improve scoring on DGI by 2.6 points to progress safety-NT  - Patient will improve gait speed by 0.18 m/s to improve safety with community ambulation-not met  - Patient will improve TOMPKINS by 6 points in order to improve static balance and reduce risk for falls-NT  - Patient will be able to demonstrate HT in gait without veering -MET  - Patient will improve 6 Minute Walk Test score by 190 feet to promote improved cardiovascular endurance- onoing  - Patient will report 50% reduction in near falls in order to improve safety with functional tasks and reduce his risk for falls-MET  - Patient will report going on walks at least 3 days per week to promote independence and improved cardiovascular endurance-MET  - Patient will be able to ascend/descend stairs reciprocally with 1 UE assist to promote independence and safety with ADLs-MET  - Patient will report 50% reduction in near falls when ambulating on uneven terrain-MET  - Pt will improve ABC to >67%-MET    Subjective  History of Present Illness  - Mechanism of injury: Pt reports he has had balance difficulties for 30 years. Did have a fall when weed whacking- trouble stepping up on curbs. Has drop foot on RLE and IR on LLE. He has PMHx of L5 fusion. Did have accidents resulting in B/L foot deformities. Noting hx of wounds.  Has had a foot drop brace for RLE however reports it gave him    UPDATE: 9/25/23: Pt reports he had a tendon cut about a month ago and now has an open wound on his R pinky toe which was a callous. He is following up with podiatry. UPDATE 10/23/23: Pt reports nothing new this past weekend. Is doing his exercises at home and walking.  He denies any falls    - Primary AD: none    Pain  - Current pain ratin/10 "none"    Social Support  - Stairs in house: FF at [employer]  - Lives with: alone  - Employment status: no  - Hand dominance: L    Objective   UE MMT: WFL    LE MMT  - R Hip Flexion: 4-/5   L Hip Flexion: 4/5  - R Hip Abduction: 4-/5  L Hip Abduction: 4-/5  - R Knee Extension: 4/5  L Knee Extension: 4/5  - R Ankle DF: 2-/5   L Ankle DF: 4/5  - R Ankle PF: 4/5   L Ankle PF: 4/5    Sensation  - Light touch: WFL  - Per pt dec sensation in B/L feet    Coordination  - Heel to Shin: WFL  - Alternate Toe Taps: WFL    Gait  - Abnormalities: Ambulating with forward flexion,lateral trunk flexion B/L, R drop foot/slap, L LE IR with initial contact on Lateral aspect of foot, noting medial foot not in contact with floor, dec gait speed, dec step length, exaggerated R hip hike and flexion to compensate for drop foot     Outcome Measures Initial Eval  8/18 9/25 10/23      5xSTS 15.21 sec 11.36s no UE 11.78s no UE      TUG  - Regular   13.56 sec 9.43s 9.6s       10 meter  0.88 m/s 0.8m/s 0.92m/s no AD      mCTSIB  - FTEO (firm)  - FTEC (firm)  - FTEO (foam)  - FTEC (foam)   30 sec  12 sec  30 sec  4 sec   30s  15s  30s  2s   30s   22  30  8      2MWT 300 ft 800ft no AD  On 6mWT    250ft on 2MWT 1,270ft no AD 6mWT      ABC 48.75% 72.5% 77.5%          NMR: lunges fwd/lat 10x ea    Patient education: HEP: [id number]        Precautions:   Past Medical History:   Diagnosis Date    Chronic kidney disease     Diabetes ([address])     LAST ASSESSED: 14    Diabetes mellitus (HCC)     Edema     LAST ASSESSED:12    Hypertension     Morbid obesity due to excess calories (HCC)     LAST ASSESSED: 12    RBBB     Sleep apnea, obstructive

## 2023-10-25 ENCOUNTER — APPOINTMENT (OUTPATIENT)
Age: 68
End: 2023-10-25
Payer: MEDICARE

## 2023-10-30 ENCOUNTER — APPOINTMENT (OUTPATIENT)
Age: 68
End: 2023-10-30
Payer: MEDICARE

## 2023-11-15 NOTE — PROGRESS NOTES
Colon and Rectal Surgery   Arlene Matos 79 y.o. male MRN [id number]  Encounter: [id number]  11/20/23 3:46 PM            Assessment: Arlene Matos is a 79 y.o. male who has prolapsing, bleeding hemorrhoids and an arrhythmia. Plan:   Bleeding hemorrhoids  The patient presents for evaluation of hemorrhoidal prolapse, fecal smearing, a wet anus, and perianal irritation. The symptoms have been going on for years. He had an evaluation by another colorectal surgeon 8 months ago when medical management was recommended. On my examination, I see grossly prolapsing internal hemorrhoids, circumferentially. I recommend hemorrhoidectomy with a backup of a transanal hemorrhoidal pexy and dearterialization if the findings suggest that the [facility] is the best option for him. I think he will need a hemorrhoidectomy because of the size of the hemorrhoids but THD will be considered in the operating room. He understands the decision will be made at the time of surgery. Risks, not limited to infection, bleeding, pain, persistent disease, need for future surgery, fecal incontinence, urinary retention, or nonhealing wounds were reviewed at length. Questions were answered. He agrees to surgery as outlined. Because of an irregular heart rate on evaluation today, I recommend a cardiology evaluation be done before surgery. The patient has a known bundle branch block but his arrhythmia is not consistent with that. I suspect that he may have atrial fibrillation. Subjective     HPI    Arlene Matos is a 79 y.o. male who is referred today by Dr. Halie Poon for rectal bleeding, ongoing for a year now. Last colonoscopy on 10/5/2023 by Dr. Nowak Me showed scattered diverticulosis in the sigmoid colon and large hemorrhoids. 10 year recall.      Historical Information   Past Medical History:   Diagnosis Date    Chronic kidney disease     Diabetes ([address])     LAST ASSESSED: 7/23/14    Diabetes mellitus ([address]) Edema     LAST ASSESSED:6/12/12    Hypertension     Morbid obesity due to excess calories ([address])     LAST ASSESSED: 6/12/12    RBBB     Sleep apnea, obstructive      Past Surgical History:   Procedure Laterality Date    BACK SURGERY      CHOLECYSTECTOMY      COLONOSCOPY      FOOT SURGERY      GASTRIC RESTRICTION SURGERY      GASTRIC STAPLING FOR MORBID OBESITY LAPAROSCOPY W/ [id number]-[id number]    SHOULDER SURGERY      UVULOPALATOPLASTY         Meds/Allergies       Current Outpatient Medications:     amLODIPine (NORVASC) 5 mg tablet, TAKE 1 TABLET DAILY, Disp: 90 tablet, Rfl: 3    buPROPion (WELLBUTRIN XL) 300 mg 24 hr tablet, TAKE 1 TABLET DAILY, Disp: 90 tablet, Rfl: 3    calcium citrate-Vitamin D 200 mg-250 units, Take 1 tablet by mouth daily, Disp: , Rfl:     clindamycin (CLINDAGEL) 1 % gel, if needed, Disp: , Rfl: 0    fluticasone (FLONASE) 50 mcg/act nasal spray, 1 spray into each nostril daily, Disp: 15.8 mL, Rfl: 1    Icosapent Ethyl (Vascepa) 1 g CAPS, TAKE 2 CAPSULES IN THE MORNING AND 2 CAPSULES AT NIGHT, Disp: 360 capsule, Rfl: 3    Iron-Vitamin C (VITRON-C)  MG TABS, Take one pill twice daily, Disp: 60 tablet, Rfl: 1    Jardiance 25 MG TABS, TAKE 1 TABLET DAILY, Disp: 90 tablet, Rfl: 3    levocetirizine (XYZAL) 5 MG tablet, Take 1 tablet (5 mg total) by mouth every evening, Disp: 90 tablet, Rfl: 1    losartan (COZAAR) 100 MG tablet, TAKE 1 TABLET DAILY, Disp: 90 tablet, Rfl: 3    mometasone (ELOCON) 0.1 % ointment, APPLY TO DRY SKIN ON LEGS NIGHTLY IF NEEDED, Disp: , Rfl:     Multiple Vitamins-Minerals (CENTRUM ADULTS PO), Take 1 tablet by mouth daily, Disp: , Rfl:     pantoprazole (PROTONIX) 40 mg tablet, TAKE 1 TABLET DAILY, Disp: 90 tablet, Rfl: 3    rosuvastatin (CRESTOR) 5 mg tablet, TAKE 1 TABLET DAILY, Disp: 90 tablet, Rfl: 3    SODIUM FLUORIDE, DENTAL RINSE, 0.2 % SOLN, swish 10 milliliter  IN MOUTH for 1 MINUTE THEN SPIT once daily at bedtime, Disp: , Rfl:   Allergies   Allergen Reactions Augmentin [Amoxicillin-Pot Clavulanate] Diarrhea    Ciprofloxacin Diarrhea       Social History   Social History     Substance and Sexual Activity   Drug Use Yes    Types: Marijuana    Comment: everyday      Social History     Tobacco Use   Smoking Status Former    Packs/day: 2.00    Years: 15.00    Total pack years: 30.00    Types: Cigarettes    Start date:     Quit date:     Years since quittin.9   Smokeless Tobacco Never         Family History   Problem Relation Age of Onset    COPD Mother     Heart disease Mother     Mental illness Mother     Drug abuse Mother     Diabetes Father     Mental illness Father     Drug abuse Father     Lung cancer Father     Diabetes Sister          Review of Systems   Constitutional: Negative. Respiratory:  Positive for apnea. Cardiovascular:  Positive for palpitations. Gastrointestinal:  Positive for anal bleeding. Hemorrhoidal prolapse and fecal smearing       Objective   Current Vitals:  Vitals:    23 1522   BP: 144/74   Weight: 106 kg (233 lb)   Height: 5' 10" (1.778 m)         Physical Exam  Constitutional:       Appearance: Normal appearance. Eyes:      Conjunctiva/sclera: Conjunctivae normal.   Cardiovascular:      Rate and Rhythm: Normal rate. Rhythm irregular. Comments: Irregularly irregular  Pulmonary:      Effort: Pulmonary effort is normal.      Breath sounds: Normal breath sounds. Abdominal:      General: Abdomen is flat. Palpations: Abdomen is soft. There is no mass. Tenderness: There is no abdominal tenderness. Genitourinary:     Prostate: Normal.      Comments: There are grossly engorged, erythematous, prolapsing internal hemorrhoids on examination. Digital exam reveals no rectal abnormalities, otherwise. Neurological:      General: No focal deficit present. Mental Status: He is alert and oriented to person, place, and time.    Psychiatric:         Mood and Affect: Mood normal.         Behavior: Behavior normal.

## 2023-11-16 ENCOUNTER — TELEPHONE (OUTPATIENT)
Dept: NEPHROLOGY | Facility: CLINIC | Age: 68
End: 2023-11-16

## 2023-11-16 NOTE — TELEPHONE ENCOUNTER
I called and left a message on machine for patient to return our call about scheduling his 12 month nephrology follow up appointment with Dr. Gurmeet Larios for on for after 4/1/2024.

## 2023-11-20 ENCOUNTER — TELEPHONE (OUTPATIENT)
Age: 68
End: 2023-11-20

## 2023-11-20 ENCOUNTER — OFFICE VISIT (OUTPATIENT)
Age: 68
End: 2023-11-20
Payer: MEDICARE

## 2023-11-20 VITALS
DIASTOLIC BLOOD PRESSURE: 74 MMHG | SYSTOLIC BLOOD PRESSURE: 144 MMHG | HEIGHT: 70 IN | BODY MASS INDEX: 33.36 KG/M2 | WEIGHT: 233 LBS

## 2023-11-20 DIAGNOSIS — K64.9 BLEEDING HEMORRHOIDS: Primary | ICD-10-CM

## 2023-11-20 DIAGNOSIS — K62.5 RECTAL BLEEDING: ICD-10-CM

## 2023-11-20 PROCEDURE — 99204 OFFICE O/P NEW MOD 45 MIN: CPT | Performed by: COLON & RECTAL SURGERY

## 2023-11-20 NOTE — LETTER
Michael Alejandro  [address] [facility] [address]        11/20/2023    Dear Michael Alejandro,    Your surgery is scheduled for: January 5, 2024  The hospital will call the evening prior to your surgery with your expected arrival time.   Location:[facility] [address] [facility] [address]    CHECK LIST PRIOR TO OUTPATIENT SURGERY  It is your responsibility to obtain any/all referrals needed for your surgery if required by your insurance. Our office will contact you to discuss your insurance coverage for this procedure.     Special instructions required if you are taking any blood thinners. Please verify with the prescribing physician. Examples include Coumadin, Plavix, Xarelto, Eliquis, Pradaxa, etc.     Please check with your family physician if you are taking the following medications: Aspirin or any Aspirin containing medication, Gingko biloba, Ginseng, Feverfew, and/or [place]’s Wort. We suggest stopping these for 3 days.     The night before and the day of your surgery, wash from your neck to groin with chlorhexidine soap. This soap is available at most retail pharmacies under such brand names as Hibiclens, Endure or Aplicare.     Pre-admission testing Required: YES      NO x     NOTHING TO EAT OR DRINK AFTER MIDNIGHT PRIOR TO SURGERY.     Take ONE FLEET ENEMA one hour prior to leaving for the hospital.     Please do not hesitate to call our office with any questions regarding your surgery.                              Post-Operative Care Information   Hemorrhoidectomy, EUA, Fissurectomy, Fistulotomy, Sphincterotomy      Resume high fiber diet. Fiber supplementation with Metamucil or Konsyl also recommended daily.       Your first bowel movement may take up to 3 days after surgery. THIS IS OFTEN PAINFUL.      While taking narcotic pain medication, you should remain on an over-the-counter stool softener such as Colace (one tablet twice daily). For  constipation Miralax can be taken up to three times daily.     Pain is to be expected after hemorrhoid surgery. Ibuprofen (400? 600MG) every 6?8 hours is an excellent alternative in addition or as a substitute to your narcotic pain medication.     Rectal bleeding or drainage is normal after surgery.       Nausea is a common complaint post op. This can be associated with the narcotic pain medications, anesthesia as well as with severe constipation.     Driving may be resumed when all off all narcotic pain medications and you can turn or twist your body without hesitation.    If you are unable to urinate within 8 hours after surgery, please call the office at [phone]    Frequent warm tub soaks or warm showers are often soothing, we suggest 3 to 4 times daily.    After bowel movements, you may wash with a hand shower or warm tub soak.  No soap is okay.     No strenuous activity (i.e., Running, jogging, swimming, or weightlifting) for up to one week after surgery.     When will I receive follow-up care?      You should be scheduled for a post-op appointment within 6-8 weeks after surgery, unless otherwise instructed on your discharge summary. If you do not have an existing appointment at the time of discharge, please call our office at [phone].    Call the office at [phone] immediately if you have a fever, chills, excessive sweating, difficulty urinating, or excessive/profuse bleeding with clots.

## 2023-11-20 NOTE — ASSESSMENT & PLAN NOTE
The patient presents for evaluation of hemorrhoidal prolapse, fecal smearing, a wet anus, and perianal irritation. The symptoms have been going on for years. He had an evaluation by another colorectal surgeon 8 months ago when medical management was recommended. On my examination, I see grossly prolapsing internal hemorrhoids, circumferentially. I recommend hemorrhoidectomy with a backup of a transanal hemorrhoidal pexy and dearterialization if the findings suggest that the [facility] is the best option for him. I think he will need a hemorrhoidectomy because of the size of the hemorrhoids but THD will be considered in the operating room. He understands the decision will be made at the time of surgery. Risks, not limited to infection, bleeding, pain, persistent disease, need for future surgery, fecal incontinence, urinary retention, or nonhealing wounds were reviewed at length. Questions were answered. He agrees to surgery as outlined. Because of an irregular heart rate on evaluation today, I recommend a cardiology evaluation be done before surgery. The patient has a known bundle branch block but his arrhythmia is not consistent with that. I suspect that he may have atrial fibrillation.

## 2023-11-27 ENCOUNTER — APPOINTMENT (OUTPATIENT)
Age: 68
End: 2023-11-27
Payer: MEDICARE

## 2023-11-27 DIAGNOSIS — N20.0 CALCULUS, KIDNEY: ICD-10-CM

## 2023-11-27 DIAGNOSIS — Z12.5 SPECIAL SCREENING FOR MALIGNANT NEOPLASM OF PROSTATE: ICD-10-CM

## 2023-11-27 LAB
ALBUMIN SERPL BCP-MCNC: 4.3 G/DL (ref 3.5–5)
ALP SERPL-CCNC: 70 U/L (ref 34–104)
ALT SERPL W P-5'-P-CCNC: 33 U/L (ref 7–52)
ANION GAP SERPL CALCULATED.3IONS-SCNC: 9 MMOL/L
AST SERPL W P-5'-P-CCNC: 25 U/L (ref 13–39)
BILIRUB SERPL-MCNC: 0.78 MG/DL (ref 0.2–1)
BUN SERPL-MCNC: 11 MG/DL (ref 5–25)
CALCIUM SERPL-MCNC: 8.1 MG/DL (ref 8.4–10.2)
CHLORIDE SERPL-SCNC: 104 MMOL/L (ref 96–108)
CHOLEST SERPL-MCNC: 135 MG/DL
CO2 SERPL-SCNC: 29 MMOL/L (ref 21–32)
CREAT SERPL-MCNC: 0.71 MG/DL (ref 0.6–1.3)
GFR SERPL CREATININE-BSD FRML MDRD: 97 ML/MIN/1.73SQ M
GLUCOSE P FAST SERPL-MCNC: 135 MG/DL (ref 65–99)
HDLC SERPL-MCNC: 41 MG/DL
LDLC SERPL CALC-MCNC: 56 MG/DL (ref 0–100)
POTASSIUM SERPL-SCNC: 3.1 MMOL/L (ref 3.5–5.3)
PROT SERPL-MCNC: 6.2 G/DL (ref 6.4–8.4)
SODIUM SERPL-SCNC: 142 MMOL/L (ref 135–147)
TRIGL SERPL-MCNC: 191 MG/DL

## 2023-11-27 PROCEDURE — 74018 RADEX ABDOMEN 1 VIEW: CPT

## 2023-11-27 PROCEDURE — 84153 ASSAY OF PSA TOTAL: CPT

## 2023-11-27 PROCEDURE — 36415 COLL VENOUS BLD VENIPUNCTURE: CPT

## 2023-11-28 ENCOUNTER — TELEPHONE (OUTPATIENT)
Dept: FAMILY MEDICINE CLINIC | Facility: CLINIC | Age: 68
End: 2023-11-28

## 2023-11-28 DIAGNOSIS — E87.6 HYPOKALEMIA: Primary | ICD-10-CM

## 2023-11-28 DIAGNOSIS — E87.6 HYPOKALEMIA: ICD-10-CM

## 2023-11-28 LAB — PSA SERPL-MCNC: 1.74 NG/ML (ref 0–4)

## 2023-11-28 RX ORDER — POTASSIUM CHLORIDE 20 MEQ/1
20 TABLET, EXTENDED RELEASE ORAL DAILY
Qty: 30 TABLET | Refills: 5 | Status: SHIPPED | OUTPATIENT
Start: 2023-11-28 | End: 2023-11-30 | Stop reason: SDUPTHER

## 2023-11-28 RX ORDER — POTASSIUM CHLORIDE 20 MEQ/1
20 TABLET, EXTENDED RELEASE ORAL DAILY
Qty: 30 TABLET | Refills: 5 | Status: SHIPPED | OUTPATIENT
Start: 2023-11-28 | End: 2023-11-28 | Stop reason: SDUPTHER

## 2023-11-28 NOTE — TELEPHONE ENCOUNTER
Patient called to say his script for Potassium Chloride 20 mgq. [facility] won't fill this script because they say it's open with [facility]. Please cancel the script sent to [facility]  Please contact when this is done.

## 2023-11-30 DIAGNOSIS — E87.6 HYPOKALEMIA: ICD-10-CM

## 2023-11-30 RX ORDER — POTASSIUM CHLORIDE 20 MEQ/1
20 TABLET, EXTENDED RELEASE ORAL DAILY
Qty: 90 TABLET | Refills: 1 | Status: SHIPPED | OUTPATIENT
Start: 2023-11-30

## 2023-12-05 ENCOUNTER — APPOINTMENT (OUTPATIENT)
Age: 68
End: 2023-12-05
Payer: MEDICARE

## 2023-12-05 DIAGNOSIS — R06.02 SHORTNESS OF BREATH: ICD-10-CM

## 2023-12-05 DIAGNOSIS — I10 ESSENTIAL HYPERTENSION, MALIGNANT: ICD-10-CM

## 2023-12-05 DIAGNOSIS — Z01.810 PRE-OPERATIVE CARDIOVASCULAR EXAMINATION: ICD-10-CM

## 2023-12-05 DIAGNOSIS — E87.6 HYPOKALEMIA: ICD-10-CM

## 2023-12-05 LAB
ANION GAP SERPL CALCULATED.3IONS-SCNC: 11 MMOL/L
APTT PPP: 30 SECONDS (ref 23–37)
BASOPHILS # BLD AUTO: 0.05 THOUSANDS/ÂΜL (ref 0–0.1)
BASOPHILS NFR BLD AUTO: 1 % (ref 0–1)
BUN SERPL-MCNC: 12 MG/DL (ref 5–25)
CALCIUM SERPL-MCNC: 9.3 MG/DL (ref 8.4–10.2)
CHLORIDE SERPL-SCNC: 107 MMOL/L (ref 96–108)
CO2 SERPL-SCNC: 27 MMOL/L (ref 21–32)
CREAT SERPL-MCNC: 0.85 MG/DL (ref 0.6–1.3)
EOSINOPHIL # BLD AUTO: 0.17 THOUSAND/ÂΜL (ref 0–0.61)
EOSINOPHIL NFR BLD AUTO: 3 % (ref 0–6)
ERYTHROCYTE [DISTWIDTH] IN BLOOD BY AUTOMATED COUNT: 12.4 % (ref 11.6–15.1)
GFR SERPL CREATININE-BSD FRML MDRD: 90 ML/MIN/1.73SQ M
GLUCOSE P FAST SERPL-MCNC: 132 MG/DL (ref 65–99)
HCT VFR BLD AUTO: 47.9 % (ref 36.5–49.3)
HGB BLD-MCNC: 16.5 G/DL (ref 12–17)
IMM GRANULOCYTES # BLD AUTO: 0.01 THOUSAND/UL (ref 0–0.2)
IMM GRANULOCYTES NFR BLD AUTO: 0 % (ref 0–2)
INR PPP: 1.02 (ref 0.84–1.19)
LYMPHOCYTES # BLD AUTO: 1.56 THOUSANDS/ÂΜL (ref 0.6–4.47)
LYMPHOCYTES NFR BLD AUTO: 28 % (ref 14–44)
MCH RBC QN AUTO: 30.4 PG (ref 26.8–34.3)
MCHC RBC AUTO-ENTMCNC: 34.4 G/DL (ref 31.4–37.4)
MCV RBC AUTO: 88 FL (ref 82–98)
MONOCYTES # BLD AUTO: 0.46 THOUSAND/ÂΜL (ref 0.17–1.22)
MONOCYTES NFR BLD AUTO: 8 % (ref 4–12)
NEUTROPHILS # BLD AUTO: 3.4 THOUSANDS/ÂΜL (ref 1.85–7.62)
NEUTS SEG NFR BLD AUTO: 60 % (ref 43–75)
NRBC BLD AUTO-RTO: 0 /100 WBCS
PLATELET # BLD AUTO: 204 THOUSANDS/UL (ref 149–390)
PMV BLD AUTO: 11.5 FL (ref 8.9–12.7)
POTASSIUM SERPL-SCNC: 3.7 MMOL/L (ref 3.5–5.3)
PROTHROMBIN TIME: 13.3 SECONDS (ref 11.6–14.5)
RBC # BLD AUTO: 5.42 MILLION/UL (ref 3.88–5.62)
SODIUM SERPL-SCNC: 145 MMOL/L (ref 135–147)
WBC # BLD AUTO: 5.65 THOUSAND/UL (ref 4.31–10.16)

## 2023-12-05 PROCEDURE — 85025 COMPLETE CBC W/AUTO DIFF WBC: CPT

## 2023-12-05 PROCEDURE — 80048 BASIC METABOLIC PNL TOTAL CA: CPT

## 2023-12-05 PROCEDURE — 85610 PROTHROMBIN TIME: CPT

## 2023-12-05 PROCEDURE — 71046 X-RAY EXAM CHEST 2 VIEWS: CPT

## 2023-12-05 PROCEDURE — 85730 THROMBOPLASTIN TIME PARTIAL: CPT

## 2023-12-05 PROCEDURE — 36415 COLL VENOUS BLD VENIPUNCTURE: CPT

## 2023-12-20 DIAGNOSIS — E78.1 HYPERTRIGLYCERIDEMIA: ICD-10-CM

## 2023-12-20 RX ORDER — ROSUVASTATIN CALCIUM 5 MG/1
TABLET, COATED ORAL
Qty: 90 TABLET | Refills: 3 | Status: SHIPPED | OUTPATIENT
Start: 2023-12-20

## 2023-12-22 ENCOUNTER — ANESTHESIA EVENT (OUTPATIENT)
Dept: PERIOP | Facility: AMBULARY SURGERY CENTER | Age: 68
End: 2023-12-22
Payer: MEDICARE

## 2023-12-26 ENCOUNTER — TELEPHONE (OUTPATIENT)
Dept: FAMILY MEDICINE CLINIC | Facility: CLINIC | Age: 68
End: 2023-12-26

## 2023-12-26 DIAGNOSIS — E87.6 HYPOKALEMIA: ICD-10-CM

## 2023-12-26 RX ORDER — CALCIUM POLYCARBOPHIL 625 MG 625 MG/1
625 TABLET ORAL 2 TIMES DAILY
COMMUNITY

## 2023-12-26 RX ORDER — POTASSIUM CHLORIDE 20 MEQ/1
20 TABLET, EXTENDED RELEASE ORAL DAILY
Qty: 90 TABLET | Refills: 1 | Status: SHIPPED | OUTPATIENT
Start: 2023-12-26

## 2023-12-26 NOTE — PRE-PROCEDURE INSTRUCTIONS
Pre-Surgery Instructions:   Medication Instructions    amLODIPine (NORVASC) 5 mg tablet Take day of surgery.    buPROPion (WELLBUTRIN XL) 300 mg 24 hr tablet Take day of surgery.    calcium citrate-Vitamin D 200 mg-250 units Stop taking 7 days prior to surgery.    calcium polycarbophil (FIBERCON) 625 mg tablet Hold day of surgery.    clindamycin (CLINDAGEL) 1 % gel Hold day of surgery.    Icosapent Ethyl (Vascepa) 1 g CAPS Take day of surgery.    Iron-Vitamin C (VITRON-C)  MG TABS Stop taking 7 days prior to surgery.    Jardiance 25 MG TABS Stop taking 4 days prior to surgery.    losartan (COZAAR) 100 MG tablet Hold day of surgery.    mometasone (ELOCON) 0.1 % ointment Hold day of surgery.    Multiple Vitamins-Minerals (CENTRUM ADULTS PO) Stop taking 7 days prior to surgery.    pantoprazole (PROTONIX) 40 mg tablet Take day of surgery.    potassium chloride (K-DUR,KLOR-CON) 20 mEq tablet Hold day of surgery.    rosuvastatin (CRESTOR) 5 mg tablet Take day of surgery.    TURMERIC PO Stop taking 7 days prior to surgery.    VITAMIN D PO Stop taking 7 days prior to surgery.   Medication instructions for day surgery reviewed. Please use only a sip of water to take your instructed medications. Avoid all over the counter vitamins, supplements and NSAIDS for one week prior to surgery per anesthesia guidelines. Tylenol is ok to take as needed.     You will receive a call one business day prior to surgery with an arrival time and hospital directions. If your surgery is scheduled on a Monday, the hospital will be calling you on the Friday prior to your surgery. If you have not heard from anyone by 8pm, please call the hospital supervisor through the hospital  at [phone]. (Lc [phone]).    Do not eat or drink anything after midnight the night before your surgery, including candy, mints, lifesavers, or chewing gum. Do not drink alcohol 24hrs before your surgery. Try not to smoke at least 24hrs before  your surgery.       Follow the pre surgery showering instructions as listed in the “My Surgical Experience Booklet” or otherwise provided by your surgeon's office. Do not use a blade to shave the surgical area 1 week before surgery. It is okay to use a clean electric clippers up to 24 hours before surgery. Do not apply any lotions, creams, including makeup, cologne, deodorant, or perfumes after showering on the day of your surgery. Do not use dry shampoo, hair spray, hair gel, or any type of hair products.     No contact lenses, eye make-up, or artificial eyelashes. Remove nail polish, including gel polish, and any artificial, gel, or acrylic nails if possible. Remove all jewelry including rings and body piercing jewelry.     Wear causal clothing that is easy to take on and off. Consider your type of surgery.    Keep any valuables, jewelry, piercings at home. Please bring any specially ordered equipment (sling, braces) if indicated.    Arrange for a responsible person to drive you to and from the hospital on the day of your surgery. Visitor Guidelines discussed.     Call the surgeon's office with any new illnesses, exposures, or additional questions prior to surgery.    Please reference your “My Surgical Experience Booklet” for additional information to prepare for your upcoming surgery.    Aware of need for enema pre op.  Advised him to contact surgeon's office for timing of use.

## 2024-01-02 ENCOUNTER — TELEPHONE (OUTPATIENT)
Age: 69
End: 2024-01-02

## 2024-01-04 NOTE — ANESTHESIA PREPROCEDURE EVALUATION
Procedure:  HEMORRHOIDECTOMY EXCISION (Anus)  vs THD (Anus)    Relevant Problems   CARDIO   (+) Bleeding hemorrhoids   (+) Hypertension   (+) Hypertriglyceridemia   (+) Right bundle branch block      ENDO   (+) Type 2 diabetes mellitus with microalbuminuria, without long-term current use of insulin       GI/HEPATIC   (+) Bleeding hemorrhoids   (+) Rectal bleeding      PULMONARY   (+) DOE (obstructive sleep apnea)      Past Medical History:   Diagnosis Date    Chronic kidney disease     CPAP (continuous positive airway pressure) dependence     Diabetes (HCC)     LAST ASSESSED: 7/23/14    Diabetes mellitus (HCC)     Edema     LAST ASSESSED:6/12/12    Gait instability     GERD (gastroesophageal reflux disease)     Hyperlipidemia     Hypertension     Kidney stone     Morbid obesity due to excess calories (HCC)     LAST ASSESSED: 6/12/12    RBBB     Sleep apnea, obstructive        Lab Results   Component Value Date    WBC 5.65 12/05/2023    HGB 16.5 12/05/2023    HCT 47.9 12/05/2023    MCV 88 12/05/2023     12/05/2023     Lab Results   Component Value Date    HGBA1C 5.8 08/03/2023         Physical Exam    Airway    Mallampati score: II  TM Distance: >3 FB  Neck ROM: full     Dental   No notable dental hx     Cardiovascular  Rhythm: regular, Rate: normal    Pulmonary   Breath sounds clear to auscultation    Other Findings  Intercisor Distance > 3cm          Anesthesia Plan  ASA Score- 3     Anesthesia Type- general with ASA Monitors.         Additional Monitors:     Airway Plan: ETT.    Comment: Discussed benefits/risks of general anesthesia including possibility of mouth/throat pain, injury to lips/teeth, nausea/vomiting, and surgical pain along with more rare complications such as stroke, MI, pneumonia, aspiration, and injury to blood vessels. All questions answered.  .       Plan Factors-Exercise tolerance (METS): >4 METS.    Chart reviewed. EKG reviewed.  Existing labs reviewed.                   Induction-  intravenous.    Postoperative Plan- Plan for postoperative opioid use. Planned trial extubation    Informed Consent- Anesthetic plan and risks discussed with patient.  I personally reviewed this patient with the CRNA. Discussed and agreed on the Anesthesia Plan with the CRNA..

## 2024-01-05 ENCOUNTER — HOSPITAL ENCOUNTER (OUTPATIENT)
Facility: AMBULARY SURGERY CENTER | Age: 69
Setting detail: OUTPATIENT SURGERY
Discharge: HOME | End: 2024-01-05
Attending: COLON & RECTAL SURGERY | Admitting: COLON & RECTAL SURGERY
Payer: MEDICARE

## 2024-01-05 ENCOUNTER — ANESTHESIA (OUTPATIENT)
Dept: PERIOP | Facility: AMBULARY SURGERY CENTER | Age: 69
End: 2024-01-05
Payer: MEDICARE

## 2024-01-05 VITALS
DIASTOLIC BLOOD PRESSURE: 68 MMHG | BODY MASS INDEX: 33.79 KG/M2 | SYSTOLIC BLOOD PRESSURE: 132 MMHG | TEMPERATURE: 97.4 F | RESPIRATION RATE: 18 BRPM | HEIGHT: 70 IN | WEIGHT: 236 LBS | OXYGEN SATURATION: 99 % | HEART RATE: 71 BPM

## 2024-01-05 DIAGNOSIS — K64.9 BLEEDING HEMORRHOIDS: Primary | ICD-10-CM

## 2024-01-05 LAB — GLUCOSE SERPL-MCNC: 118 MG/DL (ref 65–140)

## 2024-01-05 PROCEDURE — NC001 PR NO CHARGE: Performed by: COLON & RECTAL SURGERY

## 2024-01-05 PROCEDURE — 46948 INT HRHC TRANAL DARTLZJ 2+: CPT | Performed by: COLON & RECTAL SURGERY

## 2024-01-05 PROCEDURE — 82948 REAGENT STRIP/BLOOD GLUCOSE: CPT

## 2024-01-05 RX ORDER — DEXAMETHASONE SODIUM PHOSPHATE 10 MG/ML
INJECTION, SOLUTION INTRAMUSCULAR; INTRAVENOUS AS NEEDED
Status: DISCONTINUED | OUTPATIENT
Start: 2024-01-05 | End: 2024-01-05

## 2024-01-05 RX ORDER — BUPIVACAINE HYDROCHLORIDE AND EPINEPHRINE 5; 5 MG/ML; UG/ML
INJECTION, SOLUTION EPIDURAL; INTRACAUDAL; PERINEURAL AS NEEDED
Status: DISCONTINUED | OUTPATIENT
Start: 2024-01-05 | End: 2024-01-05 | Stop reason: HOSPADM

## 2024-01-05 RX ORDER — PROPOFOL 10 MG/ML
INJECTION, EMULSION INTRAVENOUS AS NEEDED
Status: DISCONTINUED | OUTPATIENT
Start: 2024-01-05 | End: 2024-01-05

## 2024-01-05 RX ORDER — KETOROLAC TROMETHAMINE 30 MG/ML
INJECTION, SOLUTION INTRAMUSCULAR; INTRAVENOUS AS NEEDED
Status: DISCONTINUED | OUTPATIENT
Start: 2024-01-05 | End: 2024-01-05

## 2024-01-05 RX ORDER — ONDANSETRON 2 MG/ML
INJECTION INTRAMUSCULAR; INTRAVENOUS AS NEEDED
Status: DISCONTINUED | OUTPATIENT
Start: 2024-01-05 | End: 2024-01-05

## 2024-01-05 RX ORDER — MIDAZOLAM HYDROCHLORIDE 2 MG/2ML
INJECTION, SOLUTION INTRAMUSCULAR; INTRAVENOUS AS NEEDED
Status: DISCONTINUED | OUTPATIENT
Start: 2024-01-05 | End: 2024-01-05

## 2024-01-05 RX ORDER — GLYCOPYRROLATE 0.2 MG/ML
INJECTION INTRAMUSCULAR; INTRAVENOUS AS NEEDED
Status: DISCONTINUED | OUTPATIENT
Start: 2024-01-05 | End: 2024-01-05

## 2024-01-05 RX ORDER — SODIUM CHLORIDE, SODIUM LACTATE, POTASSIUM CHLORIDE, CALCIUM CHLORIDE 600; 310; 30; 20 MG/100ML; MG/100ML; MG/100ML; MG/100ML
INJECTION, SOLUTION INTRAVENOUS CONTINUOUS PRN
Status: DISCONTINUED | OUTPATIENT
Start: 2024-01-05 | End: 2024-01-05

## 2024-01-05 RX ORDER — PHENYLEPHRINE HCL IN 0.9% NACL 1 MG/10 ML
SYRINGE (ML) INTRAVENOUS AS NEEDED
Status: DISCONTINUED | OUTPATIENT
Start: 2024-01-05 | End: 2024-01-05

## 2024-01-05 RX ORDER — MAGNESIUM HYDROXIDE 1200 MG/15ML
LIQUID ORAL AS NEEDED
Status: DISCONTINUED | OUTPATIENT
Start: 2024-01-05 | End: 2024-01-05 | Stop reason: HOSPADM

## 2024-01-05 RX ORDER — OXYCODONE HYDROCHLORIDE AND ACETAMINOPHEN 5; 325 MG/1; MG/1
1 TABLET ORAL EVERY 4 HOURS PRN
Status: DISCONTINUED | OUTPATIENT
Start: 2024-01-05 | End: 2024-01-05 | Stop reason: HOSPADM

## 2024-01-05 RX ORDER — LIDOCAINE HYDROCHLORIDE 10 MG/ML
INJECTION, SOLUTION EPIDURAL; INFILTRATION; INTRACAUDAL; PERINEURAL AS NEEDED
Status: DISCONTINUED | OUTPATIENT
Start: 2024-01-05 | End: 2024-01-05

## 2024-01-05 RX ORDER — ROCURONIUM BROMIDE 10 MG/ML
INJECTION, SOLUTION INTRAVENOUS AS NEEDED
Status: DISCONTINUED | OUTPATIENT
Start: 2024-01-05 | End: 2024-01-05

## 2024-01-05 RX ORDER — ONDANSETRON 2 MG/ML
4 INJECTION INTRAMUSCULAR; INTRAVENOUS ONCE AS NEEDED
Status: DISCONTINUED | OUTPATIENT
Start: 2024-01-05 | End: 2024-01-05 | Stop reason: HOSPADM

## 2024-01-05 RX ORDER — FENTANYL CITRATE/PF 50 MCG/ML
25 SYRINGE (ML) INJECTION
Status: DISCONTINUED | OUTPATIENT
Start: 2024-01-05 | End: 2024-01-05 | Stop reason: HOSPADM

## 2024-01-05 RX ORDER — ACETAMINOPHEN 325 MG/1
975 TABLET ORAL ONCE
Status: COMPLETED | OUTPATIENT
Start: 2024-01-05 | End: 2024-01-05

## 2024-01-05 RX ORDER — FENTANYL CITRATE 50 UG/ML
INJECTION, SOLUTION INTRAMUSCULAR; INTRAVENOUS AS NEEDED
Status: DISCONTINUED | OUTPATIENT
Start: 2024-01-05 | End: 2024-01-05

## 2024-01-05 RX ORDER — OXYCODONE HYDROCHLORIDE AND ACETAMINOPHEN 5; 325 MG/1; MG/1
1 TABLET ORAL EVERY 4 HOURS PRN
Qty: 20 TABLET | Refills: 0 | Status: SHIPPED | OUTPATIENT
Start: 2024-01-05 | End: 2024-01-10

## 2024-01-05 RX ADMIN — DEXAMETHASONE SODIUM PHOSPHATE 10 MG: 10 INJECTION, SOLUTION INTRAMUSCULAR; INTRAVENOUS at 11:19

## 2024-01-05 RX ADMIN — KETOROLAC TROMETHAMINE 15 MG: 30 INJECTION, SOLUTION INTRAMUSCULAR; INTRAVENOUS at 11:56

## 2024-01-05 RX ADMIN — Medication 100 MCG: at 11:40

## 2024-01-05 RX ADMIN — ACETAMINOPHEN 975 MG: 325 TABLET, FILM COATED ORAL at 10:11

## 2024-01-05 RX ADMIN — SUGAMMADEX 200 MG: 100 INJECTION, SOLUTION INTRAVENOUS at 12:23

## 2024-01-05 RX ADMIN — SODIUM CHLORIDE, SODIUM LACTATE, POTASSIUM CHLORIDE, AND CALCIUM CHLORIDE: .6; .31; .03; .02 INJECTION, SOLUTION INTRAVENOUS at 11:10

## 2024-01-05 RX ADMIN — Medication 100 MCG: at 11:58

## 2024-01-05 RX ADMIN — PROPOFOL 150 MG: 10 INJECTION, EMULSION INTRAVENOUS at 11:16

## 2024-01-05 RX ADMIN — MIDAZOLAM 2 MG: 1 INJECTION INTRAMUSCULAR; INTRAVENOUS at 11:10

## 2024-01-05 RX ADMIN — Medication 100 MCG: at 11:46

## 2024-01-05 RX ADMIN — SODIUM CHLORIDE, SODIUM LACTATE, POTASSIUM CHLORIDE, AND CALCIUM CHLORIDE: .6; .31; .03; .02 INJECTION, SOLUTION INTRAVENOUS at 10:05

## 2024-01-05 RX ADMIN — ROCURONIUM BROMIDE 50 MG: 10 INJECTION, SOLUTION INTRAVENOUS at 11:16

## 2024-01-05 RX ADMIN — LIDOCAINE HYDROCHLORIDE 50 MG: 10 INJECTION, SOLUTION EPIDURAL; INFILTRATION; INTRACAUDAL; PERINEURAL at 11:16

## 2024-01-05 RX ADMIN — ONDANSETRON 4 MG: 2 INJECTION INTRAMUSCULAR; INTRAVENOUS at 11:30

## 2024-01-05 RX ADMIN — PROPOFOL 50 MG: 10 INJECTION, EMULSION INTRAVENOUS at 11:28

## 2024-01-05 RX ADMIN — GLYCOPYRROLATE 0.2 MG: 0.2 INJECTION INTRAMUSCULAR; INTRAVENOUS at 11:25

## 2024-01-05 RX ADMIN — FENTANYL CITRATE 150 MCG: 50 INJECTION INTRAMUSCULAR; INTRAVENOUS at 11:16

## 2024-01-05 NOTE — ANESTHESIA POSTPROCEDURE EVALUATION
Post-Op Assessment Note    CV Status:  Stable  Pain Score: 0    Pain management: adequate       Mental Status:  Alert and awake   Hydration Status:  Euvolemic   PONV Controlled:  Controlled   Airway Patency:  Patent  Two or more mitigation strategies used for obstructive sleep apnea   Post Op Vitals Reviewed: Yes      Staff: CRNA, Anesthesiologist               /77 (01/05/24 1231)    Temp (!) 97 °F (36.1 °C) (01/05/24 1231)    Pulse 69 (01/05/24 1231)   Resp 16 (01/05/24 1231)    SpO2   100

## 2024-01-05 NOTE — H&P
History and Physical   Colon and Rectal Surgery   Michael Alejandro 68 y.o. male MRN: [id number]  Unit/Bed#: OR [place] Encounter: [id number]  01/05/24   11:05 AM      CC:  Hemorrhoidal bleeding/    History of Present Illness   HPI:  Michael Alejandro is a 68 y.o. male for hemorrhoid surgery.  Historical Information   Past Medical History:   Diagnosis Date    Chronic kidney disease     CPAP (continuous positive airway pressure) dependence     Diabetes (HCC)     LAST ASSESSED: 7/23/14    Diabetes mellitus (HCC)     Edema     LAST ASSESSED:6/12/12    Gait instability     GERD (gastroesophageal reflux disease)     Hyperlipidemia     Hypertension     Kidney stone     Morbid obesity due to excess calories (HCC)     LAST ASSESSED: 6/12/12    RBBB     Sleep apnea, obstructive      Past Surgical History:   Procedure Laterality Date    BACK SURGERY      L5    CHOLECYSTECTOMY      COLONOSCOPY      EGD      FOOT SURGERY Right     many surgeries    GASTRIC RESTRICTION SURGERY      GASTRIC STAPLING FOR MORBID OBESITY LAPAROSCOPY W/ MARCY-EN-Y    KIDNEY STONE SURGERY      SHOULDER SURGERY Right     UVULOPALATOPLASTY         Meds/Allergies     Medications Prior to Admission   Medication    amLODIPine (NORVASC) 5 mg tablet    buPROPion (WELLBUTRIN XL) 300 mg 24 hr tablet    calcium citrate-Vitamin D 200 mg-250 units    calcium polycarbophil (FIBERCON) 625 mg tablet    clindamycin (CLINDAGEL) 1 % gel    Icosapent Ethyl (Vascepa) 1 g CAPS    Iron-Vitamin C (VITRON-C)  MG TABS    Jardiance 25 MG TABS    losartan (COZAAR) 100 MG tablet    mometasone (ELOCON) 0.1 % ointment    Multiple Vitamins-Minerals (CENTRUM ADULTS PO)    pantoprazole (PROTONIX) 40 mg tablet    potassium chloride (K-DUR,KLOR-CON) 20 mEq tablet    rosuvastatin (CRESTOR) 5 mg tablet    TURMERIC PO    VITAMIN D PO    fluticasone (FLONASE) 50 mcg/act nasal spray    levocetirizine (XYZAL) 5 MG tablet    SODIUM FLUORIDE, DENTAL RINSE, 0.2 % SOLN       No current  "facility-administered medications for this encounter.    Facility-Administered Medications Ordered in Other Encounters:     lactated ringers infusion, , Intravenous, Continuous PRN, Yarelis Blanco, CRNA, New Bag at 24 1005    Allergies   Allergen Reactions    Augmentin [Amoxicillin-Pot Clavulanate] Diarrhea    Ciprofloxacin Diarrhea         Social History   Social History     Substance and Sexual Activity   Alcohol Use No     Social History     Substance and Sexual Activity   Drug Use Yes    Frequency: 7.0 times per week    Types: Marijuana    Comment: smokes     Social History     Tobacco Use   Smoking Status Former    Current packs/day: 0.00    Average packs/day: 2.0 packs/day for 15.0 years (30.0 ttl pk-yrs)    Types: Cigarettes    Start date:     Quit date:     Years since quittin.0   Smokeless Tobacco Never         Family History:   Family History   Problem Relation Age of Onset    COPD Mother     Heart disease Mother     Mental illness Mother     Drug abuse Mother     Diabetes Father     Mental illness Father     Drug abuse Father     Lung cancer Father     Diabetes Sister          Objective     Current Vitals:   Blood Pressure: 142/75 (24 1002)  Pulse: 74 (24 1002)  Temperature: (!) 97.4 °F (36.3 °C) (24 1002)  Temp Source: Temporal (24 1002)  Respirations: 18 (24 1002)  Height: 5' 10\" (177.8 cm) (24 1002)  Weight - Scale: 107 kg (236 lb) (24 1002)  SpO2: 99 % (24 1002)  No intake or output data in the 24 hours ending 24 1105    Physical Exam:  General:  Well nourished, no distress.  Neuro: Alert and oriented  Eyes:Sclera anicteric, conjunctiva pink.  Pulm: Clear to auscultation bilaterally. No respiratory Distress.   CV:  Regular rate and rhythm. No murmurs.  Abdomen:  Soft, flat, non-tender, without masses or hepatosplenomegaly.    Lab Results:       ASSESSMENT:  Michael Alejandro is a 68 y.o. male for hemorrhoid surgery. THD vs " hemorrhiodectomy.  Cardiac issues reviewed with Anesthesiology/.  PLAN:  Risks, not limited to infection, bleeding, pain, persistent disease, need for future surgery, fecal incontinence, urinary retention, or nonhealing wounds were reviewed at length.  Questions were answered.    ALICE Logan MD

## 2024-01-05 NOTE — OP NOTE
OPERATIVE REPORT  PATIENT NAME: Michael Alejandro    :  1955  MRN: [id number]  Pt Location: [facility] OR ROOM 05    SURGERY DATE: 2024    Surgeons and Role:     * SILAS Logan MD - Primary    Preop Diagnosis:  Bleeding hemorrhoids [K64.9]    Post-Op Diagnosis Codes:     * Bleeding hemorrhoids [K64.9]    Procedure(s):  Examination under anesthesia   transanal hemorrhoidal pexy and dearterialization    Specimen(s):  * No specimens in log *    Estimated Blood Loss:   20 mL    Drains:  * No LDAs found *    Anesthesia Type:   General    Operative Indications:  Bleeding hemorrhoids [K64.9]    Operative Findings:  Redundant hemorrhoids with rectal mucosal prolapse    Complications:   None    Procedure and Technique:  The patient was placed in a prone jackknife position with the buttocks taped apart.  The anal area was prepped using Betadine and draped in a sterile manner.  A timeout was done.    Inspection revealed prolapsing hemorrhoidal and rectal mucosa.  Digital exam was otherwise unremarkable.  Anoscopy examination revealed redundant rectal mucosa with a tendency to prolapse.  This did not appear to be full-thickness rectal prolapse.  The external hemorrhoids were essentially normal.  I began an assessment to see whether THD or hemorrhoidectomy would be more appropriate for the patient.  When the hemorrhoids were withdrawn internally, the anus normalized.  I felt that a THD would be a less invasive procedure if successful and began THD.  With further pexing of the hemorrhoids, the THD became obviously beneficial for the prolapse and corrected the prolapsing anatomy without leaving any redundant additional tissue.    THD was performed at 6 positions.  The anterior, lateral, and posterior positions on both sides were treated in a similar fashion.  The THD instrument was inserted.  The artery was identified using the Doppler ultrasound device.  A figure-of-eight suture was placed using the THD device as  instructed and tied in position.  A submucosal run of suture was carried down to the distal hemorrhoids and the suture was tied back on the original knot.  When accomplished, the hemorrhoidal and rectal mucosal tissues appear to be well pexied without any need for further therapy.  There was no bleeding at the conclusion of the operation.  Each position operated was injected with half percent Marcaine with epinephrine.    The patient was on the local anesthesia research study.    Sponge needle and instrument counts were correct.     I was present for the entire procedure.    Patient Disposition:  PACU         SIGNATURE: ALICE Logan MD  DATE: January 5, 2024  TIME: 12:19 PM

## 2024-01-08 LAB — GLUCOSE SERPL-MCNC: 143 MG/DL (ref 65–140)

## 2024-01-08 NOTE — TELEPHONE ENCOUNTER
Patients GI provider:  Dr. Logan    Number to return call: [phone]    Reason for call: Pt's significant other Ada in regard to scheduling a sooner appointment post pt's surgery on  1/5/2024. Per pt was told to schedule follow up 1 week post surgery.    Scheduled procedure/appointment date if applicable: 2/21/2024

## 2024-01-19 DIAGNOSIS — K22.70 BARRETT'S ESOPHAGUS WITHOUT DYSPLASIA: ICD-10-CM

## 2024-01-19 DIAGNOSIS — E78.1 HYPERTRIGLYCERIDEMIA: ICD-10-CM

## 2024-01-22 RX ORDER — ICOSAPENT ETHYL 1000 MG/1
CAPSULE ORAL
Qty: 360 CAPSULE | Refills: 3 | Status: SHIPPED | OUTPATIENT
Start: 2024-01-22

## 2024-01-22 RX ORDER — PANTOPRAZOLE SODIUM 40 MG/1
TABLET, DELAYED RELEASE ORAL
Qty: 90 TABLET | Refills: 1 | Status: SHIPPED | OUTPATIENT
Start: 2024-01-22

## 2024-02-05 ENCOUNTER — OFFICE VISIT (OUTPATIENT)
Dept: FAMILY MEDICINE CLINIC | Facility: CLINIC | Age: 69
End: 2024-02-05
Payer: MEDICARE

## 2024-02-05 VITALS
WEIGHT: 236.2 LBS | OXYGEN SATURATION: 98 % | HEIGHT: 70 IN | TEMPERATURE: 98 F | BODY MASS INDEX: 33.82 KG/M2 | DIASTOLIC BLOOD PRESSURE: 76 MMHG | HEART RATE: 74 BPM | SYSTOLIC BLOOD PRESSURE: 138 MMHG

## 2024-02-05 DIAGNOSIS — E87.6 HYPOKALEMIA: ICD-10-CM

## 2024-02-05 DIAGNOSIS — R26.81 GAIT INSTABILITY: ICD-10-CM

## 2024-02-05 DIAGNOSIS — M81.0 OSTEOPOROSIS, UNSPECIFIED OSTEOPOROSIS TYPE, UNSPECIFIED PATHOLOGICAL FRACTURE PRESENCE: ICD-10-CM

## 2024-02-05 DIAGNOSIS — E66.01 OBESITY, MORBID (HCC): ICD-10-CM

## 2024-02-05 DIAGNOSIS — R80.9 TYPE 2 DIABETES MELLITUS WITH MICROALBUMINURIA, WITHOUT LONG-TERM CURRENT USE OF INSULIN: Primary | ICD-10-CM

## 2024-02-05 DIAGNOSIS — I10 PRIMARY HYPERTENSION: ICD-10-CM

## 2024-02-05 DIAGNOSIS — E11.29 TYPE 2 DIABETES MELLITUS WITH MICROALBUMINURIA, WITHOUT LONG-TERM CURRENT USE OF INSULIN: Primary | ICD-10-CM

## 2024-02-05 DIAGNOSIS — G47.33 OSA (OBSTRUCTIVE SLEEP APNEA): ICD-10-CM

## 2024-02-05 DIAGNOSIS — E78.1 HYPERTRIGLYCERIDEMIA: ICD-10-CM

## 2024-02-05 PROBLEM — K64.9 BLEEDING HEMORRHOIDS: Status: RESOLVED | Noted: 2023-11-20 | Resolved: 2024-02-05

## 2024-02-05 PROBLEM — K62.5 RECTAL BLEEDING: Status: RESOLVED | Noted: 2023-09-26 | Resolved: 2024-02-05

## 2024-02-05 LAB — SL AMB POCT HEMOGLOBIN AIC: 6.2 (ref ?–6.5)

## 2024-02-05 PROCEDURE — 99214 OFFICE O/P EST MOD 30 MIN: CPT | Performed by: NURSE PRACTITIONER

## 2024-02-05 PROCEDURE — 96372 THER/PROPH/DIAG INJ SC/IM: CPT | Performed by: NURSE PRACTITIONER

## 2024-02-05 PROCEDURE — 83036 HEMOGLOBIN GLYCOSYLATED A1C: CPT | Performed by: NURSE PRACTITIONER

## 2024-02-05 RX ORDER — OXYCODONE HYDROCHLORIDE AND ACETAMINOPHEN 5; 325 MG/1; MG/1
TABLET ORAL
COMMUNITY
Start: 2024-01-05 | End: 2024-02-05 | Stop reason: ALTCHOICE

## 2024-02-05 NOTE — ASSESSMENT & PLAN NOTE
Patient has a history of recurring hypokalemia.  Currently on potassium supplement.  Repeat BMP.  Will call with results.

## 2024-02-05 NOTE — PROGRESS NOTES
Assessment/Plan:    Type 2 diabetes mellitus with microalbuminuria, without long-term current use of insulin (HCC)    Lab Results   Component Value Date    HGBA1C 6.2 02/05/2024   A1c with in goal range.  To continue Jardiance.  Foot exam completed today, sensation abnormal.  To continue care with podiatry.    DOE (obstructive sleep apnea)  To continue use of CPAP nightly.    Hypertension  Blood pressure mildly elevated.  To begin checking blood pressure while at home.  Reviewed goal of less than 130/70 due to history of diabetes.  Counseled the importance of limiting salt and caffeine.  To call in 1 week with blood pressure readings.    Osteoporosis  Patient due for Prolia today.  Injection received.  Follow-up in 6 months for next injection.    Hypertriglyceridemia  To continue Vascepa and Crestor 5 mg.  Counseled the importance of increasing dietary fiber and consuming a heart healthy diet.    Hypokalemia  Patient has a history of recurring hypokalemia.  Currently on potassium supplement.  Repeat BMP.  Will call with results.    Gait instability  Provided prescription for shower chair.       Diagnoses and all orders for this visit:    Type 2 diabetes mellitus with microalbuminuria, without long-term current use of insulin   -     Albumin / creatinine urine ratio; Future  -     Basic metabolic panel; Future  -     POCT hemoglobin A1c    Obesity, morbid (HCC)    DOE (obstructive sleep apnea)    Osteoporosis, unspecified osteoporosis type, unspecified pathological fracture presence  -     denosumab (PROLIA) subcutaneous injection 60 mg    Hypertriglyceridemia    Hypokalemia    Primary hypertension    Gait instability    Other orders  -     Discontinue: oxyCODONE-acetaminophen (PERCOCET) 5-325 mg per tablet  -     Shower Chair with Back [$]          Subjective:      Patient ID: Michael Alejandro is a 68 y.o. male.    Michael presents for a follow-up.  Recently underwent hemorrhoidectomy.  Feeling well.  Requesting a  prescription for a shower chair.        The following portions of the patient's history were reviewed and updated as appropriate: He   Patient Active Problem List    Diagnosis Date Noted    Gait instability 02/05/2024    Hypokalemia 02/02/2023    Right bundle branch block 01/03/2022    Hypertriglyceridemia 09/27/2021    DOE (obstructive sleep apnea)     Osteoporosis 03/22/2021    Hypertension 02/06/2019    Obesity, morbid (HCC) 02/06/2019    Type 2 diabetes mellitus with microalbuminuria, without long-term current use of insulin      Malabsorption 02/06/2018    Persistent proteinuria 09/28/2017     Current Outpatient Medications   Medication Sig Dispense Refill    amLODIPine (NORVASC) 5 mg tablet TAKE 1 TABLET DAILY 90 tablet 3    buPROPion (WELLBUTRIN XL) 300 mg 24 hr tablet TAKE 1 TABLET DAILY 90 tablet 3    calcium citrate-Vitamin D 200 mg-250 units Take 1 tablet by mouth daily      calcium polycarbophil (FIBERCON) 625 mg tablet Take 625 mg by mouth 2 (two) times a day      clindamycin (CLINDAGEL) 1 % gel if needed  0    Icosapent Ethyl 1 g CAPS TAKE 2 CAPSULES IN THE MORNING AND 2 CAPSULES AT NIGHT 360 capsule 3    Iron-Vitamin C (VITRON-C)  MG TABS Take one pill twice daily (Patient taking differently: in the morning Take one pill twice daily) 60 tablet 1    Jardiance 25 MG TABS TAKE 1 TABLET DAILY 90 tablet 3    levocetirizine (XYZAL) 5 MG tablet Take 1 tablet (5 mg total) by mouth every evening 90 tablet 1    losartan (COZAAR) 100 MG tablet TAKE 1 TABLET DAILY 90 tablet 3    mometasone (ELOCON) 0.1 % ointment APPLY TO DRY SKIN ON LEGS NIGHTLY IF NEEDED      Multiple Vitamins-Minerals (CENTRUM ADULTS PO) Take 1 tablet by mouth daily      pantoprazole (PROTONIX) 40 mg tablet TAKE 1 TABLET DAILY 90 tablet 1    potassium chloride (K-DUR,KLOR-CON) 20 mEq tablet Take 1 tablet (20 mEq total) by mouth daily 90 tablet 1    rosuvastatin (CRESTOR) 5 mg tablet TAKE 1 TABLET DAILY 90 tablet 3    SODIUM FLUORIDE,  "DENTAL RINSE, 0.2 % SOLN swish 10 milliliter  IN MOUTH for 1 MINUTE THEN SPIT once daily at bedtime      TURMERIC PO Take by mouth in the morning      VITAMIN D PO Take by mouth in the morning       No current facility-administered medications for this visit.     He is allergic to augmentin [amoxicillin-pot clavulanate] and ciprofloxacin..    Review of Systems   Constitutional: Negative.    Respiratory: Negative.     Cardiovascular: Negative.    Gastrointestinal: Negative.    Musculoskeletal:  Positive for gait problem.   Skin: Negative.    Psychiatric/Behavioral: Negative.           /76 (BP Location: Right arm, Patient Position: Sitting)   Pulse 74   Temp 98 °F (36.7 °C)   Ht 5' 10\" (1.778 m)   Wt 107 kg (236 lb 3.2 oz)   SpO2 98%   BMI 33.89 kg/m²     Objective:     Physical Exam  Vitals and nursing note reviewed.   Constitutional:       Appearance: He is well-developed.   HENT:      Head: Normocephalic and atraumatic.   Eyes:      Conjunctiva/sclera: Conjunctivae normal.   Cardiovascular:      Rate and Rhythm: Normal rate and regular rhythm.      Pulses: no weak pulses          Dorsalis pedis pulses are 2+ on the right side and 2+ on the left side.      Heart sounds: Normal heart sounds. No murmur heard.  Pulmonary:      Effort: Pulmonary effort is normal. No respiratory distress.      Breath sounds: Normal breath sounds. No wheezing or rales.   Chest:      Chest wall: No tenderness.   Abdominal:      General: Abdomen is flat. Bowel sounds are normal.      Palpations: Abdomen is soft.   Musculoskeletal:      Cervical back: Neck supple.   Feet:      Right foot:      Skin integrity: No skin breakdown or erythema.      Left foot:      Skin integrity: No skin breakdown or erythema.   Neurological:      General: No focal deficit present.      Mental Status: He is alert and oriented to person, place, and time.   Psychiatric:         Mood and Affect: Mood normal.         Behavior: Behavior normal.         " Thought Content: Thought content normal.         Judgment: Judgment normal.           Patient's shoes and socks removed.    Right Foot/Ankle   Right Foot Inspection  Skin Exam: skin intact. No erythema and no maceration.     Toe Exam: right toe deformity.     Sensory   Monofilament testing: diminished    Vascular  Capillary refills: < 3 seconds  The right DP pulse is 2+.     Left Foot/Ankle  Left Foot Inspection  Skin Exam: skin intact. No erythema and no maceration.     Toe Exam: ROM and strength within normal limits.     Sensory   Monofilament testing: diminished    Vascular  Capillary refills: < 3 seconds  The left DP pulse is 2+.     Assign Risk Category  Deformity present  Loss of protective sensation  No weak pulses  Risk: 2

## 2024-02-05 NOTE — ASSESSMENT & PLAN NOTE
To continue Vascepa and Crestor 5 mg.  Counseled the importance of increasing dietary fiber and consuming a heart healthy diet.

## 2024-02-05 NOTE — ASSESSMENT & PLAN NOTE
Lab Results   Component Value Date    HGBA1C 6.2 02/05/2024   A1c with in goal range.  To continue Jardiance.  Foot exam completed today, sensation abnormal.  To continue care with podiatry.

## 2024-02-05 NOTE — ASSESSMENT & PLAN NOTE
Blood pressure mildly elevated.  To begin checking blood pressure while at home.  Reviewed goal of less than 130/70 due to history of diabetes.  Counseled the importance of limiting salt and caffeine.  To call in 1 week with blood pressure readings.

## 2024-02-20 NOTE — PROGRESS NOTES
Colon and Rectal Surgery   Michael Alejandro 68 y.o. male MRN [id number]  Encounter: [id number]  02/21/24 1:58 PM            Assessment: Michael Alejandro is a 68 y.o. male who had hemorrhoids.      Plan:   Bleeding hemorrhoids  The patient is doing very well after a transanal hemorrhoidal pexy and dearterialization.  No examination was done due to the complete resolution of all symptoms.    He knows he can return at any time should he have recurrent symptoms or signs.      Subjective     HPI    Michael Alejandro is a 68 y.o. male who is here for a post operative evaluation.     The patient denies any current symptoms from the surgery site.     The patient is status post exam under anesthesia, transanal hemorrhoidal pexy and dearterialization on 1/5/2024.     Operative Findings:  Redundant hemorrhoids with rectal mucosal prolapse    Historical Information   Past Medical History:   Diagnosis Date    Chronic kidney disease     CPAP (continuous positive airway pressure) dependence     Diabetes (HCC)     LAST ASSESSED: 7/23/14    Diabetes mellitus (HCC)     Edema     LAST ASSESSED:6/12/12    Gait instability 2/5/2024    GERD (gastroesophageal reflux disease)     Hyperlipidemia     Hypertension     Kidney stone     Morbid obesity due to excess calories (HCC)     LAST ASSESSED: 6/12/12    RBBB     Sleep apnea, obstructive      Past Surgical History:   Procedure Laterality Date    BACK SURGERY      L5    CHOLECYSTECTOMY      COLONOSCOPY      EGD      FOOT SURGERY Right     many surgeries    GASTRIC RESTRICTION SURGERY      GASTRIC STAPLING FOR MORBID OBESITY LAPAROSCOPY W/ MARCY-EN-Y    HEMORRHOID SURGERY N/A 1/5/2024    Procedure: vs THD;  Surgeon: SILAS Logan MD;  Location: [facility] MAIN OR;  Service: Colorectal    KIDNEY STONE SURGERY      SHOULDER SURGERY Right     UVULOPALATOPLASTY         Meds/Allergies       Current Outpatient Medications:     amLODIPine (NORVASC) 5 mg tablet, TAKE 1 TABLET DAILY, Disp: 90 tablet,  Rfl: 3    buPROPion (WELLBUTRIN XL) 300 mg 24 hr tablet, TAKE 1 TABLET DAILY, Disp: 90 tablet, Rfl: 3    calcium citrate-Vitamin D 200 mg-250 units, Take 1 tablet by mouth daily, Disp: , Rfl:     calcium polycarbophil (FIBERCON) 625 mg tablet, Take 625 mg by mouth 2 (two) times a day, Disp: , Rfl:     Icosapent Ethyl 1 g CAPS, TAKE 2 CAPSULES IN THE MORNING AND 2 CAPSULES AT NIGHT, Disp: 360 capsule, Rfl: 3    Jardiance 25 MG TABS, TAKE 1 TABLET DAILY, Disp: 90 tablet, Rfl: 3    levocetirizine (XYZAL) 5 MG tablet, Take 1 tablet (5 mg total) by mouth every evening, Disp: 90 tablet, Rfl: 1    losartan (COZAAR) 100 MG tablet, TAKE 1 TABLET DAILY, Disp: 90 tablet, Rfl: 3    Multiple Vitamins-Minerals (CENTRUM ADULTS PO), Take 1 tablet by mouth daily, Disp: , Rfl:     pantoprazole (PROTONIX) 40 mg tablet, TAKE 1 TABLET DAILY, Disp: 90 tablet, Rfl: 1    potassium chloride (K-DUR,KLOR-CON) 20 mEq tablet, Take 1 tablet (20 mEq total) by mouth daily, Disp: 90 tablet, Rfl: 1    rosuvastatin (CRESTOR) 5 mg tablet, TAKE 1 TABLET DAILY, Disp: 90 tablet, Rfl: 3    SODIUM FLUORIDE, DENTAL RINSE, 0.2 % SOLN, swish 10 milliliter  IN MOUTH for 1 MINUTE THEN SPIT once daily at bedtime, Disp: , Rfl:     TURMERIC PO, Take by mouth in the morning, Disp: , Rfl:     VITAMIN D PO, Take by mouth in the morning, Disp: , Rfl:     clindamycin (CLINDAGEL) 1 % gel, if needed, Disp: , Rfl: 0    Iron-Vitamin C (VITRON-C)  MG TABS, Take one pill twice daily (Patient taking differently: in the morning Take one pill twice daily), Disp: 60 tablet, Rfl: 1    mometasone (ELOCON) 0.1 % ointment, APPLY TO DRY SKIN ON LEGS NIGHTLY IF NEEDED, Disp: , Rfl:   Allergies   Allergen Reactions    Augmentin [Amoxicillin-Pot Clavulanate] Diarrhea    Ciprofloxacin Diarrhea       Social History   Social History     Substance and Sexual Activity   Drug Use Yes    Frequency: 7.0 times per week    Types: Marijuana    Comment: smokes     Social History     Tobacco  "Use   Smoking Status Former    Current packs/day: 0.00    Average packs/day: 2.0 packs/day for 15.0 years (30.0 ttl pk-yrs)    Types: Cigarettes    Start date:     Quit date:     Years since quittin.1   Smokeless Tobacco Never         Family History   Problem Relation Age of Onset    COPD Mother     Heart disease Mother     Mental illness Mother     Drug abuse Mother     Diabetes Father     Mental illness Father     Drug abuse Father     Lung cancer Father     Diabetes Sister          Review of Systems    Objective   Current Vitals:  Vitals:    24 1345   Weight: 108 kg (238 lb)   Height: 5' 10\" (1.778 m)         Physical Exam            "

## 2024-02-21 ENCOUNTER — OFFICE VISIT (OUTPATIENT)
Age: 69
End: 2024-02-21

## 2024-02-21 VITALS — HEIGHT: 70 IN | WEIGHT: 238 LBS | BODY MASS INDEX: 34.07 KG/M2

## 2024-02-21 DIAGNOSIS — K64.9 BLEEDING HEMORRHOIDS: Primary | ICD-10-CM

## 2024-02-21 PROCEDURE — 99024 POSTOP FOLLOW-UP VISIT: CPT | Performed by: COLON & RECTAL SURGERY

## 2024-02-21 NOTE — ASSESSMENT & PLAN NOTE
The patient is doing very well after a transanal hemorrhoidal pexy and dearterialization.  No examination was done due to the complete resolution of all symptoms.    He knows he can return at any time should he have recurrent symptoms or signs.

## 2024-02-21 NOTE — LETTER
February 21, 2024     Jeffrey Handy III, MD  [address]  [address]  [facility] [address]    Patient: Michael Alejandro   YOB: 1955   Date of Visit: 2/21/2024       Dear Dr. Handy:    Thank you for referring Michael Alejandro to me for evaluation. Below are my notes for this consultation.    If you have questions, please do not hesitate to call me. I look forward to following your patient along with you.         Sincerely,        ALIEC Logan MD        CC: No Recipients

## 2024-03-11 ENCOUNTER — HOSPITAL ENCOUNTER (OUTPATIENT)
Dept: RADIOLOGY | Facility: HOSPITAL | Age: 69
Discharge: HOME | End: 2024-03-11
Payer: MEDICARE

## 2024-03-11 ENCOUNTER — APPOINTMENT (OUTPATIENT)
Dept: LAB | Facility: HOSPITAL | Age: 69
End: 2024-03-11
Payer: MEDICARE

## 2024-03-11 ENCOUNTER — OFFICE VISIT (OUTPATIENT)
Dept: FAMILY MEDICINE CLINIC | Facility: CLINIC | Age: 69
End: 2024-03-11
Payer: MEDICARE

## 2024-03-11 VITALS
DIASTOLIC BLOOD PRESSURE: 72 MMHG | SYSTOLIC BLOOD PRESSURE: 142 MMHG | WEIGHT: 238 LBS | HEIGHT: 70 IN | BODY MASS INDEX: 34.07 KG/M2 | HEART RATE: 80 BPM | TEMPERATURE: 98 F | OXYGEN SATURATION: 99 % | RESPIRATION RATE: 16 BRPM

## 2024-03-11 DIAGNOSIS — I10 PRIMARY HYPERTENSION: ICD-10-CM

## 2024-03-11 DIAGNOSIS — S69.92XA INJURY OF LEFT HAND, INITIAL ENCOUNTER: ICD-10-CM

## 2024-03-11 DIAGNOSIS — E11.29 TYPE 2 DIABETES MELLITUS WITH MICROALBUMINURIA, WITHOUT LONG-TERM CURRENT USE OF INSULIN: ICD-10-CM

## 2024-03-11 DIAGNOSIS — M81.0 OSTEOPOROSIS, UNSPECIFIED OSTEOPOROSIS TYPE, UNSPECIFIED PATHOLOGICAL FRACTURE PRESENCE: ICD-10-CM

## 2024-03-11 DIAGNOSIS — E66.01 OBESITY, MORBID (HCC): ICD-10-CM

## 2024-03-11 DIAGNOSIS — S69.92XA INJURY OF LEFT HAND, INITIAL ENCOUNTER: Primary | ICD-10-CM

## 2024-03-11 DIAGNOSIS — R80.9 TYPE 2 DIABETES MELLITUS WITH MICROALBUMINURIA, WITHOUT LONG-TERM CURRENT USE OF INSULIN: ICD-10-CM

## 2024-03-11 LAB
ANION GAP SERPL CALCULATED.3IONS-SCNC: 5 MMOL/L
BACTERIA UR QL AUTO: NORMAL /HPF
BASOPHILS # BLD AUTO: 0.05 THOUSANDS/ÂΜL (ref 0–0.1)
BASOPHILS NFR BLD AUTO: 1 % (ref 0–1)
BILIRUB UR QL STRIP: NEGATIVE
BUN SERPL-MCNC: 11 MG/DL (ref 5–25)
CALCIUM SERPL-MCNC: 9.2 MG/DL (ref 8.4–10.2)
CHLORIDE SERPL-SCNC: 108 MMOL/L (ref 96–108)
CLARITY UR: CLEAR
CO2 SERPL-SCNC: 27 MMOL/L (ref 21–32)
COLOR UR: ABNORMAL
CREAT SERPL-MCNC: 0.97 MG/DL (ref 0.6–1.3)
CREAT UR-MCNC: 71.8 MG/DL
CREAT UR-MCNC: 72.9 MG/DL
EOSINOPHIL # BLD AUTO: 0.19 THOUSAND/ÂΜL (ref 0–0.61)
EOSINOPHIL NFR BLD AUTO: 3 % (ref 0–6)
ERYTHROCYTE [DISTWIDTH] IN BLOOD BY AUTOMATED COUNT: 12.3 % (ref 11.6–15.1)
GFR SERPL CREATININE-BSD FRML MDRD: 81 ML/MIN/1.73SQ M
GLUCOSE P FAST SERPL-MCNC: 116 MG/DL (ref 65–99)
GLUCOSE UR STRIP-MCNC: ABNORMAL MG/DL
HCT VFR BLD AUTO: 48.2 % (ref 36.5–49.3)
HGB BLD-MCNC: 17.5 G/DL (ref 12–17)
HGB UR QL STRIP.AUTO: NEGATIVE
IMM GRANULOCYTES # BLD AUTO: 0.02 THOUSAND/UL (ref 0–0.2)
IMM GRANULOCYTES NFR BLD AUTO: 0 % (ref 0–2)
KETONES UR STRIP-MCNC: NEGATIVE MG/DL
LEUKOCYTE ESTERASE UR QL STRIP: ABNORMAL
LYMPHOCYTES # BLD AUTO: 1.83 THOUSANDS/ÂΜL (ref 0.6–4.47)
LYMPHOCYTES NFR BLD AUTO: 30 % (ref 14–44)
MCH RBC QN AUTO: 31.9 PG (ref 26.8–34.3)
MCHC RBC AUTO-ENTMCNC: 36.3 G/DL (ref 31.4–37.4)
MCV RBC AUTO: 88 FL (ref 82–98)
MICROALBUMIN UR-MCNC: 96.2 MG/L
MICROALBUMIN/CREAT 24H UR: 132 MG/G CREATININE (ref 0–30)
MONOCYTES # BLD AUTO: 0.5 THOUSAND/ÂΜL (ref 0.17–1.22)
MONOCYTES NFR BLD AUTO: 8 % (ref 4–12)
NEUTROPHILS # BLD AUTO: 3.59 THOUSANDS/ÂΜL (ref 1.85–7.62)
NEUTS SEG NFR BLD AUTO: 58 % (ref 43–75)
NITRITE UR QL STRIP: NEGATIVE
NON-SQ EPI CELLS URNS QL MICRO: NORMAL /HPF
NRBC BLD AUTO-RTO: 0 /100 WBCS
PH UR STRIP.AUTO: 5 [PH]
PLATELET # BLD AUTO: 217 THOUSANDS/UL (ref 149–390)
PMV BLD AUTO: 10.7 FL (ref 8.9–12.7)
POTASSIUM SERPL-SCNC: 4.5 MMOL/L (ref 3.5–5.3)
PROT UR STRIP-MCNC: NEGATIVE MG/DL
PROT UR-MCNC: 21 MG/DL
PROT/CREAT UR: 0.29 MG/G{CREAT} (ref 0–0.1)
RBC # BLD AUTO: 5.48 MILLION/UL (ref 3.88–5.62)
RBC #/AREA URNS AUTO: NORMAL /HPF
SODIUM SERPL-SCNC: 140 MMOL/L (ref 135–147)
SP GR UR STRIP.AUTO: 1.02 (ref 1–1.03)
UROBILINOGEN UR STRIP-ACNC: <2 MG/DL
WBC # BLD AUTO: 6.18 THOUSAND/UL (ref 4.31–10.16)
WBC #/AREA URNS AUTO: NORMAL /HPF

## 2024-03-11 PROCEDURE — 81001 URINALYSIS AUTO W/SCOPE: CPT

## 2024-03-11 PROCEDURE — 73130 X-RAY EXAM OF HAND: CPT

## 2024-03-11 PROCEDURE — 80048 BASIC METABOLIC PNL TOTAL CA: CPT

## 2024-03-11 PROCEDURE — G2211 COMPLEX E/M VISIT ADD ON: HCPCS | Performed by: NURSE PRACTITIONER

## 2024-03-11 PROCEDURE — 82570 ASSAY OF URINE CREATININE: CPT

## 2024-03-11 PROCEDURE — 99214 OFFICE O/P EST MOD 30 MIN: CPT | Performed by: NURSE PRACTITIONER

## 2024-03-11 PROCEDURE — 85025 COMPLETE CBC W/AUTO DIFF WBC: CPT

## 2024-03-11 PROCEDURE — 73140 X-RAY EXAM OF FINGER(S): CPT

## 2024-03-11 PROCEDURE — 36415 COLL VENOUS BLD VENIPUNCTURE: CPT

## 2024-03-11 PROCEDURE — 82043 UR ALBUMIN QUANTITATIVE: CPT

## 2024-03-11 PROCEDURE — 84156 ASSAY OF PROTEIN URINE: CPT

## 2024-03-11 NOTE — PROGRESS NOTES
Assessment/Plan:    Injury of left hand  To proceed with x-ray, will call with results.  Counseled on keeping hand elevated and applying ice as needed.      Hypertension  Blood pressure is acceptable. To continue current antihypertensive regimen.  To continue monitoring blood pressure while at home.       Diagnoses and all orders for this visit:    Injury of left hand, initial encounter  -     XR hand 3+ vw left; Future  -     XR finger left fourth digit-ring; Future  -     XR finger left fifth digit-pinkie; Future    Primary hypertension          Subjective:      Patient ID: Michael Alejandro is a 68 y.o. male.    Michael presents reporting left hand pain x 2 days. The pain started after he tripped in the bathroom. He used he left hand to stabilize him against the wall, he is unsure how the injured his hand. Denies decrease in ROM, change in sensation, or prior injury to left hand. He has been applying ice and taking Motrin as needed with some improvement.          The following portions of the patient's history were reviewed and updated as appropriate: He   Patient Active Problem List    Diagnosis Date Noted    Injury of left hand 03/11/2024    Gait instability 02/05/2024    Hypokalemia 02/02/2023    Right bundle branch block 01/03/2022    Hypertriglyceridemia 09/27/2021    DOE (obstructive sleep apnea)     Osteoporosis 03/22/2021    Hypertension 02/06/2019    Obesity, morbid (HCC) 02/06/2019    Type 2 diabetes mellitus with microalbuminuria, without long-term current use of insulin      Malabsorption 02/06/2018    Persistent proteinuria 09/28/2017     Current Outpatient Medications   Medication Sig Dispense Refill    amLODIPine (NORVASC) 5 mg tablet TAKE 1 TABLET DAILY 90 tablet 3    buPROPion (WELLBUTRIN XL) 300 mg 24 hr tablet TAKE 1 TABLET DAILY 90 tablet 3    calcium citrate-Vitamin D 200 mg-250 units Take 1 tablet by mouth daily      calcium polycarbophil (FIBERCON) 625 mg tablet Take 625 mg by mouth 2  "(two) times a day      clindamycin (CLINDAGEL) 1 % gel if needed  0    Icosapent Ethyl 1 g CAPS TAKE 2 CAPSULES IN THE MORNING AND 2 CAPSULES AT NIGHT 360 capsule 3    Iron-Vitamin C (VITRON-C)  MG TABS Take one pill twice daily (Patient taking differently: in the morning Take one pill twice daily) 60 tablet 1    Jardiance 25 MG TABS TAKE 1 TABLET DAILY 90 tablet 3    levocetirizine (XYZAL) 5 MG tablet Take 1 tablet (5 mg total) by mouth every evening 90 tablet 1    losartan (COZAAR) 100 MG tablet TAKE 1 TABLET DAILY 90 tablet 3    mometasone (ELOCON) 0.1 % ointment APPLY TO DRY SKIN ON LEGS NIGHTLY IF NEEDED      Multiple Vitamins-Minerals (CENTRUM ADULTS PO) Take 1 tablet by mouth daily      pantoprazole (PROTONIX) 40 mg tablet TAKE 1 TABLET DAILY 90 tablet 1    potassium chloride (K-DUR,KLOR-CON) 20 mEq tablet Take 1 tablet (20 mEq total) by mouth daily 90 tablet 1    rosuvastatin (CRESTOR) 5 mg tablet TAKE 1 TABLET DAILY 90 tablet 3    SODIUM FLUORIDE, DENTAL RINSE, 0.2 % SOLN swish 10 milliliter  IN MOUTH for 1 MINUTE THEN SPIT once daily at bedtime      TURMERIC PO Take by mouth in the morning      VITAMIN D PO Take by mouth in the morning       No current facility-administered medications for this visit.     He is allergic to augmentin [amoxicillin-pot clavulanate] and ciprofloxacin..    Review of Systems   Constitutional: Negative.    HENT: Negative.     Eyes: Negative.    Respiratory: Negative.     Cardiovascular: Negative.    Gastrointestinal: Negative.    Endocrine: Negative.    Genitourinary: Negative.    Musculoskeletal:         Left hand pain and swelling   Skin: Negative.    Allergic/Immunologic: Negative.    Neurological: Negative.    Hematological: Negative.    Psychiatric/Behavioral: Negative.           /72 (BP Location: Right arm, Patient Position: Sitting, Cuff Size: Standard)   Pulse 80   Temp 98 °F (36.7 °C) (Tympanic)   Resp 16   Ht 5' 10\" (1.778 m)   Wt 108 kg (238 lb)   " SpO2 99%   BMI 34.15 kg/m²     Objective:     Physical Exam  Vitals and nursing note reviewed.   Constitutional:       General: He is not in acute distress.     Appearance: Normal appearance. He is well-developed. He is not ill-appearing, toxic-appearing or diaphoretic.   HENT:      Head: Normocephalic and atraumatic.   Eyes:      Conjunctiva/sclera: Conjunctivae normal.   Cardiovascular:      Rate and Rhythm: Normal rate and regular rhythm.      Heart sounds: Normal heart sounds. No murmur heard.  Pulmonary:      Effort: Pulmonary effort is normal. No respiratory distress.      Breath sounds: Normal breath sounds. No wheezing or rales.   Chest:      Chest wall: No tenderness.   Musculoskeletal:      Cervical back: Neck supple.      Comments: Presence of mild swelling on the dorsal aspect of left hand as well as in 4th and 5th digits. Full ROM intact. +2 radial pulse. Sensation to light touch intact.   Skin:     General: Skin is warm and dry.      Capillary Refill: Capillary refill takes less than 2 seconds.   Neurological:      General: No focal deficit present.      Mental Status: He is alert and oriented to person, place, and time.   Psychiatric:         Mood and Affect: Mood normal.         Behavior: Behavior normal.         Thought Content: Thought content normal.         Judgment: Judgment normal.

## 2024-03-11 NOTE — ASSESSMENT & PLAN NOTE
Blood pressure is acceptable. To continue current antihypertensive regimen.  To continue monitoring blood pressure while at home.

## 2024-03-11 NOTE — ASSESSMENT & PLAN NOTE
To proceed with x-ray, will call with results.  Counseled on keeping hand elevated and applying ice as needed.

## 2024-03-28 DIAGNOSIS — I10 HYPERTENSION, UNSPECIFIED TYPE: ICD-10-CM

## 2024-03-28 RX ORDER — AMLODIPINE BESYLATE 5 MG/1
TABLET ORAL
Qty: 90 TABLET | Refills: 3 | Status: SHIPPED | OUTPATIENT
Start: 2024-03-28

## 2024-04-03 ENCOUNTER — TELEPHONE (OUTPATIENT)
Dept: NEPHROLOGY | Facility: CLINIC | Age: 69
End: 2024-04-03

## 2024-04-04 ENCOUNTER — OFFICE VISIT (OUTPATIENT)
Dept: NEPHROLOGY | Facility: CLINIC | Age: 69
End: 2024-04-04

## 2024-04-04 VITALS
DIASTOLIC BLOOD PRESSURE: 80 MMHG | HEIGHT: 70 IN | SYSTOLIC BLOOD PRESSURE: 120 MMHG | OXYGEN SATURATION: 98 % | TEMPERATURE: 97.4 F | BODY MASS INDEX: 34.36 KG/M2 | HEART RATE: 86 BPM | RESPIRATION RATE: 16 BRPM | WEIGHT: 240 LBS

## 2024-04-04 DIAGNOSIS — R80.9 TYPE 2 DIABETES MELLITUS WITH MICROALBUMINURIA, WITHOUT LONG-TERM CURRENT USE OF INSULIN (HCC): ICD-10-CM

## 2024-04-04 DIAGNOSIS — I10 PRIMARY HYPERTENSION: ICD-10-CM

## 2024-04-04 DIAGNOSIS — R80.1 PERSISTENT PROTEINURIA: Primary | ICD-10-CM

## 2024-04-04 DIAGNOSIS — E11.29 TYPE 2 DIABETES MELLITUS WITH MICROALBUMINURIA, WITHOUT LONG-TERM CURRENT USE OF INSULIN (HCC): ICD-10-CM

## 2024-04-04 DIAGNOSIS — E66.01 OBESITY, MORBID (HCC): ICD-10-CM

## 2024-04-04 NOTE — PROGRESS NOTES
NEPHROLOGY OFFICE FOLLOW UP  Michael Alejandro 68 y.o. male MRN: [id number]    Encounter: [id number] 4/4/2024    REASON FOR VISIT: Michael Alejandro is a 68 y.o. male who is here on 4/4/2024 for Proteinuria (proteinuria) and Follow-up  .    HPI:    Michael came in today for yearly nephrology follow-up of proteinuria    He is doing quite well and denies any acute complaint    No chest pain no palpitation or shortness of breath    No nausea no vomiting    Denies any urinary complaint        REVIEW OF SYSTEMS:    Review of Systems   Constitutional:  Negative for fatigue.   HENT:  Negative for congestion.    Eyes:  Negative for photophobia and pain.   Respiratory:  Negative for chest tightness and shortness of breath.    Cardiovascular:  Negative for chest pain and palpitations.   Gastrointestinal:  Negative for abdominal distention, abdominal pain and blood in stool.   Endocrine: Negative for polydipsia.   Genitourinary:  Negative for difficulty urinating, dysuria, flank pain, hematuria and urgency.   Musculoskeletal:  Positive for arthralgias. Negative for back pain.   Skin:  Negative for rash.   Neurological:  Negative for dizziness, light-headedness and headaches.   Hematological:  Does not bruise/bleed easily.   Psychiatric/Behavioral:  Negative for behavioral problems. The patient is not nervous/anxious.          PAST MEDICAL HISTORY:  Past Medical History:   Diagnosis Date    Chronic kidney disease     CPAP (continuous positive airway pressure) dependence     Diabetes (HCC)     LAST ASSESSED: 7/23/14    Diabetes mellitus (HCC)     Edema     LAST ASSESSED:6/12/12    Gait instability 2/5/2024    GERD (gastroesophageal reflux disease)     Hyperlipidemia     Hypertension     Kidney stone     Morbid obesity due to excess calories (HCC)     LAST ASSESSED: 6/12/12    RBBB     Sleep apnea, obstructive        PAST SURGICAL HISTORY:  Past Surgical History:   Procedure Laterality Date    BACK SURGERY      L5    CHOLECYSTECTOMY       COLONOSCOPY      EGD      FOOT SURGERY Right     many surgeries    GASTRIC RESTRICTION SURGERY      GASTRIC STAPLING FOR MORBID OBESITY LAPAROSCOPY W/ MARCY-EN-Y    HEMORRHOID SURGERY N/A 2024    Procedure: vs THD;  Surgeon: SILAS Logan MD;  Location: [facility] MA[facility] OR;  Service: Colorectal    KIDNEY STONE SURGERY      SHOULDER SURGERY Right     UVULOPALATOPLASTY         SOCIAL HISTORY:  Social History     Substance and Sexual Activity   Alcohol Use No     Social History     Substance and Sexual Activity   Drug Use Yes    Frequency: 7.0 times per week    Types: Marijuana    Comment: smokes     Social History     Tobacco Use   Smoking Status Former    Current packs/day: 0.00    Average packs/day: 2.0 packs/day for 15.0 years (30.0 ttl pk-yrs)    Types: Cigarettes    Start date:     Quit date:     Years since quittin.2   Smokeless Tobacco Never       FAMILY HISTORY:  Family History   Problem Relation Age of Onset    COPD Mother     Heart disease Mother     Mental illness Mother     Drug abuse Mother     Diabetes Father     Mental illness Father     Drug abuse Father     Lung cancer Father     Diabetes Sister        MEDICATIONS:    Current Outpatient Medications:     amLODIPine (NORVASC) 5 mg tablet, TAKE 1 TABLET DAILY, Disp: 90 tablet, Rfl: 3    buPROPion (WELLBUTRIN XL) 300 mg 24 hr tablet, TAKE 1 TABLET DAILY, Disp: 90 tablet, Rfl: 3    calcium citrate-Vitamin D 200 mg-250 units, Take 1 tablet by mouth daily, Disp: , Rfl:     calcium polycarbophil (FIBERCON) 625 mg tablet, Take 625 mg by mouth 2 (two) times a day, Disp: , Rfl:     clindamycin (CLINDAGEL) 1 % gel, if needed, Disp: , Rfl: 0    Icosapent Ethyl 1 g CAPS, TAKE 2 CAPSULES IN THE MORNING AND 2 CAPSULES AT NIGHT, Disp: 360 capsule, Rfl: 3    Iron-Vitamin C (VITRON-C)  MG TABS, Take one pill twice daily (Patient taking differently: in the morning Take one pill twice daily), Disp: 60 tablet, Rfl: 1    Jardiance 25 MG TABS, TAKE  "1 TABLET DAILY, Disp: 90 tablet, Rfl: 3    levocetirizine (XYZAL) 5 MG tablet, Take 1 tablet (5 mg total) by mouth every evening, Disp: 90 tablet, Rfl: 1    losartan (COZAAR) 100 MG tablet, TAKE 1 TABLET DAILY, Disp: 90 tablet, Rfl: 3    mometasone (ELOCON) 0.1 % ointment, APPLY TO DRY SKIN ON LEGS NIGHTLY IF NEEDED, Disp: , Rfl:     Multiple Vitamins-Minerals (CENTRUM ADULTS PO), Take 1 tablet by mouth daily, Disp: , Rfl:     pantoprazole (PROTONIX) 40 mg tablet, TAKE 1 TABLET DAILY, Disp: 90 tablet, Rfl: 1    potassium chloride (K-DUR,KLOR-CON) 20 mEq tablet, Take 1 tablet (20 mEq total) by mouth daily, Disp: 90 tablet, Rfl: 1    rosuvastatin (CRESTOR) 5 mg tablet, TAKE 1 TABLET DAILY, Disp: 90 tablet, Rfl: 3    SODIUM FLUORIDE, DENTAL RINSE, 0.2 % SOLN, swish 10 milliliter  IN MOUTH for 1 MINUTE THEN SPIT once daily at bedtime, Disp: , Rfl:     TURMERIC PO, Take by mouth in the morning, Disp: , Rfl:     VITAMIN D PO, Take by mouth in the morning, Disp: , Rfl:     PHYSICAL EXAM:  Vitals:    04/04/24 1057   BP: 120/80   BP Location: Right arm   Patient Position: Sitting   Pulse: 86   Resp: 16   Temp: (!) 97.4 °F (36.3 °C)   TempSrc: Temporal   SpO2: 98%   Weight: 109 kg (240 lb)   Height: 5' 10\" (1.778 m)     Body mass index is 34.44 kg/m².    Physical Exam  Constitutional:       General: He is not in acute distress.     Appearance: He is well-developed. He is obese.   HENT:      Head: Normocephalic.      Mouth/Throat:      Mouth: Mucous membranes are moist.   Eyes:      General: No scleral icterus.     Conjunctiva/sclera: Conjunctivae normal.   Neck:      Vascular: No JVD.   Cardiovascular:      Rate and Rhythm: Normal rate.      Heart sounds: Normal heart sounds.   Pulmonary:      Effort: Pulmonary effort is normal.      Breath sounds: No wheezing.   Abdominal:      Palpations: Abdomen is soft.      Tenderness: There is no abdominal tenderness.   Musculoskeletal:         General: Normal range of motion.      " Cervical back: Neck supple.   Skin:     General: Skin is warm.      Findings: No rash.   Neurological:      Mental Status: He is alert and oriented to person, place, and time.   Psychiatric:         Behavior: Behavior normal.         LAB RESULTS:  Results for orders placed or performed in visit on 03/11/24   Basic metabolic panel   Result Value Ref Range    Sodium 140 135 - 147 mmol/L    Potassium 4.5 3.5 - 5.3 mmol/L    Chloride 108 96 - 108 mmol/L    CO2 27 21 - 32 mmol/L    ANION GAP 5 mmol/L    BUN 11 5 - 25 mg/dL    Creatinine 0.97 0.60 - 1.30 mg/dL    Glucose, Fasting 116 (H) 65 - 99 mg/dL    Calcium 9.2 8.4 - 10.2 mg/dL    eGFR 81 ml/min/1.73sq m   CBC and differential   Result Value Ref Range    WBC 6.18 4.31 - 10.16 Thousand/uL    RBC 5.48 3.88 - 5.62 Million/uL    Hemoglobin 17.5 (H) 12.0 - 17.0 g/dL    Hematocrit 48.2 36.5 - 49.3 %    MCV 88 82 - 98 fL    MCH 31.9 26.8 - 34.3 pg    MCHC 36.3 31.4 - 37.4 g/dL    RDW 12.3 11.6 - 15.1 %    MPV 10.7 8.9 - 12.7 fL    Platelets 217 149 - 390 Thousands/uL    nRBC 0 /100 WBCs    Neutrophils Relative 58 43 - 75 %    Immature Grans % 0 0 - 2 %    Lymphocytes Relative 30 14 - 44 %    Monocytes Relative 8 4 - 12 %    Eosinophils Relative 3 0 - 6 %    Basophils Relative 1 0 - 1 %    Neutrophils Absolute 3.59 1.85 - 7.62 Thousands/µL    Absolute Immature Grans 0.02 0.00 - 0.20 Thousand/uL    Absolute Lymphocytes 1.83 0.60 - 4.47 Thousands/µL    Absolute Monocytes 0.50 0.17 - 1.22 Thousand/µL    Eosinophils Absolute 0.19 0.00 - 0.61 Thousand/µL    Basophils Absolute 0.05 0.00 - 0.10 Thousands/µL   Protein / creatinine ratio, urine   Result Value Ref Range    Creatinine, Ur 71.8 Reference range not established. mg/dL    Protein Urine Random 21 Reference range not established. mg/dL    Prot/Creat Ratio, Ur 0.29 (H) 0.00 - 0.10   UA (URINE) with reflex to Scope   Result Value Ref Range    Color, UA Light Yellow     Clarity, UA Clear     Specific Gravity, UA 1.021 1.003 -  "1.030    pH, UA 5.0 4.5, 5.0, 5.5, 6.0, 6.5, 7.0, 7.5, 8.0    Leukocytes, UA (A) Negative     Elevated glucose may cause decreased leukocyte values. See urine microscopic [place] [facility] [place]    Nitrite, UA Negative Negative    Protein, UA Negative Negative mg/dl    Glucose, UA >=1000 (1%) (A) Negative mg/dl    Ketones, UA Negative Negative mg/dl    Urobilinogen, UA <2.0 <2.0 mg/dl mg/dl    Bilirubin, UA Negative Negative    Occult Blood, UA Negative Negative   Albumin / creatinine urine ratio   Result Value Ref Range    Creatinine, Ur 72.9 Reference range not established. mg/dL    Albumin,U,Random 96.2 (H) <20.0 mg/L    Albumin Creat Ratio 132 (H) 0 - 30 mg/g creatinine   Urine Microscopic   Result Value Ref Range    RBC, UA 1-2 None Seen, 1-2 /hpf    WBC, UA 1-2 None Seen, 1-2 /hpf    Epithelial Cells Occasional None Seen, Occasional /hpf    Bacteria, UA None Seen None Seen, Occasional /hpf       ASSESSMENT and PLAN:      Persistent proteinuria  Likely because of diabetes and within nonnephrotic range.  Patient is on losartan as well as Jardiance which we will continue    Obesity, morbid (HCC)  Need to reduce weight and he is well aware of it    Type 2 diabetes mellitus with microalbuminuria, without long-term current use of insulin ([facility])    Lab Results   Component Value Date    HGBA1C 6.2 02/05/2024   Very well-controlled      Everything discussed with the patient at length.  I will see him back in 1 year again.  Will get blood and urine test before that visit        Portions of the record may have been created with voice recognition software. Occasional wrong word or \"sound a like\" substitutions may have occurred due to the inherent limitations of voice recognition software. Read the chart carefully and recognize, using context, where substitutions have occurred.If you have any questions, please contact the dictating provider.   "

## 2024-04-04 NOTE — ASSESSMENT & PLAN NOTE
Likely because of diabetes and within nonnephrotic range.  Patient is on losartan as well as Jardiance which we will continue

## 2024-04-18 ENCOUNTER — TELEPHONE (OUTPATIENT)
Dept: FAMILY MEDICINE CLINIC | Facility: CLINIC | Age: 69
End: 2024-04-18

## 2024-04-18 NOTE — TELEPHONE ENCOUNTER
Patient called stating that his finger still hurts. He had a x-ray done 3/11 and everything looked fine. He said it is bent and swelled up. Can we refer him somewhere or should he be seen again?     Ankita can you please assist since Johanna is not back until Monday

## 2024-04-19 DIAGNOSIS — S69.92XA INJURY OF LEFT HAND, INITIAL ENCOUNTER: Primary | ICD-10-CM

## 2024-04-27 DIAGNOSIS — I10 HYPERTENSION, UNSPECIFIED TYPE: ICD-10-CM

## 2024-04-27 RX ORDER — LOSARTAN POTASSIUM 100 MG/1
TABLET ORAL
Qty: 90 TABLET | Refills: 3 | Status: SHIPPED | OUTPATIENT
Start: 2024-04-27

## 2024-05-01 ENCOUNTER — HOSPITAL ENCOUNTER (OUTPATIENT)
Dept: RADIOLOGY | Facility: HOSPITAL | Age: 69
Discharge: HOME | End: 2024-05-01
Attending: ORTHOPAEDIC SURGERY
Payer: MEDICARE

## 2024-05-01 ENCOUNTER — OFFICE VISIT (OUTPATIENT)
Dept: OBGYN CLINIC | Facility: HOSPITAL | Age: 69
End: 2024-05-01
Payer: MEDICARE

## 2024-05-01 VITALS
SYSTOLIC BLOOD PRESSURE: 137 MMHG | HEIGHT: 70 IN | HEART RATE: 63 BPM | WEIGHT: 243 LBS | BODY MASS INDEX: 34.79 KG/M2 | DIASTOLIC BLOOD PRESSURE: 77 MMHG

## 2024-05-01 DIAGNOSIS — M89.9 BONE LESION: ICD-10-CM

## 2024-05-01 DIAGNOSIS — S69.92XA INJURY OF LEFT HAND, INITIAL ENCOUNTER: ICD-10-CM

## 2024-05-01 DIAGNOSIS — S69.92XA INJURY OF LEFT HAND, INITIAL ENCOUNTER: Primary | ICD-10-CM

## 2024-05-01 PROCEDURE — 99204 OFFICE O/P NEW MOD 45 MIN: CPT | Performed by: ORTHOPAEDIC SURGERY

## 2024-05-01 PROCEDURE — 73130 X-RAY EXAM OF HAND: CPT

## 2024-05-01 NOTE — PROGRESS NOTES
ASSESSMENT/PLAN:    Assessment:   Lesion base of the proximal phalanx left long finger.     Plan:   Patient is indicated at this time for a MRI to evaluate for lesion seen on x-rays today.    Follow Up:  After Testing    To Do Next Visit:  Discuss MRI      _____________________________________________________  CHIEF COMPLAINT:  Chief Complaint   Patient presents with    Left Hand - Pain     XR - 3/11         SUBJECTIVE:  Michael Alejandro is a 68 y.o. male who presents with Pain  Moderate  Intermittant  Dull and Aching to the left hand.  This started  6 week(s) ago as Due to a personal injury.  Patient reports a trip at home and used his left hand to brace against the wall.  He was seen by his PCP and referred here today.       PAST MEDICAL HISTORY:  Past Medical History:   Diagnosis Date    Chronic kidney disease     CPAP (continuous positive airway pressure) dependence     Diabetes (HCC)     LAST ASSESSED: 7/23/14    Diabetes mellitus (HCC)     Edema     LAST ASSESSED:6/12/12    Gait instability 2/5/2024    GERD (gastroesophageal reflux disease)     Hyperlipidemia     Hypertension     Kidney stone     Morbid obesity due to excess calories (HCC)     LAST ASSESSED: 6/12/12    RBBB     Sleep apnea, obstructive        PAST SURGICAL HISTORY:  Past Surgical History:   Procedure Laterality Date    BACK SURGERY      L5    CHOLECYSTECTOMY      COLONOSCOPY      EGD      FOOT SURGERY Right     many surgeries    GASTRIC RESTRICTION SURGERY      GASTRIC STAPLING FOR MORBID OBESITY LAPAROSCOPY W/ MARCY-EN-Y    HEMORRHOID SURGERY N/A 1/5/2024    Procedure: vs THD;  Surgeon: SILAS Logan MD;  Location: [facility] MAIN OR;  Service: Colorectal    KIDNEY STONE SURGERY      SHOULDER SURGERY Right     UVULOPALATOPLASTY         FAMILY HISTORY:  Family History   Problem Relation Age of Onset    COPD Mother     Heart disease Mother     Mental illness Mother     Drug abuse Mother     Diabetes Father     Mental illness Father     Drug  abuse Father     Lung cancer Father     Diabetes Sister        SOCIAL HISTORY:  Social History     Tobacco Use    Smoking status: Former     Current packs/day: 0.00     Average packs/day: 2.0 packs/day for 15.0 years (30.0 ttl pk-yrs)     Types: Cigarettes     Start date:      Quit date:      Years since quittin.3    Smokeless tobacco: Never   Vaping Use    Vaping status: Never Used   Substance Use Topics    Alcohol use: No    Drug use: Yes     Frequency: 7.0 times per week     Types: Marijuana     Comment: smokes       MEDICATIONS:    Current Outpatient Medications:     amLODIPine (NORVASC) 5 mg tablet, TAKE 1 TABLET DAILY, Disp: 90 tablet, Rfl: 3    buPROPion (WELLBUTRIN XL) 300 mg 24 hr tablet, TAKE 1 TABLET DAILY, Disp: 90 tablet, Rfl: 3    calcium citrate-Vitamin D 200 mg-250 units, Take 1 tablet by mouth daily, Disp: , Rfl:     calcium polycarbophil (FIBERCON) 625 mg tablet, Take 625 mg by mouth 2 (two) times a day, Disp: , Rfl:     clindamycin (CLINDAGEL) 1 % gel, if needed, Disp: , Rfl: 0    Icosapent Ethyl 1 g CAPS, TAKE 2 CAPSULES IN THE MORNING AND 2 CAPSULES AT NIGHT, Disp: 360 capsule, Rfl: 3    Iron-Vitamin C (VITRON-C)  MG TABS, Take one pill twice daily (Patient taking differently: in the morning Take one pill twice daily), Disp: 60 tablet, Rfl: 1    Jardiance 25 MG TABS, TAKE 1 TABLET DAILY, Disp: 90 tablet, Rfl: 3    levocetirizine (XYZAL) 5 MG tablet, Take 1 tablet (5 mg total) by mouth every evening, Disp: 90 tablet, Rfl: 1    losartan (COZAAR) 100 MG tablet, TAKE 1 TABLET DAILY, Disp: 90 tablet, Rfl: 3    mometasone (ELOCON) 0.1 % ointment, APPLY TO DRY SKIN ON LEGS NIGHTLY IF NEEDED, Disp: , Rfl:     Multiple Vitamins-Minerals (CENTRUM ADULTS PO), Take 1 tablet by mouth daily, Disp: , Rfl:     pantoprazole (PROTONIX) 40 mg tablet, TAKE 1 TABLET DAILY, Disp: 90 tablet, Rfl: 1    potassium chloride (K-DUR,KLOR-CON) 20 mEq tablet, Take 1 tablet (20 mEq total) by mouth daily,  "Disp: 90 tablet, Rfl: 1    rosuvastatin (CRESTOR) 5 mg tablet, TAKE 1 TABLET DAILY, Disp: 90 tablet, Rfl: 3    SODIUM FLUORIDE, DENTAL RINSE, 0.2 % SOLN, swish 10 milliliter  IN MOUTH for 1 MINUTE THEN SPIT once daily at bedtime, Disp: , Rfl:     TURMERIC PO, Take by mouth in the morning, Disp: , Rfl:     VITAMIN D PO, Take by mouth in the morning, Disp: , Rfl:     ALLERGIES:  Allergies   Allergen Reactions    Augmentin [Amoxicillin-Pot Clavulanate] Diarrhea    Ciprofloxacin Diarrhea       REVIEW OF SYSTEMS:  Pertinent items are noted in HPI.  A comprehensive review of systems was negative.    LABS:  HgA1c:   Lab Results   Component Value Date    HGBA1C 6.2 02/05/2024     BMP:   Lab Results   Component Value Date    CALCIUM 9.2 03/11/2024    K 4.5 03/11/2024    CO2 27 03/11/2024     03/11/2024    BUN 11 03/11/2024    CREATININE 0.97 03/11/2024         _____________________________________________________  PHYSICAL EXAMINATION:  Vital signs: /77   Pulse 63   Ht 5' 10\" (1.778 m)   Wt 110 kg (243 lb)   BMI 34.87 kg/m²   General: well developed and well nourished, alert, oriented times 3, and appears comfortable  Psychiatric: Normal  HEENT: Trachea Midline, No torticollis  Cardiovascular: No discernable arrhythmia  Pulmonary: No wheezing or stridor  Abdomen: No rebound or guarding  Extremities: No peripheral edema  Skin: No masses, erythema, lacerations, fluctation, ulcerations  Neurovascular: Sensation Intact to the Median, Ulnar, Radial Nerve, Motor Intact to the Median, Ulnar, Radial Nerve, and Pulses Intact    MUSCULOSKELETAL EXAMINATION:  left long and ring finger:  Positive palpable nodule over the A1 pulley.  Positive tenderness to palpation over A1 pulley. Negative catching. Positive crepitation  Full ROM  Positive TTP long finger base proximal phalanx.       _____________________________________________________  STUDIES REVIEWED:  Images were reviewed in PACS by Dr. Womack and demonstrate: left " hand x-rays demonstrates no fracture or dislocation, lesion base of the long finger proximal phalanx      PROCEDURES PERFORMED:  Procedures  No Procedures performed today  Scribe Attestation      I,:  Yarelis Vargas am acting as a scribe while in the presence of the attending physician.:       I,:  Kunal Womack MD personally performed the services described in this documentation    as scribed in my presence.:

## 2024-05-02 ENCOUNTER — TELEPHONE (OUTPATIENT)
Age: 69
End: 2024-05-02

## 2024-05-08 ENCOUNTER — HOSPITAL ENCOUNTER (OUTPATIENT)
Dept: MRI IMAGING | Facility: HOSPITAL | Age: 69
Discharge: HOME | End: 2024-05-08
Attending: ORTHOPAEDIC SURGERY
Payer: MEDICARE

## 2024-05-08 ENCOUNTER — APPOINTMENT (OUTPATIENT)
Dept: RADIOLOGY | Facility: HOSPITAL | Age: 69
End: 2024-05-08
Payer: MEDICARE

## 2024-05-08 DIAGNOSIS — S69.92XA INJURY OF LEFT HAND, INITIAL ENCOUNTER: ICD-10-CM

## 2024-05-08 DIAGNOSIS — M89.9 BONE LESION: ICD-10-CM

## 2024-05-08 PROCEDURE — 73218 MRI UPPER EXTREMITY W/O DYE: CPT

## 2024-05-15 ENCOUNTER — OFFICE VISIT (OUTPATIENT)
Dept: OBGYN CLINIC | Facility: HOSPITAL | Age: 69
End: 2024-05-15
Payer: MEDICARE

## 2024-05-15 VITALS
HEART RATE: 66 BPM | SYSTOLIC BLOOD PRESSURE: 132 MMHG | HEIGHT: 70 IN | WEIGHT: 239.2 LBS | BODY MASS INDEX: 34.24 KG/M2 | DIASTOLIC BLOOD PRESSURE: 81 MMHG

## 2024-05-15 DIAGNOSIS — M79.642 HAND PAIN, LEFT: Primary | ICD-10-CM

## 2024-05-15 PROCEDURE — 99214 OFFICE O/P EST MOD 30 MIN: CPT | Performed by: ORTHOPAEDIC SURGERY

## 2024-05-15 NOTE — PROGRESS NOTES
ASSESSMENT/PLAN:    Assessment:   Left long finger bone contusion     Plan:   Examination and MRI are consistent with a bone contusion.  Swelling and pain can persist for up to 1 year.  Patient does have underlying degenerative changes that can contribute to his pain.    Follow Up:  PRN    _____________________________________________________  CHIEF COMPLAINT:  Chief Complaint   Patient presents with    Left Hand - Pain     Long finger  MRI - 5/8/24         SUBJECTIVE:  Michael Alejandro is a 68 y.o. male who presents to discuss the finding of is left long finger MRI.  There was a concern for possible lesion seen on x-ray. Patient reports a trip at home and used his left hand to brace against the wall about 2 mos ago in March 2024.     PAST MEDICAL HISTORY:  Past Medical History:   Diagnosis Date    Chronic kidney disease     CPAP (continuous positive airway pressure) dependence     Diabetes (HCC)     LAST ASSESSED: 7/23/14    Diabetes mellitus (HCC)     Edema     LAST ASSESSED:6/12/12    Gait instability 2/5/2024    GERD (gastroesophageal reflux disease)     Hyperlipidemia     Hypertension     Kidney stone     Morbid obesity due to excess calories (HCC)     LAST ASSESSED: 6/12/12    RBBB     Sleep apnea, obstructive        PAST SURGICAL HISTORY:  Past Surgical History:   Procedure Laterality Date    BACK SURGERY      L5    CHOLECYSTECTOMY      COLONOSCOPY      EGD      FOOT SURGERY Right     many surgeries    GASTRIC RESTRICTION SURGERY      GASTRIC STAPLING FOR MORBID OBESITY LAPAROSCOPY W/ [id number]    HEMORRHOID SURGERY N/A 1/5/2024    Procedure: vs THD;  Surgeon: SILAS Logan MD;  Location: [facility] MAIN OR;  Service: Colorectal    KIDNEY STONE SURGERY      SHOULDER SURGERY Right     UVULOPALATOPLASTY         FAMILY HISTORY:  Family History   Problem Relation Age of Onset    COPD Mother     Heart disease Mother     Mental illness Mother     Drug abuse Mother     Diabetes Father     Mental illness Father      Drug abuse Father     Lung cancer Father     Diabetes Sister        SOCIAL HISTORY:  Social History     Tobacco Use    Smoking status: Former     Current packs/day: 0.00     Average packs/day: 2.0 packs/day for 15.0 years (30.0 ttl pk-yrs)     Types: Cigarettes     Start date:      Quit date:      Years since quittin.3    Smokeless tobacco: Never   Vaping Use    Vaping status: Never Used   Substance Use Topics    Alcohol use: No    Drug use: Yes     Frequency: 7.0 times per week     Types: Marijuana     Comment: smokes       MEDICATIONS:    Current Outpatient Medications:     amLODIPine (NORVASC) 5 mg tablet, TAKE 1 TABLET DAILY, Disp: 90 tablet, Rfl: 3    buPROPion (WELLBUTRIN XL) 300 mg 24 hr tablet, TAKE 1 TABLET DAILY, Disp: 90 tablet, Rfl: 3    calcium citrate-Vitamin D 200 mg-250 units, Take 1 tablet by mouth daily, Disp: , Rfl:     calcium polycarbophil (FIBERCON) 625 mg tablet, Take 625 mg by mouth 2 (two) times a day, Disp: , Rfl:     clindamycin (CLINDAGEL) 1 % gel, if needed, Disp: , Rfl: 0    Icosapent Ethyl 1 g CAPS, TAKE 2 CAPSULES IN THE MORNING AND 2 CAPSULES AT NIGHT, Disp: 360 capsule, Rfl: 3    Iron-Vitamin C (VITRON-C)  MG TABS, Take one pill twice daily (Patient taking differently: in the morning Take one pill twice daily), Disp: 60 tablet, Rfl: 1    Jardiance 25 MG TABS, TAKE 1 TABLET DAILY, Disp: 90 tablet, Rfl: 3    levocetirizine (XYZAL) 5 MG tablet, Take 1 tablet (5 mg total) by mouth every evening, Disp: 90 tablet, Rfl: 1    losartan (COZAAR) 100 MG tablet, TAKE 1 TABLET DAILY, Disp: 90 tablet, Rfl: 3    mometasone (ELOCON) 0.1 % ointment, APPLY TO DRY SKIN ON LEGS NIGHTLY IF NEEDED, Disp: , Rfl:     Multiple Vitamins-Minerals (CENTRUM ADULTS PO), Take 1 tablet by mouth daily, Disp: , Rfl:     pantoprazole (PROTONIX) 40 mg tablet, TAKE 1 TABLET DAILY, Disp: 90 tablet, Rfl: 1    potassium chloride (K-DUR,KLOR-CON) 20 mEq tablet, Take 1 tablet (20 mEq total) by mouth  "daily, Disp: 90 tablet, Rfl: 1    rosuvastatin (CRESTOR) 5 mg tablet, TAKE 1 TABLET DAILY, Disp: 90 tablet, Rfl: 3    SODIUM FLUORIDE, DENTAL RINSE, 0.2 % SOLN, swish 10 milliliter  IN MOUTH for 1 MINUTE THEN SPIT once daily at bedtime, Disp: , Rfl:     TURMERIC PO, Take by mouth in the morning, Disp: , Rfl:     VITAMIN D PO, Take by mouth in the morning, Disp: , Rfl:     ALLERGIES:  Allergies   Allergen Reactions    Augmentin [Amoxicillin-Pot Clavulanate] Diarrhea    Ciprofloxacin Diarrhea       REVIEW OF SYSTEMS:  Pertinent items are noted in HPI.  A comprehensive review of systems was negative.    LABS:  HgA1c:   Lab Results   Component Value Date    HGBA1C 6.2 02/05/2024     BMP:   Lab Results   Component Value Date    CALCIUM 9.2 03/11/2024    K 4.5 03/11/2024    CO2 27 03/11/2024     03/11/2024    BUN 11 03/11/2024    CREATININE 0.97 03/11/2024           _____________________________________________________  PHYSICAL EXAMINATION:  Vital signs: Ht 5' 10\" (1.778 m)   BMI 34.87 kg/m²   General: well developed and well nourished, alert, oriented times 3, and appears comfortable  Psychiatric: Normal  HEENT: Trachea Midline, No torticollis  Cardiovascular: No discernable arrhythmia  Pulmonary: No wheezing or stridor  Abdomen: No rebound or guarding  Extremities: No peripheral edema  Skin: No masses, erythema, lacerations, fluctation, ulcerations  Neurovascular: Sensation Intact to the Median, Ulnar, Radial Nerve, Motor Intact to the Median, Ulnar, Radial Nerve, and Pulses Intact    MUSCULOSKELETAL EXAMINATION:  left long and ring finger:  Positive palpable nodule over the A1 pulley.  Positive tenderness to palpation over A1 pulley. Negative catching. Positive crepitation  Full ROM  Positive TTP long finger base proximal phalanx.     _____________________________________________________  STUDIES REVIEWED:  Images were reviewed in PACS by Dr. Womack and demonstrate:  MRI left long finger demonstrates lesion at " the base of the ring finger proximal phalanx is a bone island       PROCEDURES PERFORMED:  Procedures  No Procedures performed today  Scribe Attestation      I,:  Yarelis Vargas am acting as a scribe while in the presence of the attending physician.:       I,:  Kunal Womack MD personally performed the services described in this documentation    as scribed in my presence.:

## 2024-06-01 DIAGNOSIS — F32.A DEPRESSION, UNSPECIFIED DEPRESSION TYPE: ICD-10-CM

## 2024-06-01 RX ORDER — BUPROPION HYDROCHLORIDE 300 MG/1
TABLET ORAL
Qty: 90 TABLET | Refills: 3 | Status: SHIPPED | OUTPATIENT
Start: 2024-06-01

## 2024-06-26 DIAGNOSIS — E87.6 HYPOKALEMIA: ICD-10-CM

## 2024-06-26 RX ORDER — POTASSIUM CHLORIDE 20 MEQ/1
20 TABLET, EXTENDED RELEASE ORAL DAILY
Qty: 90 TABLET | Refills: 1 | Status: SHIPPED | OUTPATIENT
Start: 2024-06-26

## 2024-07-16 DIAGNOSIS — I10 HYPERTENSION, UNSPECIFIED TYPE: ICD-10-CM

## 2024-07-17 ENCOUNTER — TELEPHONE (OUTPATIENT)
Age: 69
End: 2024-07-17

## 2024-07-17 RX ORDER — EMPAGLIFLOZIN 25 MG/1
TABLET, FILM COATED ORAL
Qty: 100 TABLET | Refills: 1 | Status: SHIPPED | OUTPATIENT
Start: 2024-07-17

## 2024-07-17 NOTE — TELEPHONE ENCOUNTER
Patient states he has had to go to the dentist back to back lately. They told him his acid reflux is affecting his teeth. He states he is on medication for acid reflux and is wondering if it could be worsening or if his dosage may need an increase. He would like to know if an endoscopy could be ordered to assess the reflux. Patient requests a call back to further advise.

## 2024-07-19 ENCOUNTER — TELEPHONE (OUTPATIENT)
Dept: GASTROENTEROLOGY | Facility: CLINIC | Age: 69
End: 2024-07-19

## 2024-07-19 NOTE — TELEPHONE ENCOUNTER
LMOM for patient to phone back and schedule a fup appt with either Graciela Villalpando or Amarilis

## 2024-07-23 DIAGNOSIS — K22.70 BARRETT'S ESOPHAGUS WITHOUT DYSPLASIA: ICD-10-CM

## 2024-07-23 RX ORDER — PANTOPRAZOLE SODIUM 40 MG/1
TABLET, DELAYED RELEASE ORAL
Qty: 100 TABLET | Refills: 1 | Status: SHIPPED | OUTPATIENT
Start: 2024-07-23

## 2024-07-30 ENCOUNTER — TELEPHONE (OUTPATIENT)
Dept: INTERNAL MEDICINE CLINIC | Facility: CLINIC | Age: 69
End: 2024-07-30

## 2024-08-19 ENCOUNTER — OFFICE VISIT (OUTPATIENT)
Dept: FAMILY MEDICINE CLINIC | Facility: CLINIC | Age: 69
End: 2024-08-19
Payer: MEDICARE

## 2024-08-19 VITALS
RESPIRATION RATE: 18 BRPM | TEMPERATURE: 97.9 F | DIASTOLIC BLOOD PRESSURE: 76 MMHG | HEIGHT: 70 IN | WEIGHT: 243 LBS | SYSTOLIC BLOOD PRESSURE: 124 MMHG | OXYGEN SATURATION: 97 % | BODY MASS INDEX: 34.79 KG/M2 | HEART RATE: 72 BPM

## 2024-08-19 DIAGNOSIS — G47.33 OSA (OBSTRUCTIVE SLEEP APNEA): ICD-10-CM

## 2024-08-19 DIAGNOSIS — M81.0 OSTEOPOROSIS, UNSPECIFIED OSTEOPOROSIS TYPE, UNSPECIFIED PATHOLOGICAL FRACTURE PRESENCE: ICD-10-CM

## 2024-08-19 DIAGNOSIS — K22.70 BARRETT'S ESOPHAGUS WITHOUT DYSPLASIA: ICD-10-CM

## 2024-08-19 DIAGNOSIS — Z00.00 MEDICARE ANNUAL WELLNESS VISIT, SUBSEQUENT: Primary | ICD-10-CM

## 2024-08-19 DIAGNOSIS — S69.92XD INJURY OF LEFT HAND, SUBSEQUENT ENCOUNTER: ICD-10-CM

## 2024-08-19 DIAGNOSIS — I10 PRIMARY HYPERTENSION: ICD-10-CM

## 2024-08-19 DIAGNOSIS — Z13.6 SCREENING FOR AAA (ABDOMINAL AORTIC ANEURYSM): ICD-10-CM

## 2024-08-19 DIAGNOSIS — E11.29 TYPE 2 DIABETES MELLITUS WITH MICROALBUMINURIA, WITHOUT LONG-TERM CURRENT USE OF INSULIN (HCC): ICD-10-CM

## 2024-08-19 DIAGNOSIS — D53.0 ANEMIA DUE TO PROTEIN DEFICIENCY: ICD-10-CM

## 2024-08-19 DIAGNOSIS — R80.9 TYPE 2 DIABETES MELLITUS WITH MICROALBUMINURIA, WITHOUT LONG-TERM CURRENT USE OF INSULIN (HCC): ICD-10-CM

## 2024-08-19 LAB — SL AMB POCT HEMOGLOBIN AIC: 6.2 (ref ?–6.5)

## 2024-08-19 PROCEDURE — 96372 THER/PROPH/DIAG INJ SC/IM: CPT | Performed by: NURSE PRACTITIONER

## 2024-08-19 PROCEDURE — 83036 HEMOGLOBIN GLYCOSYLATED A1C: CPT | Performed by: NURSE PRACTITIONER

## 2024-08-19 PROCEDURE — G0439 PPPS, SUBSEQ VISIT: HCPCS | Performed by: NURSE PRACTITIONER

## 2024-08-19 PROCEDURE — 99214 OFFICE O/P EST MOD 30 MIN: CPT | Performed by: NURSE PRACTITIONER

## 2024-08-19 RX ORDER — PANTOPRAZOLE SODIUM 40 MG/1
40 TABLET, DELAYED RELEASE ORAL EVERY 12 HOURS
Start: 2024-08-19

## 2024-08-19 RX ORDER — BACILLUS COAGULANS 1B CELL
CAPSULE ORAL
Start: 2024-08-19

## 2024-08-19 NOTE — PROGRESS NOTES
Ambulatory Visit  Name: Michael Alejandro      : 1955      MRN: [id number]  Encounter Provider: TIFFANY Hernandez  Encounter Date: 2024   Encounter department: [place] [id number] [address]    Assessment & Plan   1. Medicare annual wellness visit, subsequent  2. Type 2 diabetes mellitus with microalbuminuria, without long-term current use of insulin ([facility])  Assessment & Plan:    Lab Results   Component Value Date    HGBA1C 6.2 2024   Diabetes well-controlled.  To continue current medications.  Counseled the importance of consuming a low-carb diet and engaging in daily physical activity as tolerated.  Orders:  -     POCT hemoglobin A1c  -     Lipid Panel with Direct LDL reflex; Future  -     TSH, 3rd generation with Free T4 reflex; Future  -     CBC and differential; Future  -     Comprehensive metabolic panel; Future  3. Anemia due to protein deficiency  Assessment & Plan:  To obtain CBC.  Will call with results.  Orders:  -     Iron-Vitamin C (Vitron-C)  MG TABS; Daily  -     Iron Panel (Includes Ferritin, Iron Sat%, Iron, and TIBC); Future  4. Ugalde's esophagus without dysplasia  -     pantoprazole (PROTONIX) 40 mg tablet; Take 1 tablet (40 mg total) by mouth every 12 (twelve) hours  5. Osteoporosis, unspecified osteoporosis type, unspecified pathological fracture presence  Assessment & Plan:  To continue Prolia every 6 months.  To continue calcium and vitamin D  Orders:  -     denosumab (PROLIA) subcutaneous injection 60 mg  6. Primary hypertension  Assessment & Plan:  Blood pressure stable, to continue current antihypertensive regimen.  Counseled on the importance of maintaining a healthy weight and consuming a low-sodium diet.   7. DOE (obstructive sleep apnea)  8. Screening for AAA (abdominal aortic aneurysm)  -     US abdominal aorta screening aaa; Future; Expected date: 2024  9. Injury of left hand, subsequent encounter  Assessment &  Plan:  Reviewed recent x-ray, MRI, and consult with hand specialist.  Patient was advised by specialist that it may take more time to heal as a bone contusion was noted on imaging.  Advised to continue to monitor symptoms closely.      Depression Screening and Follow-up Plan: Patient was screened for depression during today's encounter. They screened negative with a PHQ-2 score of 0.    Falls Plan of Care: balance, strength, and gait training instructions were provided.       Preventive health issues were discussed with patient, and age appropriate screening tests were ordered as noted in patient's After Visit Summary. Personalized health advice and appropriate referrals for health education or preventive services given if needed, as noted in patient's After Visit Summary.    History of Present Illness     Michael presents for an AWV.  He was recently at the dentist and had an increase in cavities.  He was advised that this was due to worsening of acid reflux.  His gastroenterologist increased his Protonix to 80 mg a day.  He has an upcoming appointment tomorrow for further evaluation.       Patient Care Team:  TIFFANY Hernandez as PCP - General (Family Medicine)  Jeffrey Handy III, MD as Consulting Physician (Gastroenterology)  Elian Parsons as Consulting Physician (Optometry)  Nick Bailey MD as Consulting Physician (Nephrology)  Adolfo Headley PA-C    Review of Systems   Constitutional: Negative.    HENT: Negative.     Eyes: Negative.    Respiratory: Negative.     Cardiovascular: Negative.    Gastrointestinal: Negative.    Endocrine: Negative.    Genitourinary: Negative.    Musculoskeletal:         Intermittent left 4th digit pain   Skin: Negative.    Allergic/Immunologic: Negative.    Neurological: Negative.    Hematological: Negative.    Psychiatric/Behavioral: Negative.       Medical History Reviewed by provider this encounter:  Tobacco  Allergies  Meds  Problems  Med Hx  Surg Hx  Fam Hx        Annual Wellness Visit Questionnaire   Michael is here for his Subsequent Wellness visit. Last Medicare Wellness visit information reviewed, patient interviewed and updates made to the record today.      Health Risk Assessment:   Patient rates overall health as good. Patient feels that their physical health rating is same. Patient is satisfied with their life. Eyesight was rated as same. Hearing was rated as same. Patient feels that their emotional and mental health rating is same. Patients states they are never, rarely angry. Patient states they are never, rarely unusually tired/fatigued. Pain experienced in the last 7 days has been none. Patient states that he has experienced no weight loss or gain in last 6 months.     Depression Screening:   PHQ-2 Score: 0      Fall Risk Screening:   In the past year, patient has experienced: no history of falling in past year      Home Safety:  Patient does not have trouble with stairs inside or outside of their home. Patient has working smoke alarms and has working carbon monoxide detector. Home safety hazards include: none.     Nutrition:   Current diet is Regular.     Medications:   Patient is not currently taking any over-the-counter supplements. Patient is able to manage medications.     Activities of Daily Living (ADLs)/Instrumental Activities of Daily Living (IADLs):   Walk and transfer into and out of bed and chair?: Yes  Dress and groom yourself?: Yes    Bathe or shower yourself?: Yes    Feed yourself? Yes  Do your laundry/housekeeping?: Yes  Manage your money, pay your bills and track your expenses?: Yes  Make your own meals?: Yes    Do your own shopping?: Yes    Previous Hospitalizations:   Any hospitalizations or ED visits within the last 12 months?: No      Advance Care Planning:   Living will: No    Advanced directive counseling given: Yes    Patient declined ACP directive: Yes      Cognitive Screening:   Provider or family/friend/caregiver concerned regarding  cognition?: No    PREVENTIVE SCREENINGS      Cardiovascular Screening:    General: Screening Not Indicated and History Lipid Disorder      Diabetes Screening:     General: Screening Not Indicated and History Diabetes      Colorectal Cancer Screening:     General: Screening Current      Prostate Cancer Screening:    General: Screening Current      Osteoporosis Screening:    General: Screening Not Indicated and History Osteoporosis      Abdominal Aortic Aneurysm (AAA) Screening:    Risk factors include: age between 65-76 yo and tobacco use        Lung Cancer Screening:     General: Screening Not Indicated      Hepatitis C Screening:    General: Screening Current    Screening, Brief Intervention, and Referral to Treatment (SBIRT)    Screening  Typical number of drinks in a day: 0  Typical number of drinks in a week: 0  Interpretation: Low risk drinking behavior.    Single Item Drug Screening:  How often have you used an illegal drug (including marijuana) or a prescription medication for non-medical reasons in the past year? never    Single Item Drug Screen Score: 0  Interpretation: Negative screen for possible drug use disorder    Other Counseling Topics:   Car/seat belt/driving safety, skin self-exam, sunscreen and calcium and vitamin D intake and regular weightbearing exercise.     Social Determinants of Health     Financial Resource Strain: Low Risk  (8/3/2023)    Overall Financial Resource Strain (CARDIA)     Difficulty of Paying Living Expenses: Not very hard   Food Insecurity: No Food Insecurity (8/19/2024)    Hunger Vital Sign     Worried About Running Out of Food in the Last Year: Never true     Ran Out of Food in the Last Year: Never true   Transportation Needs: No Transportation Needs (8/19/2024)    PRAPARE - Transportation     Lack of Transportation (Medical): No     Lack of Transportation (Non-Medical): No   Housing Stability: Low Risk  (8/19/2024)    Housing Stability Vital Sign     Unable to Pay for  "Housing in the Last Year: No     Number of Times Moved in the Last Year: 1     Homeless in the Last Year: No   Utilities: Not At Risk (8/19/2024)    [facility] Utilities     Threatened with loss of utilities: No     No results found.    Objective     /76 (BP Location: Left arm, Patient Position: Sitting, Cuff Size: Large)   Pulse 72   Temp 97.9 °F (36.6 °C) (Tympanic)   Resp 18   Ht 5' 10\" (1.778 m)   Wt 110 kg (243 lb)   SpO2 97%   BMI 34.87 kg/m²     Physical Exam  Vitals and nursing note reviewed.   Constitutional:       General: He is not in acute distress.     Appearance: Normal appearance. He is well-developed.   HENT:      Head: Normocephalic and atraumatic.      Right Ear: Tympanic membrane, ear canal and external ear normal.      Left Ear: Tympanic membrane, ear canal and external ear normal.      Nose: Nose normal.      Mouth/Throat:      Mouth: Mucous membranes are moist.      Pharynx: Oropharynx is clear. No oropharyngeal exudate.   Eyes:      General: Lids are normal.      Conjunctiva/sclera: Conjunctivae normal.      Pupils: Pupils are equal, round, and reactive to light.   Cardiovascular:      Rate and Rhythm: Normal rate and regular rhythm.      Heart sounds: Normal heart sounds. No murmur heard.  Pulmonary:      Effort: Pulmonary effort is normal. No respiratory distress.      Breath sounds: Normal breath sounds. No stridor. No wheezing, rhonchi or rales.   Chest:      Chest wall: No tenderness.   Abdominal:      General: Bowel sounds are normal. There is no distension.      Palpations: Abdomen is soft. There is no mass.      Tenderness: There is no abdominal tenderness. There is no guarding or rebound.      Hernia: No hernia is present.   Musculoskeletal:         General: No deformity. Normal range of motion.      Cervical back: Normal range of motion and neck supple.      Comments: Presence of mild swelling in left fourth digit.  Patient has difficulty completely straightening this finger. "   Lymphadenopathy:      Cervical: No cervical adenopathy.   Skin:     General: Skin is warm and dry.      Capillary Refill: Capillary refill takes less than 2 seconds.   Neurological:      General: No focal deficit present.      Mental Status: He is alert and oriented to person, place, and time.   Psychiatric:         Mood and Affect: Mood normal.         Behavior: Behavior normal. Behavior is cooperative.         Thought Content: Thought content normal.         Judgment: Judgment normal.       Administrative Statements   I have spent a total time of 40 minutes in caring for this patient on the day of the visit/encounter including Diagnostic results, Prognosis, Risks and benefits of tx options, Instructions for management, Patient and family education, Importance of tx compliance, Risk factor reductions, Impressions, Counseling / Coordination of care, Documenting in the medical record, Reviewing / ordering tests, medicine, procedures  , and Obtaining or reviewing history  .

## 2024-08-19 NOTE — PATIENT INSTRUCTIONS
Medicare Preventive Visit Patient Instructions  Thank you for completing your Welcome to Medicare Visit or Medicare Annual Wellness Visit today. Your next wellness visit will be due in one year (8/20/2025).  The screening/preventive services that you may require over the next 5-10 years are detailed below. Some tests may not apply to you based off risk factors and/or age. Screening tests ordered at today's visit but not completed yet may show as past due. Also, please note that scanned in results may not display below.  Preventive Screenings:  Service Recommendations Previous Testing/Comments   Colorectal Cancer Screening  Colonoscopy    Fecal Occult Blood Test (FOBT)/Fecal Immunochemical Test (FIT)  Fecal DNA/Cologuard Test  Flexible Sigmoidoscopy Age: 45-75 years old   Colonoscopy: every 10 years (May be performed more frequently if at higher risk)  OR  FOBT/FIT: every 1 year  OR  Cologuard: every 3 years  OR  Sigmoidoscopy: every 5 years  Screening may be recommended earlier than age 45 if at higher risk for colorectal cancer. Also, an individualized decision between you and your healthcare provider will decide whether screening between the ages of 76-85 would be appropriate. Colonoscopy: 10/05/2023  FOBT/FIT: Not on file  Cologuard: Not on file  Sigmoidoscopy: Not on file    Screening Current     Prostate Cancer Screening Individualized decision between patient and health care provider in men between ages of 55-69   Medicare will cover every 12 months beginning on the day after your 50th birthday PSA: 1.74 ng/mL     Screening Current     Hepatitis C Screening Once for adults born between 1945 and 1965  More frequently in patients at high risk for Hepatitis C Hep C Antibody: 02/08/2019    Screening Current   Diabetes Screening 1-2 times per year if you're at risk for diabetes or have pre-diabetes Fasting glucose: 116 mg/dL (3/11/2024)  A1C: 6.2 (2/5/2024)  Screening Not Indicated  History Diabetes   Cholesterol  Screening Once every 5 years if you don't have a lipid disorder. May order more often based on risk factors. Lipid panel: 11/27/2023  Screening Not Indicated  History Lipid Disorder      Other Preventive Screenings Covered by Medicare:  Abdominal Aortic Aneurysm (AAA) Screening: covered once if your at risk. You're considered to be at risk if you have a family history of AAA or a male between the age of 65-75 who smoking at least 100 cigarettes in your lifetime.  Lung Cancer Screening: covers low dose CT scan once per year if you meet all of the following conditions: (1) Age 55-77; (2) No signs or symptoms of lung cancer; (3) Current smoker or have quit smoking within the last 15 years; (4) You have a tobacco smoking history of at least 20 pack years (packs per day x number of years you smoked); (5) You get a written order from a healthcare provider.  Glaucoma Screening: covered annually if you're considered high risk: (1) You have diabetes OR (2) Family history of glaucoma OR (3)  aged 50 and older OR (4)  American aged 65 and older  Osteoporosis Screening: covered every 2 years if you meet one of the following conditions: (1) Have a vertebral abnormality; (2) On glucocorticoid therapy for more than 3 months; (3) Have primary hyperparathyroidism; (4) On osteoporosis medications and need to assess response to drug therapy.  HIV Screening: covered annually if you're between the age of 15-65. Also covered annually if you are younger than 15 and older than 65 with risk factors for HIV infection. For pregnant patients, it is covered up to 3 times per pregnancy.    Immunizations:  Immunization Recommendations   Influenza Vaccine Annual influenza vaccination during flu season is recommended for all persons aged >= 6 months who do not have contraindications   Pneumococcal Vaccine   * Pneumococcal conjugate vaccine = PCV13 (Prevnar 13), PCV15 (Vaxneuvance), PCV20 (Prevnar 20)  * Pneumococcal  polysaccharide vaccine = PPSV23 (Pneumovax) Adults 19-63 yo with certain risk factors or if 65+ yo  If never received any pneumonia vaccine: recommend Prevnar 20 (PCV20)  Give PCV20 if previously received 1 dose of PCV13 or PPSV23   Hepatitis B Vaccine 3 dose series if at intermediate or high risk (ex: diabetes, end stage renal disease, liver disease)   Respiratory syncytial virus (RSV) Vaccine - COVERED BY MEDICARE PART D  * RSVPreF3 (Arexvy) CDC recommends that adults 60 years of age and older may receive a single dose of RSV vaccine using shared clinical decision-making (SCDM)   Tetanus (Td) Vaccine - COST NOT COVERED BY MEDICARE PART B Following completion of primary series, a booster dose should be given every 10 years to maintain immunity against tetanus. Td may also be given as tetanus wound prophylaxis.   Tdap Vaccine - COST NOT COVERED BY MEDICARE PART B Recommended at least once for all adults. For pregnant patients, recommended with each pregnancy.   Shingles Vaccine (Shingrix) - COST NOT COVERED BY MEDICARE PART B  2 shot series recommended in those 19 years and older who have or will have weakened immune systems or those 50 years and older     Health Maintenance Due:      Topic Date Due    Colorectal Cancer Screening  10/02/2033    Hepatitis C Screening  Completed     Immunizations Due:      Topic Date Due    COVID-19 Vaccine (4 - 2023-24 season) 09/01/2023    Influenza Vaccine (1) 09/01/2024     Advance Directives   What are advance directives?  Advance directives are legal documents that state your wishes and plans for medical care. These plans are made ahead of time in case you lose your ability to make decisions for yourself. Advance directives can apply to any medical decision, such as the treatments you want, and if you want to donate organs.   What are the types of advance directives?  There are many types of advance directives, and each state has rules about how to use them. You may choose a  combination of any of the following:  Living will:  This is a written record of the treatment you want. You can also choose which treatments you do not want, which to limit, and which to stop at a certain time. This includes surgery, medicine, IV fluid, and tube feedings.   Durable power of  for healthcare (DPAHC):  This is a written record that states who you want to make healthcare choices for you when you are unable to make them for yourself. This person, called a proxy, is usually a family member or a friend. You may choose more than 1 proxy.  Do not resuscitate (DNR) order:  A DNR order is used in case your heart stops beating or you stop breathing. It is a request not to have certain forms of treatment, such as CPR. A DNR order may be included in other types of advance directives.  Medical directive:  This covers the care that you want if you are in a coma, near death, or unable to make decisions for yourself. You can list the treatments you want for each condition. Treatment may include pain medicine, surgery, blood transfusions, dialysis, IV or tube feedings, and a ventilator (breathing machine).  Values history:  This document has questions about your views, beliefs, and how you feel and think about life. This information can help others choose the care that you would choose.  Why are advance directives important?  An advance directive helps you control your care. Although spoken wishes may be used, it is better to have your wishes written down. Spoken wishes can be misunderstood, or not followed. Treatments may be given even if you do not want them. An advance directive may make it easier for your family to make difficult choices about your care.   Weight Management   Why it is important to manage your weight:  Being overweight increases your risk of health conditions such as heart disease, high blood pressure, type 2 diabetes, and certain types of cancer. It can also increase your risk for  osteoarthritis, sleep apnea, and other respiratory problems. Aim for a slow, steady weight loss. Even a small amount of weight loss can lower your risk of health problems.  How to lose weight safely:  A safe and healthy way to lose weight is to eat fewer calories and get regular exercise. You can lose up about 1 pound a week by decreasing the number of calories you eat by 500 calories each day.   Healthy meal plan for weight management:  A healthy meal plan includes a variety of foods, contains fewer calories, and helps you stay healthy. A healthy meal plan includes the following:  Eat whole-grain foods more often.  A healthy meal plan should contain fiber. Fiber is the part of grains, fruits, and vegetables that is not broken down by your body. Whole-grain foods are healthy and provide extra fiber in your diet. Some examples of whole-grain foods are whole-wheat breads and pastas, oatmeal, brown rice, and bulgur.  Eat a variety of vegetables every day.  Include dark, leafy greens such as spinach, kale, lennie greens, and mustard greens. Eat yellow and orange vegetables such as carrots, sweet potatoes, and winter squash.   Eat a variety of fruits every day.  Choose fresh or canned fruit (canned in its own juice or light syrup) instead of juice. Fruit juice has very little or no fiber.  Eat low-fat dairy foods.  Drink fat-free (skim) milk or 1% milk. Eat fat-free yogurt and low-fat cottage cheese. Try low-fat cheeses such as mozzarella and other reduced-fat cheeses.  Choose meat and other protein foods that are low in fat.  Choose beans or other legumes such as split peas or lentils. Choose fish, skinless poultry (chicken or turkey), or lean cuts of red meat (beef or pork). Before you cook meat or poultry, cut off any visible fat.   Use less fat and oil.  Try baking foods instead of frying them. Add less fat, such as margarine, sour cream, regular salad dressing and mayonnaise to foods. Eat fewer high-fat foods. Some  examples of high-fat foods include french fries, doughnuts, ice cream, and cakes.  Eat fewer sweets.  Limit foods and drinks that are high in sugar. This includes candy, cookies, regular soda, and sweetened drinks.  Exercise:  Exercise at least 30 minutes per day on most days of the week. Some examples of exercise include walking, biking, dancing, and swimming. You can also fit in more physical activity by taking the stairs instead of the elevator or parking farther away from stores. Ask your healthcare provider about the best exercise plan for you.      © Copyright [employer] 2018 Information is for End User's use only and may not be sold, redistributed or otherwise used for commercial purposes. All illustrations and images included in CareNotes® are the copyrighted property of A.D.A.M., Inc. or [employer]    To begin Tumeric 500 mg BID  Tart Cherry 1000 mg daily  Glucosamine and Chondroitin 2-3 tabs a day.

## 2024-08-19 NOTE — ASSESSMENT & PLAN NOTE
Lab Results   Component Value Date    HGBA1C 6.2 08/19/2024   Diabetes well-controlled.  To continue current medications.  Counseled the importance of consuming a low-carb diet and engaging in daily physical activity as tolerated.

## 2024-08-19 NOTE — ASSESSMENT & PLAN NOTE
Reviewed recent x-ray, MRI, and consult with hand specialist.  Patient was advised by specialist that it may take more time to heal as a bone contusion was noted on imaging.  Advised to continue to monitor symptoms closely.

## 2024-08-21 ENCOUNTER — OFFICE VISIT (OUTPATIENT)
Dept: GASTROENTEROLOGY | Facility: CLINIC | Age: 69
End: 2024-08-21
Payer: MEDICARE

## 2024-08-21 VITALS
SYSTOLIC BLOOD PRESSURE: 114 MMHG | TEMPERATURE: 97.1 F | OXYGEN SATURATION: 97 % | DIASTOLIC BLOOD PRESSURE: 64 MMHG | HEIGHT: 70 IN | HEART RATE: 79 BPM | WEIGHT: 245 LBS | BODY MASS INDEX: 35.07 KG/M2

## 2024-08-21 DIAGNOSIS — K22.70 BARRETT'S ESOPHAGUS WITHOUT DYSPLASIA: ICD-10-CM

## 2024-08-21 PROCEDURE — 99214 OFFICE O/P EST MOD 30 MIN: CPT | Performed by: PHYSICIAN ASSISTANT

## 2024-08-21 RX ORDER — IBUPROFEN 800 MG/1
800 TABLET, FILM COATED ORAL EVERY 6 HOURS PRN
COMMUNITY
Start: 2024-08-13 | End: 2024-08-27

## 2024-08-21 RX ORDER — AMOXICILLIN 500 MG/1
CAPSULE ORAL
COMMUNITY
Start: 2024-08-13

## 2024-08-21 NOTE — PROGRESS NOTES
[facility] - Outpatient Follow-up Note  Michael Alejandro 69 y.o. male MRN: [id number]  Encounter: [id number]          ASSESSMENT AND PLAN:      1. Ugalde's esophagus without dysplasia  - He has known Ugalde's esophagus presenting due to concerns from his dentist that his reflux may be leading to enamel damage and cavities as well as symptoms of uncontrolled heartburn and frequent belching  - Continue protonix 40 mg BID, we increased this from daily dosing a few weeks ago when he called and he hasn't noticed any improvement in his symptom so far  - May need to have him start elevating the head of his bed and follow an anti-reflux diet more closely pending EGD findings  - Schedule EGD for further evaluation and to rebiopsy Ugalde's    ______________________________________________________________________    SUBJECTIVE:  Michael is a pleasant 70 yo M with a PMH of DOE on CPAP, RBBB, HTN, Trudi-en-Y bypass in 2012, osteoporosis, Ugalde's esophagus, presenting to schedule repeat EGD for his Ugalde's esophagus as well as for constant belching.  He was recently at the dentist in July and he reports that every 6 months he is diagnosed with multiple cavities and is dentist felt like maybe his acid reflux was contributing and causing enamel breakdown and damage.  Michael reports that he has constant belching throughout the day and will have heartburn after eating but denies any nighttime symptoms such as a regurgitation or vomiting.  He denies dysphagia.  His last EGD was in July 2021 and this showed stable Ugalde's esophagus without dysplasia.  He denies any abdominal pain.  He reports no issues with his bowel habits or with bleeding.  He had a colonoscopy last year for evaluation of significant bleeding per rectum was found to have diverticulosis and large internal hemorrhoids so he was sent to colorectal and had a transanal hemorrhoidal pexy and dearterialization with Dr. Logan. He has done  really well since that time without any recurrent bleeding.      REVIEW OF SYSTEMS IS OTHERWISE NEGATIVE.      Historical Information   Past Medical History:   Diagnosis Date    Chronic kidney disease     CPAP (continuous positive airway pressure) dependence     Diabetes (HCC)     LAST ASSESSED: 14    Diabetes mellitus (HCC)     Edema     LAST ASSESSED:12    Gait instability 2024    GERD (gastroesophageal reflux disease)     Hyperlipidemia     Hypertension     Kidney stone     Morbid obesity due to excess calories (HCC)     LAST ASSESSED: 12    RBBB     Sleep apnea, obstructive      Past Surgical History:   Procedure Laterality Date    BACK SURGERY      L5    CHOLECYSTECTOMY      COLONOSCOPY      EGD      FOOT SURGERY Right     many surgeries    GASTRIC RESTRICTION SURGERY      GASTRIC STAPLING FOR MORBID OBESITY LAPAROSCOPY W/ MARCY-EN-Y    HEMORRHOID SURGERY N/A 2024    Procedure: vs THD;  Surgeon: SILAS Logan MD;  Location: [facility] [place] MA[facility];  Service: Colorectal    KIDNEY STONE SURGERY      SHOULDER SURGERY Right     UVULOPALATOPLASTY       Social History   Social History     Substance and Sexual Activity   Alcohol Use No     Social History     Substance and Sexual Activity   Drug Use Yes    Frequency: 7.0 times per week    Types: Marijuana    Comment: smokes     Social History     Tobacco Use   Smoking Status Former    Current packs/day: 0.00    Average packs/day: 2.0 packs/day for 15.0 years (30.0 ttl pk-yrs)    Types: Cigarettes    Start date: 1971    Quit date:     Years since quittin.6   Smokeless Tobacco Never     Family History   Problem Relation Age of Onset    COPD Mother     Heart disease Mother     Mental illness Mother     Drug abuse Mother     Diabetes Father     Mental illness Father     Drug abuse Father     Lung cancer Father     Diabetes Sister        Meds/Allergies       Current Outpatient Medications:     amLODIPine (NORVASC) 5 mg tablet    amoxicillin  "(AMOXIL) 500 mg capsule    buPROPion (WELLBUTRIN XL) 300 mg 24 hr tablet    calcium citrate-Vitamin D 200 mg-250 units    calcium polycarbophil (FIBERCON) 625 mg tablet    clindamycin (CLINDAGEL) 1 % gel    Empagliflozin (Jardiance) 25 MG TABS    ibuprofen (MOTRIN) 800 mg tablet    Icosapent Ethyl 1 g CAPS    Iron-Vitamin C (Vitron-C)  MG TABS    levocetirizine (XYZAL) 5 MG tablet    losartan (COZAAR) 100 MG tablet    mometasone (ELOCON) 0.1 % ointment    Multiple Vitamins-Minerals (CENTRUM ADULTS PO)    pantoprazole (PROTONIX) 40 mg tablet    potassium chloride (Klor-Con M20) 20 mEq tablet    rosuvastatin (CRESTOR) 5 mg tablet    SODIUM FLUORIDE, DENTAL RINSE, 0.2 % SOLN    TURMERIC PO    VITAMIN D PO    Allergies   Allergen Reactions    Augmentin [Amoxicillin-Pot Clavulanate] Diarrhea    Ciprofloxacin Diarrhea           Objective     Blood pressure 114/64, pulse 79, temperature (!) 97.1 °F (36.2 °C), temperature source Tympanic, height 5' 10\" (1.778 m), weight 111 kg (245 lb), SpO2 97%. Body mass index is 35.15 kg/m².      PHYSICAL EXAM:      General Appearance:   Alert, cooperative, no distress   HEENT:   Normocephalic, atraumatic, anicteric.     Neck:  Supple, symmetrical, trachea midline   Lungs:   Clear to auscultation bilaterally; no rales, rhonchi or wheezing; respirations unlabored    Heart::   Regular rate and rhythm; no murmur, rub, or gallop.   Abdomen:   Soft, non-tender, non-distended; normal bowel sounds; no masses, no organomegaly    Genitalia:   Deferred    Rectal:   Deferred    Extremities:  No cyanosis, clubbing or edema    Pulses:  2+ and symmetric    Skin:  No jaundice, rashes, or lesions    Lymph nodes:  No palpable cervical lymphadenopathy        Lab Results:   No visits with results within 1 Day(s) from this visit.   Latest known visit with results is:   Office Visit on 08/19/2024   Component Date Value    Hemoglobin A1C 08/19/2024 6.2          Radiology Results:   No results found.  "

## 2024-08-21 NOTE — PATIENT INSTRUCTIONS
Scheduled date of EGD(as of today):9/5/24  Physician performing EGD:Ole  Location of EGD:[facility]  Instructions reviewed with patient by:Shantal DIEZ  Clearances:  none    Jardiance 4 day hold

## 2024-08-21 NOTE — H&P (VIEW-ONLY)
[facility] - Outpatient Follow-up Note  Michael Alejandro 69 y.o. male MRN: [id number]  Encounter: [id number]          ASSESSMENT AND PLAN:      1. Ugalde's esophagus without dysplasia  - He has known Ugalde's esophagus presenting due to concerns from his dentist that his reflux may be leading to enamel damage and cavities as well as symptoms of uncontrolled heartburn and frequent belching  - Continue protonix 40 mg BID, we increased this from daily dosing a few weeks ago when he called and he hasn't noticed any improvement in his symptom so far  - May need to have him start elevating the head of his bed and follow an anti-reflux diet more closely pending EGD findings  - Schedule EGD for further evaluation and to rebiopsy Ugalde's    ______________________________________________________________________    SUBJECTIVE:  Michael is a pleasant 68 yo M with a PMH of DOE on CPAP, RBBB, HTN, Trudi-en-Y bypass in 2012, osteoporosis, Ugalde's esophagus, presenting to schedule repeat EGD for his Ugalde's esophagus as well as for constant belching.  He was recently at the dentist in July and he reports that every 6 months he is diagnosed with multiple cavities and is dentist felt like maybe his acid reflux was contributing and causing enamel breakdown and damage.  Michael reports that he has constant belching throughout the day and will have heartburn after eating but denies any nighttime symptoms such as a regurgitation or vomiting.  He denies dysphagia.  His last EGD was in July 2021 and this showed stable Ugalde's esophagus without dysplasia.  He denies any abdominal pain.  He reports no issues with his bowel habits or with bleeding.  He had a colonoscopy last year for evaluation of significant bleeding per rectum was found to have diverticulosis and large internal hemorrhoids so he was sent to colorectal and had a transanal hemorrhoidal pexy and dearterialization with Dr. Logan. He has done  really well since that time without any recurrent bleeding.      REVIEW OF SYSTEMS IS OTHERWISE NEGATIVE.      Historical Information   Past Medical History:   Diagnosis Date    Chronic kidney disease     CPAP (continuous positive airway pressure) dependence     Diabetes (HCC)     LAST ASSESSED: 14    Diabetes mellitus (HCC)     Edema     LAST ASSESSED:12    Gait instability 2024    GERD (gastroesophageal reflux disease)     Hyperlipidemia     Hypertension     Kidney stone     Morbid obesity due to excess calories (HCC)     LAST ASSESSED: 12    RBBB     Sleep apnea, obstructive      Past Surgical History:   Procedure Laterality Date    BACK SURGERY      L5    CHOLECYSTECTOMY      COLONOSCOPY      EGD      FOOT SURGERY Right     many surgeries    GASTRIC RESTRICTION SURGERY      GASTRIC STAPLING FOR MORBID OBESITY LAPAROSCOPY W/ MARCY-EN-Y    HEMORRHOID SURGERY N/A 2024    Procedure: vs THD;  Surgeon: SILAS Logan MD;  Location: [facility] MAIN OR;  Service: Colorectal    KIDNEY STONE SURGERY      SHOULDER SURGERY Right     UVULOPALATOPLASTY       Social History   Social History     Substance and Sexual Activity   Alcohol Use No     Social History     Substance and Sexual Activity   Drug Use Yes    Frequency: 7.0 times per week    Types: Marijuana    Comment: smokes     Social History     Tobacco Use   Smoking Status Former    Current packs/day: 0.00    Average packs/day: 2.0 packs/day for 15.0 years (30.0 ttl pk-yrs)    Types: Cigarettes    Start date: 1971    Quit date:     Years since quittin.6   Smokeless Tobacco Never     Family History   Problem Relation Age of Onset    COPD Mother     Heart disease Mother     Mental illness Mother     Drug abuse Mother     Diabetes Father     Mental illness Father     Drug abuse Father     Lung cancer Father     Diabetes Sister        Meds/Allergies       Current Outpatient Medications:     amLODIPine (NORVASC) 5 mg tablet    amoxicillin  "(AMOXIL) 500 mg capsule    buPROPion (WELLBUTRIN XL) 300 mg 24 hr tablet    calcium citrate-Vitamin D 200 mg-250 units    calcium polycarbophil (FIBERCON) 625 mg tablet    clindamycin (CLINDAGEL) 1 % gel    Empagliflozin (Jardiance) 25 MG TABS    ibuprofen (MOTRIN) 800 mg tablet    Icosapent Ethyl 1 g CAPS    Iron-Vitamin C (Vitron-C)  MG TABS    levocetirizine (XYZAL) 5 MG tablet    losartan (COZAAR) 100 MG tablet    mometasone (ELOCON) 0.1 % ointment    Multiple Vitamins-Minerals (CENTRUM ADULTS PO)    pantoprazole (PROTONIX) 40 mg tablet    potassium chloride (Klor-Con M20) 20 mEq tablet    rosuvastatin (CRESTOR) 5 mg tablet    SODIUM FLUORIDE, DENTAL RINSE, 0.2 % SOLN    TURMERIC PO    VITAMIN D PO    Allergies   Allergen Reactions    Augmentin [Amoxicillin-Pot Clavulanate] Diarrhea    Ciprofloxacin Diarrhea           Objective     Blood pressure 114/64, pulse 79, temperature (!) 97.1 °F (36.2 °C), temperature source Tympanic, height 5' 10\" (1.778 m), weight 111 kg (245 lb), SpO2 97%. Body mass index is 35.15 kg/m².      PHYSICAL EXAM:      General Appearance:   Alert, cooperative, no distress   HEENT:   Normocephalic, atraumatic, anicteric.     Neck:  Supple, symmetrical, trachea midline   Lungs:   Clear to auscultation bilaterally; no rales, rhonchi or wheezing; respirations unlabored    Heart::   Regular rate and rhythm; no murmur, rub, or gallop.   Abdomen:   Soft, non-tender, non-distended; normal bowel sounds; no masses, no organomegaly    Genitalia:   Deferred    Rectal:   Deferred    Extremities:  No cyanosis, clubbing or edema    Pulses:  2+ and symmetric    Skin:  No jaundice, rashes, or lesions    Lymph nodes:  No palpable cervical lymphadenopathy        Lab Results:   No visits with results within 1 Day(s) from this visit.   Latest known visit with results is:   Office Visit on 08/19/2024   Component Date Value    Hemoglobin A1C 08/19/2024 6.2          Radiology Results:   No results found.  "

## 2024-08-27 ENCOUNTER — HOSPITAL ENCOUNTER (OUTPATIENT)
Dept: ULTRASOUND IMAGING | Facility: CLINIC | Age: 69
Discharge: HOME | End: 2024-08-27
Payer: MEDICARE

## 2024-08-27 DIAGNOSIS — Z13.6 SCREENING FOR AAA (ABDOMINAL AORTIC ANEURYSM): ICD-10-CM

## 2024-08-27 PROCEDURE — 76706 US ABDL AORTA SCREEN AAA: CPT

## 2024-09-03 ENCOUNTER — TELEPHONE (OUTPATIENT)
Age: 69
End: 2024-09-03

## 2024-09-03 NOTE — TELEPHONE ENCOUNTER
Elsa from [facility] called to ask Dr. Banda to sign off on the last note.  Elsa said it is a medicare thing that it is signed and that the note agrees to the last finding.    Please fax the note to:    [phone]    Please advise, thank you.    Elsa:  [phone]

## 2024-09-05 ENCOUNTER — ANESTHESIA (OUTPATIENT)
Dept: GASTROENTEROLOGY | Facility: HOSPITAL | Age: 69
End: 2024-09-05

## 2024-09-05 ENCOUNTER — ANESTHESIA EVENT (OUTPATIENT)
Dept: GASTROENTEROLOGY | Facility: HOSPITAL | Age: 69
End: 2024-09-05

## 2024-09-05 ENCOUNTER — HOSPITAL ENCOUNTER (OUTPATIENT)
Dept: GASTROENTEROLOGY | Facility: HOSPITAL | Age: 69
Setting detail: OUTPATIENT SURGERY
End: 2024-09-05
Payer: MEDICARE

## 2024-09-05 VITALS
WEIGHT: 250 LBS | HEART RATE: 66 BPM | TEMPERATURE: 98 F | DIASTOLIC BLOOD PRESSURE: 69 MMHG | BODY MASS INDEX: 35.79 KG/M2 | SYSTOLIC BLOOD PRESSURE: 119 MMHG | OXYGEN SATURATION: 99 % | HEIGHT: 70 IN | RESPIRATION RATE: 16 BRPM

## 2024-09-05 DIAGNOSIS — K22.70 BARRETT'S ESOPHAGUS WITHOUT DYSPLASIA: ICD-10-CM

## 2024-09-05 LAB — GLUCOSE SERPL-MCNC: 142 MG/DL (ref 65–140)

## 2024-09-05 PROCEDURE — 88305 TISSUE EXAM BY PATHOLOGIST: CPT | Performed by: PATHOLOGY

## 2024-09-05 PROCEDURE — 43239 EGD BIOPSY SINGLE/MULTIPLE: CPT | Performed by: INTERNAL MEDICINE

## 2024-09-05 PROCEDURE — 82948 REAGENT STRIP/BLOOD GLUCOSE: CPT

## 2024-09-05 RX ORDER — PROPOFOL 10 MG/ML
INJECTION, EMULSION INTRAVENOUS AS NEEDED
Status: DISCONTINUED | OUTPATIENT
Start: 2024-09-05 | End: 2024-09-05

## 2024-09-05 RX ORDER — SODIUM CHLORIDE, SODIUM LACTATE, POTASSIUM CHLORIDE, CALCIUM CHLORIDE 600; 310; 30; 20 MG/100ML; MG/100ML; MG/100ML; MG/100ML
50 INJECTION, SOLUTION INTRAVENOUS CONTINUOUS
Status: DISCONTINUED | OUTPATIENT
Start: 2024-09-05 | End: 2024-09-09 | Stop reason: HOSPADM

## 2024-09-05 RX ORDER — LIDOCAINE HYDROCHLORIDE 20 MG/ML
INJECTION, SOLUTION EPIDURAL; INFILTRATION; INTRACAUDAL; PERINEURAL AS NEEDED
Status: DISCONTINUED | OUTPATIENT
Start: 2024-09-05 | End: 2024-09-05

## 2024-09-05 RX ADMIN — PROPOFOL 20 MG: 10 INJECTION, EMULSION INTRAVENOUS at 09:35

## 2024-09-05 RX ADMIN — PROPOFOL 20 MG: 10 INJECTION, EMULSION INTRAVENOUS at 09:37

## 2024-09-05 RX ADMIN — LIDOCAINE HYDROCHLORIDE 100 MG: 20 INJECTION, SOLUTION EPIDURAL; INFILTRATION; INTRACAUDAL; PERINEURAL at 09:34

## 2024-09-05 RX ADMIN — SODIUM CHLORIDE, SODIUM LACTATE, POTASSIUM CHLORIDE, AND CALCIUM CHLORIDE 50 ML/HR: .6; .31; .03; .02 INJECTION, SOLUTION INTRAVENOUS at 08:00

## 2024-09-05 RX ADMIN — PROPOFOL 120 MG: 10 INJECTION, EMULSION INTRAVENOUS at 09:34

## 2024-09-05 NOTE — ANESTHESIA POSTPROCEDURE EVALUATION
Post-Op Assessment Note    CV Status:  Stable  Pain Score: 0    Pain management: adequate       Mental Status:  Alert and awake   Hydration Status:  Euvolemic   PONV Controlled:  Controlled   Airway Patency:  Patent     Post Op Vitals Reviewed: Yes    No anethesia notable event occurred.    Staff: CRNA               /65 (09/05/24 0939)    Temp 98 °F (36.7 °C) (09/05/24 0939)    Pulse 67 (09/05/24 0939)   Resp 16 (09/05/24 0939)    SpO2 96 % (09/05/24 0939)

## 2024-09-05 NOTE — ANESTHESIA PREPROCEDURE EVALUATION
Procedure:  EGD    Relevant Problems   CARDIO   (+) Hypertension   (+) Hypertriglyceridemia   (+) Right bundle branch block      ENDO   (+) Type 2 diabetes mellitus with microalbuminuria, without long-term current use of insulin (HCC)      HEMATOLOGY   (+) Anemia due to protein deficiency      PULMONARY   (+) DOE (obstructive sleep apnea)      Upper partial removed  Uses CPAP nightly  Physical Exam    Airway    Mallampati score: II  TM Distance: >3 FB  Neck ROM: full     Dental   Comment: Denies loose teeth     Cardiovascular  Cardiovascular exam normal    Pulmonary  Pulmonary exam normal     Other Findings  Portions of exam deferred due to low yield and/or unknown COVID status      Anesthesia Plan  ASA Score- 2     Anesthesia Type- IV sedation with anesthesia with ASA Monitors.         Additional Monitors:     Airway Plan:            Plan Factors-Exercise tolerance (METS): >4 METS.    Chart reviewed.   Existing labs reviewed. Patient summary reviewed.    Patient is not a current smoker.              Induction- intravenous.    Postoperative Plan-         Informed Consent- Anesthetic plan and risks discussed with patient.  I personally reviewed this patient with the CRNA. Discussed and agreed on the Anesthesia Plan with the CRNA..

## 2024-09-05 NOTE — INTERVAL H&P NOTE
H&P reviewed. After examining the patient I find no changes in the patients condition since the H&P had been written.    Vitals:    09/05/24 0751   BP: 147/71   Pulse: 79   Resp: 17   Temp: 97.7 °F (36.5 °C)   SpO2: 98%

## 2024-09-09 PROCEDURE — 88305 TISSUE EXAM BY PATHOLOGIST: CPT | Performed by: PATHOLOGY

## 2024-09-16 DIAGNOSIS — K22.70 BARRETT'S ESOPHAGUS WITHOUT DYSPLASIA: ICD-10-CM

## 2024-09-16 RX ORDER — PANTOPRAZOLE SODIUM 40 MG/1
40 TABLET, DELAYED RELEASE ORAL EVERY 12 HOURS
Qty: 180 TABLET | Refills: 1 | Status: SHIPPED | OUTPATIENT
Start: 2024-09-16

## 2024-09-16 NOTE — TELEPHONE ENCOUNTER
Reason for call:   [x] Refill   [] Prior Auth  [] Other:     Office:   [x] PCP/Provider - ankur Jimenez  [] Specialty/Provider -     Medication:   Pantoprazole 40 mg, 1 bid,180        Pharmacy:   [facility]    Does the patient have enough for 3 days?   [] Yes   [x] No - Send as HP to POD

## 2024-09-17 ENCOUNTER — TELEPHONE (OUTPATIENT)
Dept: GASTROENTEROLOGY | Facility: CLINIC | Age: 69
End: 2024-09-17

## 2024-09-17 NOTE — TELEPHONE ENCOUNTER
----- Message from Jeffrey Handy MD sent at 9/17/2024  7:19 AM EDT -----  Please review with the patient    Ramiro Rodriguez-     The biopsies of the esophagus shows stable Ugalde's esophagus.  There is no advancement towards cancer.  You will need an endoscopy in 3 years.  Have a great day.

## 2024-09-18 PROBLEM — Z00.00 MEDICARE ANNUAL WELLNESS VISIT, SUBSEQUENT: Status: RESOLVED | Noted: 2022-05-05 | Resolved: 2024-09-18

## 2024-09-20 ENCOUNTER — APPOINTMENT (OUTPATIENT)
Dept: LAB | Facility: HOSPITAL | Age: 69
End: 2024-09-20
Payer: MEDICARE

## 2024-09-20 DIAGNOSIS — D53.0 ANEMIA DUE TO PROTEIN DEFICIENCY: ICD-10-CM

## 2024-09-20 DIAGNOSIS — E11.29 TYPE 2 DIABETES MELLITUS WITH MICROALBUMINURIA, WITHOUT LONG-TERM CURRENT USE OF INSULIN (HCC): ICD-10-CM

## 2024-09-20 DIAGNOSIS — R80.9 TYPE 2 DIABETES MELLITUS WITH MICROALBUMINURIA, WITHOUT LONG-TERM CURRENT USE OF INSULIN (HCC): ICD-10-CM

## 2024-09-20 LAB
ALBUMIN SERPL BCG-MCNC: 4.2 G/DL (ref 3.5–5)
ALP SERPL-CCNC: 74 U/L (ref 34–104)
ALT SERPL W P-5'-P-CCNC: 33 U/L (ref 7–52)
ANION GAP SERPL CALCULATED.3IONS-SCNC: 6 MMOL/L (ref 4–13)
AST SERPL W P-5'-P-CCNC: 21 U/L (ref 13–39)
BASOPHILS # BLD AUTO: 0.05 THOUSANDS/ΜL (ref 0–0.1)
BASOPHILS NFR BLD AUTO: 1 % (ref 0–1)
BILIRUB SERPL-MCNC: 0.76 MG/DL (ref 0.2–1)
BUN SERPL-MCNC: 17 MG/DL (ref 5–25)
CALCIUM SERPL-MCNC: 8.5 MG/DL (ref 8.4–10.2)
CHLORIDE SERPL-SCNC: 109 MMOL/L (ref 96–108)
CHOLEST SERPL-MCNC: 127 MG/DL
CO2 SERPL-SCNC: 25 MMOL/L (ref 21–32)
CREAT SERPL-MCNC: 1.01 MG/DL (ref 0.6–1.3)
EOSINOPHIL # BLD AUTO: 0.33 THOUSAND/ΜL (ref 0–0.61)
EOSINOPHIL NFR BLD AUTO: 6 % (ref 0–6)
ERYTHROCYTE [DISTWIDTH] IN BLOOD BY AUTOMATED COUNT: 12.1 % (ref 11.6–15.1)
FERRITIN SERPL-MCNC: 73 NG/ML (ref 24–336)
GFR SERPL CREATININE-BSD FRML MDRD: 76 ML/MIN/1.73SQ M
GLUCOSE P FAST SERPL-MCNC: 140 MG/DL (ref 65–99)
HCT VFR BLD AUTO: 45.9 % (ref 36.5–49.3)
HDLC SERPL-MCNC: 36 MG/DL
HGB BLD-MCNC: 15.6 G/DL (ref 12–17)
IMM GRANULOCYTES # BLD AUTO: 0.02 THOUSAND/UL (ref 0–0.2)
IMM GRANULOCYTES NFR BLD AUTO: 0 % (ref 0–2)
IRON SATN MFR SERPL: 55 % (ref 15–50)
IRON SERPL-MCNC: 161 UG/DL (ref 50–212)
LDLC SERPL CALC-MCNC: 38 MG/DL (ref 0–100)
LYMPHOCYTES # BLD AUTO: 1.93 THOUSANDS/ΜL (ref 0.6–4.47)
LYMPHOCYTES NFR BLD AUTO: 32 % (ref 14–44)
MCH RBC QN AUTO: 31.1 PG (ref 26.8–34.3)
MCHC RBC AUTO-ENTMCNC: 34 G/DL (ref 31.4–37.4)
MCV RBC AUTO: 91 FL (ref 82–98)
MONOCYTES # BLD AUTO: 0.52 THOUSAND/ΜL (ref 0.17–1.22)
MONOCYTES NFR BLD AUTO: 9 % (ref 4–12)
NEUTROPHILS # BLD AUTO: 3.16 THOUSANDS/ΜL (ref 1.85–7.62)
NEUTS SEG NFR BLD AUTO: 52 % (ref 43–75)
NRBC BLD AUTO-RTO: 0 /100 WBCS
PLATELET # BLD AUTO: 190 THOUSANDS/UL (ref 149–390)
PMV BLD AUTO: 11 FL (ref 8.9–12.7)
POTASSIUM SERPL-SCNC: 4.1 MMOL/L (ref 3.5–5.3)
PROT SERPL-MCNC: 6.2 G/DL (ref 6.4–8.4)
RBC # BLD AUTO: 5.02 MILLION/UL (ref 3.88–5.62)
SODIUM SERPL-SCNC: 140 MMOL/L (ref 135–147)
TIBC SERPL-MCNC: 292 UG/DL (ref 250–450)
TRIGL SERPL-MCNC: 266 MG/DL
TSH SERPL DL<=0.05 MIU/L-ACNC: 1.17 UIU/ML (ref 0.45–4.5)
UIBC SERPL-MCNC: 131 UG/DL (ref 155–355)
WBC # BLD AUTO: 6.01 THOUSAND/UL (ref 4.31–10.16)

## 2024-09-20 PROCEDURE — 83550 IRON BINDING TEST: CPT

## 2024-09-20 PROCEDURE — 80061 LIPID PANEL: CPT

## 2024-09-20 PROCEDURE — 36415 COLL VENOUS BLD VENIPUNCTURE: CPT

## 2024-09-20 PROCEDURE — 83540 ASSAY OF IRON: CPT

## 2024-09-20 PROCEDURE — 85025 COMPLETE CBC W/AUTO DIFF WBC: CPT

## 2024-09-20 PROCEDURE — 84443 ASSAY THYROID STIM HORMONE: CPT

## 2024-09-20 PROCEDURE — 80053 COMPREHEN METABOLIC PANEL: CPT

## 2024-09-20 PROCEDURE — 82728 ASSAY OF FERRITIN: CPT

## 2024-09-24 ENCOUNTER — TELEPHONE (OUTPATIENT)
Dept: FAMILY MEDICINE CLINIC | Facility: CLINIC | Age: 69
End: 2024-09-24

## 2024-09-24 ENCOUNTER — TELEPHONE (OUTPATIENT)
Age: 69
End: 2024-09-24

## 2024-09-24 NOTE — TELEPHONE ENCOUNTER
Claire Zuñiga MA  9/24/2024 10:41 AM EDT Back to Top      Called patient and left message to call back.    TIFFANY Hernandez  9/23/2024  4:46 PM EDT       Please call patient to verify how much iron he is taking, his levels were slightly elevated.  He does not need to take it every day.  2-3 times a week is fine.  Also please verify he is taking his Crestor 5 mg daily.  His triglycerides are mildly elevated.  I would recommend him adding 4 g of fish oil to his diet today.  Please ensure he increases his fiber intake and avoids processed foods.  His glucose was elevated at 140 but I see his A1c from last month was 6.2%.  Again, please have him decrease his carbohydrate intake and exercise as tolerated.

## 2024-09-24 NOTE — TELEPHONE ENCOUNTER
Pt called back and was given the results of the labs, he was taking the Iron supplement (Victron-C 65 mg) daily he did state that he will cut down as suggested and yes he is taking his crestor 5mg dialy as directed.

## 2024-09-27 ENCOUNTER — TELEPHONE (OUTPATIENT)
Age: 69
End: 2024-09-27

## 2024-09-27 NOTE — TELEPHONE ENCOUNTER
Patient called stated,   He had a stress test today ordered by Dr Mendoza.   Patient was informed that  amlodipine  5mg should be increased to 7.5mg daily:  due to slow blood flow.    Pt is requesting a new Rx for Amlodipine 7.5mg daily.      Pt has an appt with Dr Mendoza on Tuesday 10/1/2024  should patient wait to be seen by him first and have Dr. Mendoza prescribe medication to the patient?    Pt is requesting a call back to advise.    Please advise. Thank you.

## 2024-09-30 NOTE — TELEPHONE ENCOUNTER
Called patient and he will have Dr Mendoza do the script for him tomorrow morning during his consult and was told per Graciela that it is okay for the increase as patient just wanted to make sure you were okay with that and in agreeance as well.

## 2024-10-07 ENCOUNTER — TELEPHONE (OUTPATIENT)
Dept: ADMINISTRATIVE | Facility: OTHER | Age: 69
End: 2024-10-07

## 2024-10-07 NOTE — TELEPHONE ENCOUNTER
----- Message from Jl ZHENG sent at 10/4/2024  1:02 PM EDT -----  Regarding: hm on dm foot exam  10/04/24 1:03 PM    Hello, our patient Michael Alejandro has had Diabetic Foot Exam completed/performed. Please assist in updating the patient chart by pulling the document from the Media Tab. The date of service is 09/04/2024.     Thank you,  Kadedra Jackson-Reyes, MA [facility] [address] [place] [address]

## 2024-10-07 NOTE — LETTER
Diabetic Eye Exam Form    Date Requested: 10/22/24  Patient: Michael Alejandro  Patient : 1955   Referring Provider: TIFFANY Hernandez      DIABETIC Eye Exam Date _______________________________      Type of Exam MUST be documented for Diabetic Eye Exams. Please CHECK ONE.     Retinal Exam       Dilated Retinal Exam       OCT       Optomap-Iris Exam      Fundus Photography       Left Eye - Please check Retinopathy or No Retinopathy        Exam did show retinopathy    Exam did not show retinopathy       Right Eye - Please check Retinopathy or No Retinopathy       Exam did show retinopathy    Exam did not show retinopathy       Comments __________________________________________________________    Practice Providing Exam ______________________________________________    Exam Performed By (print name) _______________________________________      Provider Signature ___________________________________________________      These reports are needed for  compliance.  Please fax this completed form and a copy of the Diabetic Eye Exam report to our office located at [address] [place] [address] as soon as possible via Fax [phone] attention [place]: Phone [phone]  We thank you for your assistance in treating our mutual patient.   [facility] [phone] F [phone]

## 2024-10-07 NOTE — TELEPHONE ENCOUNTER
----- Message from Claire JOSUE sent at 10/4/2024  1:24 PM EDT -----  10/04/24 1:24 PM    Hello, our patient Michael Alejandro has had Diabetic Eye Exam completed/performed. Please assist in updating the patient chart by making an External outreach to [facility] facility located in [place]. The date of service is 2023.    Thank you,  MILAD Lopez [facility] [address] [place] [address]

## 2024-10-08 ENCOUNTER — IMMUNIZATIONS (OUTPATIENT)
Dept: FAMILY MEDICINE CLINIC | Facility: CLINIC | Age: 69
End: 2024-10-08
Payer: MEDICARE

## 2024-10-08 DIAGNOSIS — Z23 ENCOUNTER FOR IMMUNIZATION: Primary | ICD-10-CM

## 2024-10-08 PROCEDURE — 90662 IIV NO PRSV INCREASED AG IM: CPT

## 2024-10-08 PROCEDURE — G0008 ADMIN INFLUENZA VIRUS VAC: HCPCS

## 2024-10-22 NOTE — TELEPHONE ENCOUNTER
Upon review of the In Basket request we have found/obtained the documentation. After careful review of the document we are unable to complete this request for Diabetic Foot Exam because the documentation does not have the result(s) needed to close the requested care gap(s).    Any additional questions or concerns should be emailed to the Practice Liaisons via the appropriate education email address, please do not reply via In Basket.    Thank you  Senthil Billings MA   [facility]

## 2024-11-05 ENCOUNTER — TELEPHONE (OUTPATIENT)
Age: 69
End: 2024-11-05

## 2024-11-05 NOTE — TELEPHONE ENCOUNTER
Patient is requesting a letter stating that he can take over-the-counter medications (as done in the past).  He would like this letter mailed to him please.  Thank you.

## 2024-11-14 LAB
LEFT EYE DIABETIC RETINOPATHY: NORMAL
RIGHT EYE DIABETIC RETINOPATHY: NORMAL

## 2024-12-20 DIAGNOSIS — E87.6 HYPOKALEMIA: ICD-10-CM

## 2024-12-20 DIAGNOSIS — E78.1 HYPERTRIGLYCERIDEMIA: ICD-10-CM

## 2024-12-20 RX ORDER — ROSUVASTATIN CALCIUM 5 MG/1
5 TABLET, COATED ORAL DAILY
Qty: 90 TABLET | Refills: 1 | Status: SHIPPED | OUTPATIENT
Start: 2024-12-20

## 2024-12-20 RX ORDER — POTASSIUM CHLORIDE 1500 MG/1
20 TABLET, EXTENDED RELEASE ORAL DAILY
Qty: 90 TABLET | Refills: 1 | Status: SHIPPED | OUTPATIENT
Start: 2024-12-20

## 2025-01-21 DIAGNOSIS — E78.1 HYPERTRIGLYCERIDEMIA: ICD-10-CM

## 2025-01-21 DIAGNOSIS — I10 HYPERTENSION, UNSPECIFIED TYPE: ICD-10-CM

## 2025-01-21 RX ORDER — EMPAGLIFLOZIN 25 MG/1
25 TABLET, FILM COATED ORAL DAILY
Qty: 90 TABLET | Refills: 1 | Status: SHIPPED | OUTPATIENT
Start: 2025-01-21

## 2025-01-23 RX ORDER — ICOSAPENT ETHYL 1 G/1
CAPSULE ORAL
Qty: 360 CAPSULE | Refills: 3 | Status: SHIPPED | OUTPATIENT
Start: 2025-01-23

## 2025-02-14 ENCOUNTER — RA CDI HCC (OUTPATIENT)
Dept: OTHER | Facility: HOSPITAL | Age: 70
End: 2025-02-14

## 2025-02-14 NOTE — PROGRESS NOTES
HCC coding opportunities          Chart Reviewed number of suggestions sent to Provider: 2     Patients Insurance   E11.51 and E11.40  Medicare Insurance: Medicare

## 2025-02-20 ENCOUNTER — OFFICE VISIT (OUTPATIENT)
Dept: FAMILY MEDICINE CLINIC | Facility: CLINIC | Age: 70
End: 2025-02-20
Payer: MEDICARE

## 2025-02-20 ENCOUNTER — TELEPHONE (OUTPATIENT)
Dept: FAMILY MEDICINE CLINIC | Facility: CLINIC | Age: 70
End: 2025-02-20

## 2025-02-20 VITALS
SYSTOLIC BLOOD PRESSURE: 128 MMHG | HEART RATE: 68 BPM | HEIGHT: 70 IN | BODY MASS INDEX: 36.45 KG/M2 | OXYGEN SATURATION: 99 % | WEIGHT: 254.6 LBS | DIASTOLIC BLOOD PRESSURE: 72 MMHG | TEMPERATURE: 97.6 F

## 2025-02-20 DIAGNOSIS — E11.29 TYPE 2 DIABETES MELLITUS WITH MICROALBUMINURIA, WITHOUT LONG-TERM CURRENT USE OF INSULIN (HCC): Primary | ICD-10-CM

## 2025-02-20 DIAGNOSIS — M81.0 OSTEOPOROSIS, UNSPECIFIED OSTEOPOROSIS TYPE, UNSPECIFIED PATHOLOGICAL FRACTURE PRESENCE: ICD-10-CM

## 2025-02-20 DIAGNOSIS — R80.9 TYPE 2 DIABETES MELLITUS WITH MICROALBUMINURIA, WITHOUT LONG-TERM CURRENT USE OF INSULIN (HCC): Primary | ICD-10-CM

## 2025-02-20 DIAGNOSIS — E66.01 OBESITY, MORBID (HCC): ICD-10-CM

## 2025-02-20 DIAGNOSIS — I10 PRIMARY HYPERTENSION: ICD-10-CM

## 2025-02-20 DIAGNOSIS — R60.0 BILATERAL LOWER EXTREMITY EDEMA: ICD-10-CM

## 2025-02-20 DIAGNOSIS — G47.33 OSA (OBSTRUCTIVE SLEEP APNEA): ICD-10-CM

## 2025-02-20 DIAGNOSIS — Z12.5 SCREENING FOR PROSTATE CANCER: ICD-10-CM

## 2025-02-20 LAB — SL AMB POCT HEMOGLOBIN AIC: 6.2 (ref ?–6.5)

## 2025-02-20 PROCEDURE — 99214 OFFICE O/P EST MOD 30 MIN: CPT | Performed by: NURSE PRACTITIONER

## 2025-02-20 PROCEDURE — 83036 HEMOGLOBIN GLYCOSYLATED A1C: CPT | Performed by: NURSE PRACTITIONER

## 2025-02-20 PROCEDURE — 96372 THER/PROPH/DIAG INJ SC/IM: CPT | Performed by: NURSE PRACTITIONER

## 2025-02-20 NOTE — TELEPHONE ENCOUNTER
PA request from semaglutide, 0.25 or 0.5 mg/dose, (Ozempic, 0.25 or 0.5 MG/DOSE,) 2 mg/3 mL injection pen    Pre-procedure teaching reinforced.

## 2025-02-20 NOTE — ASSESSMENT & PLAN NOTE
Prolia administered today.  Next dose due in 6 months.  Will order DEXA scan at next visit.  Orders:    denosumab (PROLIA) subcutaneous injection 60 mg

## 2025-02-20 NOTE — ASSESSMENT & PLAN NOTE
Lab Results   Component Value Date    HGBA1C 6.2 02/20/2025   Diabetes well-controlled.  Patient would like to change from Jardiance to Ozempic to help with weight loss.  Will have patient's stop Jardiance and begin Ozempic weekly.  To obtain lab work now, will call with results.  Reviewed side effects of the medication.  Will repeat labs in 3 months to monitor kidney function.    Orders:    semaglutide, 0.25 or 0.5 mg/dose, (Ozempic, 0.25 or 0.5 MG/DOSE,) 2 mg/3 mL injection pen; Inject 0.25 mg under the skin once weekly for 28 days, THEN inject 0.5 mg under the skin once weekly    Albumin / creatinine urine ratio; Future    Basic metabolic panel; Future    Hemoglobin A1C; Future    CBC and differential; Future    Comprehensive metabolic panel; Future    Lipid Panel with Direct LDL reflex; Future    UA (URINE) with reflex to Scope; Future    Iron Panel (Includes Ferritin, Iron Sat%, Iron, and TIBC); Future    POCT hemoglobin A1c

## 2025-02-20 NOTE — ASSESSMENT & PLAN NOTE
Counseled on increasing water intake, beginning isometric exercises, limiting sodium and keeping feet elevated.  Prescribed compression stockings for patient to begin wearing daily.  Orders:    Compression Stocking

## 2025-02-20 NOTE — ASSESSMENT & PLAN NOTE
Prior Authorization Clinical Questions for Weight Management Pharmacotherapy    1. Does the patient have a contrainidcation to medication prescribed for weight management?: No  2. Does the patient have a diagnosis of obesity, confirmed by a BMI greater than or equal to 30 kg/m^2?: Yes  3. Does the patient have a BMI of greater than or equal to 27 kg/m^2 with at least one weight-related comorbidity/risk factor/complication (e.g. diabetes, dyslipidemia, coronary artery disease)?: Yes  4. Weight-related co-morbidities/risk factors: type 2 diabetes, dyslipidemia, hypertension, obstructive sleep apnea (DOE), GERD, osteoarthritis, depression  5. WEGOVY CVA Indication: Does patient have established documented cardiovascular disease (history of a prior heart attack (myocardial infarction), stroke, or symptomatic peripheral arterial disease (PAD)?: No  6. ZEPBOUND DOE Indication: Does patient have documented DOE diagnosed via sleep study (insurance will require copy of sleep study results for approval)?: N/A  7. Has the patient been on a weight loss regimen of low-calorie diet, increased physical activity, and lifestyle modifications for a minimum of 6 months?: Yes  8. Has the patient completed a comprehensive weight loss program (ie, Weight Watchers, Noom, Bariatrics, other vinod on phone)? If so, what?: No  9. Does the patient have a history of type 2 diabetes?: Yes  10. Has the member tried and failed other weight loss medication within the past 12 months?: No  11. Will the member use requested medication in combination with another GLP agonist or weight loss drug?: No  12. Is the medication a controlled substance?: No     Baseline weight (in pounds): 254 lbs     Counseled on the importance of weight loss, healthy food choices, engaging in daily physical activity, and reducing BMI.     Orders:    semaglutide, 0.25 or 0.5 mg/dose, (Ozempic, 0.25 or 0.5 MG/DOSE,) 2 mg/3 mL injection pen; Inject 0.25 mg under the skin once  weekly for 28 days, THEN inject 0.5 mg under the skin once weekly    Albumin / creatinine urine ratio; Future    Basic metabolic panel; Future    Hemoglobin A1C; Future

## 2025-02-20 NOTE — PROGRESS NOTES
Name: Micheal Alejandro      : 1955      MRN: [id number]  Encounter Provider: TIFFANY Hernandez  Encounter Date: 2025   Encounter department: [facility] [address]  :  Assessment & Plan  Type 2 diabetes mellitus with microalbuminuria, without long-term current use of insulin (HCC)    Lab Results   Component Value Date    HGBA1C 6.2 2025   Diabetes well-controlled.  Patient would like to change from Jardiance to Ozempic to help with weight loss.  Will have patient's stop Jardiance and begin Ozempic weekly.  To obtain lab work now, will call with results.  Reviewed side effects of the medication.  Will repeat labs in 3 months to monitor kidney function.    Orders:    semaglutide, 0.25 or 0.5 mg/dose, (Ozempic, 0.25 or 0.5 MG/DOSE,) 2 mg/3 mL injection pen; Inject 0.25 mg under the skin once weekly for 28 days, THEN inject 0.5 mg under the skin once weekly    Albumin / creatinine urine ratio; Future    Basic metabolic panel; Future    Hemoglobin A1C; Future    CBC and differential; Future    Comprehensive metabolic panel; Future    Lipid Panel with Direct LDL reflex; Future    UA (URINE) with reflex to Scope; Future    Iron Panel (Includes Ferritin, Iron Sat%, Iron, and TIBC); Future    POCT hemoglobin A1c    Obesity, morbid (HCC)  Prior Authorization Clinical Questions for Weight Management Pharmacotherapy    1. Does the patient have a contrainidcation to medication prescribed for weight management?: No  2. Does the patient have a diagnosis of obesity, confirmed by a BMI greater than or equal to 30 kg/m^2?: Yes  3. Does the patient have a BMI of greater than or equal to 27 kg/m^2 with at least one weight-related comorbidity/risk factor/complication (e.g. diabetes, dyslipidemia, coronary artery disease)?: Yes  4. Weight-related co-morbidities/risk factors: type 2 diabetes, dyslipidemia, hypertension, obstructive sleep apnea (DOE), GERD, osteoarthritis,  depression  5. WEGOVY CVA Indication: Does patient have established documented cardiovascular disease (history of a prior heart attack (myocardial infarction), stroke, or symptomatic peripheral arterial disease (PAD)?: No  6. ZEPBOUND DOE Indication: Does patient have documented DOE diagnosed via sleep study (insurance will require copy of sleep study results for approval)?: N/A  7. Has the patient been on a weight loss regimen of low-calorie diet, increased physical activity, and lifestyle modifications for a minimum of 6 months?: Yes  8. Has the patient completed a comprehensive weight loss program (ie, Weight Watchers, Noom, Bariatrics, other vinod on phone)? If so, what?: No  9. Does the patient have a history of type 2 diabetes?: Yes  10. Has the member tried and failed other weight loss medication within the past 12 months?: No  11. Will the member use requested medication in combination with another GLP agonist or weight loss drug?: No  12. Is the medication a controlled substance?: No     Baseline weight (in pounds): 254 lbs     Counseled on the importance of weight loss, healthy food choices, engaging in daily physical activity, and reducing BMI.     Orders:    semaglutide, 0.25 or 0.5 mg/dose, (Ozempic, 0.25 or 0.5 MG/DOSE,) 2 mg/3 mL injection pen; Inject 0.25 mg under the skin once weekly for 28 days, THEN inject 0.5 mg under the skin once weekly    Albumin / creatinine urine ratio; Future    Basic metabolic panel; Future    Hemoglobin A1C; Future    Bilateral lower extremity edema  Counseled on increasing water intake, beginning isometric exercises, limiting sodium and keeping feet elevated.  Prescribed compression stockings for patient to begin wearing daily.  Orders:    Compression Stocking    Osteoporosis, unspecified osteoporosis type, unspecified pathological fracture presence  Prolia administered today.  Next dose due in 6 months.  Will order DEXA scan at next visit.  Orders:    denosumab (PROLIA)  "subcutaneous injection 60 mg    Primary hypertension  Blood pressure stable, to continue current antihypertensive regimen.  Counseled on the importance of maintaining a healthy weight and consuming a low-sodium diet.        Screening for prostate cancer    Orders:    PSA, Total Screen; Future    DOE (obstructive sleep apnea)  Continue use of CPAP nightly.              History of Present Illness   Michael presents for a follow-up.  He is noted some weight gain and has difficulty with exercise due to his chronic pain in his feet.  He has been having swelling in his legs as well.  Denies shortness of breath, orthopnea, chest pain, fever, or chills.      Review of Systems   Constitutional:  Positive for unexpected weight change (weight gain).   HENT: Negative.     Eyes: Negative.    Respiratory: Negative.     Cardiovascular:  Positive for leg swelling.   Gastrointestinal: Negative.    Endocrine: Negative.    Genitourinary: Negative.    Musculoskeletal:  Positive for arthralgias.   Skin: Negative.    Allergic/Immunologic: Negative.    Neurological: Negative.    Hematological: Negative.    Psychiatric/Behavioral: Negative.         Objective   /72 (BP Location: Left arm, Patient Position: Sitting)   Pulse 68   Temp 97.6 °F (36.4 °C)   Ht 5' 10\" (1.778 m)   Wt 115 kg (254 lb 9.6 oz)   SpO2 99%   BMI 36.53 kg/m²      Physical Exam  Vitals and nursing note reviewed.   Constitutional:       General: He is not in acute distress.     Appearance: Normal appearance. He is well-developed. He is not ill-appearing, toxic-appearing or diaphoretic.   HENT:      Head: Normocephalic and atraumatic.   Eyes:      Conjunctiva/sclera: Conjunctivae normal.   Cardiovascular:      Rate and Rhythm: Normal rate and regular rhythm.      Heart sounds: Normal heart sounds. No murmur heard.  Pulmonary:      Effort: Pulmonary effort is normal. No respiratory distress.      Breath sounds: Normal breath sounds. No wheezing or rales.   Chest: "      Chest wall: No tenderness.   Abdominal:      General: Bowel sounds are normal. There is no distension.      Palpations: Abdomen is soft. There is no mass.      Tenderness: There is no abdominal tenderness. There is no guarding or rebound.      Hernia: No hernia is present.   Musculoskeletal:      Cervical back: Neck supple.      Right lower le+ Edema present.      Left lower le+ Edema present.   Skin:     General: Skin is warm and dry.      Capillary Refill: Capillary refill takes less than 2 seconds.   Neurological:      General: No focal deficit present.      Mental Status: He is alert and oriented to person, place, and time.   Psychiatric:         Mood and Affect: Mood normal.         Behavior: Behavior normal.         Thought Content: Thought content normal.         Judgment: Judgment normal.

## 2025-02-21 ENCOUNTER — TELEPHONE (OUTPATIENT)
Age: 70
End: 2025-02-21

## 2025-02-21 NOTE — TELEPHONE ENCOUNTER
Duplicate encounter created, please see telephone encounter from 02/20/2025 regarding Ozempic 0.25 MG  PA status. Please review patient's chart to see if there is already an encounter regarding the medication in question and to document anything regarding this medication in regards to anything regarding the authorization process etc before creating another encounter Thank You.

## 2025-02-21 NOTE — TELEPHONE ENCOUNTER
Patient called in to check the process of an order of compression stocking. Please advise patient of status.

## 2025-02-21 NOTE — TELEPHONE ENCOUNTER
Reason for call:   [x] Prior Auth  [] Other:     Caller:  [x] Patient  [] Pharmacy  Name:  [facility] [address] [place] [address]   Address:   Callback Number:     Medication: semaglutide, 0.25 or 0.5 mg/dose, (Ozempic, 0.25 or 0.5 MG/DOSE,) 2 mg/3 mL injection pen     Dose/Frequency: nject 0.25 mg under the skin once weekly for 28 days, THEN inject 0.5 mg under the skin once weekly,     Quantity: 9 ml    Ordering Provider:   [x] PCP/Provider -   [] Speciality/Provider -     Has the patient tried other medications and failed? If failed, which medications did they fail?    [] No   [] Yes -     Is the patient's insurance updated in EPIC?   [] Yes   [] No     Is a copy of the patient's insurance scanned in EPIC?   [] Yes   [] No

## 2025-02-21 NOTE — TELEPHONE ENCOUNTER
PA for Ozempic, 0.25 or 0.5 MG/DOSE, SUBMITTED to     States that patient does not have coverage under this insurance. Pt was just seen yesterday 2/20/25.                  Color consistent with ethnicity/race, warm, dry intact, resilient.

## 2025-02-27 ENCOUNTER — TELEPHONE (OUTPATIENT)
Age: 70
End: 2025-02-27

## 2025-02-27 DIAGNOSIS — M79.673 PAIN OF FOOT, UNSPECIFIED LATERALITY: Primary | ICD-10-CM

## 2025-02-27 NOTE — TELEPHONE ENCOUNTER
Patient is going to send a copy what is on formulary so you know what other alternative to send in

## 2025-02-27 NOTE — TELEPHONE ENCOUNTER
Patient called and said the Ozempic is not covered under his insurance but is asking for an alternative that might be covered.  He would like a call back with Graciela's recommendation.

## 2025-02-27 NOTE — TELEPHONE ENCOUNTER
Patient is requesting a referral to an orthopedic surgeon - specifically a foot/ankle specialist.  The neuropathy is getting worse but his foot is now inverted.  He has a profound limp and has had a few falls.  Please contact patient and advise.  Thank you.

## 2025-03-03 DIAGNOSIS — E11.29 TYPE 2 DIABETES MELLITUS WITH MICROALBUMINURIA, WITHOUT LONG-TERM CURRENT USE OF INSULIN (HCC): Primary | ICD-10-CM

## 2025-03-03 DIAGNOSIS — R80.9 TYPE 2 DIABETES MELLITUS WITH MICROALBUMINURIA, WITHOUT LONG-TERM CURRENT USE OF INSULIN (HCC): Primary | ICD-10-CM

## 2025-03-03 RX ORDER — DULAGLUTIDE 0.75 MG/.5ML
0.75 INJECTION, SOLUTION SUBCUTANEOUS WEEKLY
Qty: 0.5 ML | Refills: 1 | Status: SHIPPED | OUTPATIENT
Start: 2025-03-03 | End: 2025-03-06 | Stop reason: SDUPTHER

## 2025-03-04 ENCOUNTER — TELEPHONE (OUTPATIENT)
Age: 70
End: 2025-03-04

## 2025-03-04 NOTE — TELEPHONE ENCOUNTER
PA for Dulaglutide (Trulicity) 0.75 MG/0.5ML SOAJ SUBMITTED to     via    []CMM-KEY:   [x]Surescripts-Case ID # [id number]   []Availity-Auth ID # NDC #   []Faxed to plan   []Other website   []Phone call Case ID #     [x]PA sent as URGENT    All office notes, labs and other pertaining documents and studies sent. Clinical questions answered. Awaiting determination from insurance company.     Turnaround time for your insurance to make a decision on your Prior Authorization can take 7-21 business days.

## 2025-03-05 ENCOUNTER — OFFICE VISIT (OUTPATIENT)
Dept: OBGYN CLINIC | Facility: CLINIC | Age: 70
End: 2025-03-05
Payer: MEDICARE

## 2025-03-05 ENCOUNTER — APPOINTMENT (OUTPATIENT)
Dept: RADIOLOGY | Facility: AMBULARY SURGERY CENTER | Age: 70
End: 2025-03-05
Attending: ORTHOPAEDIC SURGERY
Payer: MEDICARE

## 2025-03-05 VITALS — HEIGHT: 70 IN | WEIGHT: 254 LBS | BODY MASS INDEX: 36.36 KG/M2

## 2025-03-05 DIAGNOSIS — Z01.89 ENCOUNTER FOR LOWER EXTREMITY COMPARISON IMAGING STUDY: ICD-10-CM

## 2025-03-05 DIAGNOSIS — M21.6X2 HINDFOOT VARUS, ACQUIRED, LEFT: ICD-10-CM

## 2025-03-05 DIAGNOSIS — M25.572 PAIN, JOINT, ANKLE AND FOOT, LEFT: ICD-10-CM

## 2025-03-05 DIAGNOSIS — M79.673 PAIN OF FOOT, UNSPECIFIED LATERALITY: ICD-10-CM

## 2025-03-05 DIAGNOSIS — M19.172 POST-TRAUMATIC ARTHRITIS OF LEFT ANKLE: Primary | ICD-10-CM

## 2025-03-05 PROCEDURE — 73610 X-RAY EXAM OF ANKLE: CPT

## 2025-03-05 PROCEDURE — 73630 X-RAY EXAM OF FOOT: CPT

## 2025-03-05 PROCEDURE — 73620 X-RAY EXAM OF FOOT: CPT

## 2025-03-05 PROCEDURE — 99214 OFFICE O/P EST MOD 30 MIN: CPT | Performed by: ORTHOPAEDIC SURGERY

## 2025-03-05 PROCEDURE — 73600 X-RAY EXAM OF ANKLE: CPT

## 2025-03-05 NOTE — TELEPHONE ENCOUNTER
PA for Dulaglutide (Trulicity) 0.75 MG/0.5ML SOAJ  APPROVED     Date(s) approved February 2, 2025 to December 31, 2099     Case #[id number]       Patient advised by          []MyChart Message  []Phone call   []LMOM  []L/M to call office as no active Communication consent on file  []Unable to leave detailed message as VM not approved on Communication consent       Pharmacy advised by    [x]Fax  []Phone call  []Secure Chat       Approval letter scanned into Media No Not available at decision

## 2025-03-05 NOTE — PROGRESS NOTES
James R Lachman, M.D.  Attending, Orthopaedic Surgery  Foot and Ankle  [facility]      ORTHOPAEDIC FOOT AND ANKLE CLINIC VISIT     Assessment:     Encounter Diagnoses   Name Primary?    Post-traumatic arthritis of left ankle Yes    Hindfoot varus, acquired, left     Encounter for lower extremity comparison imaging study     Pain of foot, unspecified laterality      Plan:   The patient verbalized understanding of exam findings and treatment plan. We engaged in the shared decision-making process and treatment options were discussed at length with the patient. Surgical and conservative management discussed today along with risks and benefits.  Patient with remote h/o left ankle fracture ORIF in 1975   He has developed post-traumatic arthritis of his left ankle as well as flexible hindfoot varus. Reports progressive deformity left ankle leading to feeling of instability and walking on outside of his foot  He states he has tried an [place] brace in the past but had difficulty wearing it due to laces and not fitting in shoes.   Explained to patient that he can have a brace made with Velcro and he could then look for a new pair of shoes once he obtains the new brace to ensure the shoe can accommodate the brace.   Surgical option would be TTC arthrodesis.   Patient wishes to defer surgery and brace at this time. He will think over his options  Of note, patient does have diabetes. Most recent A1c 6.2 (2/20/2025)  Return if symptoms worsen or fail to improve.      History of Present Illness:   Chief Complaint:   Chief Complaint   Patient presents with    Left Ankle - Pain     He is walking on the outside. Locked in this postion since 1975, walking with a cane. He is falling a lot more.      Michael Alejandro is a 69 y.o. male who is being seen for left foot. H/o left ankle ORIF after sustaining compound fracture in 1975. Reports progressive ankle deformity resulting in walking on outside of foot.  Pain is localized at lateral foot and ankle with minimal radiating and described as sharp and severe. Patient denies numbness, tingling or radicular pain.  Denies history of neuropathy.  Patient does not smoke (former), does  have diabetes and does not take blood thinners.  Patient denies family history of anesthesia complications and has not had any complications with anesthesia. Also reports h/o right ankle fracture 5-6 years ago. Also has right foot drop.      Pain/symptom timing:  Worse during the day when active  Pain/symptom context:  Worse with activites and work  Pain/symptom modifying factors:  Rest makes better, activities make worse  Pain/symptom associated signs/symptoms: none    Prior treatment   NSAIDsYes   Injections No   Bracing/Orthotics Yes  - orthotics, [place] brace   Physical Therapy No     Orthopedic Surgical History:   See below     Past Medical, Surgical and Social History:  Past Medical History:  has a past medical history of Chronic kidney disease, CPAP (continuous positive airway pressure) dependence, Diabetes ([facility]), Diabetes mellitus ([facility]), Edema, Gait instability (02/05/2024), GERD (gastroesophageal reflux disease), Hyperlipidemia, Hypertension, Kidney stone, Morbid obesity due to excess calories ([facility]), RBBB, and Sleep apnea, obstructive.  Problem List: does not have any pertinent problems on file.  Past Surgical History:  has a past surgical history that includes Back surgery; Cholecystectomy; Colonoscopy; Foot surgery (Right); Shoulder surgery (Right); Uvulopalatoplasty; Gastric restriction surgery; EGD; Kidney stone surgery; and Hemorrhoid surgery (N/A, 1/5/2024).  Family History: family history includes COPD in his mother; Diabetes in his father and sister; Drug abuse in his father and mother; Heart disease in his mother; Lung cancer in his father; Mental illness in his father and mother.  Social History:  reports that he quit smoking about 39 years ago. His smoking use included  "cigarettes. He started smoking about 54 years ago. He has a 30 pack-year smoking history. He has never used smokeless tobacco. He reports current drug use. Frequency: 7.00 times per week. Drug: Marijuana. He reports that he does not drink alcohol.  Current Medications: has a current medication list which includes the following prescription(s): amlodipine, amoxicillin, bupropion, calcium citrate-vitamin d, calcium polycarbophil, clindamycin, trulicity, jardiance, icosapent ethyl, vitron-c, losartan, mometasone, multiple vitamins-minerals, pantoprazole, potassium chloride, rosuvastatin, turmeric, and vitamin d.  Allergies: is allergic to augmentin [amoxicillin-pot clavulanate] and ciprofloxacin.     Review of Systems:  General- denies fever/chills  HEENT- denies hearing loss or sore throat  Eyes- denies eye pain or visual disturbances, denies red eyes  Respiratory- denies cough or SOB  Cardio- denies chest pain or palpitations  GI- denies abdominal pain  Endocrine- denies urinary frequency  Urinary- denies pain with urination  Musculoskeletal- Negative except noted above  Skin- denies rashes or wounds  Neurological- denies dizziness or headache  Psychiatric- denies anxiety or difficulty concentrating    Physical Exam:   Ht 5' 10\" (1.778 m)   Wt 115 kg (254 lb)   BMI 36.45 kg/m²   General/Constitutional: No apparent distress: well-nourished and well developed.  Eyes: normal ocular motion  Cardio: RRR, Normal S1S2, No m/r/g  Lymphatic: No appreciable lymphadenopathy  Respiratory: Non-labored breathing, CTA b/l no w/c/r  Vascular: No edema, swelling or tenderness, except as noted in detailed exam.  Integumentary: No impressive skin lesions present, except as noted in detailed exam.  Neuro: No ataxia or tremors noted  Psych: Normal mood and affect, oriented to person, place and time. Appropriate affect.  Musculoskeletal: Normal, except as noted in detailed exam and in HPI.    Examination    Left    Gait Antalgic "   Musculoskeletal No ttp     Skin Normal.      Nails Normal    Range of Motion  10 degrees dorsiflexion, 5 degrees plantarflexion  Subtalar motion: limited 10/10 inversion/eversion; flexible hindfoot    Stability Stable    Muscle Strength 5/5 tibialis anterior  5/5 gastrocnemius-soleus  4/5 posterior tibialis  3/5 peroneal/eversion strength  5/5 EHL  5/5 FHL    Neurologic Normal    Sensation Intact to light touch throughout sural, saphenous, superficial peroneal, deep peroneal and medial/lateral plantar nerve distributions.  [place]-Drew 5.07 filament (10g) testing  deferred.    Cardiovascular Brisk capillary refill < 2 seconds,intact DP and PT pulses    Special Tests None      Imaging Studies:   3 views of the left ankle and 2 views left foot were taken, reviewed and interpreted independently that demonstrate severe tibiotalar osteoarthritis and hindfoot varus deformity. Reviewed by me personally.    2 views right ankle demonstrates old bimalleolar fracture    Scribe Attestation      I,:  Miri Carroll PA-C am acting as a scribe while in the presence of the attending physician.:       I,:  James R Lachman, MD personally performed the services described in this documentation    as scribed in my presence.:             James R. Lachman, MD  Foot & Ankle Surgery   Department of Orthopaedic Surgery  [facility]      I personally performed the service.    James R. Lachman, MD

## 2025-03-05 NOTE — PATIENT INSTRUCTIONS
You have severe hindfoot varus.  The treatment options;  [place] brace  TTC fusion  This means we put a gerardo through your heel, up through your ankle an into your tibia.  This would eliminate motion in your ankle but allow your foot to remain stable in a position that would allow you to walk.

## 2025-03-06 DIAGNOSIS — R80.9 TYPE 2 DIABETES MELLITUS WITH MICROALBUMINURIA, WITHOUT LONG-TERM CURRENT USE OF INSULIN (HCC): ICD-10-CM

## 2025-03-06 DIAGNOSIS — E11.29 TYPE 2 DIABETES MELLITUS WITH MICROALBUMINURIA, WITHOUT LONG-TERM CURRENT USE OF INSULIN (HCC): ICD-10-CM

## 2025-03-06 RX ORDER — DULAGLUTIDE 0.75 MG/.5ML
0.75 INJECTION, SOLUTION SUBCUTANEOUS WEEKLY
Qty: 2 ML | Refills: 1 | Status: SHIPPED | OUTPATIENT
Start: 2025-03-06 | End: 2025-06-04

## 2025-03-06 NOTE — TELEPHONE ENCOUNTER
Patient is asking if it is possible to get a 3 month supply of the medication. He states that [facility] $[facility], however, they explained to him for the same price he could get a 3 month supply. Patient stated he is on a fixed income and this would help him save money. Please advise and notify patient

## 2025-03-14 ENCOUNTER — APPOINTMENT (OUTPATIENT)
Dept: LAB | Facility: HOSPITAL | Age: 70
End: 2025-03-14
Payer: MEDICARE

## 2025-03-14 ENCOUNTER — RESULTS FOLLOW-UP (OUTPATIENT)
Dept: OTHER | Facility: HOSPITAL | Age: 70
End: 2025-03-14

## 2025-03-14 DIAGNOSIS — I10 PRIMARY HYPERTENSION: ICD-10-CM

## 2025-03-14 DIAGNOSIS — R80.1 PERSISTENT PROTEINURIA: ICD-10-CM

## 2025-03-14 DIAGNOSIS — Z12.5 SCREENING FOR PROSTATE CANCER: ICD-10-CM

## 2025-03-14 DIAGNOSIS — E11.29 TYPE 2 DIABETES MELLITUS WITH MICROALBUMINURIA, WITHOUT LONG-TERM CURRENT USE OF INSULIN (HCC): ICD-10-CM

## 2025-03-14 DIAGNOSIS — R80.9 TYPE 2 DIABETES MELLITUS WITH MICROALBUMINURIA, WITHOUT LONG-TERM CURRENT USE OF INSULIN (HCC): ICD-10-CM

## 2025-03-14 DIAGNOSIS — E66.01 OBESITY, MORBID (HCC): ICD-10-CM

## 2025-03-14 LAB
ALBUMIN SERPL BCG-MCNC: 4.3 G/DL (ref 3.5–5)
ALP SERPL-CCNC: 86 U/L (ref 34–104)
ALT SERPL W P-5'-P-CCNC: 26 U/L (ref 7–52)
ANION GAP SERPL CALCULATED.3IONS-SCNC: 8 MMOL/L (ref 4–13)
ANION GAP SERPL CALCULATED.3IONS-SCNC: 8 MMOL/L (ref 4–13)
AST SERPL W P-5'-P-CCNC: 23 U/L (ref 13–39)
BACTERIA UR QL AUTO: ABNORMAL /HPF
BASOPHILS # BLD AUTO: 0.04 THOUSANDS/ÂΜL (ref 0–0.1)
BASOPHILS # BLD AUTO: 0.04 THOUSANDS/ÂΜL (ref 0–0.1)
BASOPHILS NFR BLD AUTO: 1 % (ref 0–1)
BASOPHILS NFR BLD AUTO: 1 % (ref 0–1)
BILIRUB SERPL-MCNC: 0.79 MG/DL (ref 0.2–1)
BILIRUB UR QL STRIP: NEGATIVE
BUN SERPL-MCNC: 19 MG/DL (ref 5–25)
BUN SERPL-MCNC: 20 MG/DL (ref 5–25)
CALCIUM SERPL-MCNC: 8.5 MG/DL (ref 8.4–10.2)
CALCIUM SERPL-MCNC: 8.5 MG/DL (ref 8.4–10.2)
CHLORIDE SERPL-SCNC: 110 MMOL/L (ref 96–108)
CHLORIDE SERPL-SCNC: 110 MMOL/L (ref 96–108)
CHOLEST SERPL-MCNC: 113 MG/DL (ref ?–200)
CLARITY UR: CLEAR
CO2 SERPL-SCNC: 23 MMOL/L (ref 21–32)
CO2 SERPL-SCNC: 23 MMOL/L (ref 21–32)
COLOR UR: YELLOW
CREAT SERPL-MCNC: 1.05 MG/DL (ref 0.6–1.3)
CREAT SERPL-MCNC: 1.09 MG/DL (ref 0.6–1.3)
CREAT UR-MCNC: 225.6 MG/DL
EOSINOPHIL # BLD AUTO: 0.24 THOUSAND/ÂΜL (ref 0–0.61)
EOSINOPHIL # BLD AUTO: 0.26 THOUSAND/ÂΜL (ref 0–0.61)
EOSINOPHIL NFR BLD AUTO: 5 % (ref 0–6)
EOSINOPHIL NFR BLD AUTO: 5 % (ref 0–6)
ERYTHROCYTE [DISTWIDTH] IN BLOOD BY AUTOMATED COUNT: 12.3 % (ref 11.6–15.1)
ERYTHROCYTE [DISTWIDTH] IN BLOOD BY AUTOMATED COUNT: 12.4 % (ref 11.6–15.1)
FERRITIN SERPL-MCNC: 64 NG/ML (ref 24–336)
GFR SERPL CREATININE-BSD FRML MDRD: 69 ML/MIN/1.73SQ M
GFR SERPL CREATININE-BSD FRML MDRD: 73 ML/MIN/1.73SQ M
GLUCOSE P FAST SERPL-MCNC: 112 MG/DL (ref 65–99)
GLUCOSE P FAST SERPL-MCNC: 113 MG/DL (ref 65–99)
GLUCOSE UR STRIP-MCNC: NEGATIVE MG/DL
HCT VFR BLD AUTO: 45.2 % (ref 36.5–49.3)
HCT VFR BLD AUTO: 45.7 % (ref 36.5–49.3)
HDLC SERPL-MCNC: 29 MG/DL
HGB BLD-MCNC: 15.8 G/DL (ref 12–17)
HGB BLD-MCNC: 15.9 G/DL (ref 12–17)
HGB UR QL STRIP.AUTO: NEGATIVE
IMM GRANULOCYTES # BLD AUTO: 0.01 THOUSAND/UL (ref 0–0.2)
IMM GRANULOCYTES # BLD AUTO: 0.01 THOUSAND/UL (ref 0–0.2)
IMM GRANULOCYTES NFR BLD AUTO: 0 % (ref 0–2)
IMM GRANULOCYTES NFR BLD AUTO: 0 % (ref 0–2)
IRON SATN MFR SERPL: 37 % (ref 15–50)
IRON SERPL-MCNC: 114 UG/DL (ref 50–212)
KETONES UR STRIP-MCNC: NEGATIVE MG/DL
LDLC SERPL CALC-MCNC: 44 MG/DL (ref 0–100)
LEUKOCYTE ESTERASE UR QL STRIP: NEGATIVE
LYMPHOCYTES # BLD AUTO: 1.43 THOUSANDS/ÂΜL (ref 0.6–4.47)
LYMPHOCYTES # BLD AUTO: 1.55 THOUSANDS/ÂΜL (ref 0.6–4.47)
LYMPHOCYTES NFR BLD AUTO: 28 % (ref 14–44)
LYMPHOCYTES NFR BLD AUTO: 31 % (ref 14–44)
MCH RBC QN AUTO: 31 PG (ref 26.8–34.3)
MCH RBC QN AUTO: 31.1 PG (ref 26.8–34.3)
MCHC RBC AUTO-ENTMCNC: 34.6 G/DL (ref 31.4–37.4)
MCHC RBC AUTO-ENTMCNC: 35.2 G/DL (ref 31.4–37.4)
MCV RBC AUTO: 88 FL (ref 82–98)
MCV RBC AUTO: 90 FL (ref 82–98)
MONOCYTES # BLD AUTO: 0.61 THOUSAND/ÂΜL (ref 0.17–1.22)
MONOCYTES # BLD AUTO: 0.66 THOUSAND/ÂΜL (ref 0.17–1.22)
MONOCYTES NFR BLD AUTO: 12 % (ref 4–12)
MONOCYTES NFR BLD AUTO: 13 % (ref 4–12)
MUCOUS THREADS UR QL AUTO: ABNORMAL
NEUTROPHILS # BLD AUTO: 2.61 THOUSANDS/ÂΜL (ref 1.85–7.62)
NEUTROPHILS # BLD AUTO: 2.63 THOUSANDS/ÂΜL (ref 1.85–7.62)
NEUTS SEG NFR BLD AUTO: 51 % (ref 43–75)
NEUTS SEG NFR BLD AUTO: 53 % (ref 43–75)
NITRITE UR QL STRIP: NEGATIVE
NON-SQ EPI CELLS URNS QL MICRO: ABNORMAL /HPF
NRBC BLD AUTO-RTO: 0 /100 WBCS
NRBC BLD AUTO-RTO: 0 /100 WBCS
PH UR STRIP.AUTO: 5 [PH]
PLATELET # BLD AUTO: 180 THOUSANDS/UL (ref 149–390)
PLATELET # BLD AUTO: 183 THOUSANDS/UL (ref 149–390)
PMV BLD AUTO: 10.4 FL (ref 8.9–12.7)
PMV BLD AUTO: 10.4 FL (ref 8.9–12.7)
POTASSIUM SERPL-SCNC: 3.8 MMOL/L (ref 3.5–5.3)
POTASSIUM SERPL-SCNC: 3.9 MMOL/L (ref 3.5–5.3)
PROT SERPL-MCNC: 6.2 G/DL (ref 6.4–8.4)
PROT UR STRIP-MCNC: ABNORMAL MG/DL
PROT UR-MCNC: 27 MG/DL
PROT/CREAT UR: 0.1 MG/G{CREAT} (ref 0–0.1)
PSA SERPL-MCNC: 2.13 NG/ML (ref 0–4)
RBC # BLD AUTO: 5.09 MILLION/UL (ref 3.88–5.62)
RBC # BLD AUTO: 5.12 MILLION/UL (ref 3.88–5.62)
RBC #/AREA URNS AUTO: ABNORMAL /HPF
SODIUM SERPL-SCNC: 141 MMOL/L (ref 135–147)
SODIUM SERPL-SCNC: 141 MMOL/L (ref 135–147)
SP GR UR STRIP.AUTO: 1.02 (ref 1–1.03)
TIBC SERPL-MCNC: 312.2 UG/DL (ref 250–450)
TRANSFERRIN SERPL-MCNC: 223 MG/DL (ref 203–362)
TRIGL SERPL-MCNC: 201 MG/DL (ref ?–150)
UIBC SERPL-MCNC: 198 UG/DL (ref 155–355)
UROBILINOGEN UR STRIP-ACNC: <2 MG/DL
WBC # BLD AUTO: 5.03 THOUSAND/UL (ref 4.31–10.16)
WBC # BLD AUTO: 5.06 THOUSAND/UL (ref 4.31–10.16)
WBC #/AREA URNS AUTO: ABNORMAL /HPF

## 2025-03-14 PROCEDURE — 85025 COMPLETE CBC W/AUTO DIFF WBC: CPT

## 2025-03-14 PROCEDURE — 81001 URINALYSIS AUTO W/SCOPE: CPT

## 2025-03-14 PROCEDURE — 80048 BASIC METABOLIC PNL TOTAL CA: CPT

## 2025-03-14 PROCEDURE — 80053 COMPREHEN METABOLIC PANEL: CPT

## 2025-03-14 PROCEDURE — 84156 ASSAY OF PROTEIN URINE: CPT

## 2025-03-14 PROCEDURE — 80061 LIPID PANEL: CPT

## 2025-03-14 PROCEDURE — G0103 PSA SCREENING: HCPCS

## 2025-03-14 PROCEDURE — 36415 COLL VENOUS BLD VENIPUNCTURE: CPT

## 2025-03-14 PROCEDURE — 82570 ASSAY OF URINE CREATININE: CPT

## 2025-03-14 PROCEDURE — 82728 ASSAY OF FERRITIN: CPT

## 2025-03-14 PROCEDURE — 83540 ASSAY OF IRON: CPT

## 2025-03-14 PROCEDURE — 83550 IRON BINDING TEST: CPT

## 2025-03-17 ENCOUNTER — RESULTS FOLLOW-UP (OUTPATIENT)
Dept: FAMILY MEDICINE CLINIC | Facility: CLINIC | Age: 70
End: 2025-03-17

## 2025-03-27 DIAGNOSIS — R80.9 TYPE 2 DIABETES MELLITUS WITH MICROALBUMINURIA, WITHOUT LONG-TERM CURRENT USE OF INSULIN (HCC): ICD-10-CM

## 2025-03-27 DIAGNOSIS — E11.29 TYPE 2 DIABETES MELLITUS WITH MICROALBUMINURIA, WITHOUT LONG-TERM CURRENT USE OF INSULIN (HCC): ICD-10-CM

## 2025-03-27 RX ORDER — DULAGLUTIDE 0.75 MG/.5ML
0.75 INJECTION, SOLUTION SUBCUTANEOUS WEEKLY
Qty: 6 ML | Refills: 1 | Status: SHIPPED | OUTPATIENT
Start: 2025-03-27 | End: 2025-06-25

## 2025-04-09 ENCOUNTER — OFFICE VISIT (OUTPATIENT)
Dept: NEPHROLOGY | Facility: CLINIC | Age: 70
End: 2025-04-09

## 2025-04-09 VITALS
SYSTOLIC BLOOD PRESSURE: 130 MMHG | BODY MASS INDEX: 34.07 KG/M2 | WEIGHT: 238 LBS | RESPIRATION RATE: 16 BRPM | DIASTOLIC BLOOD PRESSURE: 70 MMHG | OXYGEN SATURATION: 98 % | TEMPERATURE: 97.8 F | HEART RATE: 78 BPM | HEIGHT: 70 IN

## 2025-04-09 DIAGNOSIS — R80.9 TYPE 2 DIABETES MELLITUS WITH MICROALBUMINURIA, WITHOUT LONG-TERM CURRENT USE OF INSULIN (HCC): ICD-10-CM

## 2025-04-09 DIAGNOSIS — E11.29 TYPE 2 DIABETES MELLITUS WITH MICROALBUMINURIA, WITHOUT LONG-TERM CURRENT USE OF INSULIN (HCC): ICD-10-CM

## 2025-04-09 DIAGNOSIS — R80.1 PERSISTENT PROTEINURIA: Primary | ICD-10-CM

## 2025-04-09 DIAGNOSIS — I10 PRIMARY HYPERTENSION: ICD-10-CM

## 2025-04-09 NOTE — PROGRESS NOTES
"Name: Michael Alejandro      : 1955      MRN: [id number]  Encounter Provider: Nick Bailey MD  Encounter Date: 2025   Encounter department: [facility]  :  Assessment & Plan  Persistent proteinuria  Patient does have proteinuria which is actually getting better with loss of weight.  Will continue to monitor       Type 2 diabetes mellitus with microalbuminuria, without long-term current use of insulin ([facility])    Lab Results   Component Value Date    HGBA1C 6.2 2025     Very well-controlled       Primary hypertension  Well-controlled       Everything discussed with patient at length.  I will see him back in 1 year and get blood and urine test before that visit    History of Present Illness   HPI  Michael Alejandro is a 69 y.o. male who presents for follow-up of proteinuria    Overall doing quite well denies any acute complaint    No chest pain no palpitation    No nausea no vomiting    Denies any urinary complaint      Review of Systems   Constitutional:  Negative for fatigue.   HENT:  Negative for congestion.    Eyes:  Negative for photophobia and pain.   Respiratory:  Negative for chest tightness and shortness of breath.    Cardiovascular:  Negative for chest pain and palpitations.   Gastrointestinal:  Negative for abdominal distention, abdominal pain and blood in stool.   Endocrine: Negative for polydipsia.   Genitourinary:  Negative for difficulty urinating, dysuria, flank pain, hematuria and urgency.   Musculoskeletal:  Negative for arthralgias and back pain.   Skin:  Negative for rash.   Neurological:  Negative for dizziness, light-headedness and headaches.   Hematological:  Does not bruise/bleed easily.   Psychiatric/Behavioral:  Negative for behavioral problems. The patient is not nervous/anxious.           Objective   Pulse 78   Temp 97.8 °F (36.6 °C) (Temporal)   Resp 16   Ht 5' 10\" (1.778 m)   Wt 108 kg (238 lb)   SpO2 98%   BMI 34.15 kg/m²    "   Physical Exam  Constitutional:       General: He is not in acute distress.     Appearance: He is well-developed.   HENT:      Head: Normocephalic.      Mouth/Throat:      Mouth: Mucous membranes are moist.   Eyes:      General: No scleral icterus.     Conjunctiva/sclera: Conjunctivae normal.   Neck:      Vascular: No JVD.   Cardiovascular:      Rate and Rhythm: Normal rate.      Heart sounds: Normal heart sounds.   Pulmonary:      Effort: Pulmonary effort is normal.      Breath sounds: No wheezing.   Abdominal:      Palpations: Abdomen is soft.      Tenderness: There is no abdominal tenderness.   Musculoskeletal:         General: Normal range of motion.      Cervical back: Neck supple.   Skin:     General: Skin is warm.      Findings: No rash.   Neurological:      Mental Status: He is alert and oriented to person, place, and time.   Psychiatric:         Behavior: Behavior normal.

## 2025-04-09 NOTE — ASSESSMENT & PLAN NOTE
Patient does have proteinuria which is actually getting better with loss of weight.  Will continue to monitor

## 2025-04-14 DIAGNOSIS — E87.6 HYPOKALEMIA: ICD-10-CM

## 2025-04-14 RX ORDER — POTASSIUM CHLORIDE 1500 MG/1
20 TABLET, EXTENDED RELEASE ORAL DAILY
Qty: 90 TABLET | Refills: 1 | Status: SHIPPED | OUTPATIENT
Start: 2025-04-14 | End: 2025-04-15 | Stop reason: SDUPTHER

## 2025-04-14 NOTE — TELEPHONE ENCOUNTER
Manual /99 HR 82 Patient called to request a refill on his Potassium medication. He states that he would like for PCP to send a 30 day supply over to his local [facility] as [facility] and in back order.       He would like to know if Pcp thinks he should continue to be on the Potassium? He is completely out of the medication.       Patient would appreciate a call back with update. Please advise. [phone]   Patient blood pressure 170/92 Manual /96 HR 80 patient rapid afib to 160s, nonsustaining. Currently sustaining in 110s patient vitals 178/97 Patient blood pressure 170/92. patient is refusing metoprolol patient's family at bedside requesting IVF & ensure for patients HR sustained at 132. c/o of headache Pt not tolerating PO meds patient vitals 180/97 Pt not tolerating PO meds Repeat BP done. /89 pt c/o abdominal pain 2.07 second pause on the cardiac monitor Manual /102 HR 74 Manual /99 HR 75 Patient /84, P 87

## 2025-04-15 ENCOUNTER — NURSE TRIAGE (OUTPATIENT)
Age: 70
End: 2025-04-15

## 2025-04-15 DIAGNOSIS — E87.6 HYPOKALEMIA: ICD-10-CM

## 2025-04-15 RX ORDER — POTASSIUM CHLORIDE 1500 MG/1
20 TABLET, EXTENDED RELEASE ORAL DAILY
Qty: 90 TABLET | Refills: 1 | Status: SHIPPED | OUTPATIENT
Start: 2025-04-15 | End: 2025-04-15

## 2025-04-15 RX ORDER — POTASSIUM CHLORIDE 1500 MG/1
20 TABLET, EXTENDED RELEASE ORAL DAILY
Qty: 90 TABLET | Refills: 1 | Status: SHIPPED | OUTPATIENT
Start: 2025-04-15 | End: 2025-04-17 | Stop reason: SDUPTHER

## 2025-04-15 NOTE — TELEPHONE ENCOUNTER
"FOLLOW UP: Home Care/None    REASON FOR CONVERSATION: Medication Problem      OTHER: Potassium Chloride sent to [facility] as pt requested.    DISPOSITION: Home Care  Reason for Disposition   Prescription prescribed recently is not at pharmacy and triager has access to patient's EMR and prescription is recorded in the EMR    Answer Assessment - Initial Assessment Questions  1. NAME of MEDICINE: \"What medicine(s) are you calling about?\"      Potassium Chloride 20mEq daily    2. QUESTION: \"What is your question?\" (e.g., double dose of medicine, side effect)      Sent to [facility] instead of [facility] and pt needs it to go to [facility]    3. PRESCRIBER: \"Who prescribed the medicine?\" Reason: if prescribed by specialist, call should be referred to that group.      TIFFANY Hernandez    Protocols used: Medication Question Call-Adult-[place]    "

## 2025-04-15 NOTE — TELEPHONE ENCOUNTER
----- Message from Ebony DIEZ sent at 4/15/2025  8:23 AM EDT -----  [facility] stating [facility] to [facility] the potassium prescription due to it being out of stock. Said he called this past Friday to have it sent to [facility]'s but it was sent to [facility] in error.  Pt states he is completely out of the medication now and asking if it can be sent to [facility]'s today.  [facility] pharmacy has been removed from patient's chart.    Please advise    Potassium chloride 20 mEq daily

## 2025-04-17 DIAGNOSIS — E87.6 HYPOKALEMIA: ICD-10-CM

## 2025-04-17 RX ORDER — POTASSIUM CHLORIDE 1500 MG/1
20 TABLET, EXTENDED RELEASE ORAL DAILY
Qty: 90 TABLET | Refills: 1 | Status: SHIPPED | OUTPATIENT
Start: 2025-04-17

## 2025-04-27 DIAGNOSIS — I10 HYPERTENSION, UNSPECIFIED TYPE: ICD-10-CM

## 2025-04-28 RX ORDER — LOSARTAN POTASSIUM 100 MG/1
100 TABLET ORAL DAILY
Qty: 90 TABLET | Refills: 1 | Status: SHIPPED | OUTPATIENT
Start: 2025-04-28

## 2025-05-16 ENCOUNTER — RA CDI HCC (OUTPATIENT)
Dept: OTHER | Facility: HOSPITAL | Age: 70
End: 2025-05-16

## 2025-05-16 ENCOUNTER — TELEPHONE (OUTPATIENT)
Age: 70
End: 2025-05-16

## 2025-05-16 NOTE — TELEPHONE ENCOUNTER
Patient called in to check if he needs to print  lab orders to take to the lab he was advise that if he is going to [facility]'s labs not needed to printed.

## 2025-05-16 NOTE — PROGRESS NOTES
HCC coding opportunities          Chart Reviewed number of suggestions sent to Provider: 2     Patients Insurance   E11.40 and E11.51  Medicare Insurance: Medicare

## 2025-05-19 ENCOUNTER — APPOINTMENT (OUTPATIENT)
Dept: LAB | Facility: HOSPITAL | Age: 70
End: 2025-05-19
Payer: MEDICARE

## 2025-05-19 DIAGNOSIS — R80.9 TYPE 2 DIABETES MELLITUS WITH MICROALBUMINURIA, WITHOUT LONG-TERM CURRENT USE OF INSULIN (HCC): ICD-10-CM

## 2025-05-19 DIAGNOSIS — E11.29 TYPE 2 DIABETES MELLITUS WITH MICROALBUMINURIA, WITHOUT LONG-TERM CURRENT USE OF INSULIN (HCC): ICD-10-CM

## 2025-05-19 LAB
ANION GAP SERPL CALCULATED.3IONS-SCNC: 6 MMOL/L (ref 4–13)
BUN SERPL-MCNC: 14 MG/DL (ref 5–25)
CALCIUM SERPL-MCNC: 9 MG/DL (ref 8.4–10.2)
CHLORIDE SERPL-SCNC: 108 MMOL/L (ref 96–108)
CO2 SERPL-SCNC: 29 MMOL/L (ref 21–32)
CREAT SERPL-MCNC: 0.96 MG/DL (ref 0.6–1.3)
CREAT UR-MCNC: 186.2 MG/DL
EST. AVERAGE GLUCOSE BLD GHB EST-MCNC: 105 MG/DL
GFR SERPL CREATININE-BSD FRML MDRD: 81 ML/MIN/1.73SQ M
GLUCOSE P FAST SERPL-MCNC: 96 MG/DL (ref 65–99)
HBA1C MFR BLD: 5.3 %
MICROALBUMIN UR-MCNC: 31.6 MG/L
MICROALBUMIN/CREAT 24H UR: 17 MG/G CREATININE (ref 0–30)
POTASSIUM SERPL-SCNC: 4.1 MMOL/L (ref 3.5–5.3)
SODIUM SERPL-SCNC: 143 MMOL/L (ref 135–147)

## 2025-05-19 PROCEDURE — 82043 UR ALBUMIN QUANTITATIVE: CPT

## 2025-05-19 PROCEDURE — 83036 HEMOGLOBIN GLYCOSYLATED A1C: CPT

## 2025-05-19 PROCEDURE — 82570 ASSAY OF URINE CREATININE: CPT

## 2025-05-19 PROCEDURE — 80048 BASIC METABOLIC PNL TOTAL CA: CPT

## 2025-05-21 ENCOUNTER — HOSPITAL ENCOUNTER (EMERGENCY)
Facility: HOSPITAL | Age: 70
Discharge: HOME | End: 2025-05-22
Attending: EMERGENCY MEDICINE
Payer: MEDICARE

## 2025-05-21 ENCOUNTER — APPOINTMENT (EMERGENCY)
Dept: CT IMAGING | Facility: HOSPITAL | Age: 70
End: 2025-05-21
Payer: MEDICARE

## 2025-05-21 DIAGNOSIS — R55 SYNCOPE: Primary | ICD-10-CM
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
## 2025-05-21 LAB
ALBUMIN SERPL BCG-MCNC: 4.2 G/DL (ref 3.5–5)
ALP SERPL-CCNC: 61 U/L (ref 34–104)
ALT SERPL W P-5'-P-CCNC: 27 U/L (ref 7–52)
ANION GAP SERPL CALCULATED.3IONS-SCNC: 6 MMOL/L (ref 4–13)
AST SERPL W P-5'-P-CCNC: 24 U/L (ref 13–39)
BASOPHILS # BLD AUTO: 0.03 THOUSANDS/ÂΜL (ref 0–0.1)
BASOPHILS NFR BLD AUTO: 0 % (ref 0–1)
BILIRUB SERPL-MCNC: 0.7 MG/DL (ref 0.2–1)
BUN SERPL-MCNC: 14 MG/DL (ref 5–25)
CALCIUM SERPL-MCNC: 8.6 MG/DL (ref 8.4–10.2)
CARDIAC TROPONIN I PNL SERPL HS: 10 NG/L (ref ?–50)
CHLORIDE SERPL-SCNC: 110 MMOL/L (ref 96–108)
CO2 SERPL-SCNC: 24 MMOL/L (ref 21–32)
CREAT SERPL-MCNC: 0.9 MG/DL (ref 0.6–1.3)
EOSINOPHIL # BLD AUTO: 0.07 THOUSAND/ÂΜL (ref 0–0.61)
EOSINOPHIL NFR BLD AUTO: 1 % (ref 0–6)
ERYTHROCYTE [DISTWIDTH] IN BLOOD BY AUTOMATED COUNT: 12.3 % (ref 11.6–15.1)
GFR SERPL CREATININE-BSD FRML MDRD: 88 ML/MIN/1.73SQ M
GLUCOSE SERPL-MCNC: 114 MG/DL (ref 65–140)
GLUCOSE SERPL-MCNC: 95 MG/DL (ref 65–140)
HCT VFR BLD AUTO: 41.5 % (ref 36.5–49.3)
HGB BLD-MCNC: 14.5 G/DL (ref 12–17)
IMM GRANULOCYTES # BLD AUTO: 0.02 THOUSAND/UL (ref 0–0.2)
IMM GRANULOCYTES NFR BLD AUTO: 0 % (ref 0–2)
LYMPHOCYTES # BLD AUTO: 1.2 THOUSANDS/ÂΜL (ref 0.6–4.47)
LYMPHOCYTES NFR BLD AUTO: 11 % (ref 14–44)
MCH RBC QN AUTO: 30.4 PG (ref 26.8–34.3)
MCHC RBC AUTO-ENTMCNC: 34.9 G/DL (ref 31.4–37.4)
MCV RBC AUTO: 87 FL (ref 82–98)
MONOCYTES # BLD AUTO: 0.78 THOUSAND/ÂΜL (ref 0.17–1.22)
MONOCYTES NFR BLD AUTO: 7 % (ref 4–12)
NEUTROPHILS # BLD AUTO: 8.91 THOUSANDS/ÂΜL (ref 1.85–7.62)
NEUTS SEG NFR BLD AUTO: 81 % (ref 43–75)
NRBC BLD AUTO-RTO: 0 /100 WBCS
PLATELET # BLD AUTO: 206 THOUSANDS/UL (ref 149–390)
PMV BLD AUTO: 10.7 FL (ref 8.9–12.7)
POTASSIUM SERPL-SCNC: 4 MMOL/L (ref 3.5–5.3)
PROT SERPL-MCNC: 6 G/DL (ref 6.4–8.4)
RBC # BLD AUTO: 4.77 MILLION/UL (ref 3.88–5.62)
SODIUM SERPL-SCNC: 140 MMOL/L (ref 135–147)
WBC # BLD AUTO: 11.01 THOUSAND/UL (ref 4.31–10.16)

## 2025-05-21 PROCEDURE — 80053 COMPREHEN METABOLIC PANEL: CPT | Performed by: EMERGENCY MEDICINE

## 2025-05-21 PROCEDURE — 93005 ELECTROCARDIOGRAM TRACING: CPT

## 2025-05-21 PROCEDURE — 85025 COMPLETE CBC W/AUTO DIFF WBC: CPT | Performed by: EMERGENCY MEDICINE

## 2025-05-21 PROCEDURE — 99284 EMERGENCY DEPT VISIT MOD MDM: CPT

## 2025-05-21 PROCEDURE — 82948 REAGENT STRIP/BLOOD GLUCOSE: CPT

## 2025-05-21 PROCEDURE — 84484 ASSAY OF TROPONIN QUANT: CPT | Performed by: EMERGENCY MEDICINE

## 2025-05-21 PROCEDURE — 36415 COLL VENOUS BLD VENIPUNCTURE: CPT | Performed by: EMERGENCY MEDICINE

## 2025-05-21 PROCEDURE — 72125 CT NECK SPINE W/O DYE: CPT

## 2025-05-21 PROCEDURE — 70450 CT HEAD/BRAIN W/O DYE: CPT

## 2025-05-22 VITALS
RESPIRATION RATE: 18 BRPM | OXYGEN SATURATION: 98 % | TEMPERATURE: 98.5 F | HEART RATE: 73 BPM | SYSTOLIC BLOOD PRESSURE: 140 MMHG | DIASTOLIC BLOOD PRESSURE: 74 MMHG

## 2025-05-22 LAB
2HR DELTA HS TROPONIN: 1 NG/L
ATRIAL RATE: 87 BPM
CARDIAC TROPONIN I PNL SERPL HS: 11 NG/L (ref ?–50)
PR INTERVAL: 200 MS
QRS AXIS: -81 DEGREES
QRSD INTERVAL: 128 MS
QT INTERVAL: 366 MS
QTC INTERVAL: 440 MS
T WAVE AXIS: 71 DEGREES
VENTRICULAR RATE: 87 BPM

## 2025-05-22 PROCEDURE — 93010 ELECTROCARDIOGRAM REPORT: CPT | Performed by: INTERNAL MEDICINE

## 2025-05-22 PROCEDURE — 84484 ASSAY OF TROPONIN QUANT: CPT | Performed by: EMERGENCY MEDICINE

## 2025-05-22 PROCEDURE — 99284 EMERGENCY DEPT VISIT MOD MDM: CPT | Performed by: EMERGENCY MEDICINE

## 2025-05-22 PROCEDURE — 36415 COLL VENOUS BLD VENIPUNCTURE: CPT | Performed by: EMERGENCY MEDICINE

## 2025-05-22 NOTE — ED PROVIDER NOTES
Time reflects when diagnosis was documented in both MDM as applicable and the Disposition within this note       Time User Action Codes Description Comment    5/22/2025  2:18 AM Don Fregoso [R55] Syncope     5/22/2025  2:18 AM Don Fregoso [W19.XXXA] Fall, initial encounter           ED Disposition       ED Disposition   Discharge    Condition   Stable    Date/Time   Thu May 22, 2025  2:18 AM    Comment   Michael Alejandro discharge to home/self care.                   Assessment & Plan       Medical Decision Making  69-year-old male with likely syncopal episode with fall head strike and laceration.  Will check CT head and C-spine, workup for syncope including labs EKG serial troponins and observation.  Laceration repaired with glue at bedside.  Wound was irrigated, repaired via hair apposition technique.  The patient tolerated this well.      Workup is reassuring, patient's vital signs are stable, he is asymptomatic and requesting discharge at this is reasonable, recommended he follow-up with his PCP.  Reviewed return precautions.    Amount and/or Complexity of Data Reviewed  Labs: ordered.  Radiology: ordered.             Medications - No data to display    ED Risk Strat Scores                    No data recorded        SBIRT 20yo+      Flowsheet Row Most Recent Value   Initial Alcohol Screen: US AUDIT-C     1. How often do you have a drink containing alcohol? 0 Filed at: 05/21/2025 2254   2. How many drinks containing alcohol do you have on a typical day you are drinking?  0 Filed at: 05/21/2025 2254   3a. Male UNDER 65: How often do you have five or more drinks on one occasion? 0 Filed at: 05/21/2025 2254   3b. FEMALE Any Age, or MALE 65+: How often do you have 4 or more drinks on one occassion? 0 Filed at: 05/21/2025 2254   Audit-C Score 0 Filed at: 05/21/2025 2254   SUSAN: How many times in the past year have you...    Used an illegal drug or used a prescription medication for non-medical reasons? Never  Filed at: 05/21/2025 5824                            History of Present Illness       Chief Complaint   Patient presents with    Fall     Pt states was shooting pool around 2045 when dizzy and blacked out fell +HS, +LOC, -BT. Pt has a hematoma with bleeding controlled. -ETOH. Hx DM.        Past Medical History[1]   Past Surgical History[2]   Family History[3]   Social History[4]   E-Cigarette/Vaping    E-Cigarette Use Never User       E-Cigarette/Vaping Substances    Nicotine No     THC No     CBD No     Flavoring No     Other No     Unknown No       I have reviewed and agree with the history as documented.     69-year-old male presenting to the emergency department for evaluation of syncopal episode.  Patient states he was playing pool, was clear that he was not drinking, had a syncopal episode.  Did feel somewhat lightheaded before.  Fell backwards and hit his head sustained a laceration presented because he kept bleeding.        Review of Systems   Constitutional:  Negative for appetite change, chills, fatigue and fever.   HENT:  Negative for sneezing and sore throat.    Eyes:  Negative for visual disturbance.   Respiratory:  Negative for cough, choking, chest tightness, shortness of breath and wheezing.    Cardiovascular:  Negative for chest pain and palpitations.   Gastrointestinal:  Negative for abdominal pain, constipation, diarrhea, nausea and vomiting.   Genitourinary:  Negative for difficulty urinating and dysuria.   Skin:  Positive for wound.   Neurological:  Positive for syncope. Negative for dizziness, weakness, light-headedness, numbness and headaches.   All other systems reviewed and are negative.          Objective       ED Triage Vitals   Temperature Pulse Blood Pressure Respirations SpO2 Patient Position - Orthostatic VS   05/21/25 2145 05/21/25 2145 05/21/25 2145 05/21/25 2145 05/21/25 2145 05/21/25 2145   98.5 °F (36.9 °C) 82 130/70 16 97 % Sitting      Temp Source Heart Rate Source BP Location  FiO2 (%) Pain Score    05/21/25 2145 05/21/25 2200 05/21/25 2145 -- 05/22/25 0200    Temporal Monitor Left arm  No Pain      Vitals      Date and Time Temp Pulse SpO2 Resp BP Pain Score FACES Pain Rating User   05/22/25 0200 -- 73 98 % 18 140/74 -- -- CO   05/22/25 0200 -- -- -- -- -- No Pain -- NN   05/22/25 0100 -- 72 95 % 14 111/64 -- -- NN   05/22/25 0000 -- 77 98 % 20 123/64 -- -- NN   05/21/25 2300 -- 80 96 % 18 119/72 -- -- TS   05/21/25 2222 -- 86 95 % 36 -- -- --    05/21/25 2215 -- 83 96 % 18 135/76 -- --    05/21/25 2200 -- 86 95 % 18 132/75 -- --    05/21/25 2148 -- -- 96 % -- -- -- --    05/21/25 2145 98.5 °F (36.9 °C) 82 97 % 16 130/70 -- -- SA            Physical Exam  Constitutional:       General: He is not in acute distress.     Appearance: He is well-developed. He is not diaphoretic.   HENT:      Head: Normocephalic. Laceration present.       Eyes:      Pupils: Pupils are equal, round, and reactive to light.     Neck:      Vascular: No JVD.      Trachea: No tracheal deviation.     Cardiovascular:      Rate and Rhythm: Normal rate and regular rhythm.      Heart sounds: Normal heart sounds. No murmur heard.     No friction rub. No gallop.   Pulmonary:      Effort: Pulmonary effort is normal. No respiratory distress.      Breath sounds: Normal breath sounds. No wheezing or rales.   Abdominal:      General: Bowel sounds are normal. There is no distension.      Palpations: Abdomen is soft.      Tenderness: There is no abdominal tenderness. There is no guarding or rebound.     Skin:     General: Skin is warm and dry.      Coloration: Skin is not pale.     Neurological:      Mental Status: He is alert and oriented to person, place, and time.      Cranial Nerves: No cranial nerve deficit.      Motor: No abnormal muscle tone.     Psychiatric:         Behavior: Behavior normal.         Results Reviewed       Procedure Component Value Units Date/Time    HS Troponin I 2hr [[id number]]  (Normal)  Collected: 05/22/25 0055    Lab Status: Final result Specimen: Blood from Arm, Left Updated: 05/22/25 0123     hs TnI 2hr 11 ng/L      Delta 2hr hsTnI 1 ng/L     HS Troponin 0hr (reflex protocol) [[id number]]  (Normal) Collected: 05/21/25 2253    Lab Status: Final result Specimen: Blood from Arm, Left Updated: 05/21/25 2326     hs TnI 0hr 10 ng/L     Comprehensive metabolic panel [[id number]]  (Abnormal) Collected: 05/21/25 2253    Lab Status: Final result Specimen: Blood from Arm, Left Updated: 05/21/25 2320     Sodium 140 mmol/L      Potassium 4.0 mmol/L      Chloride 110 mmol/L      CO2 24 mmol/L      ANION GAP 6 mmol/L      BUN 14 mg/dL      Creatinine 0.90 mg/dL      Glucose 95 mg/dL      Calcium 8.6 mg/dL      AST 24 U/L      ALT 27 U/L      Alkaline Phosphatase 61 U/L      Total Protein 6.0 g/dL      Albumin 4.2 g/dL      Total Bilirubin 0.70 mg/dL      eGFR 88 ml/min/1.73sq m     Narrative:      National Kidney Disease Foundation guidelines for Chronic Kidney Disease (CKD):     Stage 1 with normal or high GFR (GFR > 90 mL/min/1.73 square meters)    Stage 2 Mild CKD (GFR = 60-89 mL/min/1.73 square meters)    Stage 3A Moderate CKD (GFR = 45-59 mL/min/1.73 square meters)    Stage 3B Moderate CKD (GFR = 30-44 mL/min/1.73 square meters)    Stage 4 Severe CKD (GFR = 15-29 mL/min/1.73 square meters)    Stage 5 End Stage CKD (GFR <15 mL/min/1.73 square meters)  Note: GFR calculation is accurate only with a steady state creatinine    CBC and differential [[id number]]  (Abnormal) Collected: 05/21/25 2253    Lab Status: Final result Specimen: Blood from Arm, Left Updated: 05/21/25 2304     WBC 11.01 Thousand/uL      RBC 4.77 Million/uL      Hemoglobin 14.5 g/dL      Hematocrit 41.5 %      MCV 87 fL      MCH 30.4 pg      MCHC 34.9 g/dL      RDW 12.3 %      MPV 10.7 fL      Platelets 206 Thousands/uL      nRBC 0 /100 WBCs      Segmented % 81 %      Immature Grans % 0 %      Lymphocytes % 11 %      Monocytes % 7 %       Eosinophils Relative 1 %      Basophils Relative 0 %      Absolute Neutrophils 8.91 Thousands/µL      Absolute Immature Grans 0.02 Thousand/uL      Absolute Lymphocytes 1.20 Thousands/µL      Absolute Monocytes 0.78 Thousand/µL      Eosinophils Absolute 0.07 Thousand/µL      Basophils Absolute 0.03 Thousands/µL     Fingerstick Glucose (POCT) [[id number]]  (Normal) Collected: 05/21/25 2208    Lab Status: Final result Specimen: Blood Updated: 05/21/25 2209     POC Glucose 114 mg/dl             CT head without contrast   Final Interpretation by Bhupendra Julio DO (05/21 2340)      No acute intracranial abnormality. Mild microangiopathic changes.      Soft tissue swelling with superimposed scalp hematoma overlying the left parietal occipital calvarium. No underlying acute depressed calvarial fracture.                  Workstation performed: [id number]         CT spine cervical without contrast   Final Interpretation by Bhupendra Julio DO (05/21 2352)      No acute vertebral body compression fracture. No prevertebral soft tissue swelling. Moderate to marked degree of central canal narrowing at C3/C4, C4/C5, and C5/C6.      Incidentally noted are prominent/elongated styloid process/calcified stylohyoid ligament bilaterally.                           Workstation performed: [id number]             Procedures    ED Medication and Procedure Management   Prior to Admission Medications   Prescriptions Last Dose Informant Patient Reported? Taking?   Dulaglutide (Trulicity) 0.75 MG/0.5ML SOAJ  Self No No   Sig: Inject 0.75 mg under the skin once a week   Empagliflozin (Jardiance) 25 MG TABS  Self No No   Sig: TAKE 1 TABLET DAILY   Patient not taking: Reported on 4/9/2025   Icosapent Ethyl 1 g CAPS  Self No No   Sig: TAKE 2 CAPSULES IN THE MORNING AND 2 CAPSULES AT NIGHT   Iron-Vitamin C (Vitron-C)  MG TABS  Self No No   Sig: Daily   Patient not taking: Reported on 4/9/2025   Multiple Vitamins-Minerals (CENTRUM ADULTS PO)   Self Yes No   Sig: Take 1 tablet by mouth daily   TURMERIC PO  Self Yes No   Sig: Take by mouth in the morning   VITAMIN D PO  Self Yes No   Sig: Take by mouth in the morning   amLODIPine (NORVASC) 5 mg tablet  Self No No   Sig: TAKE 1 TABLET DAILY   amoxicillin (AMOXIL) 500 mg capsule  Self Yes No   Patient not taking: Reported on 4/9/2025   buPROPion (WELLBUTRIN XL) 300 mg 24 hr tablet  Self No No   Sig: TAKE 1 TABLET DAILY   calcium citrate-Vitamin D 200 mg-250 units  Self Yes No   Sig: Take 1 tablet by mouth daily   calcium polycarbophil (FIBERCON) 625 mg tablet  Self Yes No   Sig: Take 625 mg by mouth 2 (two) times a day   Patient not taking: Reported on 4/9/2025   clindamycin (CLINDAGEL) 1 % gel  Self Yes No   Sig: if needed   Patient not taking: Reported on 4/9/2025   losartan (COZAAR) 100 MG tablet   No No   Sig: TAKE 1 TABLET DAILY   mometasone (ELOCON) 0.1 % ointment  Self Yes No   Sig: APPLY TO DRY SKIN ON LEGS NIGHTLY IF NEEDED   Patient not taking: Reported on 4/9/2025   pantoprazole (PROTONIX) 40 mg tablet  Self No No   Sig: Take 1 tablet (40 mg total) by mouth every 12 (twelve) hours   potassium chloride (Klor-Con M20) 20 mEq tablet   No No   Sig: Take 1 tablet (20 mEq total) by mouth daily   rosuvastatin (CRESTOR) 5 mg tablet  Self No No   Sig: Take 1 tablet (5 mg total) by mouth daily      Facility-Administered Medications: None     Discharge Medication List as of 5/22/2025  2:18 AM        CONTINUE these medications which have NOT CHANGED    Details   amLODIPine (NORVASC) 5 mg tablet TAKE 1 TABLET DAILY, Normal      amoxicillin (AMOXIL) 500 mg capsule Historical Med      buPROPion (WELLBUTRIN XL) 300 mg 24 hr tablet TAKE 1 TABLET DAILY, Normal      calcium citrate-Vitamin D 200 mg-250 units Take 1 tablet by mouth daily, Historical Med      calcium polycarbophil (FIBERCON) 625 mg tablet Take 625 mg by mouth 2 (two) times a day, Historical Med      clindamycin (CLINDAGEL) 1 % gel if needed, Starting  Mon 11/11/2019, Historical Med      Dulaglutide (Trulicity) 0.75 MG/0.5ML SOAJ Inject 0.75 mg under the skin once a week, Starting Thu 3/27/2025, Until Wed 6/25/2025, Normal      Empagliflozin (Jardiance) 25 MG TABS TAKE 1 TABLET DAILY, Starting Tue 1/21/2025, Normal      Icosapent Ethyl 1 g CAPS TAKE 2 CAPSULES IN THE MORNING AND 2 CAPSULES AT NIGHT, Normal      Iron-Vitamin C (Vitron-C)  MG TABS Daily, No Print      losartan (COZAAR) 100 MG tablet TAKE 1 TABLET DAILY, Starting Mon 4/28/2025, Normal      mometasone (ELOCON) 0.1 % ointment APPLY TO DRY SKIN ON LEGS NIGHTLY IF NEEDED, Historical Med      Multiple Vitamins-Minerals (CENTRUM ADULTS PO) Take 1 tablet by mouth daily, Historical Med      pantoprazole (PROTONIX) 40 mg tablet Take 1 tablet (40 mg total) by mouth every 12 (twelve) hours, Starting Mon 9/16/2024, Normal      potassium chloride (Klor-Con M20) 20 mEq tablet Take 1 tablet (20 mEq total) by mouth daily, Starting Thu 4/17/2025, Normal      rosuvastatin (CRESTOR) 5 mg tablet Take 1 tablet (5 mg total) by mouth daily, Starting Fri 12/20/2024, Normal      TURMERIC PO Take by mouth in the morning, Historical Med      VITAMIN D PO Take by mouth in the morning, Historical Med           No discharge procedures on file.  ED SEPSIS DOCUMENTATION   Time reflects when diagnosis was documented in both MDM as applicable and the Disposition within this note       Time User Action Codes Description Comment    5/22/2025  2:18 AM Don Fregoso [R55] Syncope     5/22/2025  2:18 AM Don Fregoso [W19.XXXA] Fall, initial encounter                    [1]   Past Medical History:  Diagnosis Date    Chronic kidney disease     CPAP (continuous positive airway pressure) dependence     Diabetes (HCC)     LAST ASSESSED: 7/23/14    Diabetes mellitus (HCC)     Edema     LAST ASSESSED:6/12/12    Gait instability 02/05/2024    GERD (gastroesophageal reflux disease)     Hyperlipidemia     Hypertension     Kidney stone      Morbid obesity due to excess calories (HCC)     LAST ASSESSED: 12    RBBB     Sleep apnea, obstructive     cpap   [2]   Past Surgical History:  Procedure Laterality Date    BACK SURGERY      L5    CHOLECYSTECTOMY      COLONOSCOPY      EGD      FOOT SURGERY Right     many surgeries    GASTRIC RESTRICTION SURGERY      GASTRIC STAPLING FOR MORBID OBESITY LAPAROSCOPY W/ [id number]    HEMORRHOID SURGERY N/A 2024    Procedure: vs THD;  Surgeon: SILAS Logan MD;  Location: [facility] MAIN OR;  Service: Colorectal    KIDNEY STONE SURGERY      SHOULDER SURGERY Right     UVULOPALATOPLASTY     [3]   Family History  Problem Relation Name Age of Onset    COPD Mother      Heart disease Mother      Mental illness Mother      Drug abuse Mother      Diabetes Father      Mental illness Father      Drug abuse Father      Lung cancer Father      Diabetes Sister     [4]   Social History  Tobacco Use    Smoking status: Former     Current packs/day: 0.00     Average packs/day: 2.0 packs/day for 15.0 years (30.0 ttl pk-yrs)     Types: Cigarettes     Start date: 1971     Quit date:      Years since quittin.4    Smokeless tobacco: Never   Vaping Use    Vaping status: Never Used   Substance Use Topics    Alcohol use: No    Drug use: Yes     Frequency: 7.0 times per week     Types: Marijuana     Comment: selina Fregoso MD  25 0521

## 2025-05-26 DIAGNOSIS — F32.A DEPRESSION, UNSPECIFIED DEPRESSION TYPE: ICD-10-CM

## 2025-05-27 RX ORDER — BUPROPION HYDROCHLORIDE 300 MG/1
300 TABLET ORAL DAILY
Qty: 90 TABLET | Refills: 1 | Status: SHIPPED | OUTPATIENT
Start: 2025-05-27

## 2025-06-02 ENCOUNTER — OFFICE VISIT (OUTPATIENT)
Dept: FAMILY MEDICINE CLINIC | Facility: CLINIC | Age: 70
End: 2025-06-02
Payer: MEDICARE

## 2025-06-02 ENCOUNTER — TELEPHONE (OUTPATIENT)
Dept: ADMINISTRATIVE | Facility: OTHER | Age: 70
End: 2025-06-02

## 2025-06-02 VITALS
WEIGHT: 230.4 LBS | DIASTOLIC BLOOD PRESSURE: 80 MMHG | HEIGHT: 70 IN | OXYGEN SATURATION: 98 % | BODY MASS INDEX: 32.99 KG/M2 | SYSTOLIC BLOOD PRESSURE: 140 MMHG | RESPIRATION RATE: 18 BRPM | HEART RATE: 76 BPM

## 2025-06-02 DIAGNOSIS — R80.9 TYPE 2 DIABETES MELLITUS WITH MICROALBUMINURIA, WITHOUT LONG-TERM CURRENT USE OF INSULIN (HCC): ICD-10-CM

## 2025-06-02 DIAGNOSIS — G89.29 CHRONIC MIDLINE BACK PAIN, UNSPECIFIED BACK LOCATION: ICD-10-CM

## 2025-06-02 DIAGNOSIS — E11.29 TYPE 2 DIABETES MELLITUS WITH MICROALBUMINURIA, WITHOUT LONG-TERM CURRENT USE OF INSULIN (HCC): ICD-10-CM

## 2025-06-02 DIAGNOSIS — M54.9 CHRONIC MIDLINE BACK PAIN, UNSPECIFIED BACK LOCATION: ICD-10-CM

## 2025-06-02 DIAGNOSIS — I10 PRIMARY HYPERTENSION: ICD-10-CM

## 2025-06-02 DIAGNOSIS — R55 SYNCOPE, UNSPECIFIED SYNCOPE TYPE: Primary | ICD-10-CM

## 2025-06-02 LAB — SL AMB POCT HEMOGLOBIN AIC: 5.2 (ref ?–6.5)

## 2025-06-02 PROCEDURE — G2211 COMPLEX E/M VISIT ADD ON: HCPCS | Performed by: NURSE PRACTITIONER

## 2025-06-02 PROCEDURE — 83036 HEMOGLOBIN GLYCOSYLATED A1C: CPT | Performed by: NURSE PRACTITIONER

## 2025-06-02 PROCEDURE — 99214 OFFICE O/P EST MOD 30 MIN: CPT | Performed by: NURSE PRACTITIONER

## 2025-06-02 NOTE — LETTER
Diabetic Eye Exam Form    Date Requested: 25  Patient: Michael Alejandro  Patient : 1955   Referring Provider: TIFFANY Hernandez      DIABETIC Eye Exam Date _______________________________      Type of Exam MUST be documented for Diabetic Eye Exams. Please CHECK ONE.     Retinal Exam       Dilated Retinal Exam       OCT       Optomap-Iris Exam      Fundus Photography       Left Eye - Please check Retinopathy or No Retinopathy        Exam did show retinopathy    Exam did not show retinopathy       Right Eye - Please check Retinopathy or No Retinopathy       Exam did show retinopathy    Exam did not show retinopathy       Comments __________________________________________________________    Practice Providing Exam ______________________________________________    Exam Performed By (print name) _______________________________________      Provider Signature ___________________________________________________      These reports are needed for  compliance.    Please fax this completed form and a copy of the Diabetic Eye Exam report to the [facility] Based Department as soon as possible via Fax [phone], attention Jese: Phone [phone]. Our office is located at [address] [place] [address].     We thank you for your assistance in treating our mutual patient.

## 2025-06-02 NOTE — TELEPHONE ENCOUNTER
Upon review of the In Basket request and the patient's chart, initial outreach has been made via fax to facility. Please see Contacts section for details.     Thank you  Jese Quiñones MA

## 2025-06-02 NOTE — ASSESSMENT & PLAN NOTE
Lab Results   Component Value Date    HGBA1C 5.2 06/02/2025     A1c at goal.  To continue to manage with diet and exercise.  Patient is up-to-date on eye exam, is seen at [facility].  Will call for report.  Orders:    POCT hemoglobin A1c

## 2025-06-02 NOTE — LETTER
Diabetic Eye Exam Form    Date Requested: 25  Patient: Michael Alejandro  Patient : 1955   Referring Provider: TIFFANY Hernandez      DIABETIC Eye Exam Date _______________________________      Type of Exam MUST be documented for Diabetic Eye Exams. Please CHECK ONE.     Retinal Exam       Dilated Retinal Exam       OCT       Optomap-Iris Exam      Fundus Photography       Left Eye - Please check Retinopathy or No Retinopathy        Exam did show retinopathy    Exam did not show retinopathy       Right Eye - Please check Retinopathy or No Retinopathy       Exam did show retinopathy    Exam did not show retinopathy       Comments __________________________________________________________    Practice Providing Exam ______________________________________________    Exam Performed By (print name) _______________________________________      Provider Signature ___________________________________________________      These reports are needed for  compliance.    Please fax this completed form and a copy of the Diabetic Eye Exam report to the [facility] Based Department as soon as possible via Fax [phone], attention Jese: Phone [phone]. Our office is located at [address] [place] [address].     We thank you for your assistance in treating our mutual patient.

## 2025-06-02 NOTE — PROGRESS NOTES
Name: Michael Alejandro      : 1955      MRN: [id number]  Encounter Provider: TIFFANY Hernandez  Encounter Date: 2025   Encounter department: [facility] [address]  :  Assessment & Plan  Syncope, unspecified syncope type  Patient experienced a syncopal episode on 2025 presenting to the emergency room.  Lab work, CAT scan of head, and cervical spine, negative for acute changes.  He did sustain a laceration of his scalp which was closed with Dermabond.  He has underwent a carotid Doppler, echocardiogram, and stress test as performed by patient's cardiologist Dr. Guardado in the last 6 months.  Denies cardiac symptoms, change in vision, or headaches.  He does report to drinking little to no water a day.  He primarily drinks iced tea or coffee.  Typically drinks about 40 ounces of coffee a day.  Orthostatic blood pressures negative while in office.  To follow-up with Dr. Guardado to make aware of new symptoms.  Stressed the importance of drinking at least 50 to 60 ounces of water a day.  Follow-up in 2 months or sooner if needed.       Type 2 diabetes mellitus with microalbuminuria, without long-term current use of insulin ([facility])    Lab Results   Component Value Date    HGBA1C 5.2 2025     A1c at goal.  To continue to manage with diet and exercise.  Patient is up-to-date on eye exam, [facility].  Will call for report.  Orders:    POCT hemoglobin A1c    Chronic midline back pain, unspecified back location  To obtain x-ray, will call with results.  Provided referral to physical therapy for strengthening and stretching.  Orders:    Ambulatory referral to Physical Therapy; Future    XR spine lumbar minimum 4 views non injury; Future    XR spine thoracic 3 vw; Future    Primary hypertension  Blood pressure acceptable.  To continue amlodipine 5 mg daily.              History of Present Illness   Michael presents for an ER follow-up.  Last Wednesday while shooting  "pool he developed dizziness and had a syncopal episode.  He presented to the ER.  Lab work, CAT scan of his head and neck were obtained.  An EKG was also obtained.  No acute changes.  He sustained a laceration to his scalp which required Dermabond.  He attributes this to an increase amount of stress as his girlfriend of 15 years was recently diagnosed with cancer and is terminally ill.  He does report to drinking primarily iced tea and 40 ounces of coffee a day.  He does not drink water.  He eats small meals throughout the day.  Denies chest pain, headache, dizziness, weakness in extremities, or change in vision.  He also reports thoracic and lumbar back pain which has been present for many years.  He previously had surgery in his lumbar spine.  Michael describes this as a stiffening when he sits for prolonged period of time or first thing in the morning.      Review of Systems   Constitutional: Negative.    HENT: Negative.     Eyes: Negative.    Respiratory: Negative.     Cardiovascular: Negative.    Gastrointestinal: Negative.    Endocrine: Negative.    Genitourinary: Negative.    Musculoskeletal:  Positive for back pain.   Skin:  Positive for wound.   Allergic/Immunologic: Negative.    Neurological:  Positive for dizziness.   Hematological: Negative.    Psychiatric/Behavioral: Negative.         Objective   /80 (BP Location: Left arm, Patient Position: Standing)   Pulse 76   Resp 18   Ht 5' 10\" (1.778 m)   Wt 105 kg (230 lb 6.4 oz)   SpO2 98%   BMI 33.06 kg/m²      Physical Exam  Vitals and nursing note reviewed.   Constitutional:       General: He is not in acute distress.     Appearance: Normal appearance. He is well-developed. He is not ill-appearing, toxic-appearing or diaphoretic.   HENT:      Head: Normocephalic and atraumatic.     Eyes:      Conjunctiva/sclera: Conjunctivae normal.       Cardiovascular:      Rate and Rhythm: Normal rate and regular rhythm.      Heart sounds: Normal heart sounds. " No murmur heard.  Pulmonary:      Effort: Pulmonary effort is normal. No respiratory distress.      Breath sounds: Normal breath sounds. No wheezing or rales.   Chest:      Chest wall: No tenderness.     Musculoskeletal:      Cervical back: Neck supple.      Comments: Well-healed surgical incision in lumbar spine.  No deformities in thoracic or lumbar spine.     Skin:     General: Skin is warm and dry.      Capillary Refill: Capillary refill takes less than 2 seconds.      Comments: Presence of a well approximated laceration on the crown of head to the left of midline. No erythema, warmth or drainage.      Neurological:      General: No focal deficit present.      Mental Status: He is alert and oriented to person, place, and time.     Psychiatric:         Mood and Affect: Mood normal.         Behavior: Behavior normal.         Thought Content: Thought content normal.         Judgment: Judgment normal.

## 2025-06-02 NOTE — TELEPHONE ENCOUNTER
----- Message from Abigail ZHENG sent at 6/2/2025  9:22 AM EDT -----  Regarding: diabetic eye exam  06/02/25 9:22 Shanon, our patient Michael Alejandro has had Diabetic Eye Exam completed/performed. Please assist in updating the patient chart by making an External outreach to [facility] eye  facility located in [place]. The date of service is October 2024.Thank you,MARIANNE Banks FP [address]

## 2025-06-06 NOTE — TELEPHONE ENCOUNTER
As a follow-up, a second attempt has been made for outreach via fax to facility. Please see Contacts section for details.    Thank you  Jese Quiñones MA

## 2025-06-12 NOTE — TELEPHONE ENCOUNTER
Upon review of the In Basket request we were able to locate, review, and update the patient chart as requested for Diabetic Eye Exam.    Any additional questions or concerns should be emailed to the Practice Liaisons via the appropriate education email address, please do not reply via In Basket.    Thank you  Jese Quiñones MA   [facility]

## 2025-06-13 DIAGNOSIS — E78.1 HYPERTRIGLYCERIDEMIA: ICD-10-CM

## 2025-06-14 RX ORDER — ROSUVASTATIN CALCIUM 5 MG/1
5 TABLET, COATED ORAL DAILY
Qty: 90 TABLET | Refills: 3 | Status: SHIPPED | OUTPATIENT
Start: 2025-06-14

## 2025-07-02 NOTE — PATIENT INSTRUCTIONS
Patient is here for vitamin b-12 injection No-Patient/Caregiver offered and refused free interpretation services.

## 2025-07-10 DIAGNOSIS — E11.29 TYPE 2 DIABETES MELLITUS WITH MICROALBUMINURIA, WITHOUT LONG-TERM CURRENT USE OF INSULIN (HCC): Primary | ICD-10-CM

## 2025-07-10 DIAGNOSIS — R80.9 TYPE 2 DIABETES MELLITUS WITH MICROALBUMINURIA, WITHOUT LONG-TERM CURRENT USE OF INSULIN (HCC): Primary | ICD-10-CM

## 2025-07-10 RX ORDER — DULAGLUTIDE 0.75 MG/.5ML
0.75 INJECTION, SOLUTION SUBCUTANEOUS WEEKLY
Qty: 6 ML | Refills: 2 | Status: SHIPPED | OUTPATIENT
Start: 2025-07-10

## 2025-07-21 DIAGNOSIS — E87.6 HYPOKALEMIA: ICD-10-CM

## 2025-07-22 RX ORDER — POTASSIUM CHLORIDE 1500 MG/1
20 TABLET, EXTENDED RELEASE ORAL DAILY
Qty: 90 TABLET | Refills: 1 | Status: SHIPPED | OUTPATIENT
Start: 2025-07-22

## 2025-07-24 ENCOUNTER — TELEPHONE (OUTPATIENT)
Dept: NEPHROLOGY | Facility: CLINIC | Age: 70
End: 2025-07-24

## 2025-08-04 DIAGNOSIS — I10 HYPERTENSION, UNSPECIFIED TYPE: ICD-10-CM

## 2025-08-05 RX ORDER — LOSARTAN POTASSIUM 100 MG/1
100 TABLET ORAL DAILY
Qty: 90 TABLET | Refills: 1 | Status: SHIPPED | OUTPATIENT
Start: 2025-08-05

## (undated) DEVICE — THD KIT

## (undated) DEVICE — INTENDED FOR TISSUE SEPARATION, AND OTHER PROCEDURES THAT REQUIRE A SHARP SURGICAL BLADE TO PUNCTURE OR CUT.: Brand: BARD-PARKER SAFETY BLADES SIZE 10, STERILE

## (undated) DEVICE — GLOVE SRG BIOGEL 7.5

## (undated) DEVICE — DECANTER: Brand: UNBRANDED

## (undated) DEVICE — 3M™ DURAPORE™ SURGICAL TAPE 1538-3, 3 INCH X 10 YARD (7,5CM X 9,1M), 4 ROLLS/BOX: Brand: 3M™ DURAPORE™

## (undated) DEVICE — GAUZE SPONGES,16 PLY: Brand: CURITY

## (undated) DEVICE — PLUMEPEN PRO 10FT

## (undated) DEVICE — GLOVE INDICATOR PI UNDERGLOVE SZ 8 BLUE

## (undated) DEVICE — TINCTURE OF BENZOIN SKIN PREP SPRAY IS A SKIN PREP SPRAY THAT ENHANCES THE ADHESION OF TAPE/APPLIANCE WHILE PROTECTING SENSITIVE SKIN FROM ADHESIVES AND BODY FLUIDS.: Brand: TINCTURE OF BENZOIN SPRAY 4OZ US

## (undated) DEVICE — HYDROGEN PEROXIDE 3 PCT 4OZ

## (undated) DEVICE — SUT CHROMIC 2-0 CT-2 27 IN 883H

## (undated) DEVICE — POOLE SUCTION HANDLE: Brand: CARDINAL HEALTH

## (undated) DEVICE — PENCIL ELECTROSURG E-Z CLEAN -0035H

## (undated) DEVICE — BETHLEHEM UNIVERSAL MINOR GEN: Brand: CARDINAL HEALTH

## (undated) DEVICE — TUBING SUCTION 5MM X 12 FT

## (undated) DEVICE — STERILE SURGICAL LUBRICANT,  TUBE: Brand: SURGILUBE

## (undated) DEVICE — SPONGE STICK WITH PVP-I: Brand: KENDALL

## (undated) DEVICE — BASIC SINGLE BASIN 2-LF: Brand: MEDLINE INDUSTRIES, INC.

## (undated) DEVICE — NEEDLE 25G X 1 1/2